# Patient Record
Sex: FEMALE | Race: WHITE | NOT HISPANIC OR LATINO | Employment: OTHER | URBAN - NONMETROPOLITAN AREA
[De-identification: names, ages, dates, MRNs, and addresses within clinical notes are randomized per-mention and may not be internally consistent; named-entity substitution may affect disease eponyms.]

---

## 2017-03-27 ENCOUNTER — OFFICE VISIT (OUTPATIENT)
Dept: INTERNAL MEDICINE | Facility: CLINIC | Age: 62
End: 2017-03-27

## 2017-03-27 VITALS
DIASTOLIC BLOOD PRESSURE: 60 MMHG | WEIGHT: 134 LBS | BODY MASS INDEX: 21.03 KG/M2 | HEART RATE: 87 BPM | HEIGHT: 67 IN | OXYGEN SATURATION: 94 % | SYSTOLIC BLOOD PRESSURE: 100 MMHG

## 2017-03-27 DIAGNOSIS — F32.A DEPRESSION, UNSPECIFIED DEPRESSION TYPE: Primary | ICD-10-CM

## 2017-03-27 DIAGNOSIS — E78.00 ELEVATED LDL CHOLESTEROL LEVEL: ICD-10-CM

## 2017-03-27 DIAGNOSIS — E55.9 VITAMIN D DEFICIENCY: ICD-10-CM

## 2017-03-27 DIAGNOSIS — E53.8 VITAMIN B12 DEFICIENCY: ICD-10-CM

## 2017-03-27 DIAGNOSIS — Z23 NEED FOR VACCINATION: ICD-10-CM

## 2017-03-27 DIAGNOSIS — Z79.899 ENCOUNTER FOR LONG-TERM CURRENT USE OF MEDICATION: ICD-10-CM

## 2017-03-27 DIAGNOSIS — G25.2 FINE TREMOR: ICD-10-CM

## 2017-03-27 LAB
25(OH)D3+25(OH)D2 SERPL-MCNC: 27.7 NG/ML
ALBUMIN SERPL-MCNC: 4.7 G/DL (ref 3.5–5)
ALBUMIN/GLOB SERPL: 1.6 G/DL (ref 1–2)
ALP SERPL-CCNC: 79 U/L (ref 38–126)
ALT SERPL-CCNC: 24 U/L (ref 13–69)
AST SERPL-CCNC: 27 U/L (ref 15–46)
BILIRUB SERPL-MCNC: 0.8 MG/DL (ref 0.2–1.3)
BUN SERPL-MCNC: 12 MG/DL (ref 7–20)
BUN/CREAT SERPL: 17.1 (ref 7.1–23.5)
CALCIUM SERPL-MCNC: 10 MG/DL (ref 8.4–10.2)
CHLORIDE SERPL-SCNC: 106 MMOL/L (ref 98–107)
CHOLEST SERPL-MCNC: 198 MG/DL (ref 0–199)
CO2 SERPL-SCNC: 30 MMOL/L (ref 26–30)
CREAT SERPL-MCNC: 0.7 MG/DL (ref 0.6–1.3)
GLOBULIN SER CALC-MCNC: 3 GM/DL
GLUCOSE SERPL-MCNC: 86 MG/DL (ref 74–98)
HDLC SERPL-MCNC: 53 MG/DL (ref 40–60)
LDLC SERPL CALC-MCNC: 119 MG/DL (ref 0–99)
POTASSIUM SERPL-SCNC: 4.2 MMOL/L (ref 3.5–5.1)
PROT SERPL-MCNC: 7.7 G/DL (ref 6.3–8.2)
SODIUM SERPL-SCNC: 144 MMOL/L (ref 137–145)
TRIGL SERPL-MCNC: 130 MG/DL
VIT B12 SERPL-MCNC: 554 PG/ML (ref 239–931)
VLDLC SERPL CALC-MCNC: 26 MG/DL

## 2017-03-27 PROCEDURE — 99214 OFFICE O/P EST MOD 30 MIN: CPT | Performed by: FAMILY MEDICINE

## 2017-03-27 RX ORDER — ESCITALOPRAM OXALATE 10 MG/1
10 TABLET ORAL DAILY
Qty: 90 TABLET | Refills: 3 | Status: SHIPPED | OUTPATIENT
Start: 2017-03-27 | End: 2018-04-09 | Stop reason: SDUPTHER

## 2017-03-27 RX ORDER — PROPRANOLOL HYDROCHLORIDE 10 MG/1
10 TABLET ORAL 3 TIMES DAILY
Qty: 90 TABLET | Refills: 5 | Status: SHIPPED | OUTPATIENT
Start: 2017-03-27 | End: 2017-04-28 | Stop reason: SDUPTHER

## 2017-03-27 RX ORDER — ERGOCALCIFEROL 1.25 MG/1
CAPSULE ORAL
COMMUNITY
Start: 2017-01-31 | End: 2017-05-18 | Stop reason: SDUPTHER

## 2017-03-27 NOTE — PROGRESS NOTES
Subjective   Milagro Sanderson is a 62 y.o. female.     History of Present Illness   Milagro had labs in September that showed a bit of elevated trig's.  She's taking vitamin D supplements and fish oils, as well as dialy B12 supplements.  She's fasting today.  VSS. NAD.    She has been getting a lot of fever blisters, and is curious about medication for those to keep them from occurring.    She's curious about getting the shingles shot.  Her tremors are getting worse, and she's curious if anything would help.     The following portions of the patient's history were reviewed and updated as appropriate: allergies, current medications, past social history and problem list.    Review of Systems   Constitutional: Negative for appetite change, chills, fatigue, fever and unexpected weight change.   HENT: Negative for congestion, ear pain, hearing loss, nosebleeds, postnasal drip, rhinorrhea, sore throat, tinnitus and trouble swallowing.    Eyes: Negative for photophobia, discharge and visual disturbance.   Respiratory: Negative for cough, chest tightness, shortness of breath and wheezing.    Cardiovascular: Negative for chest pain, palpitations and leg swelling.   Gastrointestinal: Negative for abdominal distention, abdominal pain, blood in stool, constipation, diarrhea, nausea and vomiting.   Endocrine: Negative for cold intolerance, heat intolerance, polydipsia, polyphagia and polyuria.   Musculoskeletal: Negative for arthralgias, back pain, joint swelling, myalgias, neck pain and neck stiffness.   Skin: Negative for color change, pallor, rash and wound.        Fever blisters     Allergic/Immunologic: Negative for environmental allergies, food allergies and immunocompromised state.   Neurological: Negative for dizziness, tremors, seizures, weakness, numbness and headaches.   Hematological: Negative for adenopathy. Does not bruise/bleed easily.   Psychiatric/Behavioral: Negative for agitation, behavioral problems, confusion,  "hallucinations, self-injury and suicidal ideas. The patient is not nervous/anxious.        Objective   /60  Pulse 87  Ht 67\" (170.2 cm)  Wt 134 lb (60.8 kg)  SpO2 94%  BMI 20.99 kg/m2  Physical Exam   Constitutional: She is oriented to person, place, and time. She appears well-developed and well-nourished. No distress.   HENT:   Head: Normocephalic and atraumatic.   Right Ear: External ear normal.   Left Ear: External ear normal.   Nose: Nose normal.   Mouth/Throat: Oropharynx is clear and moist.   Eyes: Conjunctivae and EOM are normal. Pupils are equal, round, and reactive to light. Right eye exhibits no discharge. Left eye exhibits no discharge. No scleral icterus.   Neck: Normal range of motion. Neck supple. No JVD present. No tracheal deviation present. No thyromegaly present.   Cardiovascular: Normal rate, regular rhythm, normal heart sounds and intact distal pulses.  Exam reveals no gallop and no friction rub.    No murmur heard.  Pulmonary/Chest: Effort normal and breath sounds normal. No stridor. No respiratory distress. She has no wheezes. She has no rales. She exhibits no tenderness.   Abdominal: Soft. Bowel sounds are normal. She exhibits no distension and no mass. There is no tenderness. There is no rebound and no guarding. No hernia.   Musculoskeletal: Normal range of motion. She exhibits no edema, tenderness or deformity.   Lymphadenopathy:     She has no cervical adenopathy.   Neurological: She is alert and oriented to person, place, and time. She has normal reflexes. She displays normal reflexes. No cranial nerve deficit. She exhibits normal muscle tone. Coordination abnormal.   Fine tremor of hands if needed.   Skin: Skin is warm and dry. No rash noted. No erythema. No pallor.   Fever blister on midline upper lip.   Psychiatric: She has a normal mood and affect. Her behavior is normal. Judgment normal.       Assessment/Plan   Problem List Items Addressed This Visit        Digestive    " Vitamin B12 deficiency    Relevant Orders    Lipid Panel    Comprehensive Metabolic Panel    Vitamin D 25 Hydroxy    Vitamin B12    Vitamin D deficiency    Relevant Orders    Lipid Panel    Comprehensive Metabolic Panel    Vitamin D 25 Hydroxy    Vitamin B12       Other    Depression - Primary    Relevant Medications    escitalopram (LEXAPRO) 10 MG tablet    Other Relevant Orders    Lipid Panel    Comprehensive Metabolic Panel    Vitamin D 25 Hydroxy    Vitamin B12    Elevated LDL cholesterol level    Relevant Orders    Lipid Panel    Comprehensive Metabolic Panel    Vitamin D 25 Hydroxy    Vitamin B12    Encounter for long-term current use of medication    Relevant Orders    Lipid Panel    Comprehensive Metabolic Panel    Vitamin D 25 Hydroxy    Vitamin B12    Need for vaccination    Relevant Medications    zoster vaccine live PF (ZOSTAVAX) 56001 UNT/0.65ML injection    Fine tremor    Relevant Medications    propranolol (INDERAL) 10 MG tablet           get labs, start propranolol for tremors, and fu in a month. Sooner if needed.

## 2017-04-14 ENCOUNTER — TRANSCRIBE ORDERS (OUTPATIENT)
Dept: MAMMOGRAPHY | Facility: HOSPITAL | Age: 62
End: 2017-04-14

## 2017-04-14 DIAGNOSIS — Z13.9 SCREENING: Primary | ICD-10-CM

## 2017-04-28 ENCOUNTER — OFFICE VISIT (OUTPATIENT)
Dept: INTERNAL MEDICINE | Facility: CLINIC | Age: 62
End: 2017-04-28

## 2017-04-28 VITALS
WEIGHT: 134 LBS | BODY MASS INDEX: 21.03 KG/M2 | OXYGEN SATURATION: 96 % | DIASTOLIC BLOOD PRESSURE: 70 MMHG | SYSTOLIC BLOOD PRESSURE: 110 MMHG | HEIGHT: 67 IN | HEART RATE: 63 BPM

## 2017-04-28 DIAGNOSIS — E78.00 ELEVATED LDL CHOLESTEROL LEVEL: ICD-10-CM

## 2017-04-28 DIAGNOSIS — E55.9 VITAMIN D DEFICIENCY: Primary | ICD-10-CM

## 2017-04-28 DIAGNOSIS — G25.2 FINE TREMOR: ICD-10-CM

## 2017-04-28 PROCEDURE — 99214 OFFICE O/P EST MOD 30 MIN: CPT | Performed by: FAMILY MEDICINE

## 2017-04-28 RX ORDER — PROPRANOLOL HYDROCHLORIDE 10 MG/1
20 TABLET ORAL 3 TIMES DAILY
Qty: 180 TABLET | Refills: 5 | Status: SHIPPED | OUTPATIENT
Start: 2017-04-28 | End: 2017-05-12 | Stop reason: SDUPTHER

## 2017-04-28 NOTE — PROGRESS NOTES
"Subjective   Milagro Sanderson is a 62 y.o. female.     History of Present Illness   Milagro's labs were all fine. She needs to restart vitamin D.  She states that her tremors are somewhat better and she can complete more daily tasks on 10mg TID of propranolol, but that her tremors are not totally gone.  She's fine with trying to go up to 20mg TID. We discussed orthostatic hypotension to watch for.      The following portions of the patient's history were reviewed and updated as appropriate: allergies, current medications, past social history and problem list.    Review of Systems   Constitutional: Negative for appetite change, chills, fatigue, fever and unexpected weight change.   HENT: Negative for congestion, ear pain, hearing loss, nosebleeds, postnasal drip, rhinorrhea, sore throat, tinnitus and trouble swallowing.    Eyes: Negative for photophobia, discharge and visual disturbance.   Respiratory: Negative for cough, chest tightness, shortness of breath and wheezing.    Cardiovascular: Negative for chest pain, palpitations and leg swelling.   Gastrointestinal: Negative for abdominal distention, abdominal pain, blood in stool, constipation, diarrhea, nausea and vomiting.   Endocrine: Negative for cold intolerance, heat intolerance, polydipsia, polyphagia and polyuria.   Musculoskeletal: Negative for arthralgias, back pain, joint swelling, myalgias, neck pain and neck stiffness.   Skin: Negative for color change, pallor, rash and wound.   Allergic/Immunologic: Negative for environmental allergies, food allergies and immunocompromised state.   Neurological: Negative for dizziness, tremors, seizures, weakness, numbness and headaches.   Hematological: Negative for adenopathy. Does not bruise/bleed easily.   Psychiatric/Behavioral: Negative for agitation, behavioral problems, confusion, hallucinations, self-injury and suicidal ideas. The patient is not nervous/anxious.        Objective   /70  Pulse 63  Ht 67\" (170.2 " cm)  Wt 134 lb (60.8 kg)  SpO2 96%  BMI 20.99 kg/m2  Physical Exam   Constitutional: She is oriented to person, place, and time. She appears well-developed and well-nourished. No distress.   HENT:   Head: Normocephalic and atraumatic.   Right Ear: External ear normal.   Left Ear: External ear normal.   Nose: Nose normal.   Mouth/Throat: Oropharynx is clear and moist.   Eyes: Conjunctivae and EOM are normal. Pupils are equal, round, and reactive to light. Right eye exhibits no discharge. Left eye exhibits no discharge. No scleral icterus.   Neck: Normal range of motion. Neck supple. No JVD present. No tracheal deviation present. No thyromegaly present.   Cardiovascular: Normal rate, regular rhythm, normal heart sounds and intact distal pulses.  Exam reveals no gallop and no friction rub.    No murmur heard.  Pulmonary/Chest: Effort normal and breath sounds normal. No stridor. No respiratory distress. She has no wheezes. She has no rales. She exhibits no tenderness.   Abdominal: Soft. Bowel sounds are normal. She exhibits no distension and no mass. There is no tenderness. There is no rebound and no guarding. No hernia.   Musculoskeletal: Normal range of motion. She exhibits no edema, tenderness or deformity.   Lymphadenopathy:     She has no cervical adenopathy.   Neurological: She is alert and oriented to person, place, and time. She has normal reflexes. She displays normal reflexes. No cranial nerve deficit. She exhibits normal muscle tone.   Skin: Skin is warm and dry. No rash noted. No erythema. No pallor.   Psychiatric: She has a normal mood and affect. Her behavior is normal. Judgment normal.       Assessment/Plan   Problem List Items Addressed This Visit        Digestive    Vitamin D deficiency - Primary       Other    Elevated LDL cholesterol level    Fine tremor    Relevant Medications    propranolol (INDERAL) 10 MG tablet        Fu in 8 weeks to see how she's doing with 20mg TID.  Sooner if needed.

## 2017-05-01 ENCOUNTER — HOSPITAL ENCOUNTER (OUTPATIENT)
Dept: MAMMOGRAPHY | Facility: HOSPITAL | Age: 62
Discharge: HOME | End: 2017-05-01
Attending: SURGERY | Admitting: SURGERY

## 2017-05-01 DIAGNOSIS — Z13.9 SCREENING: ICD-10-CM

## 2017-05-01 PROCEDURE — G0202 SCR MAMMO BI INCL CAD: HCPCS

## 2017-05-01 PROCEDURE — 77063 BREAST TOMOSYNTHESIS BI: CPT

## 2017-05-12 ENCOUNTER — TELEPHONE (OUTPATIENT)
Dept: INTERNAL MEDICINE | Facility: CLINIC | Age: 62
End: 2017-05-12

## 2017-05-12 DIAGNOSIS — G25.2 FINE TREMOR: ICD-10-CM

## 2017-05-12 RX ORDER — PROPRANOLOL HYDROCHLORIDE 10 MG/1
20 TABLET ORAL 3 TIMES DAILY
Qty: 180 TABLET | Refills: 5 | Status: SHIPPED | OUTPATIENT
Start: 2017-05-12 | End: 2017-11-07 | Stop reason: SDUPTHER

## 2017-05-18 RX ORDER — ERGOCALCIFEROL 1.25 MG/1
CAPSULE ORAL
Qty: 6 CAPSULE | Refills: 1 | Status: SHIPPED | OUTPATIENT
Start: 2017-05-18 | End: 2017-11-07 | Stop reason: SDUPTHER

## 2017-05-24 ENCOUNTER — OFFICE VISIT (OUTPATIENT)
Dept: SURGERY | Facility: CLINIC | Age: 62
End: 2017-05-24

## 2017-05-24 VITALS
BODY MASS INDEX: 21.19 KG/M2 | SYSTOLIC BLOOD PRESSURE: 110 MMHG | WEIGHT: 135 LBS | TEMPERATURE: 97.8 F | DIASTOLIC BLOOD PRESSURE: 68 MMHG | HEIGHT: 67 IN

## 2017-05-24 DIAGNOSIS — Z00.00 HEALTH CARE MAINTENANCE: Primary | ICD-10-CM

## 2017-05-24 PROCEDURE — 99214 OFFICE O/P EST MOD 30 MIN: CPT | Performed by: SURGERY

## 2017-10-11 ENCOUNTER — OFFICE VISIT (OUTPATIENT)
Dept: INTERNAL MEDICINE | Facility: CLINIC | Age: 62
End: 2017-10-11

## 2017-10-11 VITALS
BODY MASS INDEX: 22.13 KG/M2 | OXYGEN SATURATION: 97 % | WEIGHT: 141 LBS | SYSTOLIC BLOOD PRESSURE: 120 MMHG | TEMPERATURE: 97.8 F | HEART RATE: 64 BPM | HEIGHT: 67 IN | DIASTOLIC BLOOD PRESSURE: 70 MMHG

## 2017-10-11 DIAGNOSIS — E78.00 ELEVATED LDL CHOLESTEROL LEVEL: ICD-10-CM

## 2017-10-11 DIAGNOSIS — Z79.899 ENCOUNTER FOR LONG-TERM CURRENT USE OF MEDICATION: ICD-10-CM

## 2017-10-11 DIAGNOSIS — Z23 NEED FOR VACCINATION: Primary | ICD-10-CM

## 2017-10-11 DIAGNOSIS — G25.2 FINE TREMOR: ICD-10-CM

## 2017-10-11 PROCEDURE — 90471 IMMUNIZATION ADMIN: CPT | Performed by: FAMILY MEDICINE

## 2017-10-11 PROCEDURE — 90686 IIV4 VACC NO PRSV 0.5 ML IM: CPT | Performed by: FAMILY MEDICINE

## 2017-10-11 PROCEDURE — 99213 OFFICE O/P EST LOW 20 MIN: CPT | Performed by: FAMILY MEDICINE

## 2017-10-11 NOTE — PROGRESS NOTES
"Subjective   Milagro Sanderson is a 62 y.o. female.     History of Present Illness   Milagro is due to have yearly labs checked.  She has elevated LDL in the past we watch.  She also has vitamin D def, which she takes supplements for.  She has been taking the omega 3 fa's for her lipids.  She's not fasting today.  VSS. NAD.  We will try a trail of replacing the propranolol with bystolic, and seeing if the bystolic helps the tremors any without affecting her salivary glands.  They have been bothering her since she started the propranolol.  It could be parasympathritic hyperactivity due to that medication, causing the \"bumps\" in the roof of her mouth.      The following portions of the patient's history were reviewed and updated as appropriate: allergies, current medications, past social history and problem list.    Review of Systems   Constitutional: Negative for appetite change, chills, fatigue, fever and unexpected weight change.   HENT: Positive for mouth sores. Negative for congestion, ear pain, hearing loss, nosebleeds, postnasal drip, rhinorrhea, sore throat, tinnitus and trouble swallowing.    Eyes: Negative for photophobia, discharge and visual disturbance.   Respiratory: Negative for cough, chest tightness, shortness of breath and wheezing.    Cardiovascular: Negative for chest pain, palpitations and leg swelling.   Gastrointestinal: Negative for abdominal distention, abdominal pain, blood in stool, constipation, diarrhea, nausea and vomiting.   Endocrine: Negative for cold intolerance, heat intolerance, polydipsia, polyphagia and polyuria.   Musculoskeletal: Negative for arthralgias, back pain, joint swelling, myalgias, neck pain and neck stiffness.   Skin: Negative for color change, pallor, rash and wound.   Allergic/Immunologic: Negative for environmental allergies, food allergies and immunocompromised state.   Neurological: Negative for dizziness, tremors, seizures, weakness, numbness and headaches. " "  Hematological: Negative for adenopathy. Does not bruise/bleed easily.   Psychiatric/Behavioral: Negative for agitation, behavioral problems, confusion, hallucinations, self-injury and suicidal ideas. The patient is not nervous/anxious.        Objective   /70  Pulse 64  Temp 97.8 °F (36.6 °C)  Ht 67\" (170.2 cm)  Wt 141 lb (64 kg)  SpO2 97%  BMI 22.08 kg/m2  Physical Exam   Constitutional: She is oriented to person, place, and time. She appears well-developed and well-nourished. No distress.   HENT:   Head: Normocephalic and atraumatic.   Right Ear: External ear normal.   Left Ear: External ear normal.   Nose: Nose normal.   Mouth/Throat: Oropharynx is clear and moist.   Hyperactive salivary glands on the soft palate noted.   Eyes: Conjunctivae and EOM are normal. Pupils are equal, round, and reactive to light. Right eye exhibits no discharge. Left eye exhibits no discharge. No scleral icterus.   Neck: Normal range of motion. Neck supple. No JVD present. No tracheal deviation present. No thyromegaly present.   Cardiovascular: Normal rate, regular rhythm, normal heart sounds and intact distal pulses.  Exam reveals no gallop and no friction rub.    No murmur heard.  Pulmonary/Chest: Effort normal and breath sounds normal. No stridor. No respiratory distress. She has no wheezes. She has no rales. She exhibits no tenderness.   Abdominal: Soft. Bowel sounds are normal. She exhibits no distension and no mass. There is no tenderness. There is no rebound and no guarding. No hernia.   Musculoskeletal: Normal range of motion. She exhibits no edema, tenderness or deformity.   Lymphadenopathy:     She has no cervical adenopathy.   Neurological: She is alert and oriented to person, place, and time. She has normal reflexes. She displays normal reflexes. No cranial nerve deficit. She exhibits normal muscle tone.   Skin: Skin is warm and dry. No rash noted. No erythema. No pallor.   Psychiatric: She has a normal mood " and affect. Her behavior is normal. Judgment normal.       Assessment/Plan   Problem List Items Addressed This Visit        Other    Elevated LDL cholesterol level    Relevant Orders    Lipid Panel    Comprehensive Metabolic Panel    CBC & Differential    Urinalysis With / Microscopic If Indicated - Urine, Clean Catch    T4, Free    TSH    Vitamin D 25 Hydroxy    Encounter for long-term current use of medication    Relevant Orders    Lipid Panel    Comprehensive Metabolic Panel    CBC & Differential    Urinalysis With / Microscopic If Indicated - Urine, Clean Catch    T4, Free    TSH    Vitamin D 25 Hydroxy    Need for vaccination - Primary    Relevant Orders    Flu Vaccine Quad PF 3YR+ (Completed)    Lipid Panel    Comprehensive Metabolic Panel    CBC & Differential    Urinalysis With / Microscopic If Indicated - Urine, Clean Catch    T4, Free    TSH    Vitamin D 25 Hydroxy    Fine tremor    Relevant Orders    Lipid Panel    Comprehensive Metabolic Panel    CBC & Differential    Urinalysis With / Microscopic If Indicated - Urine, Clean Catch    T4, Free    TSH    Vitamin D 25 Hydroxy           fu in a month. Sooner if needed.

## 2017-10-12 LAB
25(OH)D3+25(OH)D2 SERPL-MCNC: 31.7 NG/ML
ALBUMIN SERPL-MCNC: 4.4 G/DL (ref 3.5–5)
ALBUMIN/GLOB SERPL: 1.5 G/DL (ref 1–2)
ALP SERPL-CCNC: 75 U/L (ref 38–126)
ALT SERPL-CCNC: 25 U/L (ref 13–69)
AST SERPL-CCNC: 22 U/L (ref 15–46)
BASOPHILS # BLD AUTO: 0.03 10*3/MM3 (ref 0–0.2)
BASOPHILS NFR BLD AUTO: 0.8 % (ref 0–2.5)
BILIRUB SERPL-MCNC: 0.8 MG/DL (ref 0.2–1.3)
BUN SERPL-MCNC: 11 MG/DL (ref 7–20)
BUN/CREAT SERPL: 13.8 (ref 7.1–23.5)
CALCIUM SERPL-MCNC: 10 MG/DL (ref 8.4–10.2)
CHLORIDE SERPL-SCNC: 106 MMOL/L (ref 98–107)
CHOLEST SERPL-MCNC: 164 MG/DL (ref 0–199)
CO2 SERPL-SCNC: 27 MMOL/L (ref 26–30)
CREAT SERPL-MCNC: 0.8 MG/DL (ref 0.6–1.3)
EOSINOPHIL # BLD AUTO: 0.1 10*3/MM3 (ref 0–0.7)
EOSINOPHIL NFR BLD AUTO: 2.7 % (ref 0–7)
ERYTHROCYTE [DISTWIDTH] IN BLOOD BY AUTOMATED COUNT: 11.4 % (ref 11.5–14.5)
GLOBULIN SER CALC-MCNC: 2.9 GM/DL
GLUCOSE SERPL-MCNC: 89 MG/DL (ref 74–98)
HCT VFR BLD AUTO: 41.4 % (ref 37–47)
HDLC SERPL-MCNC: 44 MG/DL (ref 40–60)
HGB BLD-MCNC: 14.3 G/DL (ref 12–16)
IMM GRANULOCYTES # BLD: 0.01 10*3/MM3 (ref 0–0.06)
IMM GRANULOCYTES NFR BLD: 0.3 % (ref 0–0.6)
LDLC SERPL CALC-MCNC: 91 MG/DL (ref 0–99)
LYMPHOCYTES # BLD AUTO: 0.69 10*3/MM3 (ref 0.6–3.4)
LYMPHOCYTES NFR BLD AUTO: 18.7 % (ref 10–50)
MCH RBC QN AUTO: 32.1 PG (ref 27–31)
MCHC RBC AUTO-ENTMCNC: 34.5 G/DL (ref 30–37)
MCV RBC AUTO: 92.8 FL (ref 81–99)
MONOCYTES # BLD AUTO: 0.41 10*3/MM3 (ref 0–0.9)
MONOCYTES NFR BLD AUTO: 11.1 % (ref 0–12)
NEUTROPHILS # BLD AUTO: 2.45 10*3/MM3 (ref 2–6.9)
NEUTROPHILS NFR BLD AUTO: 66.4 % (ref 37–80)
NRBC BLD AUTO-RTO: 0 /100 WBC (ref 0–0)
PLATELET # BLD AUTO: 240 10*3/MM3 (ref 130–400)
POTASSIUM SERPL-SCNC: 4.9 MMOL/L (ref 3.5–5.1)
PROT SERPL-MCNC: 7.3 G/DL (ref 6.3–8.2)
RBC # BLD AUTO: 4.46 10*6/MM3 (ref 4.2–5.4)
SODIUM SERPL-SCNC: 145 MMOL/L (ref 137–145)
T4 FREE SERPL-MCNC: 1.13 NG/DL (ref 0.78–2.19)
TRIGL SERPL-MCNC: 146 MG/DL
TSH SERPL DL<=0.005 MIU/L-ACNC: 2.5 MIU/ML (ref 0.47–4.68)
VLDLC SERPL CALC-MCNC: 29.2 MG/DL
WBC # BLD AUTO: 3.69 10*3/MM3 (ref 4.8–10.8)

## 2017-10-13 DIAGNOSIS — R82.90 URINE ABNORMALITY: Primary | ICD-10-CM

## 2017-10-16 LAB
APPEARANCE UR: CLEAR
BACTERIA #/AREA URNS HPF: NORMAL /HPF
BACTERIA UR CULT: ABNORMAL
BILIRUB UR QL STRIP: NEGATIVE
COLOR UR: YELLOW
EPI CELLS #/AREA URNS HPF: NORMAL /HPF
GLUCOSE UR QL: NEGATIVE
HGB UR QL STRIP: NEGATIVE
KETONES UR QL STRIP: NEGATIVE
LEUKOCYTE ESTERASE UR QL STRIP: ABNORMAL
MICRO URNS: ABNORMAL
MUCOUS THREADS URNS QL MICRO: PRESENT /HPF
NITRITE UR QL STRIP: NEGATIVE
OTHER ANTIBIOTIC SUSC ISLT: ABNORMAL
PH UR STRIP: 6.5 [PH] (ref 5–7.5)
PROT UR QL STRIP: NEGATIVE
RBC #/AREA URNS HPF: NORMAL /HPF
SP GR UR: 1.01 (ref 1–1.03)
URINALYSIS REFLEX: ABNORMAL
UROBILINOGEN UR STRIP-MCNC: 1 MG/DL (ref 0.2–1)
WBC #/AREA URNS HPF: NORMAL /HPF

## 2017-11-07 ENCOUNTER — OFFICE VISIT (OUTPATIENT)
Dept: INTERNAL MEDICINE | Facility: CLINIC | Age: 62
End: 2017-11-07

## 2017-11-07 VITALS
DIASTOLIC BLOOD PRESSURE: 70 MMHG | SYSTOLIC BLOOD PRESSURE: 120 MMHG | HEART RATE: 62 BPM | BODY MASS INDEX: 22.13 KG/M2 | OXYGEN SATURATION: 97 % | WEIGHT: 141 LBS | HEIGHT: 67 IN

## 2017-11-07 DIAGNOSIS — G25.2 FINE TREMOR: ICD-10-CM

## 2017-11-07 DIAGNOSIS — E55.9 VITAMIN D DEFICIENCY: Primary | ICD-10-CM

## 2017-11-07 PROCEDURE — 99213 OFFICE O/P EST LOW 20 MIN: CPT | Performed by: FAMILY MEDICINE

## 2017-11-07 RX ORDER — PROPRANOLOL HYDROCHLORIDE 20 MG/1
40 TABLET ORAL 3 TIMES DAILY
Qty: 270 TABLET | Refills: 3 | Status: SHIPPED | OUTPATIENT
Start: 2017-11-07 | End: 2018-04-09 | Stop reason: SDUPTHER

## 2017-11-07 RX ORDER — ERGOCALCIFEROL 1.25 MG/1
50000 CAPSULE ORAL
Qty: 6 CAPSULE | Refills: 3 | Status: SHIPPED | OUTPATIENT
Start: 2017-11-07 | End: 2018-04-10

## 2017-11-07 NOTE — PROGRESS NOTES
"Arlette Sanderson is a 62 y.o. female.     History of Present Illness   sHE HAS HAD THE ROOF OF HER MOUTH THAT GOT BLISTERS OVER THE SALIVARY GLANDS.  She was changed from propranolol to bystolic. She states the blisters got better, but now she has bad tremors again.  She's wanting to start back the propranolol at 10mg TID.        The following portions of the patient's history were reviewed and updated as appropriate: allergies, current medications, past social history and problem list.    Review of Systems   Constitutional: Negative for appetite change, chills, fatigue, fever and unexpected weight change.   HENT: Negative for congestion, ear pain, hearing loss, nosebleeds, postnasal drip, rhinorrhea, sore throat, tinnitus and trouble swallowing.    Eyes: Negative for photophobia, discharge and visual disturbance.   Respiratory: Negative for cough, chest tightness, shortness of breath and wheezing.    Cardiovascular: Negative for chest pain, palpitations and leg swelling.   Gastrointestinal: Negative for abdominal distention, abdominal pain, blood in stool, constipation, diarrhea, nausea and vomiting.   Endocrine: Negative for cold intolerance, heat intolerance, polydipsia, polyphagia and polyuria.   Musculoskeletal: Negative for arthralgias, back pain, joint swelling, myalgias, neck pain and neck stiffness.   Skin: Negative for color change, pallor, rash and wound.   Allergic/Immunologic: Negative for environmental allergies, food allergies and immunocompromised state.   Neurological: Positive for tremors. Negative for dizziness, seizures, weakness, numbness and headaches.   Hematological: Negative for adenopathy. Does not bruise/bleed easily.   Psychiatric/Behavioral: Negative for agitation, behavioral problems, confusion, hallucinations, self-injury and suicidal ideas. The patient is not nervous/anxious.        Objective   /70  Pulse 62  Ht 67\" (170.2 cm)  Wt 141 lb (64 kg)  SpO2 97%  BMI " 22.08 kg/m2  Physical Exam   Constitutional: She is oriented to person, place, and time. She appears well-developed and well-nourished. No distress.   HENT:   Head: Normocephalic and atraumatic.   Right Ear: External ear normal.   Left Ear: External ear normal.   Nose: Nose normal.   Mouth/Throat: Oropharynx is clear and moist.   Eyes: Conjunctivae and EOM are normal. Pupils are equal, round, and reactive to light. Right eye exhibits no discharge. Left eye exhibits no discharge. No scleral icterus.   Neck: Normal range of motion. Neck supple. No JVD present. No tracheal deviation present. No thyromegaly present.   Cardiovascular: Normal rate, regular rhythm, normal heart sounds and intact distal pulses.  Exam reveals no gallop and no friction rub.    No murmur heard.  Pulmonary/Chest: Effort normal and breath sounds normal. No stridor. No respiratory distress. She has no wheezes. She has no rales. She exhibits no tenderness.   Abdominal: Soft. Bowel sounds are normal. She exhibits no distension and no mass. There is no tenderness. There is no rebound and no guarding. No hernia.   Musculoskeletal: Normal range of motion. She exhibits no edema, tenderness or deformity.   Lymphadenopathy:     She has no cervical adenopathy.   Neurological: She is alert and oriented to person, place, and time. She has normal reflexes. She displays normal reflexes. No cranial nerve deficit. She exhibits normal muscle tone.   Skin: Skin is warm and dry. No rash noted. No erythema. No pallor.   Psychiatric: She has a normal mood and affect. Her behavior is normal. Judgment normal.       Assessment/Plan   Problem List Items Addressed This Visit        Digestive    Vitamin D deficiency - Primary    Relevant Medications    vitamin D (ERGOCALCIFEROL) 76389 units capsule capsule       Other    Fine tremor    Relevant Medications    propranolol (INDERAL) 20 MG tablet        FU in 6 months, sooner if needed.

## 2018-04-09 ENCOUNTER — OFFICE VISIT (OUTPATIENT)
Dept: INTERNAL MEDICINE | Facility: CLINIC | Age: 63
End: 2018-04-09

## 2018-04-09 VITALS
HEART RATE: 66 BPM | BODY MASS INDEX: 22.6 KG/M2 | OXYGEN SATURATION: 99 % | WEIGHT: 144 LBS | HEIGHT: 67 IN | DIASTOLIC BLOOD PRESSURE: 64 MMHG | SYSTOLIC BLOOD PRESSURE: 110 MMHG | TEMPERATURE: 97.9 F | RESPIRATION RATE: 12 BRPM

## 2018-04-09 DIAGNOSIS — Z12.39 BREAST CANCER SCREENING: ICD-10-CM

## 2018-04-09 DIAGNOSIS — B00.1 RECURRENT COLD SORES: Primary | ICD-10-CM

## 2018-04-09 DIAGNOSIS — F32.A DEPRESSION, UNSPECIFIED DEPRESSION TYPE: ICD-10-CM

## 2018-04-09 DIAGNOSIS — E55.9 VITAMIN D DEFICIENCY: ICD-10-CM

## 2018-04-09 DIAGNOSIS — E53.8 VITAMIN B12 DEFICIENCY: ICD-10-CM

## 2018-04-09 DIAGNOSIS — E78.00 ELEVATED LDL CHOLESTEROL LEVEL: ICD-10-CM

## 2018-04-09 DIAGNOSIS — Z23 NEED FOR ZOSTER VACCINE: ICD-10-CM

## 2018-04-09 DIAGNOSIS — Z23 NEED FOR PNEUMOCOCCAL VACCINE: ICD-10-CM

## 2018-04-09 DIAGNOSIS — IMO0001 NORMAL BODY MASS INDEX (BMI): ICD-10-CM

## 2018-04-09 DIAGNOSIS — G25.2 FINE TREMOR: ICD-10-CM

## 2018-04-09 DIAGNOSIS — Z11.59 NEED FOR HEPATITIS C SCREENING TEST: ICD-10-CM

## 2018-04-09 PROBLEM — B35.1 ONYCHOMYCOSIS: Status: ACTIVE | Noted: 2018-04-09

## 2018-04-09 PROBLEM — G25.0 HEREDITARY ESSENTIAL TREMOR: Status: ACTIVE | Noted: 2018-04-09

## 2018-04-09 PROCEDURE — 90670 PCV13 VACCINE IM: CPT | Performed by: FAMILY MEDICINE

## 2018-04-09 PROCEDURE — G0009 ADMIN PNEUMOCOCCAL VACCINE: HCPCS | Performed by: FAMILY MEDICINE

## 2018-04-09 PROCEDURE — 99214 OFFICE O/P EST MOD 30 MIN: CPT | Performed by: FAMILY MEDICINE

## 2018-04-09 RX ORDER — ESCITALOPRAM OXALATE 10 MG/1
10 TABLET ORAL DAILY
Qty: 90 TABLET | Refills: 3 | Status: SHIPPED | OUTPATIENT
Start: 2018-04-09 | End: 2019-04-29 | Stop reason: SDUPTHER

## 2018-04-09 RX ORDER — PROPRANOLOL HYDROCHLORIDE 20 MG/1
20 TABLET ORAL 3 TIMES DAILY
Qty: 270 TABLET | Refills: 3 | Status: SHIPPED | OUTPATIENT
Start: 2018-04-09 | End: 2019-04-29

## 2018-04-09 RX ORDER — ACYCLOVIR 400 MG/1
400 TABLET ORAL 3 TIMES DAILY
Qty: 15 TABLET | Refills: 5 | Status: SHIPPED | OUTPATIENT
Start: 2018-04-09 | End: 2019-08-20 | Stop reason: SDUPTHER

## 2018-04-09 NOTE — PROGRESS NOTES
"Subjective    Milagro Sanderson is a 63 y.o. female here for:  Chief Complaint   Patient presents with   • Establish Care     Former patient of Dr. Willis   • Depression     History of Present Illness     She notes always going to Dr. Amaral for her breast exams. She notes having fibrous breasts and is coming up due for screening. Asks if I can do this for her.     Has been getting a lot of fever blisters, lesions seems to be coming again before one heals. Over the counter medicine not helping as much as it was. She's taken a pill before for this, has not had for some time. Has had stress in life. Unsure if Lexapro is still helping with mood. She has been taking Lexapro a long time she reports. Unsure if she wants to change medicines, though.    Fine motor issues affected by tremor. Taking propranolol 20 mg tid. Was taking 40 mg tid but she did not like taking two pills at a time, so she dropped dose. It did not cause fatigue. Not keen on adding another medicine.     Dr. Willis had discussed Zostavax with her previously but she did not take it. Asks about Shingrix. Also needs pneumonia vaccination.     Fasting for labs. Taking high dose vitamin D supplement as well as over the counter multivitamin.     The following portions of the patient's history were reviewed and updated as appropriate: allergies, current medications, past family history, past medical history, past social history, past surgical history and problem list.    Review of Systems   Respiratory: Negative for shortness of breath.    Cardiovascular: Negative for chest pain.   Neurological: Positive for tremors.   Psychiatric/Behavioral: Positive for behavioral problems (irritable at times).       Vitals:    04/09/18 0901   BP: 110/64   Pulse: 66   Resp: 12   Temp: 97.9 °F (36.6 °C)   SpO2: 99%   Weight: 65.3 kg (144 lb)   Height: 170.2 cm (67.01\")         Objective   Physical Exam   Constitutional: She is oriented to person, place, and time. Vital signs are " normal. She appears well-developed and well-nourished. She is active.  Non-toxic appearance. She does not appear ill. No distress.   HENT:   Head: Normocephalic and atraumatic. Hair is normal.   Right Ear: Hearing and external ear normal.   Left Ear: Hearing and external ear normal.   Nose: Nose normal.   Mouth/Throat: Oropharynx is clear and moist. Mucous membranes are not dry. She does not have dentures. No oral lesions. Normal dentition. No uvula swelling.   No lesions to lips at this time, wearing lipstick   Eyes: EOM and lids are normal. Pupils are equal, round, and reactive to light. No scleral icterus.   Neck: Neck supple.   Cardiovascular: Normal rate, regular rhythm and normal heart sounds.    No murmur heard.  Pulmonary/Chest: Effort normal and breath sounds normal.   Musculoskeletal: She exhibits no edema or deformity.   Neurological: She is alert and oriented to person, place, and time. She displays no tremor (no tremor at this time). No cranial nerve deficit. Gait normal.   Skin: Skin is warm and dry. No rash noted. She is not diaphoretic. No cyanosis. Nails show no clubbing.   Psychiatric: She has a normal mood and affect. Her speech is normal and behavior is normal. Judgment and thought content normal. Cognition and memory are normal.   Nursing note and vitals reviewed.        Assessment/Plan     Milagro was seen today for establish care and depression.    Diagnoses and all orders for this visit:    Recurrent cold sores  Comments:  Start Acyclovir at first sign of outbreak.  Orders:  -     acyclovir (ZOVIRAX) 400 MG tablet; Take 1 tablet by mouth 3 (Three) Times a Day for 5 days. START AT FIRST SIGN OF COLD SORE  -     CBC & Differential    Fine tremor  Comments:  Continue propranolol for now. BP is good range. If worsens need to consider primidone. Past thyroid labs normal.  Orders:  -     propranolol (INDERAL) 20 MG tablet; Take 1 tablet by mouth 3 (Three) Times a Day.    Depression, unspecified  depression type  Comments:  Stable. Continue Lexapro. If mood worsens need to consider new medicine, may have burn out of SSRI.  Orders:  -     escitalopram (LEXAPRO) 10 MG tablet; Take 1 tablet by mouth Daily.    Vitamin B12 deficiency  -     CBC & Differential  -     Vitamin B12 & Folate    Vitamin D deficiency  -     Vitamin D 25 Hydroxy    Elevated LDL cholesterol level  -     Comprehensive Metabolic Panel  -     Lipid Panel    Normal body mass index (BMI)    Need for pneumococcal vaccine  -     Pneumococcal Conjugate Vaccine 13-Valent All (PCV13)    Need for zoster vaccine  -     Zoster Vac Recomb Adjuvanted 50 MCG reconstituted suspension; Inject 1 each into the shoulder, thigh, or buttocks 1 (One) Time for 1 dose.    Need for hepatitis C screening test  -     Hepatitis C Antibody    Breast cancer screening  -     Mammo Screening Digital Tomosynthesis Bilateral With CAD        · Prescription sent for Shingrix. Vaccination discussed in detail including efficacy compared to Zostavax and need for two injections two months apart. Discussed likely myalgias after vaccination as greater than 40% of patients complained of this in clinical trials. If not covered by insurance, recommend waiting until it is covered (if not affordable). Even if Zostavax was previously received, Shingrix is recommended.  · Pneumonia vaccination discussed and given, needs Pneumovax 23 a year from now.  ·     Return in about 6 months (around 10/9/2018) for Medicare Wellness.    Mae Valle MD    Please note that portions of this note may have been completed with a voice recognition program. Efforts were made to edit dictation, but occasionally words are mistranscribed.

## 2018-04-10 LAB
25(OH)D3+25(OH)D2 SERPL-MCNC: 33.7 NG/ML
ALBUMIN SERPL-MCNC: 4.5 G/DL (ref 3.5–5)
ALBUMIN/GLOB SERPL: 1.5 G/DL (ref 1–2)
ALP SERPL-CCNC: 87 U/L (ref 38–126)
ALT SERPL-CCNC: 27 U/L (ref 13–69)
AST SERPL-CCNC: 24 U/L (ref 15–46)
BASOPHILS # BLD AUTO: 0.04 10*3/MM3 (ref 0–0.2)
BASOPHILS NFR BLD AUTO: 0.9 % (ref 0–2.5)
BILIRUB SERPL-MCNC: 0.8 MG/DL (ref 0.2–1.3)
BUN SERPL-MCNC: 20 MG/DL (ref 7–20)
BUN/CREAT SERPL: 33.3 (ref 7.1–23.5)
CALCIUM SERPL-MCNC: 10.3 MG/DL (ref 8.4–10.2)
CHLORIDE SERPL-SCNC: 104 MMOL/L (ref 98–107)
CHOLEST SERPL-MCNC: 187 MG/DL (ref 0–199)
CO2 SERPL-SCNC: 30 MMOL/L (ref 26–30)
CREAT SERPL-MCNC: 0.6 MG/DL (ref 0.6–1.3)
EOSINOPHIL # BLD AUTO: 0.11 10*3/MM3 (ref 0–0.7)
EOSINOPHIL NFR BLD AUTO: 2.4 % (ref 0–7)
ERYTHROCYTE [DISTWIDTH] IN BLOOD BY AUTOMATED COUNT: 11.7 % (ref 11.5–14.5)
FOLATE SERPL-MCNC: 16.7 NG/ML
GFR SERPLBLD CREATININE-BSD FMLA CKD-EPI: 100 ML/MIN/1.73
GFR SERPLBLD CREATININE-BSD FMLA CKD-EPI: 122 ML/MIN/1.73
GLOBULIN SER CALC-MCNC: 3 GM/DL
GLUCOSE SERPL-MCNC: 87 MG/DL (ref 74–98)
HCT VFR BLD AUTO: 40.4 % (ref 37–47)
HCV AB S/CO SERPL IA: <0.1 S/CO RATIO (ref 0–0.9)
HDLC SERPL-MCNC: 44 MG/DL (ref 40–60)
HGB BLD-MCNC: 13.9 G/DL (ref 12–16)
IMM GRANULOCYTES # BLD: 0.01 10*3/MM3 (ref 0–0.06)
IMM GRANULOCYTES NFR BLD: 0.2 % (ref 0–0.6)
LDLC SERPL CALC-MCNC: 107 MG/DL (ref 0–99)
LYMPHOCYTES # BLD AUTO: 1.17 10*3/MM3 (ref 0.6–3.4)
LYMPHOCYTES NFR BLD AUTO: 25.9 % (ref 10–50)
MCH RBC QN AUTO: 32.4 PG (ref 27–31)
MCHC RBC AUTO-ENTMCNC: 34.4 G/DL (ref 30–37)
MCV RBC AUTO: 94.2 FL (ref 81–99)
MONOCYTES # BLD AUTO: 0.46 10*3/MM3 (ref 0–0.9)
MONOCYTES NFR BLD AUTO: 10.2 % (ref 0–12)
NEUTROPHILS # BLD AUTO: 2.73 10*3/MM3 (ref 2–6.9)
NEUTROPHILS NFR BLD AUTO: 60.4 % (ref 37–80)
NRBC BLD AUTO-RTO: 0 /100 WBC (ref 0–0)
PLATELET # BLD AUTO: 247 10*3/MM3 (ref 130–400)
POTASSIUM SERPL-SCNC: 4.8 MMOL/L (ref 3.5–5.1)
PROT SERPL-MCNC: 7.5 G/DL (ref 6.3–8.2)
RBC # BLD AUTO: 4.29 10*6/MM3 (ref 4.2–5.4)
SODIUM SERPL-SCNC: 144 MMOL/L (ref 137–145)
TRIGL SERPL-MCNC: 181 MG/DL
VIT B12 SERPL-MCNC: 824 PG/ML (ref 239–931)
VLDLC SERPL CALC-MCNC: 36.2 MG/DL
WBC # BLD AUTO: 4.52 10*3/MM3 (ref 4.8–10.8)

## 2018-04-10 RX ORDER — ACETAMINOPHEN 160 MG
2000 TABLET,DISINTEGRATING ORAL DAILY
Qty: 90 CAPSULE | Refills: 3 | Status: SHIPPED | OUTPATIENT
Start: 2018-04-10 | End: 2019-01-15 | Stop reason: ALTCHOICE

## 2018-05-15 ENCOUNTER — APPOINTMENT (OUTPATIENT)
Dept: MAMMOGRAPHY | Facility: HOSPITAL | Age: 63
End: 2018-05-15

## 2018-06-15 ENCOUNTER — HOSPITAL ENCOUNTER (OUTPATIENT)
Dept: MAMMOGRAPHY | Facility: HOSPITAL | Age: 63
Discharge: HOME | End: 2018-06-15
Admitting: FAMILY MEDICINE

## 2018-06-15 PROCEDURE — 77063 BREAST TOMOSYNTHESIS BI: CPT

## 2018-06-15 PROCEDURE — 77067 SCR MAMMO BI INCL CAD: CPT

## 2018-06-26 ENCOUNTER — OFFICE VISIT (OUTPATIENT)
Dept: INTERNAL MEDICINE | Facility: CLINIC | Age: 63
End: 2018-06-26

## 2018-06-26 VITALS
WEIGHT: 144 LBS | RESPIRATION RATE: 12 BRPM | TEMPERATURE: 98.1 F | HEART RATE: 63 BPM | BODY MASS INDEX: 22.6 KG/M2 | OXYGEN SATURATION: 97 % | HEIGHT: 67 IN | DIASTOLIC BLOOD PRESSURE: 62 MMHG | SYSTOLIC BLOOD PRESSURE: 110 MMHG

## 2018-06-26 DIAGNOSIS — R21 ERYTHEMATOUS RASH: Primary | ICD-10-CM

## 2018-06-26 PROCEDURE — 99213 OFFICE O/P EST LOW 20 MIN: CPT | Performed by: FAMILY MEDICINE

## 2018-06-26 RX ORDER — CLOTRIMAZOLE 1 %
CREAM (GRAM) TOPICAL 2 TIMES DAILY
Qty: 45 G | Refills: 0 | Status: SHIPPED | OUTPATIENT
Start: 2018-06-26 | End: 2018-11-27

## 2018-06-27 NOTE — PROGRESS NOTES
"Subjective    Milagro Sanderson is a 63 y.o. female here for:  Chief Complaint   Patient presents with   • spot on arm     Noticed spot on arm in birth shun last week seems to have grown      Rash on upper outer riught arm for a week or so. Started smaller and has grown. Round and red. A bit itchy. Has been around a new dog. Lesion is overlying a birthmark so she was worried about possible skin cancer.           The following portions of the patient's history were reviewed and updated as appropriate: allergies, current medications, past family history, past medical history, past social history, past surgical history and problem list.    Review of Systems   Constitutional: Negative for fever.       Vitals:    06/26/18 1604   BP: 110/62   Pulse: 63   Resp: 12   Temp: 98.1 °F (36.7 °C)   SpO2: 97%   Weight: 65.3 kg (144 lb)   Height: 170.2 cm (67\")         Objective   Physical Exam   Constitutional: She is oriented to person, place, and time. Vital signs are normal. She appears well-developed and well-nourished. She is active. She does not appear ill. No distress.   HENT:   Head: Normocephalic and atraumatic.   Right Ear: Hearing normal.   Left Ear: Hearing normal.   Mouth/Throat: Mucous membranes are not dry.   Eyes: EOM are normal. No scleral icterus.   Neck: Neck supple.   Pulmonary/Chest: Effort normal.   Neurological: She is alert and oriented to person, place, and time. No cranial nerve deficit.   Skin: Skin is warm. She is not diaphoretic.        Psychiatric: She has a normal mood and affect. Her behavior is normal.   Nursing note and vitals reviewed.          Assessment/Plan     Problem List Items Addressed This Visit     None      Visit Diagnoses     Erythematous rash    -  Primary    Relevant Medications    clotrimazole (LOTRIMIN) 1 % cream          · If not improved in two weeks needs to return to clinic     Return for As scheduled previously.    Mae Valle MD    Please note that portions of this note " may have been completed with a voice recognition program. Efforts were made to edit dictation, but occasionally words are mistranscribed.

## 2018-11-27 ENCOUNTER — OFFICE VISIT (OUTPATIENT)
Dept: INTERNAL MEDICINE | Facility: CLINIC | Age: 63
End: 2018-11-27

## 2018-11-27 VITALS
TEMPERATURE: 97.6 F | RESPIRATION RATE: 16 BRPM | OXYGEN SATURATION: 98 % | HEART RATE: 62 BPM | HEIGHT: 67 IN | BODY MASS INDEX: 22.35 KG/M2 | WEIGHT: 142.4 LBS

## 2018-11-27 DIAGNOSIS — G25.0 HEREDITARY ESSENTIAL TREMOR: ICD-10-CM

## 2018-11-27 DIAGNOSIS — F33.40 RECURRENT MAJOR DEPRESSIVE DISORDER, IN REMISSION (HCC): ICD-10-CM

## 2018-11-27 DIAGNOSIS — B00.1 RECURRENT COLD SORES: ICD-10-CM

## 2018-11-27 DIAGNOSIS — Z00.00 MEDICARE ANNUAL WELLNESS VISIT, INITIAL: Primary | ICD-10-CM

## 2018-11-27 PROCEDURE — G0402 INITIAL PREVENTIVE EXAM: HCPCS | Performed by: FAMILY MEDICINE

## 2018-11-27 RX ORDER — MAGNESIUM CHLORIDE 64 MG
250 TABLET, DELAYED RELEASE (ENTERIC COATED) ORAL
COMMUNITY
End: 2021-11-09

## 2018-11-27 RX ORDER — ACYCLOVIR 200 MG/1
CAPSULE ORAL
COMMUNITY
End: 2018-12-08

## 2018-11-27 NOTE — PATIENT INSTRUCTIONS
Health Maintenance for Postmenopausal Women  Menopause is a normal process in which your reproductive ability comes to an end. This process happens gradually over a span of months to years, usually between the ages of 48 and 55. Menopause is complete when you have missed 12 consecutive menstrual periods.  It is important to talk with your health care provider about some of the most common conditions that affect postmenopausal women, such as heart disease, cancer, and bone loss (osteoporosis). Adopting a healthy lifestyle and getting preventive care can help to promote your health and wellness. Those actions can also lower your chances of developing some of these common conditions.  What should I know about menopause?  During menopause, you may experience a number of symptoms, such as:  · Moderate-to-severe hot flashes.  · Night sweats.  · Decrease in sex drive.  · Mood swings.  · Headaches.  · Tiredness.  · Irritability.  · Memory problems.  · Insomnia.     Choosing to treat or not to treat menopausal changes is an individual decision that you make with your health care provider.  What should I know about hormone replacement therapy and supplements?  Hormone therapy products are effective for treating symptoms that are associated with menopause, such as hot flashes and night sweats. Hormone replacement carries certain risks, especially as you become older. If you are thinking about using estrogen or estrogen with progestin treatments, discuss the benefits and risks with your health care provider.  What should I know about heart disease and stroke?  Heart disease, heart attack, and stroke become more likely as you age. This may be due, in part, to the hormonal changes that your body experiences during menopause. These can affect how your body processes dietary fats, triglycerides, and cholesterol. Heart attack and stroke are both medical emergencies.    There are many things that you can do to help prevent heart  disease and stroke:  · Have your blood pressure checked at least every 1-2 years. High blood pressure causes heart disease and increases the risk of stroke.  · If you are 55-79 years old, ask your health care provider if you should take aspirin to prevent a heart attack or a stroke.  · Do not use any tobacco products, including cigarettes, chewing tobacco, or electronic cigarettes. If you need help quitting, ask your health care provider.  · It is important to eat a healthy diet and maintain a healthy weight.  ? Be sure to include plenty of vegetables, fruits, low-fat dairy products, and lean protein.  ? Avoid eating foods that are high in solid fats, added sugars, or salt (sodium).  · Get regular exercise. This is one of the most important things that you can do for your health.  ? Try to exercise for at least 150 minutes each week. The type of exercise that you do should increase your heart rate and make you sweat. This is known as moderate-intensity exercise.  ? Try to do strengthening exercises at least twice each week. Do these in addition to the moderate-intensity exercise.  · Know your numbers. Ask your health care provider to check your cholesterol and your blood glucose. Continue to have your blood tested as directed by your health care provider.     What should I know about cancer screening?  There are several types of cancer. Take the following steps to reduce your risk and to catch any cancer development as early as possible.  Breast Cancer  · Practice breast self-awareness.  ? This means understanding how your breasts normally appear and feel.  ? It also means doing regular breast self-exams. Let your health care provider know about any changes, no matter how small.  · If you are 40 or older, have a clinician do a breast exam (clinical breast exam or CBE) every year. Depending on your age, family history, and medical history, it may be recommended that you also have a yearly breast X-ray  (mammogram).  · If you have a family history of breast cancer, talk with your health care provider about genetic screening.  · If you are at high risk for breast cancer, talk with your health care provider about having an MRI and a mammogram every year.  · Breast cancer (BRCA) gene test is recommended for women who have family members with BRCA-related cancers. Results of the assessment will determine the need for genetic counseling and BRCA1 and for BRCA2 testing. BRCA-related cancers include these types:  ? Breast. This occurs in males or females.  ? Ovarian.  ? Tubal. This may also be called fallopian tube cancer.  ? Cancer of the abdominal or pelvic lining (peritoneal cancer).  ? Prostate.  ? Pancreatic.     Cervical, Uterine, and Ovarian Cancer  Your health care provider may recommend that you be screened regularly for cancer of the pelvic organs. These include your ovaries, uterus, and vagina. This screening involves a pelvic exam, which includes checking for microscopic changes to the surface of your cervix (Pap test).  · For women ages 21-65, health care providers may recommend a pelvic exam and a Pap test every three years. For women ages 30-65, they may recommend the Pap test and pelvic exam, combined with testing for human papilloma virus (HPV), every five years. Some types of HPV increase your risk of cervical cancer. Testing for HPV may also be done on women of any age who have unclear Pap test results.  · Other health care providers may not recommend any screening for nonpregnant women who are considered low risk for pelvic cancer and have no symptoms. Ask your health care provider if a screening pelvic exam is right for you.  · If you have had past treatment for cervical cancer or a condition that could lead to cancer, you need Pap tests and screening for cancer for at least 20 years after your treatment. If Pap tests have been discontinued for you, your risk factors (such as having a new sexual  partner) need to be reassessed to determine if you should start having screenings again. Some women have medical problems that increase the chance of getting cervical cancer. In these cases, your health care provider may recommend that you have screening and Pap tests more often.  · If you have a family history of uterine cancer or ovarian cancer, talk with your health care provider about genetic screening.  · If you have vaginal bleeding after reaching menopause, tell your health care provider.  · There are currently no reliable tests available to screen for ovarian cancer.     Lung Cancer  Lung cancer screening is recommended for adults 55-80 years old who are at high risk for lung cancer because of a history of smoking. A yearly low-dose CT scan of the lungs is recommended if you:  · Currently smoke.  · Have a history of at least 30 pack-years of smoking and you currently smoke or have quit within the past 15 years. A pack-year is smoking an average of one pack of cigarettes per day for one year.     Yearly screening should:  · Continue until it has been 15 years since you quit.  · Stop if you develop a health problem that would prevent you from having lung cancer treatment.     Colorectal Cancer  · This type of cancer can be detected and can often be prevented.  · Routine colorectal cancer screening usually begins at age 50 and continues through age 75.  · If you have risk factors for colon cancer, your health care provider may recommend that you be screened at an earlier age.  · If you have a family history of colorectal cancer, talk with your health care provider about genetic screening.  · Your health care provider may also recommend using home test kits to check for hidden blood in your stool.  · A small camera at the end of a tube can be used to examine your colon directly (sigmoidoscopy or colonoscopy). This is done to check for the earliest forms of colorectal cancer.  · Direct examination of the colon  should be repeated every 5-10 years until age 75. However, if early forms of precancerous polyps or small growths are found or if you have a family history or genetic risk for colorectal cancer, you may need to be screened more often.     Skin Cancer  · Check your skin from head to toe regularly.  · Monitor any moles. Be sure to tell your health care provider:  ? About any new moles or changes in moles, especially if there is a change in a mole's shape or color.  ? If you have a mole that is larger than the size of a pencil eraser.  · If any of your family members has a history of skin cancer, especially at a young age, talk with your health care provider about genetic screening.  · Always use sunscreen. Apply sunscreen liberally and repeatedly throughout the day.  · Whenever you are outside, protect yourself by wearing long sleeves, pants, a wide-brimmed hat, and sunglasses.     What should I know about osteoporosis?  Osteoporosis is a condition in which bone destruction happens more quickly than new bone creation. After menopause, you may be at an increased risk for osteoporosis. To help prevent osteoporosis or the bone fractures that can happen because of osteoporosis, the following is recommended:  · If you are 19-50 years old, get at least 1,000 mg of calcium and at least 600 mg of vitamin D per day.  · If you are older than age 50 but younger than age 70, get at least 1,200 mg of calcium and at least 600 mg of vitamin D per day.  · If you are older than age 70, get at least 1,200 mg of calcium and at least 800 mg of vitamin D per day.     Smoking and excessive alcohol intake increase the risk of osteoporosis. Eat foods that are rich in calcium and vitamin D, and do weight-bearing exercises several times each week as directed by your health care provider.  What should I know about how menopause affects my mental health?  Depression may occur at any age, but it is more common as you become older. Common symptoms  of depression include:  · Low or sad mood.  · Changes in sleep patterns.  · Changes in appetite or eating patterns.  · Feeling an overall lack of motivation or enjoyment of activities that you previously enjoyed.  · Frequent crying spells.     Talk with your health care provider if you think that you are experiencing depression.  What should I know about immunizations?  It is important that you get and maintain your immunizations. These include:  · Tetanus, diphtheria, and pertussis (Tdap) booster vaccine.  · Influenza every year before the flu season begins.  · Pneumonia vaccine.  · Shingles vaccine.     Your health care provider may also recommend other immunizations.  This information is not intended to replace advice given to you by your health care provider. Make sure you discuss any questions you have with your health care provider.  Document Released: 2007 Document Revised: 2017 Document Reviewed: 2016  [employer] Interactive Patient Education © 2018 [employer]         Medicare Wellness  Personal Prevention Plan of Service     Date of Office Visit:  2018  Encounter Provider:  Mae Valle MD  Place of Service:  [facility]  Patient Name: Milagro Sanderson  :  1955    As part of the Medicare Wellness portion of your visit today, we are providing you with this personalized preventive plan of services (PPPS). This plan is based upon recommendations of the [place] Preventive Services Task Force (USPSTF) and the Advisory Committee on Immunization Practices (ACIP).    This lists the preventive care services that should be considered, and provides dates of when you are due. Items listed as completed are up-to-date and do not require any further intervention.    Health Maintenance   Topic Date Due   • ZOSTER VACCINE (2 of 2) 2018   • TDAP/TD VACCINES (1 - Tdap) 2020 (Originally 1972)   • PNEUMOCOCCAL VACCINES (65+ LOW/MEDIUM  RISK) (2 of 2 - PPSV23) 04/09/2019   • MEDICARE ANNUAL WELLNESS  11/27/2019   • MAMMOGRAM  06/15/2020   • COLONOSCOPY  03/27/2025   • HEPATITIS C SCREENING  Completed   • INFLUENZA VACCINE  Completed   • PAP SMEAR  Discontinued       No orders of the defined types were placed in this encounter.      Return in about 5 months (around 4/15/2019) for Follow up on current issues, labs prior.

## 2018-11-27 NOTE — PROGRESS NOTES
QUICK REFERENCE INFORMATION:  The ABCs of the Annual Wellness Visit    Initial Medicare Wellness Visit    HEALTH RISK ASSESSMENT    1955    Recent Hospitalizations:  No hospitalization(s) within the last year..        Current Medical Providers:  Patient Care Team:  Mae Valle MD as PCP - General (Family Medicine)        Smoking Status:  Social History     Tobacco Use   Smoking Status Never Smoker   Smokeless Tobacco Never Used       Alcohol Consumption:  Social History     Substance and Sexual Activity   Alcohol Use No       Depression Screen:   PHQ-2/PHQ-9 Depression Screening 11/27/2018   Little interest or pleasure in doing things 0   Feeling down, depressed, or hopeless 1   Total Score 1       Health Habits and Functional and Cognitive Screening:  Functional & Cognitive Status 11/27/2018   Do you have difficulty preparing food and eating? No   Do you have difficulty bathing yourself, getting dressed or grooming yourself? No   Do you have difficulty using the toilet? No   Do you have difficulty moving around from place to place? No   Do you have trouble with steps or getting out of a bed or a chair? No   In the past year have you fallen or experienced a near fall? No   Current Diet Well Balanced Diet   Dental Exam Up to date   Eye Exam Up to date   Exercise (times per week) 0 times per week   Current Exercise Activities Include No Regular Exercise   Do you need help using the phone?  No   Are you deaf or do you have serious difficulty hearing?  No   Do you need help with transportation? No   Do you need help shopping? No   Do you need help preparing meals?  No   Do you need help with housework?  No   Do you need help with laundry? No   Do you need help taking your medications? No   Do you need help managing money? No   Do you ever drive or ride in a car without wearing a seat belt? No   Have you felt unusual stress, anger or loneliness in the last month? Yes   Who do you live with? Alone   If you  need help, do you have trouble finding someone available to you? No   Have you been bothered in the last four weeks by sexual problems? No   Do you have difficulty concentrating, remembering or making decisions? Yes           Does the patient have evidence of cognitive impairment? No    Asiprin use counseling: Does not need ASA (and currently is not on it)      Recent Lab Results:    Visual Acuity:  No exam data present    Age-appropriate Screening Schedule:  Refer to the list below for future screening recommendations based on patient's age, sex and/or medical conditions. Orders for these recommended tests are listed in the plan section. The patient has been provided with a written plan.    Health Maintenance   Topic Date Due   • TDAP/TD VACCINES (1 - Tdap) 01/01/2020 (Originally 1/24/1974)   • PNEUMOCOCCAL VACCINES (65+ LOW/MEDIUM RISK) (2 of 2 - PPSV23) 04/09/2019   • MAMMOGRAM  06/15/2020   • COLONOSCOPY  03/27/2025   • INFLUENZA VACCINE  Completed   • ZOSTER VACCINE  Completed   • PAP SMEAR  Discontinued        Subjective   History of Present Illness    Milagro Sanderson is a 63 y.o. female who presents for an Annual Wellness Visit.she is overall healthy. She takes acyclovir as needed for cold sores. She has a hereditary tremor, mild, improved with beta blocker. Depression controlled with lexapro. She is taking supplements as well. Last pap smear x 2 normal.     The following portions of the patient's history were reviewed and updated as appropriate: allergies, current medications, past family history, past medical history, past social history, past surgical history and problem list.    Outpatient Medications Prior to Visit   Medication Sig Dispense Refill   • acyclovir (ZOVIRAX) 200 MG capsule Take  by mouth Every 4 (Four) Hours While Awake.     • Ascorbic Acid (VITAMIN C) 500 MG capsule Take  by mouth daily.     • calcium (OS-SANTHOSH) 600 MG tablet Take  by mouth daily.     • Cholecalciferol (VITAMIN D3) 2000 units  capsule Take 1 capsule by mouth Daily. 90 capsule 3   • cyanocobalamin (VITAMIN B-12) 50 MCG tablet tablet Take  by mouth daily.     • escitalopram (LEXAPRO) 10 MG tablet Take 1 tablet by mouth Daily. 90 tablet 3   • magnesium chloride ER 64 MG DR tablet Take  by mouth Daily.     • Multiple Vitamins-Minerals (CENTRUM SILVER ULTRA WOMENS PO) Take  by mouth.     • Omega-3 Fatty Acids (FISH OIL) 1000 MG capsule capsule Take  by mouth.     • propranolol (INDERAL) 20 MG tablet Take 1 tablet by mouth 3 (Three) Times a Day. 270 tablet 3   • vitamin E 200 UNIT capsule Take 200 Units by mouth Daily.     • clotrimazole (LOTRIMIN) 1 % cream Apply  topically 2 (Two) Times a Day. 45 g 0     No facility-administered medications prior to visit.        Patient Active Problem List   Diagnosis   • Recurrent major depressive disorder, in remission (CMS/HCC)   • Eczema   • Elevated LDL cholesterol level   • Vitamin B12 deficiency   • Vitamin D deficiency   • Fine tremor   • Hereditary essential tremor   • Onychomycosis   • Normal body mass index (BMI)   • Recurrent cold sores       Advance Care Planning:  has NO advance directive - information provided to the patient today    Identification of Risk Factors:  Risk factors include: depression.    Review of Systems   Eyes: Positive for discharge (improved/resolved).   Respiratory: Negative for shortness of breath.    Cardiovascular: Negative for chest pain.   Gastrointestinal: Negative for abdominal pain.   Skin:        itching       Compared to one year ago, the patient feels her physical health is better.  Compared to one year ago, the patient feels her mental health is better.    Objective     Physical Exam   Constitutional: She is oriented to person, place, and time. Vital signs are normal. She appears well-developed and well-nourished. She is active.  Non-toxic appearance. She does not have a sickly appearance. She does not appear ill. No distress.   HENT:   Head: Normocephalic and  "atraumatic. Hair is normal.   Right Ear: Hearing, tympanic membrane, external ear and ear canal normal.   Left Ear: Hearing, tympanic membrane, external ear and ear canal normal.   Nose: Nose normal.   Mouth/Throat: Uvula is midline, oropharynx is clear and moist and mucous membranes are normal.   Eyes: Conjunctivae, EOM and lids are normal. Pupils are equal, round, and reactive to light. Right eye exhibits no discharge. Left eye exhibits no discharge. No scleral icterus.   Neck: Phonation normal. Neck supple. No thyromegaly present.   Cardiovascular: Normal rate, regular rhythm and normal heart sounds.   No murmur heard.  Pulses:       Radial pulses are 2+ on the right side, and 2+ on the left side.   Pulmonary/Chest: Effort normal and breath sounds normal.   Abdominal: Soft. Bowel sounds are normal. She exhibits no distension and no mass. There is no tenderness. There is no rebound and no guarding.   Musculoskeletal: She exhibits no edema, tenderness or deformity.   Lymphadenopathy:        Head (right side): No submandibular adenopathy present.        Head (left side): No submandibular adenopathy present.     She has no cervical adenopathy.   Neurological: She is alert and oriented to person, place, and time. She has normal strength. She displays no tremor. No cranial nerve deficit or sensory deficit. Gait normal.   Skin: Skin is warm and dry. No rash noted. She is not diaphoretic. No cyanosis. Nails show no clubbing.   Psychiatric: She has a normal mood and affect. Her speech is normal and behavior is normal. Judgment and thought content normal. Cognition and memory are normal.   Nursing note and vitals reviewed.      Vitals:    11/27/18 1047   Pulse: 62   Resp: 16   Temp: 97.6 °F (36.4 °C)   TempSrc: Oral   SpO2: 98%   Weight: 64.6 kg (142 lb 6.4 oz)   Height: 170.2 cm (67.01\")   PainSc: 0-No pain       Patient's Body mass index is 22.3 kg/m². BMI is within normal parameters. No follow-up " required.      Assessment/Plan   Patient Self-Management and Personalized Health Advice  The patient has been provided with information about: diet, exercise and mental health concerns and preventive services including:   · Advance directive, [employer].    Visit Diagnoses:    ICD-10-CM ICD-9-CM   1. Medicare annual wellness visit, initial Z00.00 V70.0   2. Hereditary essential tremor G25.0 333.1   3. Recurrent cold sores B00.1 054.9   4. Recurrent major depressive disorder, in remission (CMS/[facility]) F33.40 296.35     Problem List Items Addressed This Visit        Nervous and Auditory    Hereditary essential tremor    Overview     Current therapy: propranolol         Current Assessment & Plan     Continue beta blocker            Other    Recurrent major depressive disorder, in remission (CMS/[facility])    Overview     · Current therapy: lexapro         Current Assessment & Plan     Psychological condition is improving with treatment.  Continue current treatment regimen.  Psychological condition  will be reassessed at the next regular appointment.         Recurrent cold sores    Overview     Start Acyclovir at first sign of outbreak.         Relevant Medications    acyclovir (ZOVIRAX) 200 MG capsule      Other Visit Diagnoses     Medicare annual wellness visit, initial    -  Primary          No orders of the defined types were placed in this encounter.      Outpatient Encounter Medications as of 11/27/2018   Medication Sig Dispense Refill   • acyclovir (ZOVIRAX) 200 MG capsule Take  by mouth Every 4 (Four) Hours While Awake.     • Ascorbic Acid (VITAMIN C) 500 MG capsule Take  by mouth daily.     • calcium (OS-SANTHOSH) 600 MG tablet Take  by mouth daily.     • Cholecalciferol (VITAMIN D3) 2000 units capsule Take 1 capsule by mouth Daily. 90 capsule 3   • cyanocobalamin (VITAMIN B-12) 50 MCG tablet tablet Take  by mouth daily.     • escitalopram (LEXAPRO) 10 MG tablet Take 1 tablet by mouth Daily. 90 tablet 3   • magnesium chloride  ER 64 MG DR tablet Take  by mouth Daily.     • Multiple Vitamins-Minerals (CENTRUM SILVER ULTRA WOMENS PO) Take  by mouth.     • Omega-3 Fatty Acids (FISH OIL) 1000 MG capsule capsule Take  by mouth.     • propranolol (INDERAL) 20 MG tablet Take 1 tablet by mouth 3 (Three) Times a Day. 270 tablet 3   • vitamin E 200 UNIT capsule Take 200 Units by mouth Daily.     • [DISCONTINUED] clotrimazole (LOTRIMIN) 1 % cream Apply  topically 2 (Two) Times a Day. 45 g 0     No facility-administered encounter medications on file as of 11/27/2018.        Reviewed use of high risk medication in the elderly: yes  Reviewed for potential of harmful drug interactions in the elderly: yes    Follow Up:  Return in about 5 months (around 4/15/2019) for Follow up on current issues, labs prior.     An After Visit Summary and PPPS with all of these plans were given to the patient.

## 2019-01-02 ENCOUNTER — TELEPHONE (OUTPATIENT)
Dept: INTERNAL MEDICINE | Facility: CLINIC | Age: 64
End: 2019-01-02

## 2019-01-02 DIAGNOSIS — R13.19 ESOPHAGEAL DYSPHAGIA: ICD-10-CM

## 2019-01-02 RX ORDER — OMEPRAZOLE 20 MG/1
20 CAPSULE, DELAYED RELEASE ORAL DAILY
Qty: 90 CAPSULE | Refills: 0 | Status: SHIPPED | OUTPATIENT
Start: 2019-01-02 | End: 2019-01-15

## 2019-01-02 NOTE — TELEPHONE ENCOUNTER
Patient came in office and wanted to know if she should see Dr. Rogers or cancel? She went to Urgent Care on 12/8/18 because she felt like her pills were getting stuck in the back of her throat and it was hurting. They gave her Omeprazole 20 mg to take. She said it has helped her and she is needing refills.     Confirmed Pharmacy

## 2019-01-15 ENCOUNTER — OFFICE VISIT (OUTPATIENT)
Dept: GASTROENTEROLOGY | Facility: CLINIC | Age: 64
End: 2019-01-15

## 2019-01-15 ENCOUNTER — PREP FOR SURGERY (OUTPATIENT)
Dept: OTHER | Facility: HOSPITAL | Age: 64
End: 2019-01-15

## 2019-01-15 VITALS
HEART RATE: 65 BPM | HEIGHT: 68 IN | RESPIRATION RATE: 16 BRPM | DIASTOLIC BLOOD PRESSURE: 67 MMHG | TEMPERATURE: 97.2 F | WEIGHT: 145 LBS | BODY MASS INDEX: 21.98 KG/M2 | SYSTOLIC BLOOD PRESSURE: 137 MMHG

## 2019-01-15 DIAGNOSIS — R07.89 OTHER CHEST PAIN: ICD-10-CM

## 2019-01-15 DIAGNOSIS — R13.10 DYSPHAGIA, UNSPECIFIED TYPE: Primary | ICD-10-CM

## 2019-01-15 DIAGNOSIS — K59.00 CONSTIPATION, UNSPECIFIED CONSTIPATION TYPE: ICD-10-CM

## 2019-01-15 DIAGNOSIS — R13.10 DYSPHAGIA: Primary | ICD-10-CM

## 2019-01-15 PROCEDURE — 99204 OFFICE O/P NEW MOD 45 MIN: CPT | Performed by: INTERNAL MEDICINE

## 2019-01-15 RX ORDER — SODIUM CHLORIDE 9 MG/ML
70 INJECTION, SOLUTION INTRAVENOUS CONTINUOUS PRN
Status: CANCELLED | OUTPATIENT
Start: 2019-01-15

## 2019-01-15 NOTE — PATIENT INSTRUCTIONS
1. The patient should eat relatively soft diet.    2. The patient should eat in upright position and chew well.  The patient should drink water after to 3 bites and take medications with liberal amounts of water.    3. Generally,  medications that have a potential to cause pill esophagitis may be avoided or used in an alternative form. For example Motrin or Aleve can be taken in a gel cap form.  4. After eating and taking medications the patient should remain in upright position for 10-15 minutes.  5. Upper endoscopy (EGD) counseling:  Description of the procedure, risks, benefits, alternatives and options including nonoperative options were discussed with the patient in detail.  The patient understands and wishes to proceed.  6. It may be prudent to check thyroid function test if not already undertaken recently. About 2 years ago her TSH was okay.  7. A possible colonoscopy in the future.

## 2019-01-15 NOTE — PROGRESS NOTES
Chief Complaint   Patient presents with   • Difficulty Swallowing     History of Present Illness     The patient has difficulty swallowing for the last 1 month or so. The patient felt about a month or so ago some pills got stuck in her throat on the right side. Since then the patient has difficulty swallowing. Later it felt in her lower throat and neck area and for the last couple of weeks in the mid substernal area. Frequency being several times a week. There is associated chest discomfort. This is described as burning sensation in the retrosternal area. The discomfort is nonexertional. The patient otherwise can swallow liquids and solids. There is some improvement of the symptoms over the last 3 weeks. There is no associated weight loss.    The patient has history of mild to moderate constipation off and on for the last 3-4 years. Frequency of bowel movements being every other day to once in 2 days. The stools are relatively hard. There is no associated anorectal pain. She denies bright red blood per rectum.    The patient denies abdominal pain. There is no nausea or vomiting. She denies hematemesis or melena. There is no history of liver or pancreatic disease. There is no family history of colon cancer, inflammatory bowel disease or chronic liver disease. Her last colonoscopy was  5 years ago with polyps. There is no history of anemia. She denies epigastric abdominal fullness.     Review of Systems   Constitutional: Negative for appetite change, chills, fatigue, fever and unexpected weight change.   HENT: Positive for trouble swallowing. Negative for mouth sores and nosebleeds.    Eyes: Negative for discharge and redness.   Respiratory: Positive for cough. Negative for apnea and shortness of breath.    Cardiovascular: Positive for chest pain. Negative for palpitations and leg swelling.   Gastrointestinal: Positive for constipation. Negative for abdominal pain, anal bleeding, blood in stool, diarrhea, nausea and  vomiting.   Endocrine: Negative for cold intolerance, heat intolerance and polydipsia.   Genitourinary: Negative for dysuria, hematuria and urgency.   Musculoskeletal: Negative for arthralgias, joint swelling and myalgias.   Skin: Negative for rash.   Allergic/Immunologic: Positive for food allergies (hazelnuts). Negative for immunocompromised state.   Neurological: Negative for dizziness, seizures, syncope and headaches.   Hematological: Negative for adenopathy. Does not bruise/bleed easily.   Psychiatric/Behavioral: Negative for dysphoric mood. The patient is not nervous/anxious and is not hyperactive.      Patient Active Problem List   Diagnosis   • Recurrent major depressive disorder, in remission (CMS/HCC)   • Eczema   • Elevated LDL cholesterol level   • Vitamin B12 deficiency   • Vitamin D deficiency   • Fine tremor   • Hereditary essential tremor   • Onychomycosis   • Normal body mass index (BMI)   • Recurrent cold sores     Past Medical History:   Diagnosis Date   • Benign familial tremor    • Blood in urine    • Depression    • Eczema    • Elevated LDL cholesterol level    • Fever blister    • History of blood transfusion     possibly   • History of tremor    • Onychomycosis    • Sebaceous cyst    • Vitamin B12 deficiency    • Vitamin D deficiency      Past Surgical History:   Procedure Laterality Date   • APPENDECTOMY  2012   • BREAST BIOPSY     • BREAST CYST ASPIRATION     • BREAST SURGERY Bilateral     history of mammatone left and right breast   • CYSTECTOMY      from the scalp x 3     Family History   Problem Relation Age of Onset   • Heart attack Mother    • Hypertension Mother    • Heart attack Other    • Kidney disease Other    • Colon cancer Neg Hx    • Cirrhosis Neg Hx    • Liver disease Neg Hx    • Liver cancer Neg Hx    • Crohn's disease Neg Hx    • Ulcerative colitis Neg Hx    • Esophageal cancer Neg Hx    • Stomach cancer Neg Hx      Social History     Tobacco Use   • Smoking status: Never  "Smoker   • Smokeless tobacco: Never Used   Substance Use Topics   • Alcohol use: No     Comment: \"former social use, but not in 40+ years\"       Current Outpatient Medications:   •  Ascorbic Acid (VITAMIN C) 500 MG capsule, Take  by mouth daily., Disp: , Rfl:   •  calcium (OS-SANTHOSH) 600 MG tablet, Take  by mouth daily., Disp: , Rfl:   •  Cholecalciferol (VITAMIN D-3 PO), Take 50 mcg by mouth 1 (One) Time Per Week., Disp: , Rfl:   •  Cyanocobalamin (B-12) 1000 MCG capsule, Take 1,000 mcg by mouth Every 14 (Fourteen) Days., Disp: , Rfl:   •  escitalopram (LEXAPRO) 10 MG tablet, Take 1 tablet by mouth Daily., Disp: 90 tablet, Rfl: 3  •  magnesium chloride ER 64 MG DR tablet, Take 250 mg by mouth Every 14 (Fourteen) Days., Disp: , Rfl:   •  Multiple Vitamins-Minerals (CENTRUM SILVER ULTRA WOMENS PO), Take 1 tablet by mouth Every 14 (Fourteen) Days., Disp: , Rfl:   •  Omega-3 Fatty Acids (FISH OIL) 1000 MG capsule capsule, Take 1,200 mg by mouth 1 (One) Time Per Week., Disp: , Rfl:   •  propranolol (INDERAL) 20 MG tablet, Take 1 tablet by mouth 3 (Three) Times a Day., Disp: 270 tablet, Rfl: 3  •  vitamin E 200 UNIT capsule, Take 200 Units by mouth Daily., Disp: , Rfl:     Allergies   Allergen Reactions   • Macrobid [Nitrofurantoin Monohyd Macro]        Blood pressure 137/67, pulse 65, temperature 97.2 °F (36.2 °C), resp. rate 16, height 172.7 cm (68\"), weight 65.8 kg (145 lb).    Physical Exam   Constitutional: She is oriented to person, place, and time. She appears well-developed and well-nourished. No distress.   HENT:   Head: Normocephalic and atraumatic.   Right Ear: Hearing and external ear normal.   Left Ear: Hearing and external ear normal.   Nose: Nose normal.   Mouth/Throat: Oropharynx is clear and moist and mucous membranes are normal. Mucous membranes are not pale, not dry and not cyanotic. No oral lesions. No oropharyngeal exudate.   Eyes: Conjunctivae and EOM are normal. Right eye exhibits no discharge. Left " eye exhibits no discharge. No scleral icterus.   Neck: Trachea normal. Neck supple. No JVD present. No edema present. No thyroid mass and no thyromegaly present.   Cardiovascular: Normal rate, regular rhythm, S2 normal and normal heart sounds. Exam reveals no gallop, no S3 and no friction rub.   No murmur heard.  Pulmonary/Chest: Effort normal and breath sounds normal. No respiratory distress. She has no wheezes. She has no rales. She exhibits no tenderness.   Abdominal: Soft. Normal appearance and bowel sounds are normal. She exhibits no distension, no ascites and no mass. There is no splenomegaly or hepatomegaly. There is no tenderness. There is no rigidity, no rebound and no guarding. No hernia.   Musculoskeletal: She exhibits no tenderness or deformity.     Vascular Status -  Her right foot exhibits no edema. Her left foot exhibits no edema.  Lymphadenopathy:     She has no cervical adenopathy.        Left: No supraclavicular adenopathy present.   Neurological: She is alert and oriented to person, place, and time. She has normal strength. No cranial nerve deficit or sensory deficit. She exhibits normal muscle tone. Coordination normal.   Skin: No rash noted. She is not diaphoretic. No cyanosis. No pallor. Nails show no clubbing.   Psychiatric: She has a normal mood and affect. Her behavior is normal. Judgment and thought content normal.   Nursing note and vitals reviewed.  Stigmata of chronic liver disease:  None.  Asterixis:  None.    Laboratory Testing:  Upon review of medical records:    Dated October 12, 2017 sodium 145 potassium 4.9 chloride 106 CO2 27 BUN 11 serum creatinine 0.80 and glucose 89.  Calcium 10.0.  Total protein 7.3.  Albumin 4.40.  T bili 0.8 AST 22 ALT 25 alkaline phosphatase 75.  Total cholesterol 164.  Triglycerides 146.  TSH 2.500.  Free T4 level 1.13.  WBC 3.69 hemoglobin 14.3 hematocrit 41.4 MCV 92.8 and platelet count 240.    Dated April 9, 2018 sodium 144 potassium 4.8 chloride 104  CO2 30 BUN 20 serum creatinine 0.60 glucose 87.  Calcium 10.3.  Total protein 7.5.  Albumin 4.50.  T bili 0.8 AST 24 ALT 27 alkaline phosphatase 87.  Total cholesterol 187.  Triglycerides 181.  Vitamin B12 level 824.  Folate level 16.70.  Hep C virus antibody negative at <0.1.  WBC 4.5 to hemoglobin 13.9 hematocrit 40.4 MCV 94.2 and platelet count 247.    Procedures:  Upon review of medical records:    Dated October 27, 2014 the patient underwent a colonoscopy by Dr. Amaral from surgical service which revealed: Sigmoid polyps.  Colon, polyp, 35 cm and 25 cm.    Assessment:      ICD-10-CM ICD-9-CM   1. Dysphagia, unspecified type R13.10 787.20   2. Constipation, unspecified constipation type K59.00 564.00   3. Other chest pain R07.89 786.59         Discussion:  1.     Plan/  Patient Instructions   1. The patient should eat relatively soft diet.    2. The patient should eat in upright position and chew well.  The patient should drink water after to 3 bites and take medications with liberal amounts of water.    3. Generally,  medications that have a potential to cause pill esophagitis may be avoided or used in an alternative form. For example Motrin or Aleve can be taken in a gel cap form.  4. After eating and taking medications the patient should remain in upright position for 10-15 minutes.  5. Upper endoscopy (EGD) counseling:  Description of the procedure, risks, benefits, alternatives and options including nonoperative options were discussed with the patient in detail.  The patient understands and wishes to proceed.  6. It may be prudent to check thyroid function test if not already undertaken recently. About 2 years ago her TSH was okay.  7. A possible colonoscopy in the future.       Simba Rogers MD

## 2019-01-29 PROBLEM — R13.10 DYSPHAGIA: Status: ACTIVE | Noted: 2019-01-29

## 2019-02-07 ENCOUNTER — HOSPITAL ENCOUNTER (OUTPATIENT)
Facility: HOSPITAL | Age: 64
Setting detail: HOSPITAL OUTPATIENT SURGERY
Discharge: HOME | End: 2019-02-07
Attending: INTERNAL MEDICINE | Admitting: INTERNAL MEDICINE

## 2019-02-07 ENCOUNTER — ANESTHESIA (OUTPATIENT)
Dept: GASTROENTEROLOGY | Facility: HOSPITAL | Age: 64
End: 2019-02-07

## 2019-02-07 ENCOUNTER — ANESTHESIA EVENT (OUTPATIENT)
Dept: GASTROENTEROLOGY | Facility: HOSPITAL | Age: 64
End: 2019-02-07

## 2019-02-07 VITALS
OXYGEN SATURATION: 100 % | WEIGHT: 148 LBS | TEMPERATURE: 98.5 F | DIASTOLIC BLOOD PRESSURE: 67 MMHG | HEIGHT: 68 IN | HEART RATE: 67 BPM | SYSTOLIC BLOOD PRESSURE: 124 MMHG | BODY MASS INDEX: 22.43 KG/M2 | RESPIRATION RATE: 18 BRPM

## 2019-02-07 DIAGNOSIS — R13.10 DYSPHAGIA: ICD-10-CM

## 2019-02-07 PROCEDURE — 25010000002 ONDANSETRON PER 1 MG: Performed by: NURSE ANESTHETIST, CERTIFIED REGISTERED

## 2019-02-07 PROCEDURE — 43239 EGD BIOPSY SINGLE/MULTIPLE: CPT | Performed by: INTERNAL MEDICINE

## 2019-02-07 PROCEDURE — 88305 TISSUE EXAM BY PATHOLOGIST: CPT | Performed by: INTERNAL MEDICINE

## 2019-02-07 PROCEDURE — 25010000002 PROPOFOL 200 MG/20ML EMULSION: Performed by: NURSE ANESTHETIST, CERTIFIED REGISTERED

## 2019-02-07 RX ORDER — PANTOPRAZOLE SODIUM 40 MG/1
TABLET, DELAYED RELEASE ORAL
Qty: 30 TABLET | Refills: 1 | Status: SHIPPED | OUTPATIENT
Start: 2019-02-07 | End: 2019-04-29 | Stop reason: SDUPTHER

## 2019-02-07 RX ORDER — PROPOFOL 10 MG/ML
INJECTION, EMULSION INTRAVENOUS AS NEEDED
Status: DISCONTINUED | OUTPATIENT
Start: 2019-02-07 | End: 2019-02-07 | Stop reason: SURG

## 2019-02-07 RX ORDER — SODIUM CHLORIDE 0.9 % (FLUSH) 0.9 %
3 SYRINGE (ML) INJECTION AS NEEDED
Status: DISCONTINUED | OUTPATIENT
Start: 2019-02-07 | End: 2019-02-07 | Stop reason: HOSPADM

## 2019-02-07 RX ORDER — ONDANSETRON 2 MG/ML
INJECTION INTRAMUSCULAR; INTRAVENOUS AS NEEDED
Status: DISCONTINUED | OUTPATIENT
Start: 2019-02-07 | End: 2019-02-07 | Stop reason: SURG

## 2019-02-07 RX ORDER — SODIUM CHLORIDE 9 MG/ML
70 INJECTION, SOLUTION INTRAVENOUS CONTINUOUS PRN
Status: DISCONTINUED | OUTPATIENT
Start: 2019-02-07 | End: 2019-02-07 | Stop reason: HOSPADM

## 2019-02-07 RX ADMIN — PROPOFOL 50 MG: 10 INJECTION, EMULSION INTRAVENOUS at 10:53

## 2019-02-07 RX ADMIN — PROPOFOL 100 MG: 10 INJECTION, EMULSION INTRAVENOUS at 10:43

## 2019-02-07 RX ADMIN — ONDANSETRON 4 MG: 2 INJECTION INTRAMUSCULAR; INTRAVENOUS at 10:43

## 2019-02-07 RX ADMIN — SODIUM CHLORIDE 70 ML/HR: 9 INJECTION, SOLUTION INTRAVENOUS at 09:36

## 2019-02-07 RX ADMIN — PROPOFOL 50 MG: 10 INJECTION, EMULSION INTRAVENOUS at 10:47

## 2019-02-07 RX ADMIN — PROPOFOL 50 MG: 10 INJECTION, EMULSION INTRAVENOUS at 10:50

## 2019-02-07 NOTE — ANESTHESIA PREPROCEDURE EVALUATION
Anesthesia Evaluation     Patient summary reviewed and Nursing notes reviewed   no history of anesthetic complications:  NPO Solid Status: > 8 hours  NPO Liquid Status: > 8 hours           Airway   Mallampati: I  TM distance: >3 FB  Neck ROM: full  no difficulty expected  Dental - normal exam     Pulmonary - negative pulmonary ROS and normal exam   Cardiovascular - negative cardio ROS and normal exam        Neuro/Psych- negative ROS  GI/Hepatic/Renal/Endo - negative ROS     Musculoskeletal (-) negative ROS    Abdominal    Substance History - negative use     OB/GYN negative ob/gyn ROS         Other - negative ROS                       Anesthesia Plan    ASA 1     MAC     intravenous induction   Anesthetic plan, all risks, benefits, and alternatives have been provided, discussed and informed consent has been obtained with: patient.

## 2019-02-07 NOTE — INTERVAL H&P NOTE
"  H&P reviewed. The patient was examined and there are no changes to the H&P.       No recent shortness of breath or chest pains.      Blood pressure 129/75, pulse 66, temperature 98.4 °F (36.9 °C), temperature source Tympanic, resp. rate 16, height 172.7 cm (68\"), weight 67.1 kg (148 lb), SpO2 97 %, not currently breastfeeding.    Chest: clear to auscultation.  Cardiac exam: No S3 no murmurs.   Abdomen: soft bowel sounds present nondistended nontender.        "

## 2019-02-07 NOTE — ANESTHESIA POSTPROCEDURE EVALUATION
Patient: Milagro Sanderson    Procedure Summary     Date:  02/07/19 Room / Location:  [facility] ENDOSCOPY 2 / [facility] ENDOSCOPY    Anesthesia Start:  1040 Anesthesia Stop:  1055    Procedure:  ESOPHAGOGASTRODUODENOSCOPY W/ BIOPSIES (N/A Esophagus) Diagnosis:       Dysphagia      (Dysphagia [R13.10])    Surgeon:  Simba Rogers MD Provider:  London Valdez CRNA    Anesthesia Type:  MAC ASA Status:  1          Anesthesia Type: MAC  Last vitals  BP   124/67 (02/07/19 1136)   Temp   98.5 °F (36.9 °C) (02/07/19 1103)   Pulse   67 (02/07/19 1136)   Resp   18 (02/07/19 1136)     SpO2   100 % (02/07/19 1136)     Post Anesthesia Care and Evaluation    Patient location during evaluation: bedside  Patient participation: complete - patient participated  Level of consciousness: awake  Pain score: 0  Pain management: adequate  Airway patency: patent  Anesthetic complications: No anesthetic complications  PONV Status: controlled  Cardiovascular status: acceptable and stable  Respiratory status: acceptable and room air  Hydration status: acceptable

## 2019-02-07 NOTE — DISCHARGE INSTRUCTIONS
Rest today  No pushing,pulling,tugging,heavy lifting, or strenuous activity   No major decision making,driving,or drinking alcoholic beverages for 24 hours due to the sedation you received  Always use good hand hygiene/washing technique  No driving on pain medication.      To assist you in voiding:  Drink plenty of fluids  Listen to running water while attempting to void.    If you are unable to urinate and you have an uncomfortable urge to void or it has been   6 hours since you were discharged, return to the Emergency Room.        ************************************************************************************      Postprocedure instructions.  1. Nothing by mouth for 30 min.  2. Clear liquids diet (No Sodas) for 1 hours.  3. May advance to soft low-fat diet  in 2 hours       Diet otherwise:    1. Low fat diet.    2. Avoid soda beverages, excessive use of coffee and tea.   3. Avoid smoking and alcohol.      Other Instructions:  Call [facility] at [phone] or come to the Emergency Department if you experience the following: Chest pain, abdominal pain, bleeding (vomiting of blood or coffee colored material, black stools or lisette blood in stools),   fever/chills, nausea and vomiting or dizziness.    Follow-up:    Dr. Rogers in 3-4 weeks.   Office phone #[phone].   If you already have an appointment just keep that appointment.    ************************************************************************************    Notes to the patient and the family from Dr. Rogers.    Dear patient/family member,    Findings on today's procedure are as follows:  1. Esophagitis. Inflammation of the esophagus.  2. Subtle concentric Esophageal rings.   3. Inflammation of the stomach. Gastritis.    4. Small sliding hiatal hernia.    5. No cancer. No active ulcers.    Recommendations:    1. Protonix (Pantoprazole) tablet 40 mg tablet. Take 1 tablet orally in the morning half an hour before eating every  day.  2. Other instructions as above.      Should you have more questions please do not hesitate to talk to the nurse who can call me and let me talk to you.      I hope you feel better.    Simba Rogers M.D., FACP, FACG.

## 2019-02-07 NOTE — OP NOTE
PROCEDURE:  Upper Endoscopy with biopsies.    DATE OF PROCEDURE: February 7, 2019.    REFERRING PROVIDER:  Mae Valle MD.     INSTRUMENT:  Olympus GIF H 190 video endoscope     INDICATIONS OF THE PROCEDURE:  This is a 64-year-old female with history of dysphagia.       BIOPSIES: Second portion of duodenum.  Gastric antrum, body and fundus.  Mid and distal esophagus.     MEDICATIONS:  MAC.     PHOTOGRAPHS:  Photographs were included in the medical records.     CONSENT/PREPROCEDURE EVALUATION:  Risks, benefits, alternatives and options of the procedure including risks of anesthesia/sedation were discussed and informed consent was obtained prior to the procedure. History and physical examination were performed and nothing precluded the test.     REPORT:  The patient was placed in left lateral decubitus position. Once under the influence of IV sedation, the instrument was inserted into the mouth and esophagus was intubated under direct vision without difficulty.    Limited view of the hypopharyngeal, laryngopharyngeal areas and vocal cord appeared to be within normal limits.    Esophagus:  Tortuosity and tertiary contractions of esophageal body were noted. Subtle concentric rings were seen in the mid and distal esophagus. These were biopsied.  Erosive distal esophagitis. LA class A. Z line was noted to be around 42 cm.   A small sliding hiatal hernia less than 3 cm was noted.  No Infante's esophagus was seen.     Stomach:  Antrum:  Erythematous gastritis.  Angulus, lesser and greater curves: Normal.  Retroflex examination: Sliding hiatal hernia.  Cardia and fundus:  Some fullness was noted on the posterior wall of the upper stomach. This was palpated with biopsy forceps and appeared to be rather firm.     Body of the stomach: Erythematous gastritis.  Good distensibility of the stomach was achieved no giant folds were noted.   Biopsies were obtained from the gastric antrum,  body and fundus.    Pylorus and  pyloric channel:  normal.     Duodenum:  Bulb: Normal.  Second portion: normal.  No scalloping was seen in the second portion of duodenum.  Biopsies were obtained from the second portion of duodenum.    Intervention:  None.       The upper GI tract was decompressed and the scope was pulled out of the patient. The patient tolerated the procedure well.     DIAGNOSES:    1. Erosive distal esophagitis. LA class A.  2. Small sliding hiatal hernia less than 3 cm.  3. Erythematous gastritis.  4. Fullness at the upper stomach-posterior wall.  5. Subtle concentric rings within the mid and distal esophagus.     RECOMMENDATIONS:  1.  Dietary instructions.  2.  Pantoprazole 40 mg 1 p.o. q.a.m. 1/2 hour before breakfast.  3.  Follow biopsies.  4.  Follow up in office.  5. A possible CT of abdomen with contrast for evaluation of fullness of the upper posterior wall there is no recent.     Thank you very much for letting me participate in the care of this patient. Please do not hesitate to call me if you have any questions.

## 2019-02-12 PROBLEM — K21.00 GERD WITH ESOPHAGITIS: Status: ACTIVE | Noted: 2019-02-12

## 2019-02-13 LAB
LAB AP CASE REPORT: NORMAL
PATH REPORT.FINAL DX SPEC: NORMAL

## 2019-03-14 ENCOUNTER — LAB (OUTPATIENT)
Dept: LAB | Facility: HOSPITAL | Age: 64
End: 2019-03-14

## 2019-03-14 ENCOUNTER — OFFICE VISIT (OUTPATIENT)
Dept: GASTROENTEROLOGY | Facility: CLINIC | Age: 64
End: 2019-03-14

## 2019-03-14 VITALS
DIASTOLIC BLOOD PRESSURE: 76 MMHG | HEIGHT: 68 IN | HEART RATE: 66 BPM | TEMPERATURE: 97.2 F | WEIGHT: 150 LBS | BODY MASS INDEX: 22.73 KG/M2 | RESPIRATION RATE: 12 BRPM | SYSTOLIC BLOOD PRESSURE: 135 MMHG

## 2019-03-14 DIAGNOSIS — R12 HEARTBURN: ICD-10-CM

## 2019-03-14 DIAGNOSIS — R68.84 PAIN IN UPPER JAW: ICD-10-CM

## 2019-03-14 DIAGNOSIS — R13.10 DYSPHAGIA, UNSPECIFIED TYPE: Primary | ICD-10-CM

## 2019-03-14 DIAGNOSIS — R19.06 EPIGASTRIC FULLNESS: ICD-10-CM

## 2019-03-14 DIAGNOSIS — K59.00 CONSTIPATION, UNSPECIFIED CONSTIPATION TYPE: ICD-10-CM

## 2019-03-14 LAB
BUN BLD-MCNC: 15 MG/DL (ref 7–20)
CREAT BLD-MCNC: 0.7 MG/DL (ref 0.6–1.3)
GFR SERPL CREATININE-BSD FRML MDRD: 84 ML/MIN/1.73

## 2019-03-14 PROCEDURE — 99214 OFFICE O/P EST MOD 30 MIN: CPT | Performed by: INTERNAL MEDICINE

## 2019-03-14 PROCEDURE — 84520 ASSAY OF UREA NITROGEN: CPT

## 2019-03-14 PROCEDURE — 82565 ASSAY OF CREATININE: CPT

## 2019-03-14 PROCEDURE — 36415 COLL VENOUS BLD VENIPUNCTURE: CPT

## 2019-03-14 NOTE — PATIENT INSTRUCTIONS
1. Antireflux measures.  2. The patient should eat relatively soft diet.    3. The patient should eat in upright position and chew well.  The patient should drink water after to 3 bites and take medications with liberal amounts of water.    4. Generally,  medications that have a potential to cause pill esophagitis may be avoided or used in an alternative form. For example Motrin or Aleve can be taken in a gel cap form.  5. After eating and taking medications the patient should remain in upright position for 10-15 minutes.  6. The patient may apply local analgesic such as BenGay to the jaw area.  7. Advil or Motrin 200 mg gelcap 1 orally 3 times a day with food for 3-7 days.  8. Check BUN and creatinine.  9. CT of abdomen and pelvis with oral and IV contrast after review of BUN and creatinine.  Diagnosis: Gastric fullness

## 2019-03-14 NOTE — PROGRESS NOTES
Chief Complaint   Patient presents with   • Difficulty Swallowing     History of Present Illness     The patient has history of dysphagia starting in December 2018.  This was described as moderately severe feeling with frequency being several times a week.  The patient would point towards mid substernal area.  The dysphagic symptoms were mostly with solids.  She also had some burning sensation in the retrosternal area.  There was no associated weight loss.  She feels much better.  Dysphagic symptoms have significantly improved.  She denies further retrosternal burning sensation.  There is no nausea or vomiting.  There is mild epigastric abdominal fullness at times.    About 2 weeks ago the patient was eating something hard and while chewing she started having pain in the right jaw.  There is history of some rash on the back of her neck.  There is no history of overt GI bleed (Hematemesis, melena or hematochezia).  The patient has history of some mild constipation with bowel movements perhaps every other day or so.  She denies abdominal pain.  There is no nausea or vomiting.  There is no history of weight loss.    Review of Systems   Constitutional: Negative for appetite change, chills, fatigue, fever and unexpected weight change.   HENT: Positive for trouble swallowing. Negative for mouth sores and nosebleeds.    Eyes: Negative for discharge and redness.   Respiratory: Negative for apnea, cough and shortness of breath.    Cardiovascular: Negative for chest pain, palpitations and leg swelling.   Gastrointestinal: Positive for constipation. Negative for abdominal distention, abdominal pain, anal bleeding, blood in stool, diarrhea, nausea and vomiting.   Endocrine: Negative for cold intolerance, heat intolerance and polydipsia.   Genitourinary: Negative for dysuria, hematuria and urgency.   Musculoskeletal: Negative for arthralgias, joint swelling and myalgias.   Skin: Positive for rash.   Allergic/Immunologic:  Negative for food allergies and immunocompromised state.   Neurological: Negative for dizziness, seizures, syncope and headaches.   Hematological: Negative for adenopathy. Does not bruise/bleed easily.   Psychiatric/Behavioral: Negative for dysphoric mood. The patient is not nervous/anxious and is not hyperactive.      Patient Active Problem List   Diagnosis   • Recurrent major depressive disorder, in remission (CMS/HCC)   • Eczema   • Elevated LDL cholesterol level   • Vitamin B12 deficiency   • Vitamin D deficiency   • Fine tremor   • Hereditary essential tremor   • Onychomycosis   • Normal body mass index (BMI)   • Recurrent cold sores   • Dysphagia   • GERD with esophagitis     Past Medical History:   Diagnosis Date   • Benign familial tremor    • Blood in urine    • Body piercing     EARS   • Depression    • Eczema    • Elevated LDL cholesterol level    • Fever blister    • History of fracture     ARM AT AGE 4 - PATIENT REPORTS SHE DOES NOT REMEMBER LATERALITY   • History of migraine    • History of tremor    • Onychomycosis    • Problems with swallowing     REPORTS SHE FEELS LIKE PILLS ARE GETTING STUCK IN A POCKET IN HER THROAT WHEN SHE TRIES TO SWALLOW THEM   • Sebaceous cyst    • Vitamin D deficiency    • Wears glasses      Past Surgical History:   Procedure Laterality Date   • APPENDECTOMY  2012   • BREAST BIOPSY Bilateral    • BREAST CYST ASPIRATION     • BREAST SURGERY Bilateral     history of mammatone left and right breast   • COLONOSCOPY     • CYST REMOVAL      SEBACEOUS CYST X3 FROM HEAD   • ENDOSCOPY N/A 2/7/2019    Procedure: ESOPHAGOGASTRODUODENOSCOPY W/ BIOPSIES;  Surgeon: Simba Rogers MD;  Location: [facility] ENDOSCOPY;  Service: Gastroenterology   • WISDOM TOOTH EXTRACTION      EXTRACTION X2     Family History   Problem Relation Age of Onset   • Heart attack Mother    • Hypertension Mother    • Heart attack Other    • Kidney disease Other    • Colon cancer Neg Hx    • Cirrhosis Neg Hx    •  "Liver disease Neg Hx    • Liver cancer Neg Hx    • Crohn's disease Neg Hx    • Ulcerative colitis Neg Hx    • Esophageal cancer Neg Hx    • Stomach cancer Neg Hx      Social History     Tobacco Use   • Smoking status: Never Smoker   • Smokeless tobacco: Never Used   Substance Use Topics   • Alcohol use: No       Current Outpatient Medications:   •  Ascorbic Acid (VITAMIN C) 500 MG capsule, Take 500 mg by mouth Every Other Day., Disp: , Rfl:   •  calcium (OS-SANTHOSH) 600 MG tablet, Take 600 mg by mouth 1 (One) Time Per Week., Disp: , Rfl:   •  Cholecalciferol (VITAMIN D-3 PO), Take 2,000 Units by mouth Daily., Disp: , Rfl:   •  escitalopram (LEXAPRO) 10 MG tablet, Take 1 tablet by mouth Daily., Disp: 90 tablet, Rfl: 3  •  magnesium chloride ER 64 MG DR tablet, Take 250 mg by mouth Every 14 (Fourteen) Days., Disp: , Rfl:   •  Multiple Vitamins-Minerals (CENTRUM SILVER ULTRA WOMENS PO), Take 1 tablet by mouth Every Other Day., Disp: , Rfl:   •  Omega-3 Fatty Acids (FISH OIL) 1000 MG capsule capsule, Take 1,200 mg by mouth Daily With Breakfast., Disp: , Rfl:   •  pantoprazole (PROTONIX) 40 MG EC tablet, Take 1 tablet by mouth 30 minutes before breakfast daily., Disp: 30 tablet, Rfl: 1  •  propranolol (INDERAL) 20 MG tablet, Take 1 tablet by mouth 3 (Three) Times a Day., Disp: 270 tablet, Rfl: 3    Allergies   Allergen Reactions   • Hazelnut Anaphylaxis   • Macrobid [Nitrofurantoin Monohyd Macro] Other (See Comments)     REPORTS \"I FELL AND IT WAS LIKE I COULDN'T MOVE\"          Blood pressure 135/76, pulse 66, temperature 97.2 °F (36.2 °C), resp. rate 12, height 172.7 cm (68\"), weight 68 kg (150 lb), not currently breastfeeding.    Physical Exam   Constitutional: She is oriented to person, place, and time. She appears well-developed and well-nourished. No distress.   HENT:   Head: Normocephalic and atraumatic.   Right Ear: Hearing and external ear normal.   Left Ear: Hearing and external ear normal.   Nose: Nose normal. "   Mouth/Throat: Oropharynx is clear and moist and mucous membranes are normal. Mucous membranes are not pale, not dry and not cyanotic. No oral lesions. No oropharyngeal exudate.   Positive focal tenderness around right temporal mandibular joint and adjacent jaw.  However she can open and close the jaw without significant pain.   Eyes: Conjunctivae and EOM are normal. Right eye exhibits no discharge. Left eye exhibits no discharge. No scleral icterus.   Neck: Trachea normal. Neck supple. No JVD present. No edema present. No thyroid mass and no thyromegaly present.   Cardiovascular: Normal rate, regular rhythm, S2 normal and normal heart sounds. Exam reveals no gallop, no S3 and no friction rub.   No murmur heard.  Pulmonary/Chest: Effort normal and breath sounds normal. No respiratory distress. She has no wheezes. She has no rales. She exhibits no tenderness.   Abdominal: Soft. Normal appearance and bowel sounds are normal. She exhibits no distension, no ascites and no mass. There is no splenomegaly or hepatomegaly. There is no tenderness. There is no rigidity, no rebound and no guarding. No hernia.   Musculoskeletal: She exhibits no tenderness or deformity.     Vascular Status -  Her right foot exhibits no edema. Her left foot exhibits no edema.  Lymphadenopathy:     She has no cervical adenopathy.        Left: No supraclavicular adenopathy present.   Neurological: She is alert and oriented to person, place, and time. She has normal strength. No cranial nerve deficit or sensory deficit. She exhibits normal muscle tone. Coordination normal.   Skin: Rash noted. She is not diaphoretic. There is erythema. No cyanosis. No pallor. Nails show no clubbing.   Patient was noted to have mild areas of focal erythema, scratches at the back of her neck.   Psychiatric: She has a normal mood and affect. Her behavior is normal. Judgment and thought content normal.   Nursing note and vitals reviewed.  Stigmata of chronic liver  disease:  None.  Asterixis:  None.    Laboratory Testing:  Upon review of medical records:    Dated October 12, 2017 sodium 145 potassium 4.9 chloride 106 CO2 27 BUN 11 serum creatinine 0.80 and glucose 89.  Calcium 10.0.  Total protein 7.3.  Albumin 4.40.  T bili 0.8 AST 22 ALT 25 alkaline phosphatase 75.  Total cholesterol 164.  Triglycerides 146.  TSH 2.500.  Free T4 level 1.13.  WBC 3.69 hemoglobin 14.3 hematocrit 41.4 MCV 92.8 and platelet count 240.     Dated April 9, 2018 sodium 144 potassium 4.8 chloride 104 CO2 30 BUN 20 serum creatinine 0.60 glucose 87.  Calcium 10.3.  Total protein 7.5.  Albumin 4.50.  T bili 0.8 AST 24 ALT 27 alkaline phosphatase 87.  Total cholesterol 187.  Triglycerides 181.  Vitamin B12 level 824.  Folate level 16.70.  Hep C virus antibody negative at <0.1.  WBC 4.5 to hemoglobin 13.9 hematocrit 40.4 MCV 94.2 and platelet count 247.     Procedures:  Upon review of medical records:     Dated October 27, 2014 the patient underwent a colonoscopy by Dr. Amaral from surgical service which revealed: Sigmoid polyps.  Colon, polyp, 35 cm and 25 cm.     Dated February 7, 2019 the patient underwent an upper endoscopy which revealed: Erosive distal esophagitis.  LA class A.  Small sliding hiatal hernia less than 3 cm.  Erythematous gastritis.  Fullness at the upper stomach-posterior wall.  Subtle concentric rings within the mid and distal esophagus.  Antrum, body and fundus, biopsies revealed gastric antral and fundic type mucosa with mild chronic inactive gastritis.  Negative for intestinal metaplasia or dysplasia.  No Helicobacter pylori-like organisms seen.  Second portion of duodenum, biopsy revealed Duodenal type mucosa with no significant histopathologic abnormalities.  Mid and distal esophagus, biopsies revealed squamous mucosa with features suggestive of reflux esophagitis.  Negative for glandular type mucosa, intestinal metaplasia or dysplasia.  Negative for specific  microorganisms.      Assessment:      ICD-10-CM ICD-9-CM   1. Dysphagia, unspecified type R13.10 787.20   2. Heartburn R12 787.1   3. Constipation, unspecified constipation type K59.00 564.00   4. Pain in upper jaw R68.84 784.92   5. Epigastric fullness R19.06 789.36         Discussion:  1.     Plan/  Patient Instructions   1. Antireflux measures.  2. The patient should eat relatively soft diet.    3. The patient should eat in upright position and chew well.  The patient should drink water after to 3 bites and take medications with liberal amounts of water.    4. Generally,  medications that have a potential to cause pill esophagitis may be avoided or used in an alternative form. For example Motrin or Aleve can be taken in a gel cap form.  5. After eating and taking medications the patient should remain in upright position for 10-15 minutes.  6. The patient may apply local analgesic such as BenGay to the jaw area.  7. Advil or Motrin 200 mg gelcap 1 orally 3 times a day with food for 3-7 days.  8. Check BUN and creatinine.  9. CT of abdomen and pelvis with oral and IV contrast after review of BUN and creatinine.  Diagnosis: Gastric fullness       Simba Rogers MD

## 2019-03-15 ENCOUNTER — TELEPHONE (OUTPATIENT)
Dept: GASTROENTEROLOGY | Facility: CLINIC | Age: 64
End: 2019-03-15

## 2019-03-15 DIAGNOSIS — R19.8 ABDOMINAL FULLNESS: Primary | ICD-10-CM

## 2019-03-15 NOTE — TELEPHONE ENCOUNTER
Let the patient know her labs were ok and CT scan has been ordered. The hospital will contact her regarding scheduling.

## 2019-03-19 ENCOUNTER — HOSPITAL ENCOUNTER (OUTPATIENT)
Dept: CT IMAGING | Facility: HOSPITAL | Age: 64
Discharge: HOME | End: 2019-03-19
Admitting: NURSE PRACTITIONER

## 2019-03-19 DIAGNOSIS — R19.8 ABDOMINAL FULLNESS: ICD-10-CM

## 2019-03-19 PROCEDURE — 74177 CT ABD & PELVIS W/CONTRAST: CPT

## 2019-03-19 PROCEDURE — 25010000002 IOPAMIDOL 61 % SOLUTION: Performed by: NURSE PRACTITIONER

## 2019-03-19 RX ADMIN — BARIUM SULFATE 450 ML: 21 SUSPENSION ORAL at 13:28

## 2019-03-19 RX ADMIN — IOPAMIDOL 100 ML: 612 INJECTION, SOLUTION INTRAVENOUS at 13:28

## 2019-03-20 NOTE — TELEPHONE ENCOUNTER
Let her know the CT scan was ok, no acute process noted. Dr. Rogers will discuss further with her at follow up.

## 2019-03-21 ENCOUNTER — TELEPHONE (OUTPATIENT)
Dept: GASTROENTEROLOGY | Facility: CLINIC | Age: 64
End: 2019-03-21

## 2019-03-21 NOTE — TELEPHONE ENCOUNTER
Patient called for CT results.   verified.  CT results were normal.  She does have liver cysts which are unchanged from CT in 2012.  She also has renal cysts.  Patient is encouraged to follow PCP recommendations for renal cysts.  Her liver cysts would be discussed at follow-up with Dr. SAMSON in May.  She states understanding.

## 2019-03-25 ENCOUNTER — TELEPHONE (OUTPATIENT)
Dept: GASTROENTEROLOGY | Facility: CLINIC | Age: 64
End: 2019-03-25

## 2019-04-09 ENCOUNTER — CLINICAL SUPPORT (OUTPATIENT)
Dept: INTERNAL MEDICINE | Facility: CLINIC | Age: 64
End: 2019-04-09

## 2019-04-09 PROCEDURE — G0009 ADMIN PNEUMOCOCCAL VACCINE: HCPCS | Performed by: FAMILY MEDICINE

## 2019-04-09 PROCEDURE — 90732 PPSV23 VACC 2 YRS+ SUBQ/IM: CPT | Performed by: FAMILY MEDICINE

## 2019-04-16 PROBLEM — IMO0001 NORMAL BODY MASS INDEX (BMI): Status: RESOLVED | Noted: 2018-04-09 | Resolved: 2019-04-16

## 2019-04-16 LAB
25(OH)D3+25(OH)D2 SERPL-MCNC: 28.7 NG/ML
ALBUMIN SERPL-MCNC: 4.1 G/DL (ref 3.5–5)
ALBUMIN/GLOB SERPL: 1.5 G/DL (ref 1–2)
ALP SERPL-CCNC: 79 U/L (ref 38–126)
ALT SERPL-CCNC: 25 U/L (ref 13–69)
AST SERPL-CCNC: 24 U/L (ref 15–46)
BASOPHILS # BLD AUTO: 0.04 10*3/MM3 (ref 0–0.2)
BASOPHILS NFR BLD AUTO: 0.9 % (ref 0–1.5)
BILIRUB SERPL-MCNC: 0.6 MG/DL (ref 0.2–1.3)
BUN SERPL-MCNC: 15 MG/DL (ref 7–20)
BUN/CREAT SERPL: 21.4 (ref 7.1–23.5)
CALCIUM SERPL-MCNC: 9.8 MG/DL (ref 8.4–10.2)
CHLORIDE SERPL-SCNC: 103 MMOL/L (ref 98–107)
CHOLEST SERPL-MCNC: 147 MG/DL (ref 0–199)
CO2 SERPL-SCNC: 28 MMOL/L (ref 26–30)
CREAT SERPL-MCNC: 0.7 MG/DL (ref 0.6–1.3)
EOSINOPHIL # BLD AUTO: 0.15 10*3/MM3 (ref 0–0.4)
EOSINOPHIL NFR BLD AUTO: 3.5 % (ref 0.3–6.2)
ERYTHROCYTE [DISTWIDTH] IN BLOOD BY AUTOMATED COUNT: 11.8 % (ref 12.3–15.4)
FOLATE SERPL-MCNC: >20 NG/ML
GLOBULIN SER CALC-MCNC: 2.7 GM/DL
GLUCOSE SERPL-MCNC: 86 MG/DL (ref 74–98)
HCT VFR BLD AUTO: 40.1 % (ref 34–46.6)
HDLC SERPL-MCNC: 48 MG/DL (ref 40–60)
HGB BLD-MCNC: 13.5 G/DL (ref 12–15.9)
IMM GRANULOCYTES # BLD AUTO: 0.01 10*3/MM3 (ref 0–0.05)
IMM GRANULOCYTES NFR BLD AUTO: 0.2 % (ref 0–0.5)
LDLC SERPL CALC-MCNC: 78 MG/DL (ref 0–99)
LYMPHOCYTES # BLD AUTO: 1.14 10*3/MM3 (ref 0.7–3.1)
LYMPHOCYTES NFR BLD AUTO: 26.5 % (ref 19.6–45.3)
MCH RBC QN AUTO: 31.8 PG (ref 26.6–33)
MCHC RBC AUTO-ENTMCNC: 33.7 G/DL (ref 31.5–35.7)
MCV RBC AUTO: 94.6 FL (ref 79–97)
MONOCYTES # BLD AUTO: 0.38 10*3/MM3 (ref 0.1–0.9)
MONOCYTES NFR BLD AUTO: 8.8 % (ref 5–12)
NEUTROPHILS # BLD AUTO: 2.58 10*3/MM3 (ref 1.4–7)
NEUTROPHILS NFR BLD AUTO: 60.1 % (ref 42.7–76)
NRBC BLD AUTO-RTO: 0 /100 WBC (ref 0–0)
PLATELET # BLD AUTO: 246 10*3/MM3 (ref 140–450)
POTASSIUM SERPL-SCNC: 4.3 MMOL/L (ref 3.5–5.1)
PROT SERPL-MCNC: 6.8 G/DL (ref 6.3–8.2)
RBC # BLD AUTO: 4.24 10*6/MM3 (ref 3.77–5.28)
SODIUM SERPL-SCNC: 140 MMOL/L (ref 137–145)
TRIGL SERPL-MCNC: 105 MG/DL
VIT B12 SERPL-MCNC: 652 PG/ML (ref 239–931)
VLDLC SERPL CALC-MCNC: 21 MG/DL
WBC # BLD AUTO: 4.3 10*3/MM3 (ref 3.4–10.8)

## 2019-04-29 ENCOUNTER — OFFICE VISIT (OUTPATIENT)
Dept: INTERNAL MEDICINE | Facility: CLINIC | Age: 64
End: 2019-04-29

## 2019-04-29 VITALS
TEMPERATURE: 98 F | HEIGHT: 68 IN | WEIGHT: 148.5 LBS | BODY MASS INDEX: 22.51 KG/M2 | SYSTOLIC BLOOD PRESSURE: 126 MMHG | DIASTOLIC BLOOD PRESSURE: 62 MMHG | OXYGEN SATURATION: 97 % | HEART RATE: 64 BPM | RESPIRATION RATE: 16 BRPM

## 2019-04-29 DIAGNOSIS — E53.8 VITAMIN B12 DEFICIENCY: Primary | ICD-10-CM

## 2019-04-29 DIAGNOSIS — E78.00 ELEVATED LDL CHOLESTEROL LEVEL: ICD-10-CM

## 2019-04-29 DIAGNOSIS — G25.0 HEREDITARY ESSENTIAL TREMOR: ICD-10-CM

## 2019-04-29 DIAGNOSIS — E55.9 VITAMIN D DEFICIENCY: ICD-10-CM

## 2019-04-29 DIAGNOSIS — F33.40 RECURRENT MAJOR DEPRESSIVE DISORDER, IN REMISSION (HCC): ICD-10-CM

## 2019-04-29 DIAGNOSIS — K21.00 GERD WITH ESOPHAGITIS: ICD-10-CM

## 2019-04-29 PROCEDURE — 99214 OFFICE O/P EST MOD 30 MIN: CPT | Performed by: FAMILY MEDICINE

## 2019-04-29 RX ORDER — PANTOPRAZOLE SODIUM 40 MG/1
TABLET, DELAYED RELEASE ORAL
Qty: 90 TABLET | Refills: 3 | Status: SHIPPED | OUTPATIENT
Start: 2019-04-29 | End: 2019-05-08

## 2019-04-29 RX ORDER — PRIMIDONE 50 MG/1
50 TABLET ORAL NIGHTLY
Qty: 30 TABLET | Refills: 1 | Status: SHIPPED | OUTPATIENT
Start: 2019-04-29 | End: 2019-05-08

## 2019-04-29 RX ORDER — ESCITALOPRAM OXALATE 10 MG/1
10 TABLET ORAL DAILY
Qty: 90 TABLET | Refills: 3 | Status: SHIPPED | OUTPATIENT
Start: 2019-04-29 | End: 2019-09-27 | Stop reason: SDUPTHER

## 2019-04-29 NOTE — PROGRESS NOTES
Subjective    Milagro Sanderson is a 64 y.o. female here for:    Chief Complaint   Patient presents with   • Depression     5 mo f/u pt had a CT scan with Dr. Rico and it showed cyts on her kidneys and her liver    • Tremors     would like to discuss medication        History of Present Illness     Patient underwent EGD was found to have esophagitis and gastritis.  She is worried about propranolol possibly irritating her stomach and esophagus as it sometimes wants to stick.  Friend reported a similar situation with propranolol when she uses it, mouth irritation.  Patient would like to try coming off of propranolol which she uses for her essential tremor.  She asked if there is other therapy she may try.  She has come down from 3 time a day dosing to twice a day dosing.  There is a long-standing history of her tremor and it runs in her family.  She has not tried any other medicines besides propranolol.    She was prescribed a heartburn medicine by GI but has not continued as she did not have any further refills.  She remembers taking a medicine 30 minutes before breakfast.  Upon chart review this was pantoprazole 40 mg daily.    Mood is good on Lexapro, she wishes to continue use.    The following portions of the patient's history were reviewed and updated as appropriate: allergies, current medications, past family history, past medical history, past social history, past surgical history and problem list.    Health Maintenance   Topic Date Due   • TDAP/TD VACCINES (1 - Tdap) 01/01/2020 (Originally 1/24/1974)   • INFLUENZA VACCINE  08/01/2019   • MEDICARE ANNUAL WELLNESS  11/27/2019   • MAMMOGRAM  06/15/2020   • COLONOSCOPY  03/27/2025   • HEPATITIS C SCREENING  Completed   • PNEUMOCOCCAL VACCINES (65+ LOW/MEDIUM RISK)  Completed   • ZOSTER VACCINE  Completed       Review of Systems   Constitutional: Negative for activity change.   HENT: Positive for trouble swallowing.    Gastrointestinal: Positive for indigestion.  "  Neurological: Positive for tremors.       Vitals:    04/29/19 1054   BP: 126/62   Pulse: 64   Resp: 16   Temp: 98 °F (36.7 °C)   TempSrc: Temporal   SpO2: 97%   Weight: 67.4 kg (148 lb 8 oz)   Height: 172.7 cm (67.99\")         Objective   Physical Exam   Constitutional: She is oriented to person, place, and time. Vital signs are normal. She appears well-developed and well-nourished. She is active.  Non-toxic appearance. She does not appear ill. No distress.   HENT:   Head: Normocephalic and atraumatic.   Right Ear: Hearing normal.   Left Ear: Hearing normal.   Mouth/Throat: Mucous membranes are not dry.   Eyes: EOM are normal. No scleral icterus.   Neck: Phonation normal. Neck supple.   Pulmonary/Chest: Effort normal.   Neurological: She is alert and oriented to person, place, and time. She displays no tremor. No cranial nerve deficit.   Skin: Skin is warm. No rash noted. She is not diaphoretic. No pallor.   Psychiatric: She has a normal mood and affect. Her speech is normal and behavior is normal. Judgment and thought content normal. Cognition and memory are normal.   Nursing note and vitals reviewed.    I reviewed the EGD report from February 7, 2019 performed by Dr. Rogers.    Assessment/Plan     Problem List Items Addressed This Visit        Digestive    Vitamin B12 deficiency - Primary    Relevant Orders    CBC & Differential (Completed)    Vitamin B12 & Folate (Completed)    Vitamin D deficiency    Relevant Orders    Vitamin D 25 Hydroxy (Completed)    GERD with esophagitis    Overview     EGD 2/7/19, Dr. Rogers:  DIAGNOSES:    1. Erosive distal esophagitis. LA class A.  2. Small sliding hiatal hernia less than 3 cm.  3. Erythematous gastritis.  4. Fullness at the upper stomach-posterior wall.  5. Subtle concentric rings within the mid and distal esophagus.              Relevant Medications    pantoprazole (PROTONIX) 40 MG EC tablet       Nervous and Auditory    Hereditary essential tremor    Overview     " Current therapy: propranolol         Relevant Medications    primidone (MYSOLINE) 50 MG tablet       Other    Recurrent major depressive disorder, in remission (CMS/[facility])    Overview     · Current therapy: lexapro         Current Assessment & Plan     Psychological condition is improving with treatment.  Continue current treatment regimen.  Psychological condition  will be reassessed at the next regular appointment.         Relevant Medications    escitalopram (LEXAPRO) 10 MG tablet    Elevated LDL cholesterol level    Relevant Orders    Comprehensive Metabolic Panel (Completed)    Lipid Panel (Completed)          · Discussed weaning propranolol, may try CBD oil if she wishes.  Primidone has been sent if she wants to try this.    Return in about 7 months (around 12/2/2019) for Medicare Wellness. or sooner if needed.    Mae Valle MD

## 2019-05-08 ENCOUNTER — OFFICE VISIT (OUTPATIENT)
Dept: GASTROENTEROLOGY | Facility: CLINIC | Age: 64
End: 2019-05-08

## 2019-05-08 VITALS
BODY MASS INDEX: 23.04 KG/M2 | TEMPERATURE: 99.6 F | RESPIRATION RATE: 16 BRPM | DIASTOLIC BLOOD PRESSURE: 57 MMHG | HEART RATE: 70 BPM | HEIGHT: 68 IN | SYSTOLIC BLOOD PRESSURE: 142 MMHG | WEIGHT: 152 LBS

## 2019-05-08 DIAGNOSIS — R68.84 PAIN IN UPPER JAW: ICD-10-CM

## 2019-05-08 DIAGNOSIS — R12 HEARTBURN: Primary | ICD-10-CM

## 2019-05-08 PROCEDURE — 99214 OFFICE O/P EST MOD 30 MIN: CPT | Performed by: INTERNAL MEDICINE

## 2019-05-08 NOTE — PROGRESS NOTES
Chief Complaint   Patient presents with   • Heartburn     History of Present Illness     The patient has a history of reflux off and on for the last several years.  The reflux is moderately severe.  Symptoms are described as retrosternal burning sensation, and indigestion.  There is history of occasional regurgitative symptoms.  Frequency being several times per week.  The symptoms are worse at night.  The patient takes acid suppressive therapy with reasonable control of his symptoms.  The patient denies further dysphagia symptoms.  She denies epigastric abdominal pain or fullness.  There is no nausea or vomiting.  The patient has improvement of right jaw pain.  She denies further itching of neck area.  She denies diarrhea or constipation.  There is no history of hematemesis, melena or hematochezia.  There is no history of weight loss.       Review of Systems  Patient Active Problem List   Diagnosis   • Recurrent major depressive disorder, in remission (CMS/HCC)   • Eczema   • Elevated LDL cholesterol level   • Vitamin B12 deficiency   • Vitamin D deficiency   • Fine tremor   • Hereditary essential tremor   • Onychomycosis   • Recurrent cold sores   • Dysphagia   • GERD with esophagitis     Past Medical History:   Diagnosis Date   • Benign familial tremor    • Blood in urine    • Body piercing     EARS   • Depression    • Eczema    • Elevated LDL cholesterol level    • Fever blister    • History of fracture     ARM AT AGE 4 - PATIENT REPORTS SHE DOES NOT REMEMBER LATERALITY   • History of migraine    • History of tremor    • Onychomycosis    • Problems with swallowing     REPORTS SHE FEELS LIKE PILLS ARE GETTING STUCK IN A POCKET IN HER THROAT WHEN SHE TRIES TO SWALLOW THEM   • Sebaceous cyst    • Vitamin D deficiency    • Wears glasses      Past Surgical History:   Procedure Laterality Date   • APPENDECTOMY  2012   • BREAST BIOPSY Bilateral    • BREAST CYST ASPIRATION     • BREAST SURGERY Bilateral     history of  "mammatone left and right breast   • COLONOSCOPY     • CYST REMOVAL      SEBACEOUS CYST X3 FROM HEAD   • ENDOSCOPY N/A 2/7/2019    Procedure: ESOPHAGOGASTRODUODENOSCOPY W/ BIOPSIES;  Surgeon: Simba Rogers MD;  Location: [facility] ENDOSCOPY;  Service: Gastroenterology   • WISDOM TOOTH EXTRACTION      EXTRACTION X2     Family History   Problem Relation Age of Onset   • Heart attack Mother    • Hypertension Mother    • Heart attack Other    • Kidney disease Other    • Diabetes Paternal Aunt    • Diabetes Paternal Uncle    • Colon cancer Neg Hx    • Cirrhosis Neg Hx    • Liver disease Neg Hx    • Liver cancer Neg Hx    • Crohn's disease Neg Hx    • Ulcerative colitis Neg Hx    • Esophageal cancer Neg Hx    • Stomach cancer Neg Hx      Social History     Tobacco Use   • Smoking status: Never Smoker   • Smokeless tobacco: Never Used   Substance Use Topics   • Alcohol use: No       Current Outpatient Medications:   •  Ascorbic Acid (VITAMIN C) 500 MG capsule, Take 500 mg by mouth Every Other Day., Disp: , Rfl:   •  calcium (OS-SANTHOSH) 600 MG tablet, Take 600 mg by mouth 1 (One) Time Per Week., Disp: , Rfl:   •  CBD (cannabidiol) oral oil, Take  by mouth., Disp: , Rfl:   •  Cholecalciferol (VITAMIN D-3 PO), Take 2,000 Units by mouth Daily., Disp: , Rfl:   •  escitalopram (LEXAPRO) 10 MG tablet, Take 1 tablet by mouth Daily., Disp: 90 tablet, Rfl: 3  •  magnesium chloride ER 64 MG DR tablet, Take 250 mg by mouth Every 14 (Fourteen) Days., Disp: , Rfl:   •  Multiple Vitamins-Minerals (CENTRUM SILVER ULTRA WOMENS PO), Take 1 tablet by mouth Every Other Day., Disp: , Rfl:   •  Omega-3 Fatty Acids (FISH OIL) 1000 MG capsule capsule, Take 1,200 mg by mouth Daily With Breakfast., Disp: , Rfl:     Allergies   Allergen Reactions   • Hazelnut Anaphylaxis   • Macrobid [Nitrofurantoin Monohyd Macro] Other (See Comments)     REPORTS \"I FELL AND IT WAS LIKE I COULDN'T MOVE\"          Blood pressure 142/57, pulse 70, temperature 99.6 °F (37.6 " "°C), resp. rate 16, height 172.7 cm (68\"), weight 68.9 kg (152 lb), not currently breastfeeding.    Physical Exam   Constitutional: She is oriented to person, place, and time. She appears well-developed and well-nourished. No distress.   HENT:   Head: Normocephalic and atraumatic.   Right Ear: Hearing and external ear normal.   Left Ear: Hearing and external ear normal.   Nose: Nose normal.   Mouth/Throat: Oropharynx is clear and moist and mucous membranes are normal. Mucous membranes are not pale, not dry and not cyanotic. No oral lesions. No oropharyngeal exudate.   Mild tenderness around the right jaw area   Eyes: Conjunctivae and EOM are normal. Right eye exhibits no discharge. Left eye exhibits no discharge. No scleral icterus.   Neck: Trachea normal. Neck supple. No JVD present. No edema present. No thyroid mass and no thyromegaly present.   Cardiovascular: Normal rate, regular rhythm, S2 normal and normal heart sounds. Exam reveals no gallop, no S3 and no friction rub.   No murmur heard.  Pulmonary/Chest: Effort normal and breath sounds normal. No respiratory distress. She has no wheezes. She has no rales. She exhibits no tenderness.   Abdominal: Soft. Normal appearance and bowel sounds are normal. She exhibits no distension, no ascites and no mass. There is no splenomegaly or hepatomegaly. There is no tenderness. There is no rigidity, no rebound and no guarding. No hernia.   Musculoskeletal: She exhibits no tenderness or deformity.     Vascular Status -  Her right foot exhibits no edema. Her left foot exhibits no edema.  Lymphadenopathy:     She has no cervical adenopathy.        Left: No supraclavicular adenopathy present.   Neurological: She is alert and oriented to person, place, and time. She has normal strength. No cranial nerve deficit or sensory deficit. She exhibits normal muscle tone. Coordination normal.   Skin: No rash noted. She is not diaphoretic. No cyanosis. No pallor. Nails show no clubbing. "   Psychiatric: She has a normal mood and affect. Her behavior is normal. Judgment and thought content normal.   Nursing note and vitals reviewed.  Stigmata of chronic liver disease:  None.  Asterixis:  None.    Laboratory Testing:  Upon review of medical records:    Dated October 12, 2017 sodium 145 potassium 4.9 chloride 106 CO2 27 BUN 11 serum creatinine 0.80 and glucose 89.  Calcium 10.0.  Total protein 7.3.  Albumin 4.40.  T bili 0.8 AST 22 ALT 25 alkaline phosphatase 75.  Total cholesterol 164.  Triglycerides 146.  TSH 2.500.  Free T4 level 1.13.  WBC 3.69 hemoglobin 14.3 hematocrit 41.4 MCV 92.8 and platelet count 240.     Dated April 9, 2018 sodium 144 potassium 4.8 chloride 104 CO2 30 BUN 20 serum creatinine 0.60 glucose 87.  Calcium 10.3.  Total protein 7.5.  Albumin 4.50.  T bili 0.8 AST 24 ALT 27 alkaline phosphatase 87.  Total cholesterol 187.  Triglycerides 181.  Vitamin B12 level 824.  Folate level 16.70.  Hep C virus antibody negative at <0.1.  WBC 4.5 to hemoglobin 13.9 hematocrit 40.4 MCV 94.2 and platelet count 247.    Dated April 16, 2019 sodium 140 potassium 4.3 chloride 103 CO2 28 BUN 15 serum creatinine 0.70 glucose 86.  Calcium 9.8.  Total protein 6.8.  Albumin 4.10.  T bili 0.6 AST 24 ALT 25 alkaline phosphatase 79.  Total cholesterol 147.  Triglycerides 105.  Vitamin B12 level 652.  Folate >20.00.  WBC 4.30 hemoglobin 13.5 hematocrit 40.1 MCV 94.6 and platelet count 246.    Abdominal Imaging:  Upon review of medical records:     Dated March 19, 2019 the patient underwent a CT the abdomen and pelvis with contrast which revealed: Clear lung bases.  Heart is normal in size.  Hypodense lesions in the medial right lobe of the liver which are unchanged consistent with cysts.  Spleen is unremarkable.  No adrenal mass is present.  Pancreas is normal.  Kidneys enhance appropriately.  Bilateral renal cysts.  Aorta is normal in caliber.  No free fluid or adenopathy.  Abdominal portions of the GI tract  are unremarkable with no evidence of obstruction.  Appendix is not identified.  Urinary bladder is unremarkable.  No significant free fluid or adenopathy.    Procedures:  Upon review of medical records:     Dated October 27, 2014 the patient underwent a colonoscopy by Dr. Amaral from surgical service which revealed: Sigmoid polyps.  Colon, polyp, 35 cm and 25 cm.     Dated February 7, 2019 the patient underwent an upper endoscopy which revealed: Erosive distal esophagitis.  LA class A.  Small sliding hiatal hernia less than 3 cm.  Erythematous gastritis.  Fullness at the upper stomach-posterior wall.  Subtle concentric rings within the mid and distal esophagus.  Antrum, body and fundus, biopsies revealed gastric antral and fundic type mucosa with mild chronic inactive gastritis.  Negative for intestinal metaplasia or dysplasia.  No Helicobacter pylori-like organisms seen.  Second portion of duodenum, biopsy revealed Duodenal type mucosa with no significant histopathologic abnormalities.  Mid and distal esophagus, biopsies revealed squamous mucosa with features suggestive of reflux esophagitis.  Negative for glandular type mucosa, intestinal metaplasia or dysplasia.  Negative for specific microorganisms.     Assessment:      ICD-10-CM ICD-9-CM   1. Heartburn R12 787.1   2. Pain in upper jaw R68.84 784.92         Discussion:  1.     Plan/  Patient Instructions   1. Antireflux measures.  2. The patient should eat relatively soft diet.    3. The patient should eat in upright position and chew well.  The patient should drink water after to 3 bites and take medications with liberal amounts of water.     4. Protonix (Pantoprazole) tablet 40 mg tablet. Take 1 tablet orally in the morning half an hour before eating every day.  5. Generally,  medications that have a potential to cause pill esophagitis may be avoided or used in an alternative form. For example Motrin or Aleve can be taken in a gel cap form.  6. After eating and  taking medications the patient should remain in upright position for 10-15 minutes.  7. A possible colonoscopy may be considered in the future.  Last colonoscopy was in October 2014 by surgical service-Dr. Amaral.  History of colon polyps.  The patient will be due for colonoscopy in October 2019.         Simba Rogers MD

## 2019-05-08 NOTE — PATIENT INSTRUCTIONS
1. Antireflux measures.  2. The patient should eat relatively soft diet.    3. The patient should eat in upright position and chew well.  The patient should drink water after to 3 bites and take medications with liberal amounts of water.     4. Protonix (Pantoprazole) tablet 40 mg tablet. Take 1 tablet orally in the morning half an hour before eating every day.  5. Generally,  medications that have a potential to cause pill esophagitis may be avoided or used in an alternative form. For example Motrin or Aleve can be taken in a gel cap form.  6. After eating and taking medications the patient should remain in upright position for 10-15 minutes.  7. A possible colonoscopy may be considered in the future.  Last colonoscopy was in October 2014 by surgical service-Dr. Amaral.  History of colon polyps.  The patient will be due for colonoscopy in October 2019.

## 2019-06-03 ENCOUNTER — TRANSCRIBE ORDERS (OUTPATIENT)
Dept: ADMINISTRATIVE | Facility: HOSPITAL | Age: 64
End: 2019-06-03

## 2019-06-03 DIAGNOSIS — Z12.39 ENCOUNTER FOR SCREENING FOR MALIGNANT NEOPLASM OF BREAST: Primary | ICD-10-CM

## 2019-07-25 ENCOUNTER — HOSPITAL ENCOUNTER (OUTPATIENT)
Dept: MAMMOGRAPHY | Facility: HOSPITAL | Age: 64
Discharge: HOME | End: 2019-07-25
Admitting: FAMILY MEDICINE

## 2019-07-25 DIAGNOSIS — Z12.39 ENCOUNTER FOR SCREENING FOR MALIGNANT NEOPLASM OF BREAST: ICD-10-CM

## 2019-07-25 PROCEDURE — 77067 SCR MAMMO BI INCL CAD: CPT

## 2019-07-25 PROCEDURE — 77063 BREAST TOMOSYNTHESIS BI: CPT

## 2019-07-26 DIAGNOSIS — G25.0 HEREDITARY ESSENTIAL TREMOR: ICD-10-CM

## 2019-07-26 RX ORDER — PRIMIDONE 50 MG/1
50 TABLET ORAL NIGHTLY
Qty: 90 TABLET | Refills: 3 | Status: SHIPPED | OUTPATIENT
Start: 2019-07-26 | End: 2019-12-02 | Stop reason: SINTOL

## 2019-08-12 ENCOUNTER — TELEPHONE (OUTPATIENT)
Dept: SURGERY | Facility: CLINIC | Age: 64
End: 2019-08-12

## 2019-08-20 DIAGNOSIS — B00.1 RECURRENT COLD SORES: ICD-10-CM

## 2019-08-20 RX ORDER — ACYCLOVIR 400 MG/1
TABLET ORAL
Qty: 15 TABLET | Refills: 4 | Status: SHIPPED | OUTPATIENT
Start: 2019-08-20 | End: 2021-02-03

## 2019-09-18 ENCOUNTER — TELEPHONE (OUTPATIENT)
Dept: SURGERY | Facility: CLINIC | Age: 64
End: 2019-09-18

## 2019-09-27 ENCOUNTER — OFFICE VISIT (OUTPATIENT)
Dept: INTERNAL MEDICINE | Facility: CLINIC | Age: 64
End: 2019-09-27

## 2019-09-27 VITALS
BODY MASS INDEX: 21.52 KG/M2 | WEIGHT: 142 LBS | HEART RATE: 76 BPM | TEMPERATURE: 96.6 F | DIASTOLIC BLOOD PRESSURE: 62 MMHG | OXYGEN SATURATION: 96 % | RESPIRATION RATE: 16 BRPM | SYSTOLIC BLOOD PRESSURE: 104 MMHG | HEIGHT: 68 IN

## 2019-09-27 DIAGNOSIS — R22.1 NECK SWELLING: ICD-10-CM

## 2019-09-27 DIAGNOSIS — F33.40 RECURRENT MAJOR DEPRESSIVE DISORDER, IN REMISSION (HCC): ICD-10-CM

## 2019-09-27 DIAGNOSIS — N89.8 VAGINAL DISCHARGE: ICD-10-CM

## 2019-09-27 DIAGNOSIS — R30.0 DYSURIA: Primary | ICD-10-CM

## 2019-09-27 DIAGNOSIS — N39.0 COMPLICATED UTI (URINARY TRACT INFECTION): ICD-10-CM

## 2019-09-27 LAB
BILIRUB BLD-MCNC: NEGATIVE MG/DL
CLARITY, POC: ABNORMAL
COLOR UR: YELLOW
GLUCOSE UR STRIP-MCNC: NEGATIVE MG/DL
KETONES UR QL: NEGATIVE
LEUKOCYTE EST, POC: NEGATIVE
NITRITE UR-MCNC: NEGATIVE MG/ML
PH UR: 6 [PH] (ref 5–8)
PROT UR STRIP-MCNC: NEGATIVE MG/DL
RBC # UR STRIP: ABNORMAL /UL
SP GR UR: 1.01 (ref 1–1.03)
UROBILINOGEN UR QL: NORMAL

## 2019-09-27 PROCEDURE — 99214 OFFICE O/P EST MOD 30 MIN: CPT | Performed by: FAMILY MEDICINE

## 2019-09-27 PROCEDURE — 81003 URINALYSIS AUTO W/O SCOPE: CPT | Performed by: FAMILY MEDICINE

## 2019-09-27 RX ORDER — ESCITALOPRAM OXALATE 10 MG/1
10 TABLET ORAL DAILY
Qty: 90 TABLET | Refills: 3 | Status: SHIPPED | OUTPATIENT
Start: 2019-09-27 | End: 2019-10-30

## 2019-09-27 RX ORDER — FLUCONAZOLE 150 MG/1
TABLET ORAL
Qty: 2 TABLET | Refills: 0 | Status: SHIPPED | OUTPATIENT
Start: 2019-09-27 | End: 2020-01-30

## 2019-09-27 RX ORDER — CEFDINIR 300 MG/1
300 CAPSULE ORAL 2 TIMES DAILY
Qty: 20 CAPSULE | Refills: 0 | Status: SHIPPED | OUTPATIENT
Start: 2019-09-27 | End: 2019-10-07

## 2019-09-27 RX ORDER — PANTOPRAZOLE SODIUM 40 MG/1
TABLET, DELAYED RELEASE ORAL
COMMUNITY
Start: 2019-08-20 | End: 2019-12-02

## 2019-09-27 NOTE — PROGRESS NOTES
"Subjective    Milagro Sanderson is a 64 y.o. female here for:    Chief Complaint   Patient presents with   • Urinary Tract Infection     was seen at  2-3 weeks ago and was given an antibx for a UTI but she states that she is still burning and having frequent urination        History of Present Illness   tx for urinary tract infection by [facility] with bactrim. Continues to have difficulty urinating, frequency, some burning. No fevers. She completed antibiotic. She did not hear back from [facility] regarding culture. Admits she's had a vaginal discharge requiring use of a pad which is not normal for her.    Feels the lower part of neck is puffy. Family history of goiter in mother and aunt. No trouble swallowing. No known thyroid disease. Neck has looked this way for several years but she's never had it checked out.    Stopped Lexapro for a period and felt CBD was working well for mood. Moving and mood worsened, she resumed Lexapro and mood has improved. No SI. She needs medicine refilled.    The following portions of the patient's history were reviewed and updated as appropriate: allergies, current medications, past family history, past medical history, past social history, past surgical history and problem list.    Review of Systems   Constitutional: Negative for fever.   HENT: Negative for trouble swallowing.    Genitourinary: Positive for dysuria and vaginal discharge.   Psychiatric/Behavioral: Positive for dysphoric mood.       Vitals:    09/27/19 0858   BP: 104/62   Pulse: 76   Resp: 16   Temp: 96.6 °F (35.9 °C)   TempSrc: Temporal   SpO2: 96%   Weight: 64.4 kg (142 lb)   Height: 172.7 cm (67.99\")     Patient's Body mass index is 21.6 kg/m². BMI is within normal parameters. No follow-up required..        Objective   Physical Exam   Constitutional: She is oriented to person, place, and time. Vital signs are normal. She appears well-developed and well-nourished. She is active.  Non-toxic appearance. She does not have a sickly " appearance. She does not appear ill. No distress. She is not overweight.  HENT:   Head: Normocephalic and atraumatic.   Right Ear: Hearing normal.   Left Ear: Hearing normal.   Mouth/Throat: Mucous membranes are not dry.   Eyes: EOM are normal. No scleral icterus.   Neck: Phonation normal. Neck supple.       Pulmonary/Chest: Effort normal.   Abdominal: There is no CVA tenderness.   Neurological: She is alert and oriented to person, place, and time. She displays no tremor. No cranial nerve deficit.   Skin: Skin is warm. No rash noted. She is not diaphoretic. No pallor.   Psychiatric: She has a normal mood and affect. Her speech is normal and behavior is normal. Judgment and thought content normal. Cognition and memory are normal.   Nursing note and vitals reviewed.    Office Visit on 09/27/2019   Component Date Value Ref Range Status   • Color 09/27/2019 Yellow  Yellow, Straw, Dark Yellow, Isela Final   • Clarity, UA 09/27/2019 Slightly Cloudy* Clear Final   • Specific Gravity  09/27/2019 1.015  1.005 - 1.030 Final   • pH, Urine 09/27/2019 6.0  5.0 - 8.0 Final   • Leukocytes 09/27/2019 Negative  Negative Final   • Nitrite, UA 09/27/2019 Negative  Negative Final   • Protein, POC 09/27/2019 Negative  Negative mg/dL Final   • Glucose, UA 09/27/2019 Negative  Negative, 1000 mg/dL (3+) mg/dL Final   • Ketones, UA 09/27/2019 Negative  Negative Final   • Urobilinogen, UA 09/27/2019 Normal  Normal Final   • Bilirubin 09/27/2019 Negative  Negative Final   • Blood, UA 09/27/2019 Trace* Negative Final           Assessment/Plan     Problem List Items Addressed This Visit        Other    Recurrent major depressive disorder, in remission (CMS/[facility])    Overview     · Current therapy: lexapro         Relevant Medications    escitalopram (LEXAPRO) 10 MG tablet      Other Visit Diagnoses     Dysuria    -  Primary    Relevant Medications    cefdinir (OMNICEF) 300 MG capsule    Other Relevant Orders    POCT urinalysis dipstick, automated  (Completed)    Urine Culture - , Urine, Clean Catch    Complicated UTI (urinary tract infection)        Relevant Medications    cefdinir (OMNICEF) 300 MG capsule    fluconazole (DIFLUCAN) 150 MG tablet    Other Relevant Orders    Urine Culture - , Urine, Clean Catch    Vaginal discharge        Relevant Medications    fluconazole (DIFLUCAN) 150 MG tablet    Neck swelling        Relevant Orders    US Thyroid            Mae Valle MD

## 2019-09-29 LAB
BACTERIA UR CULT: NO GROWTH
BACTERIA UR CULT: NORMAL

## 2019-10-01 ENCOUNTER — OFFICE VISIT (OUTPATIENT)
Dept: INTERNAL MEDICINE | Facility: CLINIC | Age: 64
End: 2019-10-01

## 2019-10-01 VITALS
OXYGEN SATURATION: 97 % | DIASTOLIC BLOOD PRESSURE: 70 MMHG | BODY MASS INDEX: 21.52 KG/M2 | SYSTOLIC BLOOD PRESSURE: 103 MMHG | HEIGHT: 68 IN | TEMPERATURE: 98 F | HEART RATE: 71 BPM | WEIGHT: 142 LBS

## 2019-10-01 DIAGNOSIS — B37.0 ORAL THRUSH: Primary | ICD-10-CM

## 2019-10-01 PROCEDURE — 99213 OFFICE O/P EST LOW 20 MIN: CPT | Performed by: FAMILY MEDICINE

## 2019-10-01 NOTE — PROGRESS NOTES
"Subjective    Milagro Sanderson is a 64 y.o. female here for:    Chief Complaint   Patient presents with   • Oral Concerns     coating on tongue        History of Present Illness   Antibiotic and diflucan helped with vaginal discharge and urinary symptoms. Now in today with a coating on tongue for last day. Feels like \"to\". She does not wear dentures. No history of thrush previously but her antibiotic increased the risk of this new issue.    The following portions of the patient's history were reviewed and updated as appropriate: allergies, current medications, past family history, past medical history, past social history, past surgical history and problem list.    Review of Systems   Constitutional: Negative for fever.   HENT: Negative for trouble swallowing.        Vitals:    10/01/19 0931   BP: 103/70   Pulse: 71   Temp: 98 °F (36.7 °C)   TempSrc: Temporal   SpO2: 97%   Weight: 64.4 kg (142 lb)   Height: 172.7 cm (67.99\")     Patient's Body mass index is 21.6 kg/m². BMI is within normal parameters. No follow-up required..        Objective   Physical Exam   Constitutional: She is oriented to person, place, and time. Vital signs are normal. She appears well-developed and well-nourished. She is active.  Non-toxic appearance. She does not appear ill. No distress.   HENT:   Head: Normocephalic and atraumatic.   Right Ear: Hearing normal.   Left Ear: Hearing normal.   Mouth/Throat: Mucous membranes are not dry. No oropharyngeal exudate or posterior oropharyngeal erythema.   Coating on tongue, mild yellow to white. No other oral lesions noted. Has torus to hard palate   Eyes: EOM are normal. No scleral icterus.   Neck: Phonation normal. Neck supple.   Pulmonary/Chest: Effort normal.   Neurological: She is alert and oriented to person, place, and time. She displays no tremor. No cranial nerve deficit.   Skin: Skin is warm. No rash noted. She is not diaphoretic. No pallor.   Psychiatric: She has a normal mood and affect. " Her speech is normal and behavior is normal. Judgment and thought content normal. Cognition and memory are normal.   Nursing note and vitals reviewed.        Assessment/Plan     Problem List Items Addressed This Visit     None      Visit Diagnoses     Oral thrush    -  Primary    Relevant Medications    nystatin (MYCOSTATIN) 911179 UNIT/ML suspension          ·       Mae Valle MD

## 2019-10-23 ENCOUNTER — HOSPITAL ENCOUNTER (OUTPATIENT)
Dept: ULTRASOUND IMAGING | Facility: HOSPITAL | Age: 64
Discharge: HOME | End: 2019-10-23
Admitting: FAMILY MEDICINE

## 2019-10-23 PROCEDURE — 76536 US EXAM OF HEAD AND NECK: CPT

## 2019-10-25 ENCOUNTER — OFFICE VISIT (OUTPATIENT)
Dept: INTERNAL MEDICINE | Facility: CLINIC | Age: 64
End: 2019-10-25

## 2019-10-25 VITALS
TEMPERATURE: 98.8 F | RESPIRATION RATE: 16 BRPM | BODY MASS INDEX: 20.61 KG/M2 | OXYGEN SATURATION: 98 % | HEART RATE: 83 BPM | DIASTOLIC BLOOD PRESSURE: 66 MMHG | WEIGHT: 136 LBS | HEIGHT: 68 IN | SYSTOLIC BLOOD PRESSURE: 121 MMHG

## 2019-10-25 DIAGNOSIS — R82.4 KETONURIA: ICD-10-CM

## 2019-10-25 DIAGNOSIS — R30.0 DYSURIA: Primary | ICD-10-CM

## 2019-10-25 DIAGNOSIS — R31.9 HEMATURIA, UNSPECIFIED TYPE: ICD-10-CM

## 2019-10-25 DIAGNOSIS — N89.8 VAGINAL ITCHING: ICD-10-CM

## 2019-10-25 LAB
BILIRUB BLD-MCNC: NEGATIVE MG/DL
CLARITY, POC: ABNORMAL
COLOR UR: YELLOW
GLUCOSE UR STRIP-MCNC: NEGATIVE MG/DL
KETONES UR QL: ABNORMAL
LEUKOCYTE EST, POC: NEGATIVE
NITRITE UR-MCNC: NEGATIVE MG/ML
PH UR: 5.5 [PH] (ref 5–8)
PROT UR STRIP-MCNC: NEGATIVE MG/DL
RBC # UR STRIP: ABNORMAL /UL
SP GR UR: 1.03 (ref 1–1.03)
UROBILINOGEN UR QL: NORMAL

## 2019-10-25 PROCEDURE — 99213 OFFICE O/P EST LOW 20 MIN: CPT | Performed by: NURSE PRACTITIONER

## 2019-10-25 PROCEDURE — 81003 URINALYSIS AUTO W/O SCOPE: CPT | Performed by: NURSE PRACTITIONER

## 2019-10-30 ENCOUNTER — OFFICE VISIT (OUTPATIENT)
Dept: INTERNAL MEDICINE | Facility: CLINIC | Age: 64
End: 2019-10-30

## 2019-10-30 VITALS
RESPIRATION RATE: 16 BRPM | HEART RATE: 82 BPM | HEIGHT: 68 IN | DIASTOLIC BLOOD PRESSURE: 62 MMHG | BODY MASS INDEX: 20.61 KG/M2 | SYSTOLIC BLOOD PRESSURE: 114 MMHG | WEIGHT: 136 LBS | OXYGEN SATURATION: 98 % | TEMPERATURE: 97.1 F

## 2019-10-30 DIAGNOSIS — F33.2 SEVERE EPISODE OF RECURRENT MAJOR DEPRESSIVE DISORDER, WITHOUT PSYCHOTIC FEATURES (HCC): Primary | ICD-10-CM

## 2019-10-30 DIAGNOSIS — L29.2 VULVAR ITCHING: ICD-10-CM

## 2019-10-30 PROCEDURE — 99214 OFFICE O/P EST MOD 30 MIN: CPT | Performed by: FAMILY MEDICINE

## 2019-10-30 RX ORDER — CHOLECALCIFEROL (VITAMIN D3) 125 MCG
5 CAPSULE ORAL
COMMUNITY
End: 2020-02-24

## 2019-10-30 RX ORDER — FLUOXETINE HYDROCHLORIDE 20 MG/1
20 CAPSULE ORAL DAILY
Qty: 90 CAPSULE | Refills: 3 | Status: SHIPPED | OUTPATIENT
Start: 2019-10-30 | End: 2019-11-11

## 2019-10-30 NOTE — PROGRESS NOTES
Subjective    Milagro Sanderson is a 64 y.o. female here for:    Chief Complaint   Patient presents with   • Depression     has been going on for a while but has recently gotten worse        History of Present Illness   Depression is worse. She has had thoughts of hurting herself but no plans to harm herself. Does not want to do anything. Does not want to eat. Does not want to take care of herself. Says she has no reason to be sad and that others have more reasons to be sad than her. She's been on depression medicine >15 years.  Per chart review she's taken Lexapro since at least 2/2017.    PHQ-9 Depression Screening  Little interest or pleasure in doing things? 3   Feeling down, depressed, or hopeless? 3   Trouble falling or staying asleep, or sleeping too much? 3   Feeling tired or having little energy? 3   Poor appetite or overeating? 3   Feeling bad about yourself - or that you are a failure or have let yourself or your family down? 3   Trouble concentrating on things, such as reading the newspaper or watching television? 3   Moving or speaking so slowly that other people could have noticed? Or the opposite - being so fidgety or restless that you have been moving around a lot more than usual? 0   Thoughts that you would be better off dead, or of hurting yourself in some way? 3   PHQ-9 Total Score 24   If you checked off any problems, how difficult have these problems made it for you to do your work, take care of things at home, or get along with other people? Very difficult     Has not yet started clotrimazole which was prescribed for vaginal area itching, she was not sure if she was to apply to outside or if needed to insert. She continues to have itching but no discharge appreciated. No known vulvar disease. She was using bubble bath but she's stopped and that has helped some.    The following portions of the patient's history were reviewed and updated as appropriate: allergies, current medications, past family  "history, past medical history, past social history, past surgical history and problem list.    Review of Systems   Constitutional: Positive for activity change, appetite change and fatigue.   Genitourinary: Negative for dysuria and frequency.   Skin: Positive for dry skin and rash.   Psychiatric/Behavioral: Positive for behavioral problems, depressed mood and stress. Negative for self-injury.       Vitals:    10/30/19 1251   BP: 114/62   Pulse: 82   Resp: 16   Temp: 97.1 °F (36.2 °C)   TempSrc: Temporal   SpO2: 98%   Weight: 61.7 kg (136 lb)   Height: 172.7 cm (67.99\")     Body mass index is 20.68 kg/m².   Patient's Body mass index is 20.68 kg/m². BMI is within normal parameters. No follow-up required..        Objective   Physical Exam   Constitutional: She is oriented to person, place, and time. Vital signs are normal. She appears well-developed and well-nourished. She is active.  Non-toxic appearance. She does not appear ill. No distress.   HENT:   Head: Normocephalic and atraumatic.   Right Ear: Hearing normal.   Left Ear: Hearing normal.   Mouth/Throat: Mucous membranes are not dry.   Eyes: EOM are normal. No scleral icterus.   Neck: Phonation normal. Neck supple.   Pulmonary/Chest: Effort normal.   Genitourinary: There is rash (erythema and dry) on the right labia. There is no injury or Bartholin's cyst on the right labia. There is rash (erythema, dry) on the left labia. There is no injury or Bartholin's cyst on the left labia.   Genitourinary Comments: Winston Kirkland, AGUSTINA, chaperone.     Neurological: She is alert and oriented to person, place, and time. She displays no tremor. No cranial nerve deficit.   Skin: Skin is warm. No rash noted. She is not diaphoretic. No pallor.   Psychiatric: Her speech is normal and behavior is normal. Judgment and thought content normal. Cognition and memory are normal. She exhibits a depressed mood. She expresses no suicidal ideation.   Nursing note and vitals " reviewed.        Assessment/Plan     Problem List Items Addressed This Visit        Other    Severe episode of recurrent major depressive disorder, without psychotic features (CMS/HCC) - Primary    Overview     · Current therapy: lexapro         Relevant Medications    FLUoxetine (PROZAC) 20 MG capsule      Other Visit Diagnoses     Vulvar itching            Two uncontrolled conditions with change in medical therapy:  · Depression is severe and not controlled. Burn out of Lexapro. Patient hesitant to take Prozac due to stigma but I educated her on SSRI medicines.   · Exam of vulvar area indicates need to apply antifungal not insert vaginally, discussed use of medicine and encouraged her to . Continue avoiding irritants such as bubble baths    Return in about 2 weeks (around 11/13/2019). or sooner if needed.    Mae Valle MD

## 2019-11-11 ENCOUNTER — OFFICE VISIT (OUTPATIENT)
Dept: INTERNAL MEDICINE | Facility: CLINIC | Age: 64
End: 2019-11-11

## 2019-11-11 VITALS
RESPIRATION RATE: 16 BRPM | HEART RATE: 81 BPM | SYSTOLIC BLOOD PRESSURE: 102 MMHG | WEIGHT: 133.25 LBS | HEIGHT: 68 IN | BODY MASS INDEX: 20.19 KG/M2 | OXYGEN SATURATION: 97 % | DIASTOLIC BLOOD PRESSURE: 66 MMHG | TEMPERATURE: 97.8 F

## 2019-11-11 DIAGNOSIS — R63.4 WEIGHT LOSS: ICD-10-CM

## 2019-11-11 DIAGNOSIS — F33.2 SEVERE EPISODE OF RECURRENT MAJOR DEPRESSIVE DISORDER, WITHOUT PSYCHOTIC FEATURES (HCC): Primary | ICD-10-CM

## 2019-11-11 DIAGNOSIS — R25.1 TREMOR: ICD-10-CM

## 2019-11-11 DIAGNOSIS — R63.1 POLYDIPSIA: ICD-10-CM

## 2019-11-11 DIAGNOSIS — R79.9 ABNORMAL FINDING OF BLOOD CHEMISTRY, UNSPECIFIED: ICD-10-CM

## 2019-11-11 PROCEDURE — 99214 OFFICE O/P EST MOD 30 MIN: CPT | Performed by: FAMILY MEDICINE

## 2019-11-12 NOTE — PROGRESS NOTES
"Subjective    Milagro Sanderson is a 64 y.o. female here for:    Chief Complaint   Patient presents with   • Depression     2 wk f/u pt states that she does not feel any difference with new medication        History of Present Illness   Was put on Prozac last visit for depression as it seemed she'd burned out on Lexapro. She notes no improvement with new medicine. Continues to lose weight, no appetite. Notes lack of appetite prior to Prozac starting, unsure if worsened. Tired. All symptoms seemed to have started with her move to new home. She has been there for a month now. She worries about carbon monoxide as her fireplace has gas on at all times though not lit. No abdominal pain. No blood in bowel movements. Up to date on mammogram. No night sweats. Tremor of hands seems to be worsening.    The following portions of the patient's history were reviewed and updated as appropriate: allergies, current medications, past family history, past medical history, past social history, past surgical history and problem list.    Review of Systems   Constitutional: Positive for activity change, appetite change and unexpected weight loss. Negative for chills and fever.   Respiratory: Negative for shortness of breath.    Cardiovascular: Negative for chest pain.   Endocrine: Positive for polydipsia.   Neurological: Positive for tremors.       Visit Vitals  /66   Pulse 81   Temp 97.8 °F (36.6 °C) (Temporal)   Resp 16   Ht 172.7 cm (67.99\")   Wt 60.4 kg (133 lb 4 oz)   LMP  (LMP Unknown)   SpO2 97%   BMI 20.27 kg/m²         Objective   Physical Exam   Constitutional: She is oriented to person, place, and time. Vital signs are normal. She appears well-developed and well-nourished. She is active.  Non-toxic appearance. She does not have a sickly appearance. She does not appear ill. No distress.   HENT:   Head: Normocephalic and atraumatic. Hair is normal.   Right Ear: Hearing and external ear normal.   Left Ear: Hearing and external " ear normal.   Nose: Nose normal.   Mouth/Throat: Mucous membranes are not dry. No trismus in the jaw.   Eyes: Conjunctivae, EOM and lids are normal. Pupils are equal, round, and reactive to light. No scleral icterus.   Neck: Phonation normal. Neck supple. No tracheal deviation present.   Cardiovascular: Normal rate, regular rhythm and normal heart sounds.   No murmur heard.  Pulmonary/Chest: Effort normal and breath sounds normal.   Musculoskeletal: She exhibits no edema or deformity.   Lymphadenopathy:     She has no cervical adenopathy.   Neurological: She is alert and oriented to person, place, and time. She displays tremor. No cranial nerve deficit. She exhibits normal muscle tone. Gait normal.   Skin: Skin is warm. Turgor is normal. No rash noted. She is not diaphoretic. No cyanosis. No pallor. Nails show no clubbing.   Psychiatric: She has a normal mood and affect. Her speech is normal and behavior is normal. Judgment and thought content normal. Cognition and memory are normal.   Nursing note and vitals reviewed.        Assessment/Plan     Problem List Items Addressed This Visit        Other    Severe episode of recurrent major depressive disorder, without psychotic features (CMS/HCC) - Primary    Overview     · Past therapy: Lexapro, Prozac  · Zoloft replacing Prozac due to weight loss, decreased appetite         Relevant Medications    sertraline (ZOLOFT) 50 MG tablet    Other Relevant Orders    TSH+Free T4    Carbon Monoxide, Blood    Vitamin B12 & Folate      Other Visit Diagnoses     Tremor        Relevant Orders    TSH+Free T4    T3, free    T3    CBC & Differential    Comprehensive Metabolic Panel    Carbon Monoxide, Blood    Heavy Metals, Blood    Ceruloplasmin    Vitamin B12 & Folate    Weight loss        Relevant Orders    TSH+Free T4    T3, free    T3    CBC & Differential    Comprehensive Metabolic Panel    Carbon Monoxide, Blood    Heavy Metals, Blood    Ceruloplasmin    Hemoglobin A1c     Polydipsia        Relevant Orders    Hemoglobin A1c    Abnormal finding of blood chemistry, unspecified         Relevant Orders    Hemoglobin A1c          ·     No Follow-up on file. or sooner if needed.    Mae Valle MD

## 2019-11-13 LAB
ALBUMIN SERPL-MCNC: 4.8 G/DL (ref 3.5–5.2)
ALBUMIN/GLOB SERPL: 2 G/DL
ALP SERPL-CCNC: 74 U/L (ref 39–117)
ALT SERPL-CCNC: 17 U/L (ref 1–33)
ARSENIC BLD-MCNC: 7 UG/L (ref 2–23)
AST SERPL-CCNC: 17 U/L (ref 1–32)
BASOPHILS # BLD AUTO: 0.03 10*3/MM3 (ref 0–0.2)
BASOPHILS NFR BLD AUTO: 0.5 % (ref 0–1.5)
BILIRUB SERPL-MCNC: 0.4 MG/DL (ref 0.2–1.2)
BUN SERPL-MCNC: 7 MG/DL (ref 8–23)
BUN/CREAT SERPL: 10.3 (ref 7–25)
CALCIUM SERPL-MCNC: 9.8 MG/DL (ref 8.6–10.5)
CERULOPLASMIN SERPL-MCNC: 22.6 MG/DL (ref 19–39)
CHLORIDE SERPL-SCNC: 100 MMOL/L (ref 98–107)
CO2 SERPL-SCNC: 26.3 MMOL/L (ref 22–29)
COHGB MFR BLD: 3 % (ref 0–3.6)
CREAT SERPL-MCNC: 0.68 MG/DL (ref 0.57–1)
EOSINOPHIL # BLD AUTO: 0.03 10*3/MM3 (ref 0–0.4)
EOSINOPHIL NFR BLD AUTO: 0.5 % (ref 0.3–6.2)
ERYTHROCYTE [DISTWIDTH] IN BLOOD BY AUTOMATED COUNT: 12.2 % (ref 12.3–15.4)
FOLATE SERPL-MCNC: 17.4 NG/ML (ref 4.78–24.2)
GLOBULIN SER CALC-MCNC: 2.4 GM/DL
GLUCOSE SERPL-MCNC: 91 MG/DL (ref 65–99)
HBA1C MFR BLD: 5.51 % (ref 4.8–5.6)
HCT VFR BLD AUTO: 44 % (ref 34–46.6)
HGB BLD-MCNC: 15 G/DL (ref 12–15.9)
IMM GRANULOCYTES # BLD AUTO: 0.02 10*3/MM3 (ref 0–0.05)
IMM GRANULOCYTES NFR BLD AUTO: 0.3 % (ref 0–0.5)
LEAD BLDV-MCNC: NORMAL UG/DL (ref 0–4)
LYMPHOCYTES # BLD AUTO: 0.93 10*3/MM3 (ref 0.7–3.1)
LYMPHOCYTES NFR BLD AUTO: 15.5 % (ref 19.6–45.3)
MCH RBC QN AUTO: 32.3 PG (ref 26.6–33)
MCHC RBC AUTO-ENTMCNC: 34.1 G/DL (ref 31.5–35.7)
MCV RBC AUTO: 94.8 FL (ref 79–97)
MERCURY BLD-MCNC: NORMAL UG/L (ref 0–14.9)
MONOCYTES # BLD AUTO: 0.57 10*3/MM3 (ref 0.1–0.9)
MONOCYTES NFR BLD AUTO: 9.5 % (ref 5–12)
NEUTROPHILS # BLD AUTO: 4.43 10*3/MM3 (ref 1.7–7)
NEUTROPHILS NFR BLD AUTO: 73.7 % (ref 42.7–76)
NRBC BLD AUTO-RTO: 0 /100 WBC (ref 0–0.2)
PLATELET # BLD AUTO: 278 10*3/MM3 (ref 140–450)
POTASSIUM SERPL-SCNC: 4.5 MMOL/L (ref 3.5–5.2)
PROT SERPL-MCNC: 7.2 G/DL (ref 6–8.5)
RBC # BLD AUTO: 4.64 10*6/MM3 (ref 3.77–5.28)
SODIUM SERPL-SCNC: 142 MMOL/L (ref 136–145)
T3 SERPL-MCNC: 118 NG/DL (ref 71–180)
T3FREE SERPL-MCNC: 3.4 PG/ML (ref 2–4.4)
T4 FREE SERPL-MCNC: 1.41 NG/DL (ref 0.93–1.7)
TSH SERPL DL<=0.005 MIU/L-ACNC: 2.43 UIU/ML (ref 0.27–4.2)
VIT B12 SERPL-MCNC: 554 PG/ML (ref 211–946)
WBC # BLD AUTO: 6.01 10*3/MM3 (ref 3.4–10.8)

## 2019-11-15 ENCOUNTER — TELEPHONE (OUTPATIENT)
Dept: INTERNAL MEDICINE | Facility: CLINIC | Age: 64
End: 2019-11-15

## 2019-11-15 NOTE — TELEPHONE ENCOUNTER
Pt called the [facility] and left a voice message.  She is wanting to know if the Carbon Monoxide and Heavy Metals results are in yet.

## 2019-12-02 ENCOUNTER — OFFICE VISIT (OUTPATIENT)
Dept: INTERNAL MEDICINE | Facility: CLINIC | Age: 64
End: 2019-12-02

## 2019-12-02 VITALS
TEMPERATURE: 96.7 F | HEART RATE: 83 BPM | RESPIRATION RATE: 16 BRPM | BODY MASS INDEX: 20.06 KG/M2 | WEIGHT: 132.38 LBS | DIASTOLIC BLOOD PRESSURE: 80 MMHG | SYSTOLIC BLOOD PRESSURE: 124 MMHG | HEIGHT: 68 IN | OXYGEN SATURATION: 97 %

## 2019-12-02 DIAGNOSIS — Z00.00 MEDICARE ANNUAL WELLNESS VISIT, SUBSEQUENT: Primary | ICD-10-CM

## 2019-12-02 DIAGNOSIS — K21.00 GERD WITH ESOPHAGITIS: ICD-10-CM

## 2019-12-02 DIAGNOSIS — G25.0 HEREDITARY ESSENTIAL TREMOR: ICD-10-CM

## 2019-12-02 DIAGNOSIS — F33.2 SEVERE EPISODE OF RECURRENT MAJOR DEPRESSIVE DISORDER, WITHOUT PSYCHOTIC FEATURES (HCC): ICD-10-CM

## 2019-12-02 PROBLEM — R13.10 DYSPHAGIA: Status: RESOLVED | Noted: 2019-01-29 | Resolved: 2019-12-02

## 2019-12-02 PROCEDURE — 99397 PER PM REEVAL EST PAT 65+ YR: CPT | Performed by: FAMILY MEDICINE

## 2019-12-02 PROCEDURE — G0439 PPPS, SUBSEQ VISIT: HCPCS | Performed by: FAMILY MEDICINE

## 2019-12-02 NOTE — PATIENT INSTRUCTIONS
Medicare Wellness  Personal Prevention Plan of Service     Date of Office Visit:  2019  Encounter Provider:  Mae Valle MD  Place of Service:  [facility]  Patient Name: Milagro Sanderson  :  1955    As part of the Medicare Wellness portion of your visit today, we are providing you with this personalized preventive plan of services (PPPS). This plan is based upon recommendations of the [place] Preventive Services Task Force (USPSTF) and the Advisory Committee on Immunization Practices (ACIP).    This lists the preventive care services that should be considered, and provides dates of when you are due. Items listed as completed are up-to-date and do not require any further intervention.    Health Maintenance   Topic Date Due   • TDAP/TD VACCINES (1 - Tdap) 2020 (Originally 1974)   • MEDICARE ANNUAL WELLNESS  2020   • MAMMOGRAM  2021   • COLONOSCOPY  2025   • HEPATITIS C SCREENING  Completed   • INFLUENZA VACCINE  Completed   • PNEUMOCOCCAL VACCINES (65+ LOW/MEDIUM RISK)  Completed   • ZOSTER VACCINE  Completed       No orders of the defined types were placed in this encounter.      Return in about 2 weeks (around 2019).

## 2019-12-02 NOTE — PROGRESS NOTES
The ABCs of the Annual Wellness Visit  Subsequent Medicare Wellness Visit    Chief Complaint   Patient presents with   • Medicare Wellness-subsequent     patient states that her depression is still very debilitating        Subjective   History of Present Illness:  Milagro Sanderson is a 64 y.o. female who presents for a Subsequent Medicare Wellness Visit.    Depression is severe and not improved thus far with Zoloft. Failed Lexapro, Prozac. On primidone for tremor but notes tremor is worsening as mood worsens. Started med this summer.     History of GERD with esophagitis per chart review. She is taking but where she has not been eating she asks if she needs to take it. No known GI bleeds, no reflux at this time.     HEALTH RISK ASSESSMENT    Recent Hospitalizations:  No hospitalization(s) within the last year.    Current Medical Providers:  Patient Care Team:  Mae Valle MD as PCP - General (Family Medicine)    Smoking Status:  Social History     Tobacco Use   Smoking Status Never Smoker   Smokeless Tobacco Never Used       Alcohol Consumption:  Social History     Substance and Sexual Activity   Alcohol Use No       Depression Screen:   PHQ-2/PHQ-9 Depression Screening 12/2/2019   Little interest or pleasure in doing things 3   Feeling down, depressed, or hopeless 3   Trouble falling or staying asleep, or sleeping too much 3   Feeling tired or having little energy 3   Poor appetite or overeating 3   Feeling bad about yourself - or that you are a failure or have let yourself or your family down 3   Trouble concentrating on things, such as reading the newspaper or watching television 3   Moving or speaking so slowly that other people could have noticed. Or the opposite - being so fidgety or restless that you have been moving around a lot more than usual 3   Thoughts that you would be better off dead, or of hurting yourself in some way 3   Total Score 27   If you checked off any problems, how difficult have these  problems made it for you to do your work, take care of things at home, or get along with other people? Extremely dIfficult       Fall Risk Screen:  JOANNA Fall Risk Assessment was completed, and patient is at LOW risk for falls.Assessment completed on:12/2/2019    Health Habits and Functional and Cognitive Screening:  Functional & Cognitive Status 12/2/2019   Do you have difficulty preparing food and eating? Yes   Do you have difficulty bathing yourself, getting dressed or grooming yourself? No   Do you have difficulty using the toilet? No   Do you have difficulty moving around from place to place? Yes   Do you have trouble with steps or getting out of a bed or a chair? No   Current Diet Unhealthy Diet   Dental Exam Up to date   Eye Exam Up to date   Exercise (times per week) 0 times per week   Current Exercise Activities Include None   Do you need help using the phone?  No   Are you deaf or do you have serious difficulty hearing?  No   Do you need help with transportation? Yes   Do you need help shopping? No   Do you need help preparing meals?  No   Do you need help with housework?  No   Do you need help with laundry? No   Do you need help taking your medications? No   Do you need help managing money? No   Do you ever drive or ride in a car without wearing a seat belt? No   Have you felt unusual stress, anger or loneliness in the last month? -   Who do you live with? -   If you need help, do you have trouble finding someone available to you? -   Have you been bothered in the last four weeks by sexual problems? -   Do you have difficulty concentrating, remembering or making decisions? -         Does the patient have evidence of cognitive impairment? No    Asprin use counseling:Does not need ASA (and currently is not on it)    Age-appropriate Screening Schedule:  Refer to the list below for future screening recommendations based on patient's age, sex and/or medical conditions. Orders for these recommended tests are  listed in the plan section. The patient has been provided with a written plan.    Health Maintenance   Topic Date Due   • TDAP/TD VACCINES (1 - Tdap) 01/01/2020 (Originally 1/24/1974)   • MAMMOGRAM  07/25/2021   • COLONOSCOPY  03/27/2025   • INFLUENZA VACCINE  Completed   • PNEUMOCOCCAL VACCINES (65+ LOW/MEDIUM RISK)  Completed   • ZOSTER VACCINE  Completed          The following portions of the patient's history were reviewed and updated as appropriate: allergies, current medications, past family history, past medical history, past social history, past surgical history and problem list.    Outpatient Medications Prior to Visit   Medication Sig Dispense Refill   • acyclovir (ZOVIRAX) 400 MG tablet TAKE ONE TABLET BY MOUTH THREE TIMES A DAY FOR 5 DAYS STARTING AT FIRST SIGN OF COLD SORE 15 tablet 4   • Ascorbic Acid (VITAMIN C) 500 MG capsule Take 500 mg by mouth Every Other Day.     • calcium (OS-SANTHOSH) 600 MG tablet Take 600 mg by mouth 1 (One) Time Per Week.     • CBD (cannabidiol) oral oil Take  by mouth.     • Cholecalciferol (VITAMIN D-3 PO) Take 2,000 Units by mouth Daily.     • Clotrimazole 2 % vaginal cream Insert 1 application into the vagina Daily. 21 g 0   • fluconazole (DIFLUCAN) 150 MG tablet TAKE ONE TAB NOW AND ANOTHER IN 12 DAYS 2 tablet 0   • fluocinonide (LIDEX) 0.05 % external solution      • magnesium chloride ER 64 MG DR tablet Take 250 mg by mouth Every 14 (Fourteen) Days.     • melatonin 5 MG tablet tablet Take 5 mg by mouth.     • Multiple Vitamins-Minerals (CENTRUM SILVER ULTRA WOMENS PO) Take 1 tablet by mouth Every Other Day.     • Omega-3 Fatty Acids (FISH OIL) 1000 MG capsule capsule Take 1,200 mg by mouth Daily With Breakfast.     • sertraline (ZOLOFT) 50 MG tablet Take 1 tablet by mouth Daily. 90 tablet 3   • pantoprazole (PROTONIX) 40 MG EC tablet      • primidone (MYSOLINE) 50 MG tablet Take 1 tablet by mouth Every Night. 90 tablet 3     No facility-administered medications prior to  "visit.        Patient Active Problem List   Diagnosis   • Severe episode of recurrent major depressive disorder, without psychotic features (CMS/HCC)   • Eczema   • Vitamin B12 deficiency   • Vitamin D deficiency   • Fine tremor   • Hereditary essential tremor   • Onychomycosis   • Recurrent cold sores   • GERD with esophagitis       Advanced Care Planning:  Patient does not have an advance directive - not interested in additional information    Review of Systems   Constitutional: Positive for activity change, appetite change, fatigue and unexpected weight change.   Gastrointestinal: Negative for abdominal pain.   Psychiatric/Behavioral: Positive for behavioral problems, dysphoric mood and sleep disturbance. Negative for self-injury.   All other systems reviewed and are negative.      Compared to one year ago, the patient feels her physical health is worse.  Compared to one year ago, the patient feels her mental health is worse.    Reviewed chart for potential of high risk medication in the elderly: yes  Reviewed chart for potential of harmful drug interactions in the elderly:yes    Objective         Vitals:    12/02/19 1051   BP: 124/80   Pulse: 83   Resp: 16   Temp: 96.7 °F (35.9 °C)   TempSrc: Temporal   SpO2: 97%   Weight: 60 kg (132 lb 6 oz)   Height: 172.7 cm (67.99\")   PainSc: 0-No pain       Body mass index is 20.13 kg/m².  Discussed the patient's BMI with her. The BMI is in the acceptable range.    Physical Exam   Constitutional: She is oriented to person, place, and time. Vital signs are normal. She appears well-developed and well-nourished.  Non-toxic appearance. She does not have a sickly appearance. She does not appear ill. No distress.   HENT:   Head: Normocephalic and atraumatic. Hair is normal.   Right Ear: Hearing, tympanic membrane, external ear and ear canal normal.   Left Ear: Hearing, tympanic membrane, external ear and ear canal normal.   Nose: Nose normal.   Mouth/Throat: Uvula is midline, " oropharynx is clear and moist and mucous membranes are normal. No oropharyngeal exudate.   Eyes: Conjunctivae, EOM and lids are normal. Pupils are equal, round, and reactive to light. Right eye exhibits no discharge. Left eye exhibits no discharge. No scleral icterus.   Neck: Normal range of motion and phonation normal. Neck supple.   Cardiovascular: Normal rate, regular rhythm, normal heart sounds and intact distal pulses. Exam reveals no gallop and no friction rub.   No murmur heard.  Pulmonary/Chest: Effort normal and breath sounds normal. No stridor. No respiratory distress. She has no wheezes. She has no rales. She exhibits no tenderness.   Abdominal: Soft. Bowel sounds are normal. She exhibits no distension and no mass. There is no tenderness. There is no rebound and no guarding.   Musculoskeletal: She exhibits no edema, tenderness or deformity.   Neurological: She is alert and oriented to person, place, and time. She displays no atrophy and no tremor. No cranial nerve deficit. She exhibits normal muscle tone. She displays no seizure activity. Gait normal.   Skin: Skin is warm. Capillary refill takes less than 2 seconds. No rash noted. She is not diaphoretic. No cyanosis or erythema. Nails show no clubbing.   Psychiatric: Her speech is normal and behavior is normal. Judgment and thought content normal. Cognition and memory are normal. She exhibits a depressed mood.   Nursing note and vitals reviewed.      Lab Results   Component Value Date    GLU 91 11/11/2019    HGBA1C 5.51 11/11/2019      Lab Results   Component Value Date    TSH 2.430 11/11/2019     Lab Results   Component Value Date    FREET4 1.41 11/11/2019     Lab Results   Component Value Date    CHLPL 147 04/16/2019    TRIG 105 04/16/2019    HDL 48 04/16/2019    LDL 78 04/16/2019     Lab Results   Component Value Date    [id number] 554 11/11/2019         Assessment/Plan   Medicare Risks and Personalized Health Plan  CMS Preventative Services Quick  Reference  Advance Directive Discussion  Depression/Dysphoria    The above risks/problems have been discussed with the patient.  Pertinent information has been shared with the patient in the After Visit Summary.  Follow up plans and orders are seen below in the Assessment/Plan Section.    Diagnoses and all orders for this visit:    1. Medicare annual wellness visit, subsequent (Primary)    2. Severe episode of recurrent major depressive disorder, without psychotic features (CMS/LTAC, located within [facility])  Comments:  Continue Zoloft for now, take in AM as not sleeping. Symptoms may improve with d/c of Primidone.    3. GERD with esophagitis  Comments:  May stop Protonix as no hx of bleeding she reports. If reflux returns then resume PPI.    4. Hereditary essential tremor  Comments:  Stopping primidone as high suspect for her depression, emotional problems. Not helping, either. Consider neurology referral if she does not improve.      Follow Up:  Return in about 2 weeks (around 12/16/2019).     An After Visit Summary and PPPS were given to the patient.

## 2019-12-09 ENCOUNTER — TELEPHONE (OUTPATIENT)
Dept: INTERNAL MEDICINE | Facility: CLINIC | Age: 64
End: 2019-12-09

## 2019-12-09 DIAGNOSIS — R25.1 TREMOR: Primary | ICD-10-CM

## 2019-12-09 RX ORDER — PROPRANOLOL HYDROCHLORIDE 20 MG/1
20 TABLET ORAL 2 TIMES DAILY
Qty: 60 TABLET | Refills: 1 | Status: SHIPPED | OUTPATIENT
Start: 2019-12-09 | End: 2019-12-10 | Stop reason: SDUPTHER

## 2019-12-09 NOTE — TELEPHONE ENCOUNTER
Called patient and informed of propanolol being sent in to take bid, and to monitor blood pressure.  Also informed patient or neurology referral. Patient prefers to be seen in [place], informed patient the wait time would be further out. Patient states she will try to get a ride to a neurologist in [place] if they can get her in sooner.

## 2019-12-09 NOTE — TELEPHONE ENCOUNTER
1. Neurology consult order is in.  2. Try propranolol again, she took in past. Sent low dose of 20 mg to take twice a day, watch blood pressure.

## 2019-12-09 NOTE — TELEPHONE ENCOUNTER
Patient called and states she was seen last week and was taken off primidone for her tremors. She states she is shaking so bad now she can not even feed herself due to the shaking. She has issues pushing numbers on the phone to call our office.

## 2019-12-10 DIAGNOSIS — R25.1 TREMOR: ICD-10-CM

## 2019-12-10 RX ORDER — PROPRANOLOL HYDROCHLORIDE 20 MG/1
20 TABLET ORAL 2 TIMES DAILY
Qty: 60 TABLET | Refills: 1 | Status: SHIPPED | OUTPATIENT
Start: 2019-12-10 | End: 2019-12-30 | Stop reason: SDUPTHER

## 2019-12-10 NOTE — TELEPHONE ENCOUNTER
Pt is scheduled to see Dr. Jenkins today.   Sent propanolol to the pharmacy again for patient, pharmacy shows receipt of prescription.  Called patient and notified the medication had been re-sent.

## 2019-12-10 NOTE — TELEPHONE ENCOUNTER
Pt called back this am states pharmacy has never gotten her medicine propranolol  for tremors     Please advise and give call back

## 2019-12-30 ENCOUNTER — OFFICE VISIT (OUTPATIENT)
Dept: INTERNAL MEDICINE | Facility: CLINIC | Age: 64
End: 2019-12-30

## 2019-12-30 VITALS
HEART RATE: 74 BPM | RESPIRATION RATE: 18 BRPM | DIASTOLIC BLOOD PRESSURE: 60 MMHG | TEMPERATURE: 97.7 F | SYSTOLIC BLOOD PRESSURE: 110 MMHG | BODY MASS INDEX: 19.7 KG/M2 | WEIGHT: 130 LBS | HEIGHT: 68 IN | OXYGEN SATURATION: 95 %

## 2019-12-30 DIAGNOSIS — F33.2 SEVERE EPISODE OF RECURRENT MAJOR DEPRESSIVE DISORDER, WITHOUT PSYCHOTIC FEATURES (HCC): ICD-10-CM

## 2019-12-30 DIAGNOSIS — R25.1 TREMOR: Primary | ICD-10-CM

## 2019-12-30 DIAGNOSIS — R63.4 WEIGHT LOSS: ICD-10-CM

## 2019-12-30 PROCEDURE — 99214 OFFICE O/P EST MOD 30 MIN: CPT | Performed by: FAMILY MEDICINE

## 2019-12-30 RX ORDER — SERTRALINE HYDROCHLORIDE 100 MG/1
100 TABLET, FILM COATED ORAL DAILY
Qty: 90 TABLET | Refills: 3 | Status: SHIPPED | OUTPATIENT
Start: 2019-12-30 | End: 2020-05-08 | Stop reason: SDUPTHER

## 2019-12-30 RX ORDER — PROPRANOLOL HYDROCHLORIDE 20 MG/1
20 TABLET ORAL 2 TIMES DAILY
Qty: 180 TABLET | Refills: 3 | Status: SHIPPED | OUTPATIENT
Start: 2019-12-30 | End: 2020-03-17 | Stop reason: SDUPTHER

## 2019-12-30 NOTE — PROGRESS NOTES
"Subjective    Milagro Sanderson is a 64 y.o. female here for:    Chief Complaint   Patient presents with   • Depression     Not sure the Zoloft is helping. Possible she is feeling better. Getting out of the house more thanks to her neighbor.    • Tremors     Inderal seems to help sometimes. She knows when she is nervous her tremors are worse.        History of Present Illness   History per MA reviewed.    Neighbor feels patient is a bit better but not still not her. Patient thinks maybe she's better but still not happy. Still has trouble making decisions. Still depressed, down. Anxious.    Tremors may be \"a tiny bit better.\" Worsened by being nervous or upset. Feels propranolol works better than primidone (stopped primidone as we thought it may be affecting mood but mood did not improve with d/c). She has had the tremor >5 years and it's worsening. Father had tremor as well, no known memory issues nor parkinsons in him. She was offered sinemet years ago by Dr. Willis but she declined trial. Hesitant to try today. Scheduled with Olive Gupta NP, for neurology in [place] 2/17.    The following portions of the patient's history were reviewed and updated as appropriate: allergies, current medications, past family history, past medical history, past social history, past surgical history and problem list.    Review of Systems   Constitutional: Positive for unexpected weight loss.   Neurological: Positive for tremors.   Psychiatric/Behavioral: Positive for behavioral problems, depressed mood and stress. The patient is nervous/anxious.        Visit Vitals  /60   Pulse 74   Temp 97.7 °F (36.5 °C) (Temporal)   Resp 18   Ht 172.7 cm (67.99\")   Wt 59 kg (130 lb)   LMP  (LMP Unknown)   SpO2 95%   BMI 19.77 kg/m²     Wt Readings from Last 3 Encounters:   12/30/19 59 kg (130 lb)   12/02/19 60 kg (132 lb 6 oz)   11/11/19 60.4 kg (133 lb 4 oz)           Objective   Physical Exam   Constitutional: She is oriented to person, place, " and time. Vital signs are normal. She appears well-developed and well-nourished. She is active.  Non-toxic appearance. She does not appear ill. No distress.   HENT:   Head: Normocephalic and atraumatic.   Right Ear: Hearing normal.   Left Ear: Hearing normal.   Mouth/Throat: Mucous membranes are not dry.   Eyes: EOM are normal. No scleral icterus.   Neck: Phonation normal. Neck supple.   Pulmonary/Chest: Effort normal.   Neurological: She is alert and oriented to person, place, and time. She displays tremor (pill rolling left). No cranial nerve deficit.   Skin: Skin is warm. No rash noted. She is not diaphoretic. No pallor.   Psychiatric: Her speech is normal and behavior is normal. Thought content normal. Her mood appears anxious. She exhibits a depressed mood.   Nursing note and vitals reviewed.    Lab Results   Component Value Date    TSH 2.430 11/11/2019     Lab Results   Component Value Date    FREET4 1.41 11/11/2019         Assessment/Plan     Problem List Items Addressed This Visit        Other    Severe episode of recurrent major depressive disorder, without psychotic features (CMS/HCC)    Overview     · Past therapy: Lexapro, Prozac  · Zoloft replacing Prozac due to weight loss, decreased appetite         Relevant Medications    sertraline (ZOLOFT) 100 MG tablet    Other Relevant Orders    Ambulatory Referral to Neurology      Other Visit Diagnoses     Tremor    -  Primary    Relevant Medications    propranolol (INDERAL) 20 MG tablet    carbidopa-levodopa (SINEMET)  MG per tablet    Other Relevant Orders    Ambulatory Referral to Neurology    Weight loss              · Discussed sinemet, if helpful indicates Parkinson's is high likelihood, if does not, reassures somewhat. Continue propranolol. I'd like her seen by neurology sooner than 2/17. Referring to [place] [facility].   · increase Zoloft to 100 mg. Can try this for a week before starting sinemet, she's hesitant still to try the latter.  · Continues  to lose weight. Okay to add in supplements such as ensure if desired     Mae Valle MD

## 2020-01-13 ENCOUNTER — TELEPHONE (OUTPATIENT)
Dept: INTERNAL MEDICINE | Facility: CLINIC | Age: 65
End: 2020-01-13

## 2020-01-13 NOTE — TELEPHONE ENCOUNTER
PT HAS APPOINTMENT ON 1/27/2020 TO SEE [facility] BOB THEN SHE GOES 1/29/2020 TO SEE NEUROLOGIST   PT WANTED TO KNOW IF SHE SHOULD CANCEL HER APPOINTMENT ON 1/27/2020 WITH [facility] AND RESCHEDULE TILL AFTER SEEING THE NEUROLOGIST  SO THEY CAN TALK ABOUT WHAT WAS TALKED ABOUT AT THAT APPOINTMENT     PLEASE ADVISE AND GIVE CALL

## 2020-01-29 ENCOUNTER — OFFICE VISIT (OUTPATIENT)
Dept: NEUROLOGY | Facility: CLINIC | Age: 65
End: 2020-01-29

## 2020-01-29 VITALS
SYSTOLIC BLOOD PRESSURE: 120 MMHG | DIASTOLIC BLOOD PRESSURE: 80 MMHG | WEIGHT: 128 LBS | HEART RATE: 68 BPM | BODY MASS INDEX: 20.09 KG/M2 | OXYGEN SATURATION: 95 % | HEIGHT: 67 IN

## 2020-01-29 DIAGNOSIS — R25.1 TREMOR: Primary | ICD-10-CM

## 2020-01-29 PROCEDURE — 99214 OFFICE O/P EST MOD 30 MIN: CPT | Performed by: NURSE PRACTITIONER

## 2020-01-29 NOTE — PROGRESS NOTES
Subjective:     Patient ID: Milagro Sanderson is a 65 y.o. female.    CC:   Chief Complaint   Patient presents with   • Tremors       HPI:   History of Present Illness   Ms. Sanderson is a very pleasant 65-year-old female here today for evaluation of tremor.  She tells me that she first started noticing tremor in bilateral upper extremities about 8 to 9 years ago.  Both parents had diagnosis of essential tremor, her father had tremor in hands and her mother had a jaw tremor.  Neither had a diagnosis of Parkinson's disease.  She has no known family history of Parkinson's.  She tells me that she had been managed on primidone by previous PCP, symptoms seem to be controlled very well on this medication.  She had gone off and on the medication for a few years.  She states that back in October 2019 she moved, this caused increased stress and anxiety as well as depression.  She tells me today that it has affected her quite a bit.  Around the time that she moved and when she was under more stress her tremor became more pronounced.  She had read online that primidone could affect mood so she asked her new primary care to stop the medication.  At that time they did start her on propranolol 20 mg 3 times daily.  She states that the propranolol may be helped her tremor just a bit however has been much more pronounced in the last several months.  Tremor is essentially nonexistent at rest, profound with movement, holding papers and eating etc.  Most recently back in December primary care switched her from Prozac to sertraline and also prescribed Sinemet 10/100 to be taken 3 times daily.  She has not noticed any improvement in her tremor with the Sinemet, she actually tells me she thinks it may be worse.  She adamantly denies postural symptoms, changes in smell, incontinence, dysphasia, gait instability, shuffled gait or problems with balance.  She denies dizziness, headaches, vision changes, weakness in extremities, paresthesias, syncope  etc.          The following portions of the patient's history were reviewed and updated as appropriate: allergies, current medications, past family history, past medical history, past social history, past surgical history and problem list.    Past Medical History:   Diagnosis Date   • Benign familial tremor    • Blood in urine    • Body piercing     EARS   • Depression    • Eczema    • Elevated LDL cholesterol level    • Fever blister    • History of fracture     ARM AT AGE 4 - PATIENT REPORTS SHE DOES NOT REMEMBER LATERALITY   • History of migraine    • History of tremor    • Onychomycosis    • Problems with swallowing     REPORTS SHE FEELS LIKE PILLS ARE GETTING STUCK IN A POCKET IN HER THROAT WHEN SHE TRIES TO SWALLOW THEM   • Sebaceous cyst    • Vitamin D deficiency    • Wears glasses        Past Surgical History:   Procedure Laterality Date   • APPENDECTOMY  2012   • APPENDECTOMY     • BREAST BIOPSY Bilateral    • BREAST CYST ASPIRATION     • BREAST SURGERY Bilateral     history of mammatone left and right breast   • COLONOSCOPY     • CYST REMOVAL      SEBACEOUS CYST X3 FROM HEAD   • ENDOSCOPY N/A 2/7/2019    Procedure: ESOPHAGOGASTRODUODENOSCOPY W/ BIOPSIES;  Surgeon: Simba Rogers MD;  Location: [facility] ENDOSCOPY;  Service: Gastroenterology   • WISDOM TOOTH EXTRACTION      EXTRACTION X2       Social History     Socioeconomic History   • Marital status: Single     Spouse name: Not on file   • Number of children: Not on file   • Years of education: Not on file   • Highest education level: Not on file   Tobacco Use   • Smoking status: Never Smoker   • Smokeless tobacco: Never Used   Substance and Sexual Activity   • Alcohol use: No   • Drug use: No   • Sexual activity: Defer       Family History   Problem Relation Age of Onset   • Heart attack Mother    • Hypertension Mother    • Heart attack Other    • Kidney disease Other    • Diabetes Paternal Aunt    • Diabetes Paternal Uncle    • Colon cancer Neg Hx    •  "Cirrhosis Neg Hx    • Liver disease Neg Hx    • Liver cancer Neg Hx    • Crohn's disease Neg Hx    • Ulcerative colitis Neg Hx    • Esophageal cancer Neg Hx    • Stomach cancer Neg Hx         Review of Systems   Constitutional: Negative.    HENT: Negative.    Eyes: Negative.    Respiratory: Negative.    Cardiovascular: Negative.    Gastrointestinal: Negative.    Endocrine: Negative.    Genitourinary: Negative.    Musculoskeletal: Negative.    Skin: Negative.    Allergic/Immunologic: Negative.    Neurological: Positive for tremors. Negative for dizziness, seizures, syncope, facial asymmetry, speech difficulty, weakness, light-headedness, numbness and headaches.   Hematological: Negative.    Psychiatric/Behavioral: Positive for dysphoric mood. The patient is nervous/anxious.         Objective:  /80   Pulse 68   Ht 170.2 cm (67\")   Wt 58.1 kg (128 lb)   LMP  (LMP Unknown)   SpO2 95%   BMI 20.05 kg/m²     Neurologic Exam     Mental Status   Oriented to person, place, and time.   Attention: normal. Concentration: normal.   Speech: speech is normal   Level of consciousness: alert  Knowledge: consistent with education.   Able to read. Able to write. Normal comprehension.     Cranial Nerves   Cranial nerves II through XII intact.     CN II   Visual fields full to confrontation.   Right visual field deficit: none  Left visual field deficit: none     CN III, IV, VI   Pupils are equal, round, and reactive to light.  Extraocular motions are normal.   Right pupil: Shape: regular. Reactivity: brisk. Consensual response: intact. Accommodation: intact.   Left pupil: Shape: regular. Reactivity: brisk. Consensual response: intact. Accommodation: intact.   CN III: no CN III palsy  CN VI: no CN VI palsy  Nystagmus: none   Upgaze: normal  Downgaze: normal    CN V   Facial sensation intact.     CN VII   Facial expression full, symmetric.     CN VIII   CN VIII normal.     CN IX, X   CN IX normal.   CN X normal.     CN XI   CN " XI normal.     CN XII   CN XII normal.     Motor Exam   Muscle bulk: normal  Overall muscle tone: normal  Right arm tone: normal  Left arm tone: normal  Right arm pronator drift: absent  Left arm pronator drift: absent  Right leg tone: normal  Left leg tone: normal    Strength   Strength 5/5 throughout.     Sensory Exam   Light touch normal.     Gait, Coordination, and Reflexes     Gait  Gait: normal    Coordination   Romberg: negative  Finger to nose coordination: normal  Heel to shin coordination: normal  Tandem walking coordination: normal    Reflexes   Reflexes 2+ except as noted.   Right plantar: normal  Left plantar: normal  Right Bautista: absent  Left Bautista: absentVery steady fast/fluid gate with full arm swing bilaterally  No cogwheel rigidity  She is able to stand from seated position, turn and return to seated position in 4 seconds  No shuffling or stooped posture  She has a very fine tremor in upper extremities, worse with action  No bradykinesia with finger or foot tap  JASON intact         Physical Exam   Constitutional: She is oriented to person, place, and time. She appears well-developed and well-nourished.   HENT:   Head: Normocephalic and atraumatic.   Eyes: Pupils are equal, round, and reactive to light. Conjunctivae and EOM are normal. No scleral icterus.   Neck: Normal range of motion. Neck supple.   Pulmonary/Chest: Effort normal. No respiratory distress.   Neurological: She is alert and oriented to person, place, and time. She has normal strength. She has a normal Finger-Nose-Finger Test, a normal Heel to Shin Test, a normal Romberg Test and a normal Tandem Gait Test. Gait normal.   Skin: Skin is warm.   Psychiatric: She has a normal mood and affect. Her speech is normal and behavior is normal. Judgment and thought content normal.   Vitals reviewed.      Assessment/Plan:       Milagro was seen today for tremors.    Diagnoses and all orders for this visit:    Tremor  Comments:  appears to be  essential tremor, no parkinsonian features  sinemet was tried but she feels actually may have have worsened her s/s      I did have a long discussion with Ms. Sin, her tremor appears to be benign essential tremor, it is worse with stress and anxiety.  She feels like her depression is improving.  She has not had response from sinemet, she also thinks it maybe made her worse; she can stop this medication.  She initially thought maybe primidone was causing problems with her mood but feels like this was not a factor at all, she actually thinks the primidone was helping more than she initially thought.  She has responded just a bit to propranolol, I do not think that we could push her dose much higher due to her heart rate in the 60s at this time.  I did discuss the case with Dr. Edmonds, we can try to restart her primidone with propranolol, I have advised her and her friend, who is with her today, to monitor her mood.    We will start with 50 mg half a pill twice a day, after 1 week she may increase to half a pill in the morning with whole pill at night, after another week whole pill twice a day.  If she does notice alteration in mood she will stop medication and let me know ASAP. She previously was taking primidone 50 mg at night only.  She never had problems with dizziness, ataxia etc with primidone in the past.  We did discuss other options such as Topamax or gabapentin but patient is hesitant to try another new medication, she states that she has leftover primidone at home and wishes to try this first.  If she is unable to tolerate primidone due to any mood alterations I would recommend that we try gabapentin.  I would like to see her back in 3 weeks or sooner if needed, she has any questions concerns or problems prior to follow-up appointment I recommend that she contact my office ASAP         Reviewed medications, potential side effects and signs and symptoms to report. Discussed risk versus benefits of  treatment plan with patient and/or family-including medications, labs and radiology that may be ordered. Addressed questions and concerns during visit. Patient and/or family verbalized understanding and agree with plan.    During this visit the following were done:  Labs Reviewed [x]    Labs Ordered []    Radiology Reports Reviewed []    Radiology Ordered []    PCP Records Reviewed [x]    Referring Provider Records Reviewed [x]    ER Records Reviewed []    Hospital Records Reviewed []    History Obtained From Family []    Radiology Images Reviewed []    Other Reviewed []    Records Requested []      EMR Dragon/Transcription Disclaimer:  Much of this encounter note is an electronic transcription of spoken language to printed text. Electronic transcription of spoken language may permit erroneous words or phrases to be inadvertently transcribed. Although I have reviewed the note for such errors, some may still exist in this documentation.    Ethel Elizalde, APRN  1/29/2020

## 2020-01-29 NOTE — PATIENT INSTRUCTIONS
Primidone 50 mg pills that you have at home  Take 1/2 pill twice a day for one week then increase to 1/2 pill am and whole pill at night for one week then 1 whole pill twice a day   Then return to clinic

## 2020-01-30 ENCOUNTER — OFFICE VISIT (OUTPATIENT)
Dept: INTERNAL MEDICINE | Facility: CLINIC | Age: 65
End: 2020-01-30

## 2020-01-30 VITALS
RESPIRATION RATE: 18 BRPM | SYSTOLIC BLOOD PRESSURE: 95 MMHG | WEIGHT: 130.38 LBS | HEART RATE: 82 BPM | HEIGHT: 67 IN | DIASTOLIC BLOOD PRESSURE: 60 MMHG | TEMPERATURE: 97.5 F | OXYGEN SATURATION: 97 % | BODY MASS INDEX: 20.46 KG/M2

## 2020-01-30 DIAGNOSIS — G25.0 HEREDITARY ESSENTIAL TREMOR: Primary | ICD-10-CM

## 2020-01-30 DIAGNOSIS — F33.2 SEVERE EPISODE OF RECURRENT MAJOR DEPRESSIVE DISORDER, WITHOUT PSYCHOTIC FEATURES (HCC): ICD-10-CM

## 2020-01-30 PROCEDURE — 99213 OFFICE O/P EST LOW 20 MIN: CPT | Performed by: FAMILY MEDICINE

## 2020-01-30 RX ORDER — PRIMIDONE 50 MG/1
25 TABLET ORAL 4 TIMES DAILY
COMMUNITY
End: 2020-02-14 | Stop reason: SDUPTHER

## 2020-02-06 ENCOUNTER — OFFICE VISIT (OUTPATIENT)
Dept: PSYCHIATRY | Facility: CLINIC | Age: 65
End: 2020-02-06

## 2020-02-06 DIAGNOSIS — F41.1 GENERALIZED ANXIETY DISORDER: ICD-10-CM

## 2020-02-06 DIAGNOSIS — F33.2 SEVERE EPISODE OF RECURRENT MAJOR DEPRESSIVE DISORDER, WITHOUT PSYCHOTIC FEATURES (HCC): Primary | ICD-10-CM

## 2020-02-06 PROCEDURE — 90791 PSYCH DIAGNOSTIC EVALUATION: CPT | Performed by: SOCIAL WORKER

## 2020-02-06 NOTE — PROGRESS NOTES
Subjective   Patient ID: Milagro Sanderson is a   retired no children 65 y.o. female presenting to [facility] for an initial evaluation.    Time: 11:45 am-12:45 pm    Chief Complaint: depression and anxiety    Presenting Concern(s)   The patient shares that she was referred by her primary care physician Dr. Valle after ongoing depression and anxiety since October 2019.  The patient shares that her Aunt passed away on Wednesday and she doesn't think she will be able to attend the service, because she does not drive outside of [place], she does not know where she is going, and what if she gets lost.  The patient shares that this all started after her  Mom passed away in 10/2018 after years of bad health.  Patient had been living back in the childhood home for 47 years when Mom passed away and the house was in ill repair.  The patient  [place] [facility] on the 1 year anniversary of her Mothers death.  Since then she had hated where she moved to ever since.  She shares that she cannot make a choice where to go or what to eat.  She shares that she feels like she regressed back to a child and wants someone to tell her what to do instead of her making the choice.  The patient shares that her Mom always told her what to do and how to do it.  She shares that she felt that everything was always her fault and felt blamed for whatever things were happening even if it wasn't true.  The patient shares that she worries all of the time and can no longer stop these kind of concerns.  She shares that she sleeps 5 to 6 hours and finds some relief but knows she will wake up tomorrow and have to start all over again. The patient shares that she does not have any desire to do anything, bath, cook, put on clean clothes.  She shares that she no longer enjoys reading and has lost about 10 pounds without trying.  She shares that she is afraid she wont have any  "money to pay bills.  She shares that she is behind the times electronically since she moved.  She shares that she feels paralyzed in being able to do the things her friends tell her to do and she knows they are right.  The patient shares that she is unable to make a decision because it might be the wrong decision and that makes her worry even more.      Treatment History (all levels of care):  Patient reports that she has always been a nervious person and states her Mom was also nervious.  She has been treated by her PCP was on Prozac switched and zoloft.  Was prescribed propanolol which she thinks is somewhat helpful.    Interpersonal/Family Relationships: ok      Family History  Mental Illness and/or Substance Abuse:   Mom had anxiety    Patient reports relationship with family is good. Patient was informed of the option to involve family in treatment at [facility] and was also encouraged to do so.     Personal/Social History: Patient was born and raised in [place] and was an only child.  2 years for many years, no children. Patient reports she was always with her Mom and Mom hovered over her.  Has family on her dads side but Mom side has all passed away.   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  She has not worked in several years but has many friends who are supportive of her.       Social History     Socioeconomic History   • Marital status: Single     Spouse name: Not on file   • Number of children: Not on file   • Years of education: Not on file   • Highest education level: Not on file   Tobacco Use   • Smoking status: Never Smoker   • Smokeless tobacco: Never Used   Substance and Sexual Activity   • Alcohol use: No   • Drug use: No   • Sexual activity: Defer          Experience: No    Abuse History: No      Legal History: No   Court-ordered: No      History of Substance Use:     Patient answered \"no\" " to experiencing the one or more of the following problems related to substance use: trouble quitting, financial problems, increased thought about using, work and/or school problems, inability to stay off drugs and/or alcohol, relapse, family problems, cravings, feelings of guilt or remorse about use, times when used and/or drank alone, using more than intended, and black outs and/or memory issues when using.       History of DTs: no  History of Seizures: no    Past Medical History:   Diagnosis Date   • Benign familial tremor    • Blood in urine    • Body piercing     EARS   • Depression    • Eczema    • Elevated LDL cholesterol level    • Fever blister    • History of fracture     ARM AT AGE 4 - PATIENT REPORTS SHE DOES NOT REMEMBER LATERALITY   • History of migraine    • History of tremor    • Onychomycosis    • Problems with swallowing     REPORTS SHE FEELS LIKE PILLS ARE GETTING STUCK IN A POCKET IN HER THROAT WHEN SHE TRIES TO SWALLOW THEM   • Sebaceous cyst    • Vitamin D deficiency    • Wears glasses        Objective     anxiety  depression  excessive stress  feeling anxious  Mental Status: Hygiene:   good  Cooperation:  Cooperative  Eye Contact:  Fair  Psychomotor Behavior:  Slow  Affect:  Restricted  Hopelessness: 8  Speech:  Normal  Thought Progress:  Linear  Thought Content:  Normal  Suicidal:  None  Homicidal:  None  Hallucinations:  None  Delusion:  None  Memory:  Intact  Orientation:  Person, Place, Time and Situation  Reliability:  fair  Insight:  Fair  Judgement:  Fair  Impulse Control:  Fair  Physical/Medical Issues:  Yes essential tremors        Patient identified the following problems:     Anxiety, depression and unresolved grief or loss      Short term goals: Develop rapport and encourage compliance with treatment plan and followup. The patient to contact this office, call 911, or present to the nearest emergency room should suicidal or homicidal ideations occur.    Long term goals: Patient will  learn and utilized positive coping skills to avoid or manage high risk situations. Patient will develop a network of support in the community. Patient will be able to function at optimal levels without the need for continued treatment at this level of care.    Strengths: Literate, Articulate and Has insight    Weaknesses:Lack of motivation and Poor coping skills    Resources/Assets: Financial Stability, Educated, Intelligent, Articulate and Ability to read/write    VISIT DIAGNOSIS:     ICD-10-CM ICD-9-CM   1. Severe episode of recurrent major depressive disorder, without psychotic features (CMS/[facility]) F33.2 296.33   2. Generalized anxiety disorder F41.1 300.02        Plan: Patient is voluntarily requesting admission to [facility].      Future Appointments       Provider Department Center    2/14/2020 1:30 PM Des Elizondo MD [facility]IOR[facility]     2/24/2020 10:30 AM Ethel Elizalde APRN [facility] NEUROLOGY     3/30/2020 11:30 AM (Arrive by 11:15 AM) Mae Valle MD [facility] PRIMARY CARE     4/23/2020 11:00 AM Nida Pringle LCSW [facility] BEHAVIORAL HEALTH     5/21/2020 12:00 PM Nida Pringle LCSW [facility] BEHAVIORAL HEALTH             Patient will continue in individual psychotherapy sessions as scheduled at [facility]. Patient to be assessed and monitored by Dr. RY Elizondo. Patient will adhere to medication regimen as prescribed and report any adverse side effects. Patient will contact this office, call 911, or present to the nearest emergency room should suicidal or homicidal ideations occur. Therapist will use Motivation Interviewing, Cognitive Behavioral Therapy, and Solution Focused Therapy to assist patient with improving functioning, maintaining stability, and avoiding decompensation and the  need for higher level of care.         Nida Peters, [place], [facility]

## 2020-02-14 ENCOUNTER — OFFICE VISIT (OUTPATIENT)
Dept: PSYCHIATRY | Facility: CLINIC | Age: 65
End: 2020-02-14

## 2020-02-14 VITALS — HEIGHT: 67 IN | BODY MASS INDEX: 20.4 KG/M2 | WEIGHT: 130 LBS

## 2020-02-14 DIAGNOSIS — R25.1 TREMOR: ICD-10-CM

## 2020-02-14 DIAGNOSIS — F41.1 GENERALIZED ANXIETY DISORDER: ICD-10-CM

## 2020-02-14 DIAGNOSIS — F33.2 SEVERE EPISODE OF RECURRENT MAJOR DEPRESSIVE DISORDER, WITHOUT PSYCHOTIC FEATURES (HCC): Primary | ICD-10-CM

## 2020-02-14 DIAGNOSIS — Z60.0 PHASE OF LIFE PROBLEM IN ADULT: ICD-10-CM

## 2020-02-14 PROCEDURE — 90792 PSYCH DIAG EVAL W/MED SRVCS: CPT | Performed by: PSYCHIATRY & NEUROLOGY

## 2020-02-14 RX ORDER — BUPROPION HYDROCHLORIDE 100 MG/1
100 TABLET, EXTENDED RELEASE ORAL DAILY
Qty: 30 TABLET | Refills: 1 | Status: SHIPPED | OUTPATIENT
Start: 2020-02-14 | End: 2020-03-13 | Stop reason: SDUPTHER

## 2020-02-14 RX ORDER — PRIMIDONE 50 MG/1
50 TABLET ORAL 2 TIMES DAILY
Qty: 42 TABLET | Refills: 0 | Status: SHIPPED | OUTPATIENT
Start: 2020-02-14 | End: 2020-03-06

## 2020-02-14 SDOH — SOCIAL STABILITY - SOCIAL INSECURITY: PROBLEMS OF ADJUSTMENT TO LIFE-CYCLE TRANSITIONS: Z60.0

## 2020-02-20 ENCOUNTER — OFFICE VISIT (OUTPATIENT)
Dept: PSYCHIATRY | Facility: CLINIC | Age: 65
End: 2020-02-20

## 2020-02-20 DIAGNOSIS — F33.2 SEVERE EPISODE OF RECURRENT MAJOR DEPRESSIVE DISORDER, WITHOUT PSYCHOTIC FEATURES (HCC): Primary | ICD-10-CM

## 2020-02-20 DIAGNOSIS — F41.1 GENERALIZED ANXIETY DISORDER: ICD-10-CM

## 2020-02-20 PROCEDURE — 90837 PSYTX W PT 60 MINUTES: CPT | Performed by: SOCIAL WORKER

## 2020-02-20 NOTE — TREATMENT PLAN
Multi-Disciplinary Problems (from Behavioral Health Treatment Plan)    Active Problems     Problem: Anxiety  Start Date: 02/20/20    Problem Details:  The patient self-scales this problem as a 7 with 10 being the worst.       Goal Priority Start Date Expected End Date End Date    Patient will develop and implement behavioral and cognitive strategies to reduce anxiety and irrational fears. -- 02/20/20 02/19/21 --    Goal Details:  Progress toward goal:  Not appropriate to rate progress toward goal since this is the initial treatment plan.       Goal Intervention Frequency Start Date End Date    Help patient explore past emotional issues in relation to present anxiety. PRN 02/20/20 02/19/21    Intervention Details:  Duration of treatment until until remission of symptoms.       Goal Intervention Frequency Start Date End Date    Help patient develop an awareness of their cognitive and physical responses to anxiety. PRN 02/20/20 02/19/21    Intervention Details:  Duration of treatment until until remission of symptoms.             Problem: Depression  Start Date: 02/20/20    Problem Details:  The patient self-scales this problem as a 9 with 10 being the worst.       Goal Priority Start Date Expected End Date End Date    Patient will demonstrate the ability to initiate new constructive life skills outside of sessions on a consistent basis. -- 02/20/20 02/19/21 --    Goal Details:  Progress toward goal:  Not appropriate to rate progress toward goal since this is the initial treatment plan.       Goal Intervention Frequency Start Date End Date    Assist patient in setting attainable activities of daily living goals. PRN 02/20/20 02/19/21    Goal Intervention Frequency Start Date End Date    Provide education about depression PRN 02/20/20 02/19/21    Intervention Details:  Duration of treatment until until remission of symptoms.       Goal Intervention Frequency Start Date End Date    Assist patient in developing healthy  coping strategies. PRN 02/20/20 02/19/21    Intervention Details:  Duration of treatment until until remission of symptoms.             Problem: Grief and Loss  Start Date: 02/20/20    Problem Details:  The patient self-scales this problem as a 8 with 10 being the worst.       Goal Priority Start Date Expected End Date End Date    Patient will process feelings and identify and implement specific ways to facilitate the grieving process. -- 02/20/20 02/19/21 --    Goal Details:  Progress toward goal:  Not appropriate to rate progress toward goal since this is the initial treatment plan.       Goal Intervention Frequency Start Date End Date    Assist patient in identifying and processing feelings about losses. PRN 02/20/20 02/19/21    Intervention Details:  Duration of treatment until until remission of symptoms.       Goal Intervention Frequency Start Date End Date    Identify ways to grieve effectively and utilize healthy support systems PRN 02/20/20 02/19/21    Intervention Details:  Duration of treatment until until remission of symptoms.                          I have discussed and reviewed this treatment plan with the patient assisting and approving the plan.

## 2020-02-20 NOTE — PROGRESS NOTES
"Subjective   Milagro Sanderson is a 65 y.o. female who presents today for initial evaluation     Chief Complaint: Depression    History of Present Illness: Patient is a 65-year-old  female who presents for her initial evaluation by this provider for depression.  She states that she has been depressed since October.  Last October was the 1 year anniversary of her mother's death and at that time she moved out of her childhood home which she lived in for 47 years to a condominium.  She originally felt that it would be helpful as she liked her friend's condominium who lived in the same complex, however when she got there she found it difficult.  She feels that maintenance does not come and help in a timely manner and things are updated which causes her some confusion and difficulty using them.  She also does not have her own large space or outside space which has been difficult.  She has had to figure out a new routine as far as shopping and other necessities due to being in a new area.  She states that she worries all the time.  Her energy is low and she is not eating well.  She has less sleep than she previously got.  She is losing interest in hobbies.  Patient does endorse that she continues to socialize and has been going to the [employer].  She states that she feels that she has regressed as she was doing well previously.  She has tried Lexapro and Prozac in the past.  I asked patient, though she has both anxiety and depressive symptoms, whether anxiety or depression was the primary factor prompting her to seek help with this provider.  She stated that her mood being down was the biggest thing and she just does not feel motivated or, \"good in general.\"  She denies HI/AVH but has suicidal ideations on a daily basis without a plan or intent.    Patient is  with no kids.  She lives in a [facility] by herself.  She is a retired  who taught elementary school.  She reports that she liked third grade the " most.  She denies any substance use.  She is currently managed on Zoloft as p.o. daily, propranolol and primidone for essential tremors.  She utilizes melatonin at night for sleep.    The following portions of the patient's history were reviewed and updated as appropriate: allergies, current medications, past family history, past medical history, past social history, past surgical history and problem list.      Past Medical History:  Past Medical History:   Diagnosis Date   • Benign familial tremor    • Blood in urine    • Body piercing     EARS   • Depression    • Eczema    • Elevated LDL cholesterol level    • Fever blister    • History of fracture     ARM AT AGE 4 - PATIENT REPORTS SHE DOES NOT REMEMBER LATERALITY   • History of migraine    • History of tremor    • Onychomycosis    • Problems with swallowing     REPORTS SHE FEELS LIKE PILLS ARE GETTING STUCK IN A POCKET IN HER THROAT WHEN SHE TRIES TO SWALLOW THEM   • Sebaceous cyst    • Vitamin D deficiency    • Wears glasses        Social History:  Social History     Socioeconomic History   • Marital status: Single     Spouse name: Not on file   • Number of children: Not on file   • Years of education: Not on file   • Highest education level: Not on file   Tobacco Use   • Smoking status: Never Smoker   • Smokeless tobacco: Never Used   Substance and Sexual Activity   • Alcohol use: No   • Drug use: No   • Sexual activity: Defer       Family History:  Family History   Problem Relation Age of Onset   • Heart attack Mother    • Hypertension Mother    • Heart attack Other    • Kidney disease Other    • Diabetes Paternal Aunt    • Diabetes Paternal Uncle    • Colon cancer Neg Hx    • Cirrhosis Neg Hx    • Liver disease Neg Hx    • Liver cancer Neg Hx    • Crohn's disease Neg Hx    • Ulcerative colitis Neg Hx    • Esophageal cancer Neg Hx    • Stomach cancer Neg Hx        Past Surgical History:  Past Surgical History:   Procedure Laterality Date   • APPENDECTOMY   "2012   • APPENDECTOMY     • BREAST BIOPSY Bilateral    • BREAST CYST ASPIRATION     • BREAST SURGERY Bilateral     history of mammatone left and right breast   • COLONOSCOPY     • CYST REMOVAL      SEBACEOUS CYST X3 FROM HEAD   • ENDOSCOPY N/A 2/7/2019    Procedure: ESOPHAGOGASTRODUODENOSCOPY W/ BIOPSIES;  Surgeon: Simba Rogers MD;  Location: [facility] ENDOSCOPY;  Service: Gastroenterology   • WISDOM TOOTH EXTRACTION      EXTRACTION X2       Problem List:  Patient Active Problem List   Diagnosis   • Severe episode of recurrent major depressive disorder, without psychotic features (CMS/HCC)   • Eczema   • Vitamin B12 deficiency   • Vitamin D deficiency   • Hereditary essential tremor   • Onychomycosis   • Recurrent cold sores   • GERD with esophagitis       Allergy:   Allergies   Allergen Reactions   • Hazelnut Anaphylaxis   • Macrobid [Nitrofurantoin Monohyd Macro] Other (See Comments)     REPORTS \"I FELL AND IT WAS LIKE I COULDN'T MOVE\"           Current Medications:   Current Outpatient Medications   Medication Sig Dispense Refill   • acyclovir (ZOVIRAX) 400 MG tablet TAKE ONE TABLET BY MOUTH THREE TIMES A DAY FOR 5 DAYS STARTING AT FIRST SIGN OF COLD SORE 15 tablet 4   • Ascorbic Acid (VITAMIN C) 500 MG capsule Take 500 mg by mouth Every Other Day.     • buPROPion SR (WELLBUTRIN SR) 100 MG 12 hr tablet Take 1 tablet by mouth Daily. 30 tablet 1   • calcium (OS-SANTHOSH) 600 MG tablet Take 600 mg by mouth 1 (One) Time Per Week.     • CBD (cannabidiol) oral oil Take  by mouth.     • Cholecalciferol (VITAMIN D-3 PO) Take 2,000 Units by mouth Daily.     • fluocinonide (LIDEX) 0.05 % external solution      • magnesium chloride ER 64 MG DR tablet Take 250 mg by mouth Every 14 (Fourteen) Days.     • melatonin 5 MG tablet tablet Take 5 mg by mouth.     • Multiple Vitamins-Minerals (CENTRUM SILVER ULTRA WOMENS PO) Take 1 tablet by mouth Every Other Day.     • Omega-3 Fatty Acids (FISH OIL) 1000 MG capsule capsule Take 1,200 mg " "by mouth Daily With Breakfast.     • primidone (MYSOLINE) 50 MG tablet Take 1 tablet by mouth 2 (Two) Times a Day. Slowly titrate to 50mg bid 42 tablet 0   • propranolol (INDERAL) 20 MG tablet Take 1 tablet by mouth 2 (Two) Times a Day. For tremor. Monitor blood pressure 180 tablet 3   • sertraline (ZOLOFT) 100 MG tablet Take 1 tablet by mouth Daily. 90 tablet 3     No current facility-administered medications for this visit.        Review of Symptoms:    Review of Systems   Constitutional: Positive for activity change, appetite change and fatigue. Negative for chills, diaphoresis, fever and unexpected weight loss.   HENT: Negative.    Eyes: Negative.    Respiratory: Negative.    Cardiovascular: Negative.    Gastrointestinal: Negative.    Endocrine: Negative.    Genitourinary: Negative.    Musculoskeletal: Negative.    Skin: Negative.    Allergic/Immunologic: Negative.    Neurological: Negative.    Hematological: Negative.    Psychiatric/Behavioral: Positive for agitation, decreased concentration, sleep disturbance, suicidal ideas, depressed mood and stress. The patient is nervous/anxious.          Physical Exam:   Height 170.2 cm (67\"), weight 59 kg (130 lb), not currently breastfeeding.    Appearance:  female of stated age in no acute distress  Gait, Station, Strength: Within normal limits    Mental Status Exam:   Hygiene:   good  Cooperation:  Cooperative  Eye Contact:  Good  Psychomotor Behavior:  Appropriate  Affect:  Full range  Mood: normal  Hopelessness: 8  Speech:  Normal  Thought Process:  Goal directed and Linear  Thought Content:  Normal  Suicidal:  Suicidal Ideation  Homicidal:  None  Hallucinations:  None  Delusion:  None  Memory:  Intact  Orientation:  Person, Place, Time and Situation  Reliability:  good  Insight:  Fair  Judgement:  Good  Impulse Control:  Good  Physical/Medical Issues:  No        Lab Results:   No visits with results within 1 Month(s) from this visit.   Latest known visit " with results is:   Office Visit on 11/11/2019   Component Date Value Ref Range Status   • TSH 11/11/2019 2.430  0.270 - 4.200 uIU/mL Final   • Free T4 11/11/2019 1.41  0.93 - 1.70 ng/dL Final   • T3, Free 11/11/2019 3.4  2.0 - 4.4 pg/mL Final   • T3, Total 11/11/2019 118  71 - 180 ng/dL Final   • WBC 11/11/2019 6.01  3.40 - 10.80 10*3/mm3 Final   • RBC 11/11/2019 4.64  3.77 - 5.28 10*6/mm3 Final   • Hemoglobin 11/11/2019 15.0  12.0 - 15.9 g/dL Final   • Hematocrit 11/11/2019 44.0  34.0 - 46.6 % Final   • MCV 11/11/2019 94.8  79.0 - 97.0 fL Final   • MCH 11/11/2019 32.3  26.6 - 33.0 pg Final   • MCHC 11/11/2019 34.1  31.5 - 35.7 g/dL Final   • RDW 11/11/2019 12.2* 12.3 - 15.4 % Final   • Platelets 11/11/2019 278  140 - 450 10*3/mm3 Final   • Neutrophil Rel % 11/11/2019 73.7  42.7 - 76.0 % Final   • Lymphocyte Rel % 11/11/2019 15.5* 19.6 - 45.3 % Final   • Monocyte Rel % 11/11/2019 9.5  5.0 - 12.0 % Final   • Eosinophil Rel % 11/11/2019 0.5  0.3 - 6.2 % Final   • Basophil Rel % 11/11/2019 0.5  0.0 - 1.5 % Final   • Neutrophils Absolute 11/11/2019 4.43  1.70 - 7.00 10*3/mm3 Final   • Lymphocytes Absolute 11/11/2019 0.93  0.70 - 3.10 10*3/mm3 Final   • Monocytes Absolute 11/11/2019 0.57  0.10 - 0.90 10*3/mm3 Final   • Eosinophils Absolute 11/11/2019 0.03  0.00 - 0.40 10*3/mm3 Final   • Basophils Absolute 11/11/2019 0.03  0.00 - 0.20 10*3/mm3 Final   • Immature Granulocyte Rel % 11/11/2019 0.3  0.0 - 0.5 % Final   • Immature Grans Absolute 11/11/2019 0.02  0.00 - 0.05 10*3/mm3 Final   • nRBC 11/11/2019 0.0  0.0 - 0.2 /100 WBC Final   • Glucose 11/11/2019 91  65 - 99 mg/dL Final   • BUN 11/11/2019 7* 8 - 23 mg/dL Final   • Creatinine 11/11/2019 0.68  0.57 - 1.00 mg/dL Final   • eGFR Non  Am 11/11/2019 87  >60 mL/min/1.73 Final   • eGFR African Am 11/11/2019 105  >60 mL/min/1.73 Final   • BUN/Creatinine Ratio 11/11/2019 10.3  7.0 - 25.0 Final   • Sodium 11/11/2019 142  136 - 145 mmol/L Final   • Potassium 11/11/2019  4.5  3.5 - 5.2 mmol/L Final   • Chloride 11/11/2019 100  98 - 107 mmol/L Final   • Total CO2 11/11/2019 26.3  22.0 - 29.0 mmol/L Final   • Calcium 11/11/2019 9.8  8.6 - 10.5 mg/dL Final   • Total Protein 11/11/2019 7.2  6.0 - 8.5 g/dL Final   • Albumin 11/11/2019 4.80  3.50 - 5.20 g/dL Final   • Globulin 11/11/2019 2.4  gm/dL Final   • A/G Ratio 11/11/2019 2.0  g/dL Final   • Total Bilirubin 11/11/2019 0.4  0.2 - 1.2 mg/dL Final   • Alkaline Phosphatase 11/11/2019 74  39 - 117 U/L Final   • AST (SGOT) 11/11/2019 17  1 - 32 U/L Final   • ALT (SGPT) 11/11/2019 17  1 - 33 U/L Final   • Carbon Monoxide, Blood 11/11/2019 3.0  0.0 - 3.6 % Final    Comment:                             Environmental Exposure:                               Nonsmokers           <3.7                               Smokers              <9.9                              Occupational Exposure:                               [employer]                   3.5                                  Detection Limit =  0.2     • Lead 11/11/2019 None Detected  0 - 4 ug/dL Final    Comment: Testing performed by Inductively coupled plasma/Mass Spectrometry.                            Environmental Exposure:                             WHO Recommendation    <20                            Occupational Exposure:                             OSHA Lead Std          40                             [employer]                    30                                  Detection Limit =  1  This test was developed and its performance  characteristics determined by [employer]. It has not been  cleared or approved by the Food and Drug Administration.     • Arsenic 11/11/2019 7  2 - 23 ug/L Final                                    Detection Limit = 1   • Mercury 11/11/2019 None Detected  0.0 - 14.9 ug/L Final    Comment:                         Environmental Exposure:  <15.0                          Occupational Exposure:                           [employer] - Inorganic Mercury: 15.0                                   Detection Limit =  1.0     • Ceruloplasmin 11/11/2019 22.6  19.0 - 39.0 mg/dL Final   • Hemoglobin A1C 11/11/2019 5.51  4.80 - 5.60 % Final    Comment: Hemoglobin A1C Ranges:  Increased Risk for Diabetes  5.7% to 6.4%  Diabetes                     >= 6.5%  Diabetic Goal                < 7.0%     • Vitamin B-12 11/11/2019 554  211 - 946 pg/mL Final   • Folate 11/11/2019 17.40  4.78 - 24.20 ng/mL Final       Assessment/Plan   Diagnoses and all orders for this visit:    Severe episode of recurrent major depressive disorder, without psychotic features (CMS/HCC)  -     buPROPion SR (WELLBUTRIN SR) 100 MG 12 hr tablet; Take 1 tablet by mouth Daily.    Generalized anxiety disorder    Phase of life problem in adult    Tremor  -     primidone (MYSOLINE) 50 MG tablet; Take 1 tablet by mouth 2 (Two) Times a Day. Slowly titrate to 50mg bid    -Patient is a 65-year-old  female presenting for anxiety and depression.  She has chronic daily suicidal ideation without plan or intent.  She also has severe anxiety symptoms with constant ruminations and inability to make a decision.  She endorses lack of motivation, low energy, loss of interest in hobbies, difficulty getting out of bed, self-isolation.  -Reviewed previous documentation  -Reviewed most recent available labs  -ODALIS reviewed and appropriate. Patient counseled on use of controlled substances.   -Continue Zoloft 100 mg p.o. daily for mood and anxiety.  Could consider increasing this to max dose  -Continue propranolol and primidone for essential tremor  -Start Wellbutrin  mg p.o. daily for mood augmentation.  Patient has a lot of vegetative symptoms of depression and this medication is a more stimulating antidepressant that may be beneficial  -Encouraged patient to continue with therapy    Visit Diagnoses:    ICD-10-CM ICD-9-CM   1. Tremor R25.1 781.0       TREATMENT PLAN/GOALS: Continue supportive psychotherapy efforts and medications as  indicated. Treatment and medication options discussed during today's visit. Patient acknowledged and verbally consented to continue with current treatment plan and was educated on the importance of compliance with treatment and follow-up appointments.    MEDICATION ISSUES:    Discussed medication options and treatment plan of prescribed medication as well as the risks, benefits, and side effects including potential falls, possible impaired driving and metabolic adversities among others. Patient is agreeable to call the office with any worsening of symptoms or onset of side effects. Patient is agreeable to call 911 or go to the nearest ER should he/she begin having SI/HI.     MEDS ORDERED DURING VISIT:  New Medications Ordered This Visit   Medications   • buPROPion SR (WELLBUTRIN SR) 100 MG 12 hr tablet     Sig: Take 1 tablet by mouth Daily.     Dispense:  30 tablet     Refill:  1   • primidone (MYSOLINE) 50 MG tablet     Sig: Take 1 tablet by mouth 2 (Two) Times a Day. Slowly titrate to 50mg bid     Dispense:  42 tablet     Refill:  0       Return in about 4 weeks (around 3/13/2020).             This document has been electronically signed by Des Elizondo MD  February 20, 2020 1:41 PM

## 2020-02-20 NOTE — PROGRESS NOTES
PROGRESS NOTE  Data:  Milagro Sanderson came in 2/20/2020 for her regularly scheduled therapy session, with Nida Peters, ISRAEL,GENEVIEVE from 1:45 pm to 2:40 pm.  Patient reports that she might be feeling a tiny bit better and remains hopeful.  Continues to have no desire to do anything at all.   Is having to force herself to get cleaned up each day, has stopped cooking and is eating grapes, bandana, can of soup, cereal bar and things that dosenot require any effort.  She eroirts that she has not been able to cry in a long time. Has been to the [facility] 3 times in the last week.      Clinical Maneuvering/Intervention:  Assisted patient in processing above session content; acknowledged and normalized patient’s thoughts, feelings, and concerns. Allowed patient to freely discuss issues without interruption or judgment. Provided safe, confidential environment to facilitate the development of positive therapeutic relationship and encourage open, honest communication.   Introduced thinking issues and how to challenge the negative thoughts that patient has.  She is quite harsh in her self talk and admits that she does not see anything good about herself.  She shares that her Mom was very critical and this may be what she is re-telling herself.  Patient is working with a tele-therapist for an 7.5 week program and will use the CBT based program to continue to address her anxiety and depresion. Encouraged pt the importance of keeping all appointments and taking medications as prescribed if prescribed  and calling with any questions or concerns.  Assisted patient in identifying risk factors which would indicate the need for higher level of care including thoughts to harm self or others and/or self-harming behavior and encouraged patient to contact this office, call 911, or present to the nearest emergency room should any of these events occur. Discussed crisis intervention services and means to access.  Patient adamantly and  convincingly denies current suicidal or homicidal ideation or perceptual disturbance.    Assessment     Diagnoses and all orders for this visit:    Severe episode of recurrent major depressive disorder, without psychotic features (CMS/HCC)    Generalized anxiety disorder        Patient presents for session on time, clean and casually dressed with depressed/anxious mood and congruent affect. No evidence of intoxication, withdrawal, or perceptual disturbance. Patient appears to maintain relative stability as compared to their baseline.  However, patient continues to struggle with depression & anxiety which continues to cause impairment in important areas of functioning.  A result, they can be reasonably expected to continue to benefit from treatment and would likely be at increased risk for decompensation otherwise. Association’s intact, abstraction intact. Thought process is linear and logical. Speech is clear and coherent. Patient is oriented to person, place, and time. Attention and concentration fair. Insight and judgment fair. Patient reports no current suicidal or homicidal ideation. Patient appears cooperative and agreeable to treatment and appears to begin to develop rapport. Patient does not appear to be malingering.        ROS: Patient is positive for dysphoric mood. She is anxious and nervious.   Mental Status Exam:   Hygiene:   good  Cooperation:  Cooperative  Eye Contact:  Poor  Psychomotor Behavior:  Slow  Affect:  Restricted  Mood: depressed  Speech:  Normal  Thought Process:  Linear  Thought Content:  Normal  Suicidal:  None  Homicidal:  None  Hallucinations:  None  Delusion:  None  Memory:  Intact  Orientation:  Person, Place, Time and Situation  Reliability:  fair  Insight:  Fair  Judgement:  Fair  Impulse Control:  Fair  Physical/Medical Issues:  Yes GERD, Vit D & B deficiency, essential tremors, eczema       Patient's Support Network Includes:  friends    Progress toward goal: Not at  goal    Functional Status: Severe impairment    Prognosis: Good with Ongoing Treatment            Future Appointments       Provider Department Center    2/24/2020 10:30 AM Ethel Elizalde APRN [facility] NEUROLOGY [place]    3/13/2020 10:30 AM Des Elizondo MD [facility] BEHAVIORAL HEALTH     3/30/2020 11:30 AM (Arrive by 11:15 AM) Mae Valle MD [facility] PRIMARY CARE     4/23/2020 11:00 AM Nida Pringle LCSW [facility] BEHAVIORAL HEALTH     5/21/2020 12:00 PM Nida Pringle LCSW [facility] BEHAVIORAL HEALTH           Patient will have at least monthly outpatient psychotherapy sessions or earlier if symptoms worsen or fail to improve and pharmacotherapy as scheduled with the focus on improved functioning and coping skills, maintaining stability and avoiding decompensation and the need for a higher level of care. Patient will continue to work with [employer] therapist and behavior  over the next 7 weeks.   Patient will adhere to medication regimen as prescribed and report any side effects. Patient will contact this office, call 911 or present to the nearest emergency room should suicidal or homicidal ideations occur. Provide Cognitive Behavioral Therapy and Solution Focused Therapy to improve functioning, maintain stability, and avoid decompensation and the need for higher level of care.     Nida Peters LCSW,[facility]– [place][note 2]

## 2020-02-24 ENCOUNTER — OFFICE VISIT (OUTPATIENT)
Dept: NEUROLOGY | Facility: CLINIC | Age: 65
End: 2020-02-24

## 2020-02-24 ENCOUNTER — APPOINTMENT (OUTPATIENT)
Dept: LAB | Facility: HOSPITAL | Age: 65
End: 2020-02-24

## 2020-02-24 VITALS
WEIGHT: 134 LBS | HEIGHT: 67 IN | BODY MASS INDEX: 21.03 KG/M2 | SYSTOLIC BLOOD PRESSURE: 110 MMHG | HEART RATE: 65 BPM | DIASTOLIC BLOOD PRESSURE: 60 MMHG | OXYGEN SATURATION: 95 %

## 2020-02-24 DIAGNOSIS — R25.1 TREMOR: Primary | ICD-10-CM

## 2020-02-24 LAB
ALBUMIN SERPL-MCNC: 4.6 G/DL (ref 3.5–5.2)
ALBUMIN/GLOB SERPL: 1.7 G/DL
ALP SERPL-CCNC: 80 U/L (ref 39–117)
ALT SERPL W P-5'-P-CCNC: 19 U/L (ref 1–33)
ANION GAP SERPL CALCULATED.3IONS-SCNC: 12.7 MMOL/L (ref 5–15)
AST SERPL-CCNC: 19 U/L (ref 1–32)
BILIRUB SERPL-MCNC: 0.4 MG/DL (ref 0.2–1.2)
BUN BLD-MCNC: 11 MG/DL (ref 8–23)
BUN/CREAT SERPL: 18 (ref 7–25)
CALCIUM SPEC-SCNC: 10.1 MG/DL (ref 8.6–10.5)
CHLORIDE SERPL-SCNC: 99 MMOL/L (ref 98–107)
CO2 SERPL-SCNC: 28.3 MMOL/L (ref 22–29)
CREAT BLD-MCNC: 0.61 MG/DL (ref 0.57–1)
GFR SERPL CREATININE-BSD FRML MDRD: 98 ML/MIN/1.73
GLOBULIN UR ELPH-MCNC: 2.7 GM/DL
GLUCOSE BLD-MCNC: 90 MG/DL (ref 65–99)
POTASSIUM BLD-SCNC: 4.5 MMOL/L (ref 3.5–5.2)
PROT SERPL-MCNC: 7.3 G/DL (ref 6–8.5)
SODIUM BLD-SCNC: 140 MMOL/L (ref 136–145)

## 2020-02-24 PROCEDURE — 80188 ASSAY OF PRIMIDONE: CPT | Performed by: NURSE PRACTITIONER

## 2020-02-24 PROCEDURE — 80053 COMPREHEN METABOLIC PANEL: CPT | Performed by: NURSE PRACTITIONER

## 2020-02-24 PROCEDURE — 82525 ASSAY OF COPPER: CPT | Performed by: NURSE PRACTITIONER

## 2020-02-24 PROCEDURE — 80184 ASSAY OF PHENOBARBITAL: CPT | Performed by: NURSE PRACTITIONER

## 2020-02-24 PROCEDURE — 36415 COLL VENOUS BLD VENIPUNCTURE: CPT | Performed by: NURSE PRACTITIONER

## 2020-02-24 PROCEDURE — 99213 OFFICE O/P EST LOW 20 MIN: CPT | Performed by: NURSE PRACTITIONER

## 2020-02-24 NOTE — PROGRESS NOTES
Subjective:     Patient ID: Milagro Sanderson is a 65 y.o. female.    CC:   Chief Complaint   Patient presents with   • Tremors       HPI:   History of Present Illness     Ms. Sanderson is here today for follow-up.  When I first saw her she was here for evaluation of tremor.  She told me that she first started noticing tremor in bilateral upper extremities about 8 to 9 years ago.  Both parents had diagnosis of essential tremor, her father had tremor in hands and her mother had a jaw tremor.  Neither had a diagnosis of Parkinson's disease.  She has no known family history of Parkinson's.  She tome me that she had been managed on primidone by previous PCP, symptoms seem to be controlled very well on this medication.  She had gone off and on the medication for a few years.  She stated that back in October 2019 she moved, this caused increased stress and anxiety as well as depression.  She said that it has affected her quite a bit.  Around the time that she moved and when she was under more stress her tremor became more pronounced.  She had read online that primidone could affect mood so she asked her new primary care to stop the medication.  At that time they did start her on propranolol 20 mg 3 times daily.  She said that the propranolol may be helped her tremor just a bit however it has been much more pronounced in the last several months.  Tremor is essentially nonexistent at rest, profound with movement, holding papers and eating etc.  Most recently back in December primary care switched her from Prozac to sertraline and also prescribed Sinemet 10/100 to be taken 3 times daily.  She had not noticed any improvement in her tremor with the Sinemet, she actually told me she thinks it may be worse.  She adamantly denies postural symptoms, changes in smell, incontinence, dysphasia, gait instability, shuffled gait or problems with balance.  She denies dizziness, headaches, vision changes, weakness in extremities, paresthesias,  syncope etc.    After last visit I did discuss with Dr. Edmonds, we restarted primidone with instructions to titrate up to 50 mg twice daily to take with her propranolol.  She tells me today that for the first 4 to 5 days after starting the primidone and she felt significant improvement however her tremor has returned.  She is currently up to primidone 50 mg twice daily which she is taking along with her propranolol 20 mg 3 times daily.  Her tremor continues to be exacerbated with action and movement, no tremor with rest.  She has had no increase in depression or anxiety with the addition of primidone    The following portions of the patient's history were reviewed and updated as appropriate: allergies, current medications, past family history, past medical history, past social history, past surgical history and problem list.    Past Medical History:   Diagnosis Date   • Benign familial tremor    • Blood in urine    • Body piercing     EARS   • Depression    • Eczema    • Elevated LDL cholesterol level    • Fever blister    • History of fracture     ARM AT AGE 4 - PATIENT REPORTS SHE DOES NOT REMEMBER LATERALITY   • History of migraine    • History of tremor    • Onychomycosis    • Problems with swallowing     REPORTS SHE FEELS LIKE PILLS ARE GETTING STUCK IN A POCKET IN HER THROAT WHEN SHE TRIES TO SWALLOW THEM   • Sebaceous cyst    • Vitamin D deficiency    • Wears glasses        Past Surgical History:   Procedure Laterality Date   • APPENDECTOMY  2012   • APPENDECTOMY     • BREAST BIOPSY Bilateral    • BREAST CYST ASPIRATION     • BREAST SURGERY Bilateral     history of mammatone left and right breast   • COLONOSCOPY     • CYST REMOVAL      SEBACEOUS CYST X3 FROM HEAD   • ENDOSCOPY N/A 2/7/2019    Procedure: ESOPHAGOGASTRODUODENOSCOPY W/ BIOPSIES;  Surgeon: Simba Rogers MD;  Location: [facility] ENDOSCOPY;  Service: Gastroenterology   • WISDOM TOOTH EXTRACTION      EXTRACTION X2       Social History  "    Socioeconomic History   • Marital status: Single     Spouse name: Not on file   • Number of children: Not on file   • Years of education: Not on file   • Highest education level: Not on file   Tobacco Use   • Smoking status: Never Smoker   • Smokeless tobacco: Never Used   Substance and Sexual Activity   • Alcohol use: No   • Drug use: No   • Sexual activity: Defer       Family History   Problem Relation Age of Onset   • Heart attack Mother    • Hypertension Mother    • Heart attack Other    • Kidney disease Other    • Diabetes Paternal Aunt    • Diabetes Paternal Uncle    • Colon cancer Neg Hx    • Cirrhosis Neg Hx    • Liver disease Neg Hx    • Liver cancer Neg Hx    • Crohn's disease Neg Hx    • Ulcerative colitis Neg Hx    • Esophageal cancer Neg Hx    • Stomach cancer Neg Hx         Review of Systems   Constitutional: Negative.    HENT: Negative.    Eyes: Negative.    Respiratory: Negative.    Cardiovascular: Negative.    Gastrointestinal: Negative.    Endocrine: Negative.    Genitourinary: Negative.    Musculoskeletal: Negative.    Skin: Negative.    Allergic/Immunologic: Negative.    Neurological: Positive for tremors. Negative for dizziness, seizures, syncope, facial asymmetry, speech difficulty, weakness, light-headedness, numbness and headaches.   Hematological: Negative.    Psychiatric/Behavioral: Negative.         Objective:  /60   Pulse 65   Ht 170.2 cm (67\")   Wt 60.8 kg (134 lb)   LMP  (LMP Unknown)   SpO2 95%   BMI 20.99 kg/m²     Neurologic Exam     Mental Status   Oriented to person, place, and time.   Attention: normal. Concentration: normal.   Speech: speech is normal   Level of consciousness: alert  Knowledge: consistent with education.   Able to read. Able to write. Normal comprehension.     Cranial Nerves   Cranial nerves II through XII intact.     CN II   Visual fields full to confrontation.   Right visual field deficit: none  Left visual field deficit: none     CN III, IV, VI "   Pupils are equal, round, and reactive to light.  Extraocular motions are normal.   Right pupil: Shape: regular. Reactivity: brisk. Consensual response: intact. Accommodation: intact.   Left pupil: Shape: regular. Reactivity: brisk. Consensual response: intact. Accommodation: intact.   CN III: no CN III palsy  CN VI: no CN VI palsy  Nystagmus: none   Upgaze: normal  Downgaze: normal    CN V   Facial sensation intact.     CN VII   Facial expression full, symmetric.     CN VIII   CN VIII normal.     CN IX, X   CN IX normal.   CN X normal.     CN XI   CN XI normal.     CN XII   CN XII normal.     Motor Exam   Muscle bulk: normal  Overall muscle tone: normal  Right arm tone: normal  Left arm tone: normal  Right arm pronator drift: absent  Left arm pronator drift: absent  Right leg tone: normal  Left leg tone: normal    Strength   Strength 5/5 throughout.     Sensory Exam   Light touch normal.     Gait, Coordination, and Reflexes     Gait  Gait: normal    Coordination   Romberg: negative  Finger to nose coordination: normal  Heel to shin coordination: normal  Tandem walking coordination: normal    Tremor   Resting tremor: absent  Intention tremor: absent  Action tremor: left arm and right arm    Reflexes   Reflexes 2+ except as noted.   Right plantar: normal  Left plantar: normal  Right Bautista: absent  Left Bautista: Andres has no cogwheel rigidity, no bradykinesia with finger tap.  She does have significant action tremor.  Her gait is fluid with bilateral arm swing noted.       Physical Exam   Constitutional: She is oriented to person, place, and time. She appears well-developed and well-nourished. No distress.   HENT:   Head: Normocephalic and atraumatic.   Eyes: Pupils are equal, round, and reactive to light. Conjunctivae and EOM are normal. No scleral icterus.   Neck: Normal range of motion. Neck supple.   Pulmonary/Chest: Effort normal. No respiratory distress.   Neurological: She is alert and oriented to  person, place, and time. She has normal strength. She has a normal Finger-Nose-Finger Test, a normal Heel to Shin Test, a normal Romberg Test and a normal Tandem Gait Test. Gait normal.   Skin: Skin is warm.   Psychiatric: She has a normal mood and affect. Her speech is normal and behavior is normal. Judgment and thought content normal.   Vitals reviewed.      Assessment/Plan:       Milagro was seen today for tremors.    Diagnoses and all orders for this visit:    Tremor  -     Primidone Level; Future  -     Copper, Serum; Future  -     Comprehensive Metabolic Panel; Future  -     Primidone Level  -     Copper, Serum  -     Comprehensive Metabolic Panel    Ms. Bradshaw tremor looks more essential, she has a family history of essential tremor in both parents.  Tremor is worse with anxiety.  There is no resting tremor, no rigidity, no shuffled gait or decreased arm swing on exam.  Today we will check her primidone level and the above labs, will call her with further instructions on upward titration of primidone once level has been resulted.  I am going to see her back here in a few weeks on a day that Dr. Edmonds is here so I can discuss case with him as well.           Reviewed medications, potential side effects and signs and symptoms to report. Discussed risk versus benefits of treatment plan with patient and/or family-including medications, labs and radiology that may be ordered. Addressed questions and concerns during visit. Patient and/or family verbalized understanding and agree with plan.    During this visit the following were done:  Labs Reviewed [x]    Labs Ordered [x]    Radiology Reports Reviewed []    Radiology Ordered []    PCP Records Reviewed []    Referring Provider Records Reviewed []    ER Records Reviewed []    Hospital Records Reviewed []    History Obtained From Family []    Radiology Images Reviewed []    Other Reviewed []    Records Requested []      EMR Dragon/Transcription Disclaimer:  Much of  this encounter note is an electronic transcription of spoken language to printed text. Electronic transcription of spoken language may permit erroneous words or phrases to be inadvertently transcribed. Although I have reviewed the note for such errors, some may still exist in this documentation.    Ethel Elizalde, APRN  2/24/2020

## 2020-02-26 LAB
COPPER SERPL-MCNC: 107 UG/DL (ref 72–166)
PHENOBARB UR QL: ABNORMAL UG/ML (ref 15–40)
PRIMIDONE SERPL-MCNC: 4.2 UG/ML (ref 5–12)

## 2020-02-28 ENCOUNTER — TELEPHONE (OUTPATIENT)
Dept: NEUROLOGY | Facility: CLINIC | Age: 65
End: 2020-02-28

## 2020-03-04 DIAGNOSIS — R25.1 TREMOR: ICD-10-CM

## 2020-03-04 RX ORDER — PRIMIDONE 50 MG/1
50 TABLET ORAL 2 TIMES DAILY
Qty: 42 TABLET | Refills: 0 | Status: CANCELLED | OUTPATIENT
Start: 2020-03-04

## 2020-03-04 NOTE — TELEPHONE ENCOUNTER
PATIENT CALLED, AND IS NEEDING A REFILL ON HER RX PRIMIDONE 50 MG. PATIENT IS WANTING TO SEE IF SHE COULD HAVE SOMEONE FROM THE OFFICE CONTACT HER IN REGARDS TO MAYBE INCREASING RX PER RAMYA SHETH.    PATIENT IS SCHEDULED TO COME IN ON 2020 FOR A FOLLOW UP.     PLEASE CONTACT PATIENT TO ADVISE    THANK YOU, ADRIANA    PATIENT: INDU LUCIO  : 1955  GOOD CONTACT #[phone]

## 2020-03-05 DIAGNOSIS — R25.1 TREMOR: ICD-10-CM

## 2020-03-06 DIAGNOSIS — R25.1 TREMOR: ICD-10-CM

## 2020-03-06 RX ORDER — PRIMIDONE 50 MG/1
50 TABLET ORAL 2 TIMES DAILY
Qty: 60 TABLET | Refills: 1 | Status: SHIPPED | OUTPATIENT
Start: 2020-03-06 | End: 2020-04-09 | Stop reason: SDUPTHER

## 2020-03-06 RX ORDER — PRIMIDONE 50 MG/1
50 TABLET ORAL 2 TIMES DAILY
Qty: 42 TABLET | Refills: 0 | OUTPATIENT
Start: 2020-03-06

## 2020-03-09 ENCOUNTER — TELEPHONE (OUTPATIENT)
Dept: NEUROLOGY | Facility: CLINIC | Age: 65
End: 2020-03-09

## 2020-03-13 ENCOUNTER — OFFICE VISIT (OUTPATIENT)
Dept: PSYCHIATRY | Facility: CLINIC | Age: 65
End: 2020-03-13

## 2020-03-13 VITALS
HEART RATE: 66 BPM | DIASTOLIC BLOOD PRESSURE: 62 MMHG | HEIGHT: 67 IN | BODY MASS INDEX: 21.19 KG/M2 | WEIGHT: 135 LBS | SYSTOLIC BLOOD PRESSURE: 114 MMHG

## 2020-03-13 DIAGNOSIS — F41.1 GENERALIZED ANXIETY DISORDER: Primary | ICD-10-CM

## 2020-03-13 DIAGNOSIS — F33.2 SEVERE EPISODE OF RECURRENT MAJOR DEPRESSIVE DISORDER, WITHOUT PSYCHOTIC FEATURES (HCC): ICD-10-CM

## 2020-03-13 DIAGNOSIS — Z60.0 PHASE OF LIFE PROBLEM IN ADULT: ICD-10-CM

## 2020-03-13 PROCEDURE — 99214 OFFICE O/P EST MOD 30 MIN: CPT | Performed by: PSYCHIATRY & NEUROLOGY

## 2020-03-13 RX ORDER — BUPROPION HYDROCHLORIDE 100 MG/1
100 TABLET, EXTENDED RELEASE ORAL DAILY
Qty: 30 TABLET | Refills: 1 | Status: SHIPPED | OUTPATIENT
Start: 2020-03-13 | End: 2020-05-08

## 2020-03-13 SDOH — SOCIAL STABILITY - SOCIAL INSECURITY: PROBLEMS OF ADJUSTMENT TO LIFE-CYCLE TRANSITIONS: Z60.0

## 2020-03-13 NOTE — PROGRESS NOTES
"Subjective   Milagro Sanderson is a 65 y.o. female who presents today for follow up     This provider is located at [facility], [address] [place] [address], using [employer].  The patient is seen remotely at [facility], [address], [address] in [facility] via [employer], an encrypted service from one [facility] to another, with staff present. The patient's condition being diagnosed/treated is appropriate for telemedecine.  The provider identified himself as well as his credentials.      The patient and/or patient's guardian consent to be seen remotely, and when consent is given they understand that the consent allows for patient identifiable information to be sent to a third party as needed.  They may refuse to be seen remotely at any time.  The electronic data is encrypted and password protected, and the patient has been advised of the potential risks to privacy notwithstanding such measures.      Chief Complaint: Depression    History of Present Illness: Patient is a 65-year-old  female who presents for follow-up for anxiety and depression.  Last visit was my initial encounter with the patient and at that time we initiated Wellbutrin  mg p.o. daily.  She reports that she has started to be, \"able to,\" program which involves having a therapist call her once a week as well as a  call her once a week.  She states that her therapist does DASS21 screenings on her regularly.  Her depressive score reduced from 38-34, anxiety reduced from 26-12, and other parameters improved similarly.  She feels that the medication is somewhat helpful and feels that her mood is better.  She is stressed about her tremor as she feels it is getting worse.  She does not notice a correlation from starting Wellbutrin though this can worsen tremors in some instances.  She reports that she has a neurology follow-up next week.  Patient denies " SI/HI/AVH.    The following portions of the patient's history were reviewed and updated as appropriate: allergies, current medications, past family history, past medical history, past social history, past surgical history and problem list.      Past Medical History:  Past Medical History:   Diagnosis Date   • Benign familial tremor    • Blood in urine    • Body piercing     EARS   • Depression    • Eczema    • Elevated LDL cholesterol level    • Fever blister    • History of fracture     ARM AT AGE 4 - PATIENT REPORTS SHE DOES NOT REMEMBER LATERALITY   • History of migraine    • History of tremor    • Onychomycosis    • Problems with swallowing     REPORTS SHE FEELS LIKE PILLS ARE GETTING STUCK IN A POCKET IN HER THROAT WHEN SHE TRIES TO SWALLOW THEM   • Sebaceous cyst    • Vitamin D deficiency    • Wears glasses        Social History:  Social History     Socioeconomic History   • Marital status: Single     Spouse name: Not on file   • Number of children: Not on file   • Years of education: Not on file   • Highest education level: Not on file   Tobacco Use   • Smoking status: Never Smoker   • Smokeless tobacco: Never Used   Substance and Sexual Activity   • Alcohol use: No   • Drug use: No   • Sexual activity: Defer       Family History:  Family History   Problem Relation Age of Onset   • Heart attack Mother    • Hypertension Mother    • Heart attack Other    • Kidney disease Other    • Diabetes Paternal Aunt    • Diabetes Paternal Uncle    • Colon cancer Neg Hx    • Cirrhosis Neg Hx    • Liver disease Neg Hx    • Liver cancer Neg Hx    • Crohn's disease Neg Hx    • Ulcerative colitis Neg Hx    • Esophageal cancer Neg Hx    • Stomach cancer Neg Hx        Past Surgical History:  Past Surgical History:   Procedure Laterality Date   • APPENDECTOMY  2012   • APPENDECTOMY     • BREAST BIOPSY Bilateral    • BREAST CYST ASPIRATION     • BREAST SURGERY Bilateral     history of mammatone left and right breast   • COLONOSCOPY  "    • CYST REMOVAL      SEBACEOUS CYST X3 FROM HEAD   • ENDOSCOPY N/A 2/7/2019    Procedure: ESOPHAGOGASTRODUODENOSCOPY W/ BIOPSIES;  Surgeon: Simba Rogers MD;  Location: [facility] ENDOSCOPY;  Service: Gastroenterology   • WISDOM TOOTH EXTRACTION      EXTRACTION X2       Problem List:  Patient Active Problem List   Diagnosis   • Severe episode of recurrent major depressive disorder, without psychotic features (CMS/HCC)   • Eczema   • Vitamin B12 deficiency   • Vitamin D deficiency   • Hereditary essential tremor   • Onychomycosis   • Recurrent cold sores   • GERD with esophagitis       Allergy:   Allergies   Allergen Reactions   • Hazelnut Anaphylaxis   • Macrobid [Nitrofurantoin Monohyd Macro] Other (See Comments)     REPORTS \"I FELL AND IT WAS LIKE I COULDN'T MOVE\"           Current Medications:   Current Outpatient Medications   Medication Sig Dispense Refill   • acyclovir (ZOVIRAX) 400 MG tablet TAKE ONE TABLET BY MOUTH THREE TIMES A DAY FOR 5 DAYS STARTING AT FIRST SIGN OF COLD SORE 15 tablet 4   • Ascorbic Acid (VITAMIN C) 500 MG capsule Take 500 mg by mouth Every Other Day.     • buPROPion SR (WELLBUTRIN SR) 100 MG 12 hr tablet Take 1 tablet by mouth Daily. 30 tablet 1   • calcium (OS-SANTHOSH) 600 MG tablet Take 600 mg by mouth 1 (One) Time Per Week.     • CBD (cannabidiol) oral oil Take  by mouth.     • Cholecalciferol (VITAMIN D-3 PO) Take 2,000 Units by mouth Daily.     • fluocinonide (LIDEX) 0.05 % external solution      • magnesium chloride ER 64 MG DR tablet Take 250 mg by mouth Every 14 (Fourteen) Days.     • Multiple Vitamins-Minerals (CENTRUM SILVER ULTRA WOMENS PO) Take 1 tablet by mouth Every Other Day.     • Omega-3 Fatty Acids (FISH OIL) 1000 MG capsule capsule Take 1,200 mg by mouth Daily With Breakfast.     • primidone (MYSOLINE) 50 MG tablet Take 1 tablet by mouth 2 (Two) Times a Day. 60 tablet 1   • propranolol (INDERAL) 20 MG tablet Take 1 tablet by mouth 2 (Two) Times a Day. For tremor. Monitor " "blood pressure 180 tablet 3   • sertraline (ZOLOFT) 100 MG tablet Take 1 tablet by mouth Daily. 90 tablet 3     No current facility-administered medications for this visit.        Review of Symptoms:    Review of Systems   Constitutional: Positive for activity change (improved). Negative for appetite change, chills, diaphoresis, fatigue, fever and unexpected weight loss.   HENT: Negative.    Eyes: Negative.    Respiratory: Negative.    Cardiovascular: Negative.    Gastrointestinal: Negative.    Endocrine: Negative.    Genitourinary: Negative.    Musculoskeletal: Negative.    Skin: Negative.    Allergic/Immunologic: Negative.    Neurological: Negative.    Hematological: Negative.    Psychiatric/Behavioral: Positive for dysphoric mood and stress. Negative for agitation, decreased concentration, sleep disturbance, suicidal ideas and depressed mood. The patient is nervous/anxious.          Physical Exam:   Blood pressure 114/62, pulse 66, height 170.2 cm (67\"), weight 61.2 kg (135 lb), not currently breastfeeding.    Appearance:  female of stated age in no acute distress  Gait, Station, Strength: Within normal limits    Mental Status Exam:   Hygiene:   good  Cooperation:  Cooperative  Eye Contact:  Good  Psychomotor Behavior:  Appropriate  Affect:  Full range  Mood: normal  Hopelessness: Optimistic  Speech:  Normal  Thought Process:  Goal directed and Linear  Thought Content:  Normal  Suicidal:  Death wish  Homicidal:  None  Hallucinations:  None  Delusion:  None  Memory:  Intact  Orientation:  Person, Place, Time and Situation  Reliability:  good  Insight:  Fair  Judgement:  Good  Impulse Control:  Good  Physical/Medical Issues:  No        Lab Results:   Office Visit on 02/24/2020   Component Date Value Ref Range Status   • Primidone Level 02/24/2020 4.2* 5.0 - 12.0 ug/mL Final                                    Detection Limit = 0.3                           <0.3 indicates None Detected   • Phenobarbital " 02/24/2020 None Detected  15 - 40 ug/mL Final    **Verified by repeat analysis**                                  Detection Limit = 3   • Copper 02/24/2020 107  72 - 166 ug/dL Final                                    Detection Limit = 5   • Glucose 02/24/2020 90  65 - 99 mg/dL Final   • BUN 02/24/2020 11  8 - 23 mg/dL Final   • Creatinine 02/24/2020 0.61  0.57 - 1.00 mg/dL Final   • Sodium 02/24/2020 140  136 - 145 mmol/L Final   • Potassium 02/24/2020 4.5  3.5 - 5.2 mmol/L Final   • Chloride 02/24/2020 99  98 - 107 mmol/L Final   • CO2 02/24/2020 28.3  22.0 - 29.0 mmol/L Final   • Calcium 02/24/2020 10.1  8.6 - 10.5 mg/dL Final   • Total Protein 02/24/2020 7.3  6.0 - 8.5 g/dL Final   • Albumin 02/24/2020 4.60  3.50 - 5.20 g/dL Final   • ALT (SGPT) 02/24/2020 19  1 - 33 U/L Final   • AST (SGOT) 02/24/2020 19  1 - 32 U/L Final   • Alkaline Phosphatase 02/24/2020 80  39 - 117 U/L Final   • Total Bilirubin 02/24/2020 0.4  0.2 - 1.2 mg/dL Final   • eGFR Non African Amer 02/24/2020 98  >60 mL/min/1.73 Final   • Globulin 02/24/2020 2.7  gm/dL Final   • A/G Ratio 02/24/2020 1.7  g/dL Final   • BUN/Creatinine Ratio 02/24/2020 18.0  7.0 - 25.0 Final   • Anion Gap 02/24/2020 12.7  5.0 - 15.0 mmol/L Final       Assessment/Plan   Diagnoses and all orders for this visit:    Generalized anxiety disorder    Severe episode of recurrent major depressive disorder, without psychotic features (CMS/HCC)  -     buPROPion SR (WELLBUTRIN SR) 100 MG 12 hr tablet; Take 1 tablet by mouth Daily.    Phase of life problem in adult    -Patient reports overall improvement and her therapist also notes as much.  -Reviewed previous documentation  -Reviewed most recent available labs  -Continue Zoloft 100 mg p.o. daily for mood and anxiety.  Could consider increasing this to max dose  -Continue Wellbutrin  mg p.o. daily  -Encouraged patient to continue with therapy  -Encourage patient to continue working on her isolation and to be more active in  her everyday life.  She is adjusting to new phase of life and I think that the program for which she receives a phone call from the therapist and a phone call from a  on a weekly basis is going to be very beneficial to her.  -Encouraged to follow-up with neurology for tremor    Visit Diagnoses:    ICD-10-CM ICD-9-CM   1. Generalized anxiety disorder F41.1 300.02   2. Severe episode of recurrent major depressive disorder, without psychotic features (CMS/HCC) F33.2 296.33   3. Phase of life problem in adult Z60.0 V62.89       TREATMENT PLAN/GOALS: Continue supportive psychotherapy efforts and medications as indicated. Treatment and medication options discussed during today's visit. Patient acknowledged and verbally consented to continue with current treatment plan and was educated on the importance of compliance with treatment and follow-up appointments.    MEDICATION ISSUES:    Discussed medication options and treatment plan of prescribed medication as well as the risks, benefits, and side effects including potential falls, possible impaired driving and metabolic adversities among others. Patient is agreeable to call the office with any worsening of symptoms or onset of side effects. Patient is agreeable to call 911 or go to the nearest ER should he/she begin having SI/HI.     MEDS ORDERED DURING VISIT:  New Medications Ordered This Visit   Medications   • buPROPion SR (WELLBUTRIN SR) 100 MG 12 hr tablet     Sig: Take 1 tablet by mouth Daily.     Dispense:  30 tablet     Refill:  1       Return in about 2 months (around 5/13/2020).             This document has been electronically signed by Des Elizondo MD  March 13, 2020 10:15

## 2020-03-17 ENCOUNTER — OFFICE VISIT (OUTPATIENT)
Dept: NEUROLOGY | Facility: CLINIC | Age: 65
End: 2020-03-17

## 2020-03-17 VITALS
BODY MASS INDEX: 20.88 KG/M2 | SYSTOLIC BLOOD PRESSURE: 100 MMHG | WEIGHT: 133 LBS | HEIGHT: 67 IN | HEART RATE: 63 BPM | DIASTOLIC BLOOD PRESSURE: 60 MMHG | OXYGEN SATURATION: 98 %

## 2020-03-17 DIAGNOSIS — R25.1 TREMOR: ICD-10-CM

## 2020-03-17 PROCEDURE — 99214 OFFICE O/P EST MOD 30 MIN: CPT | Performed by: NURSE PRACTITIONER

## 2020-03-17 RX ORDER — PROPRANOLOL HYDROCHLORIDE 20 MG/1
20 TABLET ORAL 3 TIMES DAILY
Qty: 270 TABLET | Refills: 1 | Status: SHIPPED | OUTPATIENT
Start: 2020-03-17 | End: 2020-07-17 | Stop reason: SDUPTHER

## 2020-03-17 NOTE — PROGRESS NOTES
Subjective:     Patient ID: Milagro Sanderson is a 65 y.o. female.    CC:   Chief Complaint   Patient presents with   • Tremors       HPI:   History of Present Illness     Ms. Sanderson is here today for follow-up.  When I first saw her she was here for evaluation of tremor.  She told me that she first started noticing tremor in bilateral upper extremities about 8 to 9 years ago.  Both parents had diagnosis of essential tremor, her father had tremor in hands and her mother had a jaw tremor.  Neither had a diagnosis of Parkinson's disease.  She has no known family history of Parkinson's.  She tome me that she had been managed on primidone by previous PCP, symptoms seem to be controlled very well on this medication.  She had gone off and on the medication for a few years.  She stated that back in October 2019 she moved, this caused increased stress and anxiety as well as depression.  She said that it has affected her quite a bit.  Around the time that she moved and when she was under more stress her tremor became more pronounced.  She had read online that primidone could affect mood so she asked her new primary care to stop the medication.  At that time they did start her on propranolol 20 mg 3 times daily.  She said that the propranolol may be helped her tremor just a bit however it has been much more pronounced in the last several months.  Tremor is essentially nonexistent at rest, profound with movement, holding papers and eating etc.  Most recently back in December primary care switched her from Prozac to sertraline and also prescribed Sinemet 10/100 to be taken 3 times daily.  She had not noticed any improvement in her tremor with the Sinemet, she actually told me she thinks it may be worse.  She adamantly denies postural symptoms, changes in smell, incontinence, dysphasia, gait instability, shuffled gait or problems with balance.  She denies dizziness, headaches, vision changes, weakness in extremities, paresthesias,  syncope etc.      I did discuss with Dr. Edmonds at her first visit, we restarted primidone with instructions to titrate up to 50 mg twice daily to take with her propranolol.  She told me today that for the first 4 to 5 days after starting the primidone and she felt significant improvement however her tremor has returned.  She is currently up to primidone 50 mg BID which she is taking along with her propranolol 20 mg 2 times daily.  Her tremor continues to be exacerbated with action and movement, no tremor with rest.  She has had no increase in depression or anxiety with the addition of primidone,s he is seeing a counselor  The following portions of the patient's history were reviewed and updated as appropriate: allergies, current medications, past family history, past medical history, past social history, past surgical history and problem list.    Past Medical History:   Diagnosis Date   • Benign familial tremor    • Blood in urine    • Body piercing     EARS   • Depression    • Eczema    • Elevated LDL cholesterol level    • Fever blister    • History of fracture     ARM AT AGE 4 - PATIENT REPORTS SHE DOES NOT REMEMBER LATERALITY   • History of migraine    • History of tremor    • Onychomycosis    • Problems with swallowing     REPORTS SHE FEELS LIKE PILLS ARE GETTING STUCK IN A POCKET IN HER THROAT WHEN SHE TRIES TO SWALLOW THEM   • Sebaceous cyst    • Vitamin D deficiency    • Wears glasses        Past Surgical History:   Procedure Laterality Date   • APPENDECTOMY  2012   • APPENDECTOMY     • BREAST BIOPSY Bilateral    • BREAST CYST ASPIRATION     • BREAST SURGERY Bilateral     history of mammatone left and right breast   • COLONOSCOPY     • CYST REMOVAL      SEBACEOUS CYST X3 FROM HEAD   • ENDOSCOPY N/A 2/7/2019    Procedure: ESOPHAGOGASTRODUODENOSCOPY W/ BIOPSIES;  Surgeon: Simba Rogers MD;  Location: [facility] ENDOSCOPY;  Service: Gastroenterology   • WISDOM TOOTH EXTRACTION      EXTRACTION X2       Social  "History     Socioeconomic History   • Marital status: Single     Spouse name: Not on file   • Number of children: Not on file   • Years of education: Not on file   • Highest education level: Not on file   Tobacco Use   • Smoking status: Never Smoker   • Smokeless tobacco: Never Used   Substance and Sexual Activity   • Alcohol use: No   • Drug use: No   • Sexual activity: Defer       Family History   Problem Relation Age of Onset   • Heart attack Mother    • Hypertension Mother    • Anxiety disorder Mother    • Depression Mother    • Heart attack Other    • Kidney disease Other    • Bipolar disorder Father    • Diabetes Paternal Aunt    • Diabetes Paternal Uncle    • Seizures Maternal Aunt    • Colon cancer Neg Hx    • Cirrhosis Neg Hx    • Liver disease Neg Hx    • Liver cancer Neg Hx    • Crohn's disease Neg Hx    • Ulcerative colitis Neg Hx    • Esophageal cancer Neg Hx    • Stomach cancer Neg Hx    • ADD / ADHD Neg Hx    • Alcohol abuse Neg Hx    • Dementia Neg Hx    • Drug abuse Neg Hx    • OCD Neg Hx    • Paranoid behavior Neg Hx    • Schizophrenia Neg Hx    • Self-Injurious Behavior  Neg Hx    • Suicide Attempts Neg Hx         Review of Systems   Constitutional: Negative.    HENT: Negative.    Eyes: Negative.    Respiratory: Negative.    Cardiovascular: Negative.    Gastrointestinal: Negative.    Endocrine: Negative.    Genitourinary: Negative.    Musculoskeletal: Negative.    Skin: Negative.    Allergic/Immunologic: Negative.    Neurological: Positive for tremors. Negative for dizziness, seizures, syncope, facial asymmetry, speech difficulty, weakness, light-headedness, numbness and headaches.   Hematological: Negative.    Psychiatric/Behavioral: Negative.         Objective:  /60   Pulse 63   Ht 170.2 cm (67\")   Wt 60.3 kg (133 lb)   LMP  (LMP Unknown)   SpO2 98%   BMI 20.83 kg/m²     Neurologic Exam     Mental Status   Oriented to person, place, and time.   Attention: normal. Concentration: " normal.   Speech: speech is normal   Level of consciousness: alert  Knowledge: consistent with education.   Able to read. Able to write. Normal comprehension.     Cranial Nerves   Cranial nerves II through XII intact.     CN II   Visual fields full to confrontation.   Right visual field deficit: none  Left visual field deficit: none     CN III, IV, VI   Pupils are equal, round, and reactive to light.  Extraocular motions are normal.   Right pupil: Shape: regular. Reactivity: brisk. Consensual response: intact. Accommodation: intact.   Left pupil: Shape: regular. Reactivity: brisk. Consensual response: intact. Accommodation: intact.   CN III: no CN III palsy  CN VI: no CN VI palsy  Nystagmus: none   Upgaze: normal  Downgaze: normal    CN V   Facial sensation intact.     CN VII   Facial expression full, symmetric.     CN VIII   CN VIII normal.     CN IX, X   CN IX normal.   CN X normal.     CN XI   CN XI normal.     CN XII   CN XII normal.     Motor Exam   Muscle bulk: normal  Overall muscle tone: normal  Right arm tone: normal  Left arm tone: normal  Right arm pronator drift: absent  Left arm pronator drift: absent  Right leg tone: normal  Left leg tone: normal    Strength   Strength 5/5 throughout.     Sensory Exam   Light touch normal.     Gait, Coordination, and Reflexes     Gait  Gait: normal    Coordination   Romberg: negative  Finger to nose coordination: normal  Heel to shin coordination: normal  Tandem walking coordination: normal    Tremor   Resting tremor: absent  Intention tremor: absent  Action tremor: absent    Reflexes   Reflexes 2+ except as noted.   Right Bautista: absent  Left Bautista: absentSteady gait with fluid arm swing bilaterally, no shuffled gait, she is able to raise from a sitting position turn and sit in 5 seconds.  No cogwheel rigidity, no resting tremor.  She does have a significant action tremor bilateral upper extremities, patient was examined with Dr. Edmonds       Physical Exam    Constitutional: She is oriented to person, place, and time. She appears well-developed and well-nourished.   HENT:   Head: Normocephalic and atraumatic.   Eyes: Pupils are equal, round, and reactive to light. Conjunctivae and EOM are normal. No scleral icterus.   Neck: Normal range of motion. Neck supple.   Pulmonary/Chest: Effort normal. No respiratory distress.   Neurological: She is alert and oriented to person, place, and time. She has normal strength. She has a normal Finger-Nose-Finger Test, a normal Heel to Shin Test, a normal Romberg Test and a normal Tandem Gait Test. Gait normal.   Skin: Skin is warm.   Psychiatric: She has a normal mood and affect. Her speech is normal and behavior is normal. Judgment and thought content normal.   Vitals reviewed.      Assessment/Plan:       Milagro was seen today for tremors.    Diagnoses and all orders for this visit:    Tremor  -     propranolol (INDERAL) 20 MG tablet; Take 1 tablet by mouth 3 (Three) Times a Day. For tremor. Monitor blood pressure    Tremor appears to be essential, observed with Dr. Edmonds who agrees.  We did discuss plan of care, he suggested increasing propranolol to 3 times daily dosing first, if indicated afterwards pushing primidone dose gradually to 250 mg once a day, consider adding gabapentin or Topamax if no improvement in tremor.  I will see her back in about 8 weeks, I have asked her to call and give me an update in about 3 weeks how she is doing.  In light of recent pandemic virus we can make medication changes over the phone which she is agreeable to.           Reviewed medications, potential side effects and signs and symptoms to report. Discussed risk versus benefits of treatment plan with patient and/or family-including medications, labs and radiology that may be ordered. Addressed questions and concerns during visit. Patient and/or family verbalized understanding and agree with plan.    During this visit the following were  done:  Labs Reviewed []    Labs Ordered []    Radiology Reports Reviewed []    Radiology Ordered []    PCP Records Reviewed []    Referring Provider Records Reviewed []    ER Records Reviewed []    Hospital Records Reviewed []    History Obtained From Family []    Radiology Images Reviewed []    Other Reviewed []    Records Requested []      Ethel Elizalde, APRN  3/17/2020

## 2020-03-24 ENCOUNTER — TELEPHONE (OUTPATIENT)
Dept: NEUROLOGY | Facility: CLINIC | Age: 65
End: 2020-03-24

## 2020-03-24 NOTE — TELEPHONE ENCOUNTER
Called and spoke to Milagro. There was a new rx sent on 3/17/20 for the new directions to take three times daily. I asked the Patient to call her pharmacy back to let them know that she should have a new rx there, and its probably on hold. She will contact her pharmacy and let me know if she has any problems

## 2020-03-24 NOTE — TELEPHONE ENCOUNTER
----- Message from Brandee Flores MA sent at 3/23/2020  9:05 AM EDT -----  Patient needs refills on her primodone and propranolol

## 2020-03-25 ENCOUNTER — TELEPHONE (OUTPATIENT)
Dept: NEUROLOGY | Facility: CLINIC | Age: 65
End: 2020-03-25

## 2020-03-25 NOTE — TELEPHONE ENCOUNTER
ANNA CALLED TO VERIFY RX FOR PROPRANOLOL 20 MG IT IS NOW THREE TIMES DAILY. THE PRIMIDONE , SHE JUST NEEDS TO CONTINUE TWICE DAILY FOR NOW PER JARRET ACCORDING TO ANNA SHE HAS REFILLS ON BOTH OF THESE. (JARRET SAID THE PATIENT TOLD HER THAT HER PSYCHIATRIST INCREASED THAT TO THREE TIMES DAILY, BUT IN THE COMPUTER SAYS TWICE DAILY.)

## 2020-03-26 ENCOUNTER — TELEPHONE (OUTPATIENT)
Dept: NEUROLOGY | Facility: CLINIC | Age: 65
End: 2020-03-26

## 2020-03-26 NOTE — TELEPHONE ENCOUNTER
Pt is having confusion on medication she is taking, when she is to take them and how often.     pls advise [phone]

## 2020-03-27 NOTE — TELEPHONE ENCOUNTER
Spoke to Milagro to clarify her primidone rx. She originally was taking this 50 mg bid. We did a primidone level in February and the message cat sent me was to have her increase this to three times daily. She is running low now due to the increase. I told her I would call the pharmacy and give them the new rx for 30 day supply and no refills for now until we see if it is improving her symptoms. She is supposed to call back with update on how she is doing since increasing the primidone and now the propranolol both for three times a day.

## 2020-04-09 ENCOUNTER — TELEPHONE (OUTPATIENT)
Dept: NEUROLOGY | Facility: CLINIC | Age: 65
End: 2020-04-09

## 2020-04-09 DIAGNOSIS — R25.1 TREMOR: ICD-10-CM

## 2020-04-09 RX ORDER — PRIMIDONE 50 MG/1
TABLET ORAL
Qty: 180 TABLET | Refills: 2 | Status: SHIPPED | OUTPATIENT
Start: 2020-04-09 | End: 2020-05-13 | Stop reason: SDUPTHER

## 2020-04-09 NOTE — TELEPHONE ENCOUNTER
I sent in the Primidone 50mg tablets with these directions-Take 1.5 tabs 2-3 x a day for tremors for at least 2 wks. If tremors not better, may increase to 2 tabs 2-3 x a day for tremors. I hope this helps her. She has an appointment in May with Daija for follow up-5/12/20 at 10am-probably needs to be updated to a video visit. Thanks, RAMYA Espinal

## 2020-04-09 NOTE — TELEPHONE ENCOUNTER
Pt states that she has good and bad days with the medication.   She would like to clarify if she should be taking the medication exactly hours apart?  Pt states that she is taking Sertraline and Wellbutrin in the morning.  Could this be causing her tremors to not be better all the time?

## 2020-04-09 NOTE — TELEPHONE ENCOUNTER
INDU LUCIO   WAS TOLD TO CALL IN A MONTH BY CECILY TO LET HER KNOW HOW SHE WAS DOING ON HER MEDICATION.    PLEASE CONTACT PATIENT @[phone]

## 2020-04-09 NOTE — TELEPHONE ENCOUNTER
So for the tremor she should be taking the propranolol 20mg 1 pill 3 times a day. She should also be taking the primidone 50mg 3 times a day as prescribed by psychiatry. The Wellbutrin and Sertraline being taken together should not worsen her tremors. If she is not noticing a significant improvement in her tremors, we would recommend discussing with her psychiatrist increasing her primidone to 75mg 3 times a day for 2 weeks and then increasing to 100mg 3 times a day if she can tolerate this. Needs to watch out for drowsiness and falls with higher doses. It appears psychiatry writes this. Let me know if I need to send script for higher dosing.

## 2020-04-23 ENCOUNTER — TELEMEDICINE (OUTPATIENT)
Dept: PSYCHIATRY | Facility: CLINIC | Age: 65
End: 2020-04-23

## 2020-04-23 DIAGNOSIS — F41.1 GENERALIZED ANXIETY DISORDER: ICD-10-CM

## 2020-04-23 DIAGNOSIS — F33.2 SEVERE EPISODE OF RECURRENT MAJOR DEPRESSIVE DISORDER, WITHOUT PSYCHOTIC FEATURES (HCC): Primary | ICD-10-CM

## 2020-04-23 PROCEDURE — 90837 PSYTX W PT 60 MINUTES: CPT | Performed by: SOCIAL WORKER

## 2020-04-23 NOTE — PROGRESS NOTES
"Date of Service: April 23, 2020  Time In: 11:00 am  Time Out: 11:53 am      PROGRESS NOTE  Data:Milagro Sanderson is a 65 y.o. female who met 1:1 with Nida Peters, GENEVIEVE WOOD for regularly scheduled Individual Therapy session. This was an audio only enabled teletherapy encounter because of difficulty with Internet connection, complying with governors direction for CVOID 19.    The Patient is  at [address] [place] [address], using [facility] Telephonic Visit (HIPAA compliant). Patient is being seen via telehealth and stated they are in a secure environment for this session. The patient's condition being diagnosed/treated is appropriate for telemedicine. The provider identified herself and credentials ISRAEL and GENEVIEVE .   The patient  consent to be seen remotely, and when consent is given they understand that the consent allows for patient identifiable information to be sent to a third party as needed.   They may refuse to be seen remotely at any time. The electronic data is encrypted and password protected, and the patient has been advised of the potential risks to privacy not withstanding such measured of the potential risks to privacy not withstanding such measures.    Milagro presents for session on time, clean and casually dressed with  without evidence of intoxication, withdrawal, or perceptual disturbance.   She was Open and Engaged.        Chief Compliant: Patient presents with depression and anxiety  Interactive Complexity: Interactive Complexity No      HPI: Patient shares ongoing issues with her belief that she \"should not have sold her family home\".  She shares that she has been reaching out to friends, using [employer] and reading daily.  She has not been exercising.  Depression Low mood for > 2 weeks, Loss of Interest, Feelings of guilt/worthlessness, Change in appetite and Psychomotor slowing  Generalized Anxiety  Excess Worry, Restless/Edgy, Easily fatigued and Decreased concentration. Onset of " "symptoms was gradual, associated with phase of life, death.  Symptoms are associated with financial burdens and lack of support.  Symptoms are aggravated by lonely, sadness and stress.   Symptoms improve with medication management, therapy and personal self-care (wellness) Current rates severity of symptoms, on a scale of 1-10 (10 is the most severe) 5 Context Family and social history was reviewed and is unchanged since last visit which was on 3/3/202 Quality improved.    CLINICAL MANEUVERING/INTERVENTION/SUPPORTIVE PSYCHOTHERAPY: Therapist continued to promote the therapeutic alliance, address the patient’s issues, and strengthen self awareness, insights, and coping skills.  Therapist applied CBT/REBT, Mindfulness Training, Review of Treatment Plan and Thought Stopping and encouraged she to use positive coping skills such as Exercising, Journaling, Drawing/Art, Listen to music, Taking a bath/shower, Cleaning the house, Energy redirection, Distraction, Use positive self-talk, Keep a positive attitude and Utilize resources/coping skills.  Therapist allowed Milagro  to freely discuss issues without interruption or judgment. Assisted the patient to apply adaptive thinking strategies about her should statements.  Patient is agreeable to use the \"stop sign technique\" to help stop her rumination of how she thinks things   Would be at her house.  Provided safe, confidential environment to facilitate the development of positive therapeutic relationship and encourage open, honest communication. Assisted patient in identifying increased risk factors which would indicate the need for higher level of care including thoughts to harm self or others, self-harming behavior, and/or binge drinking and encouraged patient to contact this office, call 911, or present to the nearest emergency room should any of these events occur. Discussed crisis intervention services and means to access.    Assessment        Psychological ROS: positive " for - anxiety and depression    Mental Status Exam:    Hygiene:   on the phone  Cooperation:  Cooperative  Eye Contact:  on the phone  Psychomotor Behavior:  Appropriate  Affect:  Restricted  Hopelessness: 5  Speech:  Normal  Thought Progress:  Linear  Thought Content:  Normal  Suicidal:  None  Homicidal:  None  Hallucinations:  None  Delusion:  None  Memory:  Intact  Orientation:  Person, Place, Time and Situation  Reliability:  fair  Insight:  Fair  Judgement:  Fair  Impulse Control:  Fair  Physical/Medical Issues:  no acute medical issue     Patient's Support Network Includes:  extended family    Progress toward goal: Not at goal    Functional Status: Moderate impairment     Overall: Depressed, Anxious     VISIT DIAGNOSIS:     ICD-10-CM ICD-9-CM   1. Severe episode of recurrent major depressive disorder, without psychotic features (CMS/HCC) F33.2 296.33   2. Generalized anxiety disorder F41.1 300.02      PROGNOSIS: annie Humphrey appears to be mentally/physically stable compared to his baseline functioning.  However, she continues to present with a severe chronic mental illness. As a result,  she  would be at significantly increased risk for decompensation and possibly higher level of care without continued treatment.  It is reasonable to assume she would considerably benefit from ongoing treatment.          PROGRESS TOWARD CURRENT PLAN OF CARE/TREATMENT PLAN  :  Making Progress    SHORT-TERM GOALS: Milagro  will bathe and dress in street clothes daily, will attend therapy as scheduled, will take all medications as prescribed, will express feelings to therapist each contact , will identify the severity of symptoms each contact, will be compliant with all treatment recommendations, will learn and practice at least 2 anxiety management techniques with goal of decreasing anxiety, will learn and practice at least 2 depression management techniques with goal of decreasing depression, will engage in one self-care activity  daily, per self-report and will engage in one enjoyable activity daily, per self-report     LONG-TERM GOALS: With the help of therapy, I would like to:   learn how to structure my spare-time more meaningfully (hobbies, etc) and learn how to relax and take it easy  learn how to handle being alone  understand more clearly who I am, what I' m capable of, and what I want out of life and discuss personal well being and mental health plans or ideas regarding my future   learn how to make decisions more independently, learn how to adjust overly high expectations I have in myself or others and learn how to deal with strong negative feelings (e.g., anger, rage)  learn how to cope with my negative thoughts, ruminations, or sense of guilt, find a way out of my negative mood, sadness, or sense of inner emptiness, learn how to master anxiety or panic attacks, learn how to cope with my physical illness, learn how be more organized in daily life and learn how to handle stressful situations better    STRENGTHS: Literate and Articulate    WEAKNESSES: Poor social support and Poor coping skills      Plan   Crisis Plan:  Symptoms and/or behaviors to indicate a crisis: Thinking about suicide    What calming techniques or other strategies will patient use to de-esclate and stay safe: slow down, breathe, visualize calming self, think it though, listen to music, change focus, take a walk    Who is one person patient can contact to assist with de-escalation? Friend     Crisis Management: Milagro will contact staff or crisis line if symptoms exacerbate or if harm to self or others becomes a concern. Crisis resources include: Crisis Line [phone], 911, Local Law Enforcement, [employer], [facility] 24/7 Emergency Room [phone].    PLAN:   Milagro will continue in BI-WEEKLY ongoing outpatient treatment via Face-to-Facewith primary therapist and pharmacotherapy as scheduled.   Milagro will report any adverse reactions to treatment/medication  interventions immediately.  Milagro will be compliant with treatment and appointments.   April 23, 2020 11:10    Recommended Referrals: Psychiatrist/APRN    Patient will adhere to medication regimen as prescribed and report any side effects. Patient will contact this office, call 911 or present to the nearest emergency room should suicidal or homicidal ideations occur. Provide Cognitive Behavioral Therapy and Solution Focused Therapy to improve functioning, maintain stability, and avoid decompensation and the need for higher level of care.          Future Appointments       Provider Department Center    5/5/2020 10:00 AM (Arrive by 9:45 AM) Mae Valle MD [facility] PRIMARY CARE     5/8/2020 10:00 AM Des Elizondo MD [facility] BEHAVIORAL HEALTH     5/12/2020 10:00 AM Ethel Elizalde APRN [facility] NEUROLOGY [place]    5/21/2020 12:00 PM Nida Pringle LCSW [facility] BEHAVIORAL HEALTH             Nida Peters LCSW, [facility]

## 2020-05-06 ENCOUNTER — OFFICE VISIT (OUTPATIENT)
Dept: INTERNAL MEDICINE | Facility: CLINIC | Age: 65
End: 2020-05-06

## 2020-05-06 VITALS
HEART RATE: 69 BPM | OXYGEN SATURATION: 98 % | WEIGHT: 135.25 LBS | DIASTOLIC BLOOD PRESSURE: 60 MMHG | HEIGHT: 67 IN | BODY MASS INDEX: 21.23 KG/M2 | TEMPERATURE: 96.4 F | SYSTOLIC BLOOD PRESSURE: 100 MMHG | RESPIRATION RATE: 18 BRPM

## 2020-05-06 DIAGNOSIS — L29.9 ITCHY SKIN: ICD-10-CM

## 2020-05-06 DIAGNOSIS — L67.8 DRY HAIR: ICD-10-CM

## 2020-05-06 DIAGNOSIS — R63.0 POOR APPETITE: ICD-10-CM

## 2020-05-06 DIAGNOSIS — L72.9 SCALP CYST: ICD-10-CM

## 2020-05-06 DIAGNOSIS — L60.0 INGROWN TOENAIL OF RIGHT FOOT: ICD-10-CM

## 2020-05-06 DIAGNOSIS — F33.2 SEVERE EPISODE OF RECURRENT MAJOR DEPRESSIVE DISORDER, WITHOUT PSYCHOTIC FEATURES (HCC): Primary | ICD-10-CM

## 2020-05-06 DIAGNOSIS — B35.1 ONYCHOMYCOSIS: ICD-10-CM

## 2020-05-06 DIAGNOSIS — G25.0 HEREDITARY ESSENTIAL TREMOR: ICD-10-CM

## 2020-05-06 PROCEDURE — 99214 OFFICE O/P EST MOD 30 MIN: CPT | Performed by: FAMILY MEDICINE

## 2020-05-08 ENCOUNTER — TELEMEDICINE (OUTPATIENT)
Dept: PSYCHIATRY | Facility: CLINIC | Age: 65
End: 2020-05-08

## 2020-05-08 DIAGNOSIS — F33.1 MODERATE EPISODE OF RECURRENT MAJOR DEPRESSIVE DISORDER (HCC): Primary | ICD-10-CM

## 2020-05-08 DIAGNOSIS — R25.1 TREMOR: ICD-10-CM

## 2020-05-08 DIAGNOSIS — F41.1 GENERALIZED ANXIETY DISORDER: ICD-10-CM

## 2020-05-08 DIAGNOSIS — Z60.0 PHASE OF LIFE PROBLEM IN ADULT: ICD-10-CM

## 2020-05-08 PROCEDURE — 99214 OFFICE O/P EST MOD 30 MIN: CPT | Performed by: PSYCHIATRY & NEUROLOGY

## 2020-05-08 RX ORDER — BUPROPION HYDROCHLORIDE 75 MG/1
75 TABLET ORAL DAILY
Qty: 90 TABLET | Refills: 1 | Status: SHIPPED | OUTPATIENT
Start: 2020-05-08 | End: 2020-07-24

## 2020-05-08 RX ORDER — SERTRALINE HYDROCHLORIDE 100 MG/1
200 TABLET, FILM COATED ORAL DAILY
Qty: 180 TABLET | Refills: 1 | Status: SHIPPED | OUTPATIENT
Start: 2020-05-08 | End: 2020-10-02 | Stop reason: SDUPTHER

## 2020-05-08 SDOH — SOCIAL STABILITY - SOCIAL INSECURITY: PROBLEMS OF ADJUSTMENT TO LIFE-CYCLE TRANSITIONS: Z60.0

## 2020-05-08 NOTE — PROGRESS NOTES
Subjective   Milagro Sanderson is a 65 y.o. female who presents today for follow up     This provider is located at [facility] at [address] [place] [address]. The provider identified himself as well as his credentials.   The Patient is at home using her phone because problems with video connection. The patient's condition being diagnosed/treated is appropriate for telemedicine. The patient gave consent to be seen remotely, and when consent is given they understand that the consent allows for patient identifiable information to be sent to a third party as needed.   They may refuse to be seen remotely at any time. The electronic data is encrypted and password protected, and the patient has been advised of the potential risks to privacy not withstanding such measures        Chief Complaint: Depression    History of Present Illness: Patient is a 65-year-old  female who presents for follow-up for anxiety and depression.  Patient reports that she has noticed some improvement with medication management and therapy.  She does feel that her symptoms are somewhat improved but she continues to have some dysphoria and vegetative symptoms of depression.  Patient also reports that her appetite has been reduced and she continues to have tremor.  Wellbutrin is likely contributing to her decreased appetite, however her tremor was of the same severity prior to starting Wellbutrin as it is now.  Regardless, we will see if we can gain any more benefit from her current medication regimen by increasing Zoloft to see if it helps with motivation and reducing Wellbutrin to instant release 75 mg to see if this improves appetite and potentially reduce his tremor symptoms.  Patient denies SI/HI/AVH.    The following portions of the patient's history were reviewed and updated as appropriate: allergies, current medications, past family history, past medical history, past social history, past surgical  history and problem list.      Past Medical History:  Past Medical History:   Diagnosis Date   • Benign familial tremor    • Blood in urine    • Body piercing     EARS   • Depression    • Eczema    • Elevated LDL cholesterol level    • Fever blister    • History of fracture     ARM AT AGE 4 - PATIENT REPORTS SHE DOES NOT REMEMBER LATERALITY   • History of migraine    • History of tremor    • Onychomycosis    • Problems with swallowing     REPORTS SHE FEELS LIKE PILLS ARE GETTING STUCK IN A POCKET IN HER THROAT WHEN SHE TRIES TO SWALLOW THEM   • Sebaceous cyst    • Vitamin D deficiency    • Wears glasses        Social History:  Social History     Socioeconomic History   • Marital status: Single     Spouse name: Not on file   • Number of children: Not on file   • Years of education: Not on file   • Highest education level: Not on file   Tobacco Use   • Smoking status: Never Smoker   • Smokeless tobacco: Never Used   Substance and Sexual Activity   • Alcohol use: No   • Drug use: No   • Sexual activity: Defer       Family History:  Family History   Problem Relation Age of Onset   • Heart attack Mother    • Hypertension Mother    • Anxiety disorder Mother    • Depression Mother    • Heart attack Other    • Kidney disease Other    • Bipolar disorder Father    • Diabetes Paternal Aunt    • Diabetes Paternal Uncle    • Seizures Maternal Aunt    • Colon cancer Neg Hx    • Cirrhosis Neg Hx    • Liver disease Neg Hx    • Liver cancer Neg Hx    • Crohn's disease Neg Hx    • Ulcerative colitis Neg Hx    • Esophageal cancer Neg Hx    • Stomach cancer Neg Hx    • ADD / ADHD Neg Hx    • Alcohol abuse Neg Hx    • Dementia Neg Hx    • Drug abuse Neg Hx    • OCD Neg Hx    • Paranoid behavior Neg Hx    • Schizophrenia Neg Hx    • Self-Injurious Behavior  Neg Hx    • Suicide Attempts Neg Hx        Past Surgical History:  Past Surgical History:   Procedure Laterality Date   • APPENDECTOMY  2012   • APPENDECTOMY     • BREAST BIOPSY  "Bilateral    • BREAST CYST ASPIRATION     • BREAST SURGERY Bilateral     history of mammatone left and right breast   • COLONOSCOPY     • CYST REMOVAL      SEBACEOUS CYST X3 FROM HEAD   • ENDOSCOPY N/A 2/7/2019    Procedure: ESOPHAGOGASTRODUODENOSCOPY W/ BIOPSIES;  Surgeon: Simba Rogers MD;  Location: [facility] END[facility]COPY;  Service: Gastroenterology   • WISDOM TOOTH EXTRACTION      EXTRACTION X2       Problem List:  Patient Active Problem List   Diagnosis   • Severe episode of recurrent major depressive disorder, without psychotic features (CMS/HCC)   • Eczema   • Vitamin B12 deficiency   • Vitamin D deficiency   • Hereditary essential tremor   • Onychomycosis   • Recurrent cold sores   • GERD with esophagitis       Allergy:   Allergies   Allergen Reactions   • Hazelnut Anaphylaxis   • Macrobid [Nitrofurantoin Monohyd Macro] Other (See Comments)     REPORTS \"I FELL AND IT WAS LIKE I COULDN'T MOVE\"           Current Medications:   Current Outpatient Medications   Medication Sig Dispense Refill   • acyclovir (ZOVIRAX) 400 MG tablet TAKE ONE TABLET BY MOUTH THREE TIMES A DAY FOR 5 DAYS STARTING AT FIRST SIGN OF COLD SORE 15 tablet 4   • Ascorbic Acid (VITAMIN C) 500 MG capsule Take 500 mg by mouth Every Other Day.     • buPROPion (WELLBUTRIN) 75 MG tablet Take 1 tablet by mouth Daily. 90 tablet 1   • calcium (OS-SANTHOSH) 600 MG tablet Take 600 mg by mouth 1 (One) Time Per Week.     • Cholecalciferol (VITAMIN D-3 PO) Take 2,000 Units by mouth Daily.     • magnesium chloride ER 64 MG DR tablet Take 250 mg by mouth Every 14 (Fourteen) Days.     • Multiple Vitamins-Minerals (CENTRUM SILVER ULTRA WOMENS PO) Take 1 tablet by mouth Every Other Day.     • Omega-3 Fatty Acids (FISH OIL) 1000 MG capsule capsule Take 1,200 mg by mouth Daily With Breakfast.     • primidone (MYSOLINE) 50 MG tablet Take 1.5 tabs 2-3 x a day for tremors for at least 2 wks. If tremors not better, may increase to 2 tabs 2-3 x a day for tremors 180 tablet 2 "   • propranolol (INDERAL) 20 MG tablet Take 1 tablet by mouth 3 (Three) Times a Day. For tremor. Monitor blood pressure 270 tablet 1   • sertraline (ZOLOFT) 100 MG tablet Take 2 tablets by mouth Daily. 180 tablet 1     No current facility-administered medications for this visit.        Review of Symptoms:    Review of Systems   Constitutional: Positive for activity change (improved) and appetite change. Negative for chills, diaphoresis, fatigue, fever and unexpected weight loss.   HENT: Negative.    Eyes: Negative.    Respiratory: Negative.    Cardiovascular: Negative.    Gastrointestinal: Negative.    Endocrine: Negative.    Genitourinary: Negative.    Musculoskeletal: Negative.    Skin: Negative.    Allergic/Immunologic: Negative.    Neurological: Positive for tremors.   Hematological: Negative.    Psychiatric/Behavioral: Positive for dysphoric mood and stress. Negative for agitation, decreased concentration, sleep disturbance, suicidal ideas and depressed mood. The patient is not nervous/anxious.          Physical Exam:   not currently breastfeeding. Unable to assess, telephone visit.     Appearance: Unable to assess, telephone visit.   Gait, Station, Strength: Unable to assess, telephone visit.     Mental Status Exam:   Hygiene:   Unable to assess, telephone visit.   Cooperation:  Cooperative  Eye Contact:  Unable to assess, telephone visit.   Psychomotor Behavior:  Unable to assess, telephone visit.   Affect:  Full range  Mood: depressed  Hopelessness: Denies  Speech:  Normal  Thought Process:  Goal directed and Linear  Thought Content:  Normal  Suicidal:  None  Homicidal:  None  Hallucinations:  None  Delusion:  None  Memory:  Intact  Orientation:  Person, Place, Time and Situation  Reliability:  good  Insight:  Fair  Judgement:  Good  Impulse Control:  Good  Physical/Medical Issues:  No        Lab Results:   No visits with results within 1 Month(s) from this visit.   Latest known visit with results is:    Office Visit on 02/24/2020   Component Date Value Ref Range Status   • Primidone Level 02/24/2020 4.2* 5.0 - 12.0 ug/mL Final                                    Detection Limit = 0.3                           <0.3 indicates None Detected   • Phenobarbital 02/24/2020 None Detected  15 - 40 ug/mL Final    **Verified by repeat analysis**                                  Detection Limit = 3   • Copper 02/24/2020 107  72 - 166 ug/dL Final                                    Detection Limit = 5   • Glucose 02/24/2020 90  65 - 99 mg/dL Final   • BUN 02/24/2020 11  8 - 23 mg/dL Final   • Creatinine 02/24/2020 0.61  0.57 - 1.00 mg/dL Final   • Sodium 02/24/2020 140  136 - 145 mmol/L Final   • Potassium 02/24/2020 4.5  3.5 - 5.2 mmol/L Final   • Chloride 02/24/2020 99  98 - 107 mmol/L Final   • CO2 02/24/2020 28.3  22.0 - 29.0 mmol/L Final   • Calcium 02/24/2020 10.1  8.6 - 10.5 mg/dL Final   • Total Protein 02/24/2020 7.3  6.0 - 8.5 g/dL Final   • Albumin 02/24/2020 4.60  3.50 - 5.20 g/dL Final   • ALT (SGPT) 02/24/2020 19  1 - 33 U/L Final   • AST (SGOT) 02/24/2020 19  1 - 32 U/L Final   • Alkaline Phosphatase 02/24/2020 80  39 - 117 U/L Final   • Total Bilirubin 02/24/2020 0.4  0.2 - 1.2 mg/dL Final   • eGFR Non African Amer 02/24/2020 98  >60 mL/min/1.73 Final   • Globulin 02/24/2020 2.7  gm/dL Final   • A/G Ratio 02/24/2020 1.7  g/dL Final   • BUN/Creatinine Ratio 02/24/2020 18.0  7.0 - 25.0 Final   • Anion Gap 02/24/2020 12.7  5.0 - 15.0 mmol/L Final       Assessment/Plan   Diagnoses and all orders for this visit:    Moderate episode of recurrent major depressive disorder (CMS/HCC)  -     sertraline (ZOLOFT) 100 MG tablet; Take 2 tablets by mouth Daily.  -     buPROPion (WELLBUTRIN) 75 MG tablet; Take 1 tablet by mouth Daily.    Generalized anxiety disorder    Tremor    Phase of life problem in adult    -Patient feels some generalized improvement though she continues to have dysphoria with vegetative symptoms of  depression.  -Reviewed previous documentation  -Reviewed most recent available labs  -Increase Zoloft to 200 mg p.o. daily for mood and anxiety.  Patient initially found this medication helpful so we will see if we can get any more benefit.  She is amotivated which could be due to depression.  -Change Wellbutrin  mg p.o. daily to Wellbutrin IR 75 mg p.o. daily.  Patient is having reduced appetite and significant tremor which has not changed since the addition of Wellbutrin but it could be contributing.  -Encouraged patient to continue with therapy  -Again encouraged patient to continue working on her isolation and to be more active in her everyday life.  She is adjusting to new phase of life and I think that the program for which she receives a phone call from the therapist and a phone call from a  on a weekly basis is going to be very beneficial to her.   -Encouraged to follow-up with neurology for tremor  -Approximate appointment time 9:50 AM to 10:15 AM via telephone due to patient having trouble with video visit    Visit Diagnoses:    ICD-10-CM ICD-9-CM   1. Moderate episode of recurrent major depressive disorder (CMS/HCC) F33.1 296.32   2. Generalized anxiety disorder F41.1 300.02   3. Tremor R25.1 781.0   4. Phase of life problem in adult Z60.0 V62.89       TREATMENT PLAN/GOALS: Continue supportive psychotherapy efforts and medications as indicated. Treatment and medication options discussed during today's visit. Patient acknowledged and verbally consented to continue with current treatment plan and was educated on the importance of compliance with treatment and follow-up appointments.    MEDICATION ISSUES:    Discussed medication options and treatment plan of prescribed medication as well as the risks, benefits, and side effects including potential falls, possible impaired driving and metabolic adversities among others. Patient is agreeable to call the office with any worsening of symptoms or  onset of side effects. Patient is agreeable to call 911 or go to the nearest ER should he/she begin having SI/HI.     MEDS ORDERED DURING VISIT:  New Medications Ordered This Visit   Medications   • sertraline (ZOLOFT) 100 MG tablet     Sig: Take 2 tablets by mouth Daily.     Dispense:  180 tablet     Refill:  1   • buPROPion (WELLBUTRIN) 75 MG tablet     Sig: Take 1 tablet by mouth Daily.     Dispense:  90 tablet     Refill:  1       Return in about 4 weeks (around 6/5/2020).             This document has been electronically signed by Des Elizondo MD  May 8, 2020 12:53

## 2020-05-13 ENCOUNTER — OFFICE VISIT (OUTPATIENT)
Dept: NEUROLOGY | Facility: CLINIC | Age: 65
End: 2020-05-13

## 2020-05-13 DIAGNOSIS — G25.0 HEREDITARY ESSENTIAL TREMOR: Primary | ICD-10-CM

## 2020-05-13 DIAGNOSIS — Z79.899 HIGH RISK MEDICATION USE: ICD-10-CM

## 2020-05-13 PROCEDURE — 99213 OFFICE O/P EST LOW 20 MIN: CPT | Performed by: NURSE PRACTITIONER

## 2020-05-13 RX ORDER — PRIMIDONE 50 MG/1
TABLET ORAL
Qty: 225 TABLET | Refills: 1 | Status: SHIPPED | OUTPATIENT
Start: 2020-05-13 | End: 2020-07-17 | Stop reason: SDUPTHER

## 2020-05-13 NOTE — PROGRESS NOTES
Subjective:     Patient ID: Milagro Sanderson is a 65 y.o. female.    CC:   Chief Complaint   Patient presents with   • Tremors       HPI:   History of Present Illness   Due to COVID-19 Pandemic, this visit was completed via telephone with verbal consent to treat obtained from patient.      You have chosen to receive care through a telephone visit. Do you consent to use a telephone visit for your medical care today? Yes    This is a pleasant 65-year-old female who presents for 2-month follow-up on her hereditary essential tremor present for about 9 years.  She is routinely followed by RAMYA Pavon.  Both of her parents had essential tremor.  Neither had Parkinson's disease.  She was initially evaluated by Dr. Edmonds and was not found to have Parkinson's.  She has had a trial of Sinemet which actually made her tremor worse and this was in December 2019.  She tells me that her tremor is in both hands and is worse with movement, holding papers, eating and holding objects.  She is currently on propranolol 20 mg 3 times a day which was increased on March 17, 2020.  She is now taking primidone 50 mg 2 tablets 3 times a day since about a month ago.  She tells me that her tremor may be slightly worse than it has been.  Within the past week her bupropion has been decreased and her sertraline has been increased as a possible cause for tremor.  She does have significant anxiety and depression and is followed by psychiatry as well as a counselor.  She feels like her tremor overall is slightly worse and definitely no better.  She is anxious about her tremor.  She wants to know what else can be done.  She is hesitant to travel or see a movement specialist.  She does not own a computer or a smart phone.  She does not have Internet access.  She does not drive and relies on friends to take her to appointments.      The following portions of the patient's history were reviewed and updated as appropriate: allergies, current  medications, past family history, past medical history, past social history, past surgical history and problem list.    Past Medical History:   Diagnosis Date   • Benign familial tremor    • Blood in urine    • Body piercing     EARS   • Depression    • Eczema    • Elevated LDL cholesterol level    • Fever blister    • History of fracture     ARM AT AGE 4 - PATIENT REPORTS SHE DOES NOT REMEMBER LATERALITY   • History of migraine    • History of tremor    • Onychomycosis    • Problems with swallowing     REPORTS SHE FEELS LIKE PILLS ARE GETTING STUCK IN A POCKET IN HER THROAT WHEN SHE TRIES TO SWALLOW THEM   • Sebaceous cyst    • Vitamin D deficiency    • Wears glasses        Past Surgical History:   Procedure Laterality Date   • APPENDECTOMY  2012   • APPENDECTOMY     • BREAST BIOPSY Bilateral    • BREAST CYST ASPIRATION     • BREAST SURGERY Bilateral     history of mammatone left and right breast   • COLONOSCOPY     • CYST REMOVAL      SEBACEOUS CYST X3 FROM HEAD   • ENDOSCOPY N/A 2/7/2019    Procedure: ESOPHAGOGASTRODUODENOSCOPY W/ BIOPSIES;  Surgeon: Simba Rogers MD;  Location: [facility] ENDOSCOPY;  Service: Gastroenterology   • WISDOM TOOTH EXTRACTION      EXTRACTION X2       Social History     Socioeconomic History   • Marital status: Single     Spouse name: Not on file   • Number of children: Not on file   • Years of education: Not on file   • Highest education level: Not on file   Tobacco Use   • Smoking status: Never Smoker   • Smokeless tobacco: Never Used   Substance and Sexual Activity   • Alcohol use: No   • Drug use: No   • Sexual activity: Defer       Family History   Problem Relation Age of Onset   • Heart attack Mother    • Hypertension Mother    • Anxiety disorder Mother    • Depression Mother    • Heart attack Other    • Kidney disease Other    • Bipolar disorder Father    • Diabetes Paternal Aunt    • Diabetes Paternal Uncle    • Seizures Maternal Aunt    • Colon cancer Neg Hx    • Cirrhosis Neg  Hx    • Liver disease Neg Hx    • Liver cancer Neg Hx    • Crohn's disease Neg Hx    • Ulcerative colitis Neg Hx    • Esophageal cancer Neg Hx    • Stomach cancer Neg Hx    • ADD / ADHD Neg Hx    • Alcohol abuse Neg Hx    • Dementia Neg Hx    • Drug abuse Neg Hx    • OCD Neg Hx    • Paranoid behavior Neg Hx    • Schizophrenia Neg Hx    • Self-Injurious Behavior  Neg Hx    • Suicide Attempts Neg Hx         Review of Systems   Constitutional: Negative.    HENT: Negative.    Eyes: Negative.    Respiratory: Negative.    Cardiovascular: Negative.    Gastrointestinal: Negative.    Endocrine: Negative.    Genitourinary: Negative.    Musculoskeletal: Negative.    Skin: Negative.    Allergic/Immunologic: Negative.    Neurological: Positive for tremors.   Psychiatric/Behavioral: Positive for behavioral problems. The patient is nervous/anxious.         Objective:  LMP  (LMP Unknown)   KAREL D/T VIDEO VISIT    Neurologic Exam  KAREL D/T VIDEO VISIT    Physical Exam  KAREL D/T VIDEO VISIT    Assessment/Plan:       Milagro was seen today for tremors.    Diagnoses and all orders for this visit:    Hereditary essential tremor  Comments:  continue propranolol 20mg TID. increase primidone. consider movement specialist consult. psych component?  Orders:  -     Primidone Level; Future  -     primidone (MYSOLINE) 50 MG tablet; 2.5 tablets 3 times a day    High risk medication use  -     Primidone Level; Future         Total time of telephone visit today was 22 minutes. F/U in 4 weeks with RAMYA Pavon in office or via telephone if exception made to continue this type of visit.  Discussed recommendations that if her tremors not improved in the next week that she would continue her propranolol 20 mg 3 times daily since her blood pressure and heart rate running on the low side of normal.  Would increase primidone to 2.5 tablets 3 times a day.  I would be hesitant to increase higher than this.  She does verbalize understanding. Consider   Movement Clinic Consult. I highly suspect familiar tremor with psychogenic component. Will increase primidone after 1 week if not better with changes in bupropion and sertraline just made by PCP this week. Limited in evaluation with telephone visit. Recommended getting primidone level checked on 6/10/2020 when patient sees her PCP in [place]. Has transportation and Internet access issues. Continue propranolol 20mg TID. Increase primidone to 150mg TID in 1 week if not better overall. Discussed increased risk of lethargy, drowsiness and falls. Continue with psychiatry and counselor as scheduled.  Reviewed medications, potential side effects and signs and symptoms to report. Discussed risk versus benefits of treatment plan with patient and/or family-including medications, labs and radiology that may be ordered. Addressed questions and concerns during visit. Patient and/or family verbalized understanding and agree with plan.    During this visit the following were done:  Labs Reviewed [x]    Labs Ordered []    Radiology Reports Reviewed []    Radiology Ordered []    PCP Records Reviewed [x]    Referring Provider Records Reviewed []    ER Records Reviewed []    Hospital Records Reviewed []    History Obtained From Family []    Radiology Images Reviewed []    Other Reviewed [x]  Psychiatry/counselor records reviewed with recent med changes  Records Requested []      Helen Traore, RAMYA  5/13/2020

## 2020-05-18 ENCOUNTER — TELEPHONE (OUTPATIENT)
Dept: NEUROLOGY | Facility: CLINIC | Age: 65
End: 2020-05-18

## 2020-05-18 NOTE — TELEPHONE ENCOUNTER
Pt is going to try to come in, but will call for a telephone visit if needed.    She states that her tremors have been really bad over the weekend.  She bebeto start 1/2 of the pill on Wednesday.

## 2020-05-18 NOTE — TELEPHONE ENCOUNTER
----- Message from RAMYA Arguelles sent at 5/13/2020 10:30 AM EDT -----  Requests 4 week telephone visit with ramya vines-she does not want to come in office and she has no computer or smart phone. I am not sure if we can honor the telephone or if she will need to come into office. She cannot do video visit. Talk with hope or [facility] to see about an exception. Told patient we would call her.

## 2020-05-21 ENCOUNTER — TELEMEDICINE (OUTPATIENT)
Dept: PSYCHIATRY | Facility: CLINIC | Age: 65
End: 2020-05-21

## 2020-05-21 DIAGNOSIS — F33.1 MODERATE EPISODE OF RECURRENT MAJOR DEPRESSIVE DISORDER (HCC): ICD-10-CM

## 2020-05-21 DIAGNOSIS — R25.1 TREMOR: ICD-10-CM

## 2020-05-21 DIAGNOSIS — Z60.0 PHASE OF LIFE PROBLEM IN ADULT: ICD-10-CM

## 2020-05-21 DIAGNOSIS — F41.1 GENERALIZED ANXIETY DISORDER: Primary | ICD-10-CM

## 2020-05-21 PROCEDURE — 90837 PSYTX W PT 60 MINUTES: CPT | Performed by: SOCIAL WORKER

## 2020-05-21 SDOH — SOCIAL STABILITY - SOCIAL INSECURITY: PROBLEMS OF ADJUSTMENT TO LIFE-CYCLE TRANSITIONS: Z60.0

## 2020-05-21 NOTE — PROGRESS NOTES
Date of Service: May 21, 2020  Time In: 12:00 pm  Time Out: 12:53 pm      PROGRESS NOTE  Data:Milagro Sanderson is a 65 y.o. female who met 1:1 with Nida Peters LCSW, LCADC for regularly scheduled Individual Therapy session.  The Patient is  at [address] [place] [address], using [employer] Video Visit (HIPAA compliant) patient unable to maintain an Internet connection.  Patient is being seen via telehealth and stated they are in a secure environment for this session. The patient's condition being diagnosed/treated is appropriate for telemedicine. The provider identified herself and credentials ISRAEL and GENEVIEVE .   The patient  consent to be seen remotely, and when consent is given they understand that the consent allows for patient identifiable information to be sent to a third party as needed.   They may refuse to be seen remotely at any time. The electronic data is encrypted and password protected, and the patient has been advised of the potential risks to privacy not withstanding such measured of the potential risks to privacy not withstanding such measures.    Milagro presents for session on time, clean and casually dressed with  evidence of intoxication, withdrawal, or perceptual disturbance.   She was Open and Engaged.      Chief Compliant: Patient presents with depression and anxiety    Interactive Complexity: Interactive Complexity No If yes, due to:       HPI: Patient shares that her tremors are worse in the last 3 weeks and had medication change yesterday, she misses mowing the grass, misses the old washing machine and knowing where everything is supposed to be. Patient expresses a lot of worry about her leg swelling hands & feet being cold all the time,   Anxiety difficulty concentrating, dizziness, feelings of losing control, palpitations, racing thoughts, shortness of breath  Depression Low mood for > 2 weeks, Decreased/Increased sleep, Loss of Interest, Feelings of guilt/worthlessness, Low energy,  Impaired concentration, Change in appetite and Psychomotor slowing. Onset of symptoms was vague.  Symptoms are associated with lack of support.  Symptoms are aggravated by lonely and stress.   Symptoms improve with medication management and therapy Current rates severity of symptoms, on a scale of 1-10 (10 is the most severe) 6 Context Family and social history was reviewed and is unchanged. Quality remained the same.    CLINICAL MANEUVERING/INTERVENTION/SUPPORTIVE PSYCHOTHERAPY: Therapist continued to promote the therapeutic alliance, address the patient’s issues, and strengthen self awareness, insights, and coping skills.  Therapist applied CBT/REBT, Interactive Feedback and Positive Coping Skills and encouraged she to use positive coping skills such as Listen to music, Taking a bath/shower, Cleaning the house, Energy redirection, Releasing pent up emotions, Distraction and Utilize resources/coping skills.  Therapist allowed Milagro  to freely discuss issues without interruption or judgment. Provided safe, confidential environment to facilitate the development of positive therapeutic relationship and encourage open, honest communication. Praised patient for willingness to start slowly exercising and becoming more active in day to day things.  She will also begin listing things she is greatful for each day to improve mood. Assisted patient in identifying increased risk factors which would indicate the need for higher level of care including thoughts to harm self or others, self-harming behavior, and/or binge drinking and encouraged patient to contact this office, call 911, or present to the nearest emergency room should any of these events occur. Discussed crisis intervention services and means to access.    Assessment        Psychological ROS: positive for - anxiety and depression    Mental Status Exam:    Hygiene:   on the phone  Cooperation:  Cooperative  Eye Contact:  on the phone  Psychomotor Behavior:   Appropriate  Affect:  Appropriate  Hopelessness: 5  Speech:  Normal  Thought Progress:  Linear  Thought Content:  Normal  Suicidal:  None  Homicidal:  None  Hallucinations:  None  Delusion:  None  Memory:  Intact  Orientation:  Person, Place, Time and Situation  Reliability:  fair  Insight:  Fair  Judgement:  Fair  Impulse Control:  Fair  Physical/Medical Issues:  No  acute medical issue    Patient's Support Network Includes:  friends    Progress toward goal: Not at goal    Functional Status: Moderate impairment     Overall: Depressed     VISIT DIAGNOSIS:     ICD-10-CM ICD-9-CM   1. Generalized anxiety disorder F41.1 300.02   2. Moderate episode of recurrent major depressive disorder (CMS/HCC) F33.1 296.32   3. Phase of life problem in adult Z60.0 V62.89   4. Tremor R25.1 781.0        PROGNOSIS: annie Humphrey appears to be mentally/physically stable compared to his baseline functioning.  However, she continues to present with a severe chronic mental illness. As a result,  she  would be at significantly increased risk for decompensation and possibly higher level of care without continued treatment.  It is reasonable to assume she would considerably benefit from ongoing treatment.          PROGRESS TOWARD CURRENT PLAN OF CARE/TREATMENT PLAN DATED 5/21/2020 :  Making Progress    SHORT-TERM GOALS: Milagro  will bathe and dress in street clothes daily, will attend therapy as scheduled, will take all medications as prescribed, will express feelings to therapist each contact , will identify the severity of symptoms each contact, will be compliant with all treatment recommendations, will learn and practice at least 2 anxiety management techniques with goal of decreasing anxiety and will learn and practice at least 2 depression management techniques with goal of decreasing depression    LONG-TERM GOALS: With the help of therapy, I would like to:   learn how to structure my spare-time more meaningfully (hobbies, etc)  learn how  to handle being alone and learn how to be more assertive with others and set appropriate boundaries  discuss personal, health, personal well being and mental health plans or ideas regarding my future   learn how to make decisions more independently and allow myself to experience feelings and express them more effectively  learn how to cope with my negative thoughts, ruminations, or sense of guilt, find a way out of my negative mood, sadness, or sense of inner emptiness and learn how to handle stressful situations better    STRENGTHS: Literate and Articulate    WEAKNESSES: Poor social support and Poor coping skills    Plan   Crisis Plan:  Symptoms and/or behaviors to indicate a crisis: Thinking about suicide    What calming techniques or other strategies will patient use to de-esclate and stay safe: slow down, breathe, visualize calming self, think it though, listen to music, change focus, take a walk  Who is one person patient can contact to assist with de-escalation? Best friend    Crisis Management: Milagro will contact staff or crisis line if symptoms exacerbate or if harm to self or others becomes a concern. Crisis resources include: Crisis Line [phone], 911, Local Law Enforcement, [facility], [facility] 24/7 Emergency Room [phone].    PLAN:   Milagro will continue in MONTHLY ongoing outpatient treatment via Face-to-Facewith primary therapist and pharmacotherapy as scheduled.   Milagro will report any adverse reactions to treatment/medication interventions immediately.  Milagro will be compliant with treatment and appointments.   May 21, 2020 12:21    Recommended Referrals: Psychiatrist/APRN  Patient will adhere to medication regimen as prescribed and report any side effects. Patient will contact this office, call 911 or present to the nearest emergency room should suicidal or homicidal ideations occur. Provide Cognitive Behavioral Therapy and Solution Focused Therapy to improve functioning, maintain stability, and  avoid decompensation and the need for higher level of care.          Future Appointments       Provider Department Center    6/5/2020 11:30 AM Des Elizondo MD [facility] BEHAVIORAL HEALTH     6/10/2020 2:30 PM (Arrive by 2:15 PM) Mae Valle MD [facility] PRIMARY CARE     6/22/2020 11:00 AM Ethel Elizalde APRN [facility] NEUROLOGY [place]    6/25/2020 12:00 PM Nida Pringle LCSW [facility] BEHAVIORAL HEALTH     7/23/2020 12:15 PM Nida Pringle LCSW [facility] BEHAVIORAL HEALTH                 Nida Peters LCSW, [facility]

## 2020-05-21 NOTE — TREATMENT PLAN
Multi-Disciplinary Problems (from Behavioral Health Treatment Plan)    Active Problems     Problem: Anxiety  Start Date: 02/20/20    Problem Details:  The patient self-scales this problem as a 7 with 10 being the worst.       Goal Priority Start Date Expected End Date End Date    Patient will develop and implement behavioral and cognitive strategies to reduce anxiety and irrational fears. -- 02/20/20 02/19/21 --    Goal Details:  Progress toward goal:  Remains about the same and medication adjustments have been made     Goal Intervention Frequency Start Date End Date    Help patient explore past emotional issues in relation to present anxiety. PRN 02/20/20 02/19/21    Intervention Details:  Duration of treatment until until remission of symptoms.       Goal Intervention Frequency Start Date End Date    Help patient develop an awareness of their cognitive and physical responses to anxiety. PRN 02/20/20 02/19/21    Intervention Details:  Duration of treatment until until remission of symptoms.             Problem: Depression  Start Date: 02/20/20    Problem Details:  The patient self-scales this problem as a 7 with 10 being the worst.       Goal Priority Start Date Expected End Date End Date    Patient will demonstrate the ability to initiate new constructive life skills outside of sessions on a consistent basis. -- 02/20/20 02/19/21 --    Goal Details:  Progress toward goal:  Self scales depression at 7 but still struggles with her good..       Goal Intervention Frequency Start Date End Date    Assist patient in setting attainable activities of daily living goals. PRN 02/20/20 02/19/21    Goal Intervention Frequency Start Date End Date    Provide education about depression PRN 02/20/20 02/19/21    Intervention Details:  Duration of treatment until until remission of symptoms.       Goal Intervention Frequency Start Date End Date    Assist patient in developing healthy coping strategies. PRN 02/20/20 02/19/21     Intervention Details:  Duration of treatment until until remission of symptoms.             Problem: Grief and Loss  Start Date: 02/20/20    Problem Details:  The patient self-scales this problem as a 6 with 10 being the worst.       Goal Priority Start Date Expected End Date End Date    Patient will process feelings and identify and implement specific ways to facilitate the grieving process. -- 02/20/20 02/19/21 --    Goal Details:  Progress toward goal:  Making slight progress but still finding it lonely.       Goal Intervention Frequency Start Date End Date    Assist patient in identifying and processing feelings about losses. PRN 02/20/20 02/19/21    Intervention Details:  Duration of treatment until until remission of symptoms.       Goal Intervention Frequency Start Date End Date    Identify ways to grieve effectively and utilize healthy support systems PRN 02/20/20 02/19/21    Intervention Details:  Duration of treatment until until remission of symptoms.                          I have discussed and reviewed this treatment plan with the patient assisting and approving the plan.

## 2020-06-22 ENCOUNTER — OFFICE VISIT (OUTPATIENT)
Dept: NEUROLOGY | Facility: CLINIC | Age: 65
End: 2020-06-22

## 2020-06-22 VITALS
OXYGEN SATURATION: 94 % | BODY MASS INDEX: 21.66 KG/M2 | TEMPERATURE: 97.7 F | HEIGHT: 67 IN | SYSTOLIC BLOOD PRESSURE: 100 MMHG | DIASTOLIC BLOOD PRESSURE: 60 MMHG | HEART RATE: 68 BPM | WEIGHT: 138 LBS

## 2020-06-22 DIAGNOSIS — R25.1 TREMOR: Primary | ICD-10-CM

## 2020-06-22 PROCEDURE — 99214 OFFICE O/P EST MOD 30 MIN: CPT | Performed by: NURSE PRACTITIONER

## 2020-06-22 NOTE — PROGRESS NOTES
Subjective:     Patient ID: Milagro Sanderson is a 65 y.o. female.    CC:   Chief Complaint   Patient presents with   • Tremors       HPI:   History of Present Illness     Ms. Sanderson is here today for follow-up.      When I first saw her she was here for evaluation of tremor.  She told me that she first started noticing tremor in bilateral upper extremities about 8 to 9 years ago.  Both parents had diagnosis of essential tremor, her father had tremor in hands and her mother had a jaw tremor.  Neither had a diagnosis of Parkinson's disease.  She has no known family history of Parkinson's.  She told me that she had been managed on primidone by previous PCP, symptoms seem to be controlled very well on this medication.  She had gone off and on the medication for a few years.  She stated that back in October 2019 she moved, this caused increased stress and anxiety as well as depression.  She said that it has affected her quite a bit.  Around the time that she moved and when she was under more stress her tremor became more pronounced.  She had read online that primidone could affect mood so she asked her new primary care to stop the medication.  At that time they did start her on propranolol 20 mg 3 times daily.  She said that the propranolol may be helped her tremor just a bit however it has been much more pronounced in the last several months.  Tremor is essentially nonexistent at rest, profound with movement, holding papers and eating etc.  Most recently back in December primary care switched her from Prozac to sertraline and also prescribed Sinemet 10/100 to be taken 3 times daily.  She had not noticed any improvement in her tremor with the Sinemet, she actually told me she thought it may be worse.  She adamantly denies postural symptoms, changes in smell, incontinence, dysphasia, gait instability, shuffled gait or problems with balance.  She denies dizziness, headaches, vision changes, weakness in extremities,  "paresthesias, syncope etc.      I did discuss with Dr. Edmonds, we restarted primidone with instructions to titrate up to 50 mg twice daily to take with her propranolol, he actually saw pt with me as well.  She told me that for the first 4 to 5 days after starting the primidone and she felt significant improvement however her tremor returned.  Over the course of the past several months her primidone has been titrated up to 125 mg TID and she continues on propranolol as well  Today she again tells me she is \"no better, worse really\".  She thinks the primidone is making her tremor worse.  She also now says that thinking back she thinks the sinemet did help.  She denies rigidity, stiffness, problems with smell or swallowing.  No gait problems or falls    Her wellbutrin has been lowered a bit by psychiatry and sertraline increased, no improvement in tremor with these changes      The following portions of the patient's history were reviewed and updated as appropriate: allergies, current medications, past family history, past medical history, past social history, past surgical history and problem list.    Past Medical History:   Diagnosis Date   • Benign familial tremor    • Blood in urine    • Body piercing     EARS   • Depression    • Eczema    • Elevated LDL cholesterol level    • Fever blister    • History of fracture     ARM AT AGE 4 - PATIENT REPORTS SHE DOES NOT REMEMBER LATERALITY   • History of migraine    • History of tremor    • Onychomycosis    • Problems with swallowing     REPORTS SHE FEELS LIKE PILLS ARE GETTING STUCK IN A POCKET IN HER THROAT WHEN SHE TRIES TO SWALLOW THEM   • Sebaceous cyst    • Vitamin D deficiency    • Wears glasses        Past Surgical History:   Procedure Laterality Date   • APPENDECTOMY  2012   • APPENDECTOMY     • BREAST BIOPSY Bilateral    • BREAST CYST ASPIRATION     • BREAST SURGERY Bilateral     history of mammatone left and right breast   • COLONOSCOPY     • CYST REMOVAL      " SEBACEOUS CYST X3 FROM HEAD   • ENDOSCOPY N/A 2/7/2019    Procedure: ESOPHAGOGASTRODUODENOSCOPY W/ BIOPSIES;  Surgeon: Simba Rogers MD;  Location: [facility] ENDOSCOPY;  Service: Gastroenterology   • WISDOM TOOTH EXTRACTION      EXTRACTION X2       Social History     Socioeconomic History   • Marital status: Single     Spouse name: Not on file   • Number of children: Not on file   • Years of education: Not on file   • Highest education level: Not on file   Tobacco Use   • Smoking status: Never Smoker   • Smokeless tobacco: Never Used   Substance and Sexual Activity   • Alcohol use: No   • Drug use: No   • Sexual activity: Defer       Family History   Problem Relation Age of Onset   • Heart attack Mother    • Hypertension Mother    • Anxiety disorder Mother    • Depression Mother    • Heart attack Other    • Kidney disease Other    • Bipolar disorder Father    • Diabetes Paternal Aunt    • Diabetes Paternal Uncle    • Seizures Maternal Aunt    • Colon cancer Neg Hx    • Cirrhosis Neg Hx    • Liver disease Neg Hx    • Liver cancer Neg Hx    • Crohn's disease Neg Hx    • Ulcerative colitis Neg Hx    • Esophageal cancer Neg Hx    • Stomach cancer Neg Hx    • ADD / ADHD Neg Hx    • Alcohol abuse Neg Hx    • Dementia Neg Hx    • Drug abuse Neg Hx    • OCD Neg Hx    • Paranoid behavior Neg Hx    • Schizophrenia Neg Hx    • Self-Injurious Behavior  Neg Hx    • Suicide Attempts Neg Hx         Review of Systems   Constitutional: Negative.    HENT: Negative.    Eyes: Negative.    Respiratory: Negative.    Cardiovascular: Negative.    Gastrointestinal: Negative.    Endocrine: Negative.    Genitourinary: Negative.    Musculoskeletal: Negative.    Skin: Negative.    Allergic/Immunologic: Negative.    Neurological: Positive for tremors. Negative for dizziness, seizures, syncope, facial asymmetry, speech difficulty, weakness, light-headedness, numbness and headaches.   Hematological: Negative.    Psychiatric/Behavioral: The patient  "is nervous/anxious.         Objective:  /60   Pulse 68   Temp 97.7 °F (36.5 °C)   Ht 170.2 cm (67\")   Wt 62.6 kg (138 lb)   LMP  (LMP Unknown)   SpO2 94%   BMI 21.61 kg/m²     Neurologic Exam     Mental Status   Oriented to person, place, and time.   Attention: normal. Concentration: normal.   Speech: speech is normal   Level of consciousness: alert  Knowledge: consistent with education.   Able to read. Able to write. Normal comprehension.     Cranial Nerves   Cranial nerves II through XII intact.     CN II   Visual fields full to confrontation.   Right visual field deficit: none  Left visual field deficit: none     CN III, IV, VI   Pupils are equal, round, and reactive to light.  Extraocular motions are normal.   Right pupil: Shape: regular. Reactivity: brisk. Consensual response: intact. Accommodation: intact.   Left pupil: Shape: regular. Reactivity: brisk. Consensual response: intact. Accommodation: intact.   CN III: no CN III palsy  CN VI: no CN VI palsy  Nystagmus: none   Upgaze: normal  Downgaze: normal    CN V   Facial sensation intact.     CN VII   Facial expression full, symmetric.     CN VIII   CN VIII normal.     CN IX, X   CN IX normal.   CN X normal.     CN XI   CN XI normal.     CN XII   CN XII normal.     Motor Exam   Muscle bulk: normal  Overall muscle tone: normal  Right arm tone: normal  Left arm tone: normal  Right arm pronator drift: absent  Left arm pronator drift: absent  Right leg tone: normal  Left leg tone: normal    Strength   Strength 5/5 throughout. No cogwheeling, no bradykinesis with finger tap, long strides with gait and fluid arm swing bilaterally         Sensory Exam   Light touch normal.     Gait, Coordination, and Reflexes     Gait  Gait: normal    Coordination   Romberg: negative  Finger to nose coordination: normal  Heel to shin coordination: normal  Tandem walking coordination: normal    Reflexes   Reflexes 2+ except as noted.   Right Bautista: absent  Left Bautista: " Andres does have a more pronounced tremor today than previous visits, present in both upper extremities L>R and in head as well  Tremor is worse with action but present in left hand at rest with mild pill rolling motion         Physical Exam   Constitutional: She is oriented to person, place, and time.   Eyes: Pupils are equal, round, and reactive to light. EOM are normal.   Neurological: She is oriented to person, place, and time. She has normal strength. She has a normal Finger-Nose-Finger Test, a normal Heel to Shin Test, a normal Romberg Test and a normal Tandem Gait Test. Gait normal.   Psychiatric: Her speech is normal.       Assessment/Plan:       Milagro was seen today for tremors.    Diagnoses and all orders for this visit:    Tremor  -     carbidopa-levodopa (Sinemet)  MG per tablet; Take 1 tablet by mouth 2 (Two) Times a Day.    Patient's tremor is more pronounced today than previous visits, she does have more pronounced tremor on the left upper extremity, mild resting tremor noted.  No other parkinsonian features however.  She tells me today that now that she thinks about it she thinks the Sinemet helped her more than anything, she did not take this very long as she felt initially it made her worse, see first note.  For now are going to try to add back Sinemet low-dose 10/100 2-3 times a day, she will cut back her primidone to 50 mg 3 times daily, she thinks this is making her worse at this point.  After 1 week she is going to stop medication.  Continue propranolol at current dose.  I have asked her to call and give me an update on her last dose of primidone how she is doing, we can push Sinemet dose up a bit, I do want to have a telephone visit with her or video visit in 3 weeks.  We also discussed referall to [facility] if these medication changes do not seem help         Reviewed medications, potential side effects and signs and symptoms to report. Discussed risk versus benefits of  treatment plan with patient and/or family-including medications, labs and radiology that may be ordered. Addressed questions and concerns during visit. Patient and/or family verbalized understanding and agree with plan.        Ethel Elizalde, APRN  6/22/2020

## 2020-06-25 ENCOUNTER — OFFICE VISIT (OUTPATIENT)
Dept: PSYCHIATRY | Facility: CLINIC | Age: 65
End: 2020-06-25

## 2020-06-25 DIAGNOSIS — F33.1 MODERATE EPISODE OF RECURRENT MAJOR DEPRESSIVE DISORDER (HCC): ICD-10-CM

## 2020-06-25 DIAGNOSIS — F41.1 GENERALIZED ANXIETY DISORDER: Primary | ICD-10-CM

## 2020-06-25 PROCEDURE — 90837 PSYTX W PT 60 MINUTES: CPT | Performed by: SOCIAL WORKER

## 2020-06-25 NOTE — PROGRESS NOTES
Date of Service: June 25, 2020  Time In: 11:55 pm  Time Out: 12:52 pm      PROGRESS NOTE  Data:Milagro Sanderson is a 65 y.o. female who [facility] 1:1 [facility] for regularly scheduled Individual Therapy session.Milagro presents for session on time, clean and casually dressed with  without evidence of intoxication, withdrawal, or perceptual disturbance.   She was open and engaged.      Chief Compliant: Patient presents with depression and anxiety    Interactive Complexity: Interactive Complexity No      HPI: Patient shares that she continues to feel depressed with no motivation.  Has not appetite and things do not taste good anymore.Patient shares that her tremors are worse.  She shares that her legs are swelling and they are different colors.  Medical assistant advised patient to contact her primary care physician and have them looked at.  Patient cannot identify anything that would cause this.  Patient continues to share many negative thoughts.  Depression Low mood for > 2 weeks, Decreased/Increased sleep, Loss of Interest, Feelings of guilt/worthlessness, Low energy, Impaired concentration, Change in appetite and Psychomotor slowing  Generalized Anxiety  Excess Worry, Restless/Edgy, Easily fatigued, Muscle tension, Decreased sleep and Decreased concentration. Onset of symptoms was vague.  Symptoms are associated with death of unchanged since last visit and lack of support.  Symptoms are aggravated by boredom, lonely and stress.   Symptoms improve with medication management, therapy and personal self-care (wellness) Current rates severity of symptoms, on a scale of 1-10 (10 is the most severe) 5 Context Family and social history was reviewed and is unchanged since last visit Quality remained the same.    CLINICAL MANEUVERING/INTERVENTION/SUPPORTIVE PSYCHOTHERAPY: Therapist continued to promote the therapeutic alliance, address the patient’s issues, and strengthen self awareness, insights, and  coping skills.  Therapist applied CBT/REBT, Cognitive Challenging, Positive Coping Skills and Thought Stopping and encouraged she to use positive coping skills such as Exercising, Cleaning the house, Spending time in nature, Reframe the way you are thinking about the problem, Use positive self-talk, Keep a positive attitude and Utilize resources/coping skills.  Specifically worked with patient on her thinking issue of over generalization and should statements both creating anxiety and significant depression.  Patient shares awareness that her mother was quite negative and she thinks this is a habit she has found herself in.  Continuing to process with patient ways to pay attention to her thoughts and challenge the should statements and over generalization therapist allowed Milagro  to freely discuss issues without interruption or judgment. Provided safe, confidential environment to facilitate the development of positive therapeutic relationship and encourage open, honest communication.  Patient was agreeable to begin exercising 5 minutes every day.  She will also start her day with 5 things she is grateful for.  She will use her face using what would Chong tell me to do when she notices negative thoughts.  Assisted patient in identifying increased risk factors which would indicate the need for higher level of care including thoughts to harm self or others, self-harming behavior, and/or binge drinking and encouraged patient to contact this office, call 911, or present to the nearest emergency room should any of these events occur. Discussed crisis intervention services and means to access.    Assessment        Psychological ROS: positive for - anxiety    Mental Status Exam:    Hygiene:   good  Cooperation:  Cooperative  Eye Contact:  Fair  Psychomotor Behavior:  Appropriate  Affect:  Restricted  Hopelessness: 5  Speech:  Normal  Thought Progress:  Linear  Thought Content:  Normal  Suicidal:  None  Homicidal:   None  Hallucinations:  None  Delusion:  None  Memory:  Intact  Orientation:  Person, Place, Time and Situation  Reliability:  fair  Insight:  Fair  Judgement:  Fair  Impulse Control:  Fair  Physical/Medical Issues:  No     Patient's Support Network Includes:  extended family and friends    Progress toward goal: Not at goal    Functional Status: Moderate impairment     Overall: Depressed, Anxious     VISIT DIAGNOSIS:     ICD-10-CM ICD-9-CM   1. Generalized anxiety disorder F41.1 300.02   2. Moderate episode of recurrent major depressive disorder (CMS/HCC) F33.1 296.32        PROGNOSIS: duncan Humphrey appears to be mentally/physically stable compared to his baseline functioning.  However, she continues to present with a severe chronic mental illness. As a result,  she  would be at significantly increased risk for decompensation and possibly higher level of care without continued treatment.  It is reasonable to assume she would considerably benefit from ongoing treatment.          PROGRESS TOWARD CURRENT PLAN OF CARE/TREATMENT PLAN :  Making Progress    SHORT-TERM GOALS: Milagro  will bathe and dress in street clothes daily, will attend therapy as scheduled, will take all medications as prescribed, will express feelings to therapist each contact , will identify the severity of symptoms each contact, will be compliant with all treatment recommendations, will learn and practice at least 2 anxiety management techniques with goal of decreasing anxiety, will learn and practice at least 2 depression management techniques with goal of decreasing depression, will work with therapist to help expose and extinguish irrational beliefs and conclusions that contribute to anxiety/depression, will engage in one self-care activity daily, per self-report and will engage in one enjoyable activity daily, per self-report     LONG-TERM GOALS: With the help of therapy, I would like to:   learn how to structure my spare-time more meaningfully  (hobbies, etc)  learn how to be more assertive with others and set appropriate boundaries, learn how to handle other people's reactions to my behavior (criticism, rejection, praise, etc.). and learn how to connect with other people (and how to maintain relationships)  discuss health, personal well being and mental health plans or ideas regarding my future   gain self-confidence or become more self-assured and allow myself to experience feelings and express them more effectively  learn how to cope with my negative thoughts, ruminations, or sense of guilt, find a way out of my negative mood, sadness, or sense of inner emptiness and learn how to handle stressful situations better    STRENGTHS: Literate and Articulate    WEAKNESSES: Poor insight, Lack of motivation and Poor coping skills    Plan   Crisis Plan:  Symptoms and/or behaviors to indicate a crisis: Thinking about suicide    What calming techniques or other strategies will patient use to de-esclate and stay safe: slow down, breathe, visualize calming self, think it though, listen to music, change focus, take a walk  Who is one person patient can contact to assist with de-escalation? friend    Crisis Management: Milagro will contact staff or crisis line if symptoms exacerbate or if harm to self or others becomes a concern. Crisis resources include: Crisis Line [phone], 911, Local Law Enforcement, [facility], [facility] 24/7 Emergency Room [phone].    PLAN:   Milagro will continue in BI-WEEKLY ongoing outpatient treatment via Face-to-Facewith primary therapist and pharmacotherapy as scheduled.   Milagro will report any adverse reactions to treatment/medication interventions immediately.  Milagro will be compliant with treatment and appointments.   June 25, 2020 12:19    Recommended Referrals: Psychiatrist/APRN and Medical Provider (PCP)  Patient will adhere to medication regimen as prescribed and report any side effects. Patient will contact this office, call [phone] or  present to the nearest emergency room should suicidal or homicidal ideations occur. Provide Cognitive Behavioral Therapy and Solution Focused Therapy to improve functioning, maintain stability, and avoid decompensation and the need for higher level of care.          Future Appointments       Provider Department Center    7/17/2020 1:00 PM Ethel Elizalde APRN [facility] NEUROLOGY [place]    7/23/2020 12:15 PM Nida Pringle LCSW [facility] BEHAVIORAL HEALTH     7/24/2020 1:30 PM Des Elizondo MD [facility] BEHAVIORAL HEALTH     8/3/2020 3:30 PM (Arrive by 3:15 PM) Mae Valle MD [facility] PRIMARY CARE                 Nida Peters LCSW, [facility]

## 2020-06-26 ENCOUNTER — TELEPHONE (OUTPATIENT)
Dept: NEUROLOGY | Facility: CLINIC | Age: 65
End: 2020-06-26

## 2020-06-26 DIAGNOSIS — R25.1 TREMOR: ICD-10-CM

## 2020-06-26 NOTE — TELEPHONE ENCOUNTER
Milagro called to report about how her medication is doing. She has taken 8 of the Carbidopa- levadopa now and 13 of the primidone, which she is now out of. She says that she feels like she is the same.

## 2020-06-29 ENCOUNTER — TRANSCRIBE ORDERS (OUTPATIENT)
Dept: ADMINISTRATIVE | Facility: HOSPITAL | Age: 65
End: 2020-06-29

## 2020-06-29 DIAGNOSIS — Z12.31 ENCOUNTER FOR SCREENING MAMMOGRAM FOR MALIGNANT NEOPLASM OF BREAST: Primary | ICD-10-CM

## 2020-07-01 ENCOUNTER — TELEPHONE (OUTPATIENT)
Dept: NEUROLOGY | Facility: CLINIC | Age: 65
End: 2020-07-01

## 2020-07-01 NOTE — TELEPHONE ENCOUNTER
Provider: RAMYA BRANDT  Caller: INDU LUCIO  Relationship to Patient: SELF      Reason for Call: PT STATING THAT SHE STARTED THE MEDICATION carbidopa-levodopa (Sinemet)  MG per tablet ON 6-27 AND HER HANDS ARE SHAKING WORSE NOW THEN THEY WAS. SHE WAS WONDERING IF SHE CAN GO BACK ON PROPRANOLOL AND PRIMIDONE. PLEASE GIVE HER A CALL BACK AS SOON AS POSSIBLE [phone]

## 2020-07-02 NOTE — TELEPHONE ENCOUNTER
Patient called back and stated that she don't have refills on the Primodone. I called pharmacy and they have the prescription she just can't  until 07/19/20 due to insurance. She can pay for it for 60.00.

## 2020-07-02 NOTE — TELEPHONE ENCOUNTER
Yes, she can go back to prior medication  I feel she needs to see [facility] for opinion as well, if she ok with referral

## 2020-07-02 NOTE — TELEPHONE ENCOUNTER
Spoke to Milagro and told her ok to start back on Primidone and Propranolol she has verbal understanding of this and has a refill on these for now and will call [facility] to get them. She is not sure about the [facility] referral but I told her we would go ahead and send it because it takes a long time to get an appt. She can think about it since it takes so long to get an appt

## 2020-07-06 DIAGNOSIS — R25.1 TREMOR: Primary | ICD-10-CM

## 2020-07-17 ENCOUNTER — OFFICE VISIT (OUTPATIENT)
Dept: NEUROLOGY | Facility: CLINIC | Age: 65
End: 2020-07-17

## 2020-07-17 DIAGNOSIS — G25.0 HEREDITARY ESSENTIAL TREMOR: ICD-10-CM

## 2020-07-17 DIAGNOSIS — R25.1 TREMOR: ICD-10-CM

## 2020-07-17 PROCEDURE — 99442 PR PHYS/QHP TELEPHONE EVALUATION 11-20 MIN: CPT | Performed by: NURSE PRACTITIONER

## 2020-07-17 RX ORDER — PROPRANOLOL HYDROCHLORIDE 20 MG/1
20 TABLET ORAL 3 TIMES DAILY
Qty: 270 TABLET | Refills: 1 | Status: SHIPPED | OUTPATIENT
Start: 2020-07-17 | End: 2022-03-11

## 2020-07-17 RX ORDER — PRIMIDONE 50 MG/1
TABLET ORAL
Qty: 225 TABLET | Refills: 1 | Status: SHIPPED | OUTPATIENT
Start: 2020-07-17 | End: 2020-10-08

## 2020-07-17 NOTE — PROGRESS NOTES
Subjective:     Patient ID: Milagro Sanderson is a 65 y.o. female.    CC:   Chief Complaint   Patient presents with   • Tremors       HPI:   History of Present Illness     Ms. Sanderson is seen today via telephone visit with consent for follow-up on tremor.  She has a long history of benign essential tremor previously well controlled on primidone, this was stopped over a year ago due to depression.  She does have quite a bit of increased stressors in the last year, tremor did return and become bothersome to her.  Primary care had started her on propranolol with little to no effect, they did trial her on Sinemet as well.  She told me first visit the Sinemet made her worse however last visit she thought maybe it made her better and she wanted to try it again as she was not responding to primidone and propranolol together.  Unfortunately as we tapered her off of primidone and started the Sinemet (patient also thought primidone made her worse in parentheses tremor worsened after about 1 week, she called the office and wanted to stop the Sinemet and restart the primidone.  At this time she has been on primidone 50 mg 2-1/2 pills 3 times a day since July 6, this was her previous dose.  She does feel that her tremor is back to her baseline prior to restarting the Sinemet but still present    The following portions of the patient's history were reviewed and updated as appropriate: allergies, current medications, past family history, past medical history, past social history, past surgical history and problem list.    Past Medical History:   Diagnosis Date   • Benign familial tremor    • Blood in urine    • Body piercing     EARS   • Depression    • Eczema    • Elevated LDL cholesterol level    • Fever blister    • History of fracture     ARM AT AGE 4 - PATIENT REPORTS SHE DOES NOT REMEMBER LATERALITY   • History of migraine    • History of tremor    • Onychomycosis    • Problems with swallowing     REPORTS SHE FEELS LIKE PILLS ARE  GETTING STUCK IN A POCKET IN HER THROAT WHEN SHE TRIES TO SWALLOW THEM   • Sebaceous cyst    • Vitamin D deficiency    • Wears glasses        Past Surgical History:   Procedure Laterality Date   • APPENDECTOMY  2012   • APPENDECTOMY     • BREAST BIOPSY Bilateral    • BREAST CYST ASPIRATION     • BREAST SURGERY Bilateral     history of mammatone left and right breast   • COLONOSCOPY     • CYST REMOVAL      SEBACEOUS CYST X3 FROM HEAD   • ENDOSCOPY N/A 2/7/2019    Procedure: ESOPHAGOGASTRODUODENOSCOPY W/ BIOPSIES;  Surgeon: Simba Rogers MD;  Location: [facility] ENDOSCOPY;  Service: Gastroenterology   • WISDOM TOOTH EXTRACTION      EXTRACTION X2       Social History     Socioeconomic History   • Marital status: Single     Spouse name: Not on file   • Number of children: Not on file   • Years of education: Not on file   • Highest education level: Not on file   Tobacco Use   • Smoking status: Never Smoker   • Smokeless tobacco: Never Used   Substance and Sexual Activity   • Alcohol use: No   • Drug use: No   • Sexual activity: Defer       Family History   Problem Relation Age of Onset   • Heart attack Mother    • Hypertension Mother    • Anxiety disorder Mother    • Depression Mother    • Heart attack Other    • Kidney disease Other    • Bipolar disorder Father    • Diabetes Paternal Aunt    • Diabetes Paternal Uncle    • Seizures Maternal Aunt    • Colon cancer Neg Hx    • Cirrhosis Neg Hx    • Liver disease Neg Hx    • Liver cancer Neg Hx    • Crohn's disease Neg Hx    • Ulcerative colitis Neg Hx    • Esophageal cancer Neg Hx    • Stomach cancer Neg Hx    • ADD / ADHD Neg Hx    • Alcohol abuse Neg Hx    • Dementia Neg Hx    • Drug abuse Neg Hx    • OCD Neg Hx    • Paranoid behavior Neg Hx    • Schizophrenia Neg Hx    • Self-Injurious Behavior  Neg Hx    • Suicide Attempts Neg Hx         Review of Systems   Constitutional: Negative.    HENT: Negative.    Eyes: Negative.    Respiratory: Negative.    Cardiovascular:  Negative.    Gastrointestinal: Negative.    Neurological: Positive for tremors. Negative for dizziness, seizures, syncope, facial asymmetry, speech difficulty, weakness, light-headedness, numbness and headaches.        Objective:  LMP  (LMP Unknown)     Neurologic Exam     Mental Status   Exam limited as this is a telephone visit, speech clear and articulate patient was alert and oriented       Physical Exam    Assessment/Plan:       Milagro was seen today for tremors.    Diagnoses and all orders for this visit:    Hereditary essential tremor  Comments:  continue propranolol 20mg TID. increase primidone. consider movement specialist consult. psych component?  Orders:  -     primidone (MYSOLINE) 50 MG tablet; 2.5 tablets 3 times a day    Tremor  -     propranolol (INDERAL) 20 MG tablet; Take 1 tablet by mouth 3 (Three) Times a Day. For tremor. Monitor blood pressure    Ms. Sanderson has been resistant to treatment, she thinks that medications make her worse however when we change these she reports that they were in fact improving her tremor.  At this time she is on primidone propranolol together, she has trialed Sinemet twice and is adamant this made her worse.  She has taken gabapentin in the past with no relief, she does not want to try this again  She has an appointment with [facility] in November, request if we can get this moved up with a different provider.  For now she is going to continue current medication doses as she had only restarted primidone less than 2 weeks ago  Will check primidone level in about 3 weeks, we can titrate dose upward if needed at that time  Total time of telephone visit 12 minutes         Reviewed medications, potential side effects and signs and symptoms to report. Discussed risk versus benefits of treatment plan with patient and/or family-including medications, labs and radiology that may be ordered. Addressed questions and concerns during visit. Patient and/or family verbalized  understanding and agree with plan.    During this visit the following were done:  Labs Reviewed []    Labs Ordered []    Radiology Reports Reviewed []    Radiology Ordered []    PCP Records Reviewed []    Referring Provider Records Reviewed []    ER Records Reviewed []    Hospital Records Reviewed []    History Obtained From Family []    Radiology Images Reviewed []    Other Reviewed []    Records Requested []      Ethel Elizalde, APRN  7/17/2020

## 2020-07-23 ENCOUNTER — OFFICE VISIT (OUTPATIENT)
Dept: PSYCHIATRY | Facility: CLINIC | Age: 65
End: 2020-07-23

## 2020-07-23 DIAGNOSIS — F33.1 MODERATE EPISODE OF RECURRENT MAJOR DEPRESSIVE DISORDER (HCC): Primary | ICD-10-CM

## 2020-07-23 DIAGNOSIS — F41.1 GENERALIZED ANXIETY DISORDER: ICD-10-CM

## 2020-07-23 PROCEDURE — 90834 PSYTX W PT 45 MINUTES: CPT | Performed by: SOCIAL WORKER

## 2020-07-23 NOTE — PROGRESS NOTES
Date of Service: July 23, 2020  Time In: 12:00 pm  Time Out: 12:53 pm      PROGRESS NOTE  Data:Milagro Sanderson is a 65 y.o. female who met 1:1 with Nida Peters LCSW,[facility] for regularly scheduled Individual Therapy session.    Milagro presents for session on time, clean and casually dressed with  without evidence of intoxication, withdrawal, or perceptual disturbance.   She was lethargic and negative/pessimistic disposition.      Chief Compliant: Patient presents with depression and anxiety    Interactive Complexity: Interactive Complexity No      HPI: Patient shares that she thinks her depression is worse with low mood, no motivation, no enjoyment in life, not bathing, putting on clean clothes, and having to push herself even more than normal to eat.  She reports that she goes to sleep at midnight waking at 6 or 7 and feels rested most of the time. She shares that since CVOID 19 pandemic has gotten worse she is not going out at all except to the grocery store between 2 to 3 weeks.  Patient   Depression Low mood for > 2 weeks, Decreased/Increased sleep, Loss of Interest, Feelings of guilt/worthlessness, Low energy, Impaired concentration, Change in appetite and Psychomotor slowing  Generalized Anxiety  Excess Worry, Restless/Edgy, Easily fatigued, Muscle tension and Decreased concentration. Onset of symptoms was vague.  Symptoms are associated with death of unchanged since last visit and lack of support.  Symptoms are aggravated by anxiety, boredom, lonely, sadness and stress.   Symptoms improve with medication management and therapy Current rates severity of symptoms, on a scale of 1-10 (10 is the most severe) 5 Context Family and social history was reviewed and is unchanged since last visit  Quality worsened.    CLINICAL MANEUVERING/INTERVENTION/SUPPORTIVE PSYCHOTHERAPY: Therapist continued to promote the therapeutic alliance, address the patient’s issues, and strengthen self awareness, insights, and coping  "skills.  Discussed what would be different if her Mom would live with her now. Patient identifies that she would force herself to get things done instead of putting things off.  \"I feel worthless and I dont  do anything\".  Therapist applied CBT/REBT, Cognitive Redirection and Positive Coping Skills and encouraged she to use positive coping skills such as Exercising, Listen to music, Taking a bath/shower, Cleaning the house, Energy redirection, Releasing pent up emotions, Reframe the way you are thinking about the problem, Self Care (Take care of your body in a way that makes you feel good - paint your nails, do your hair, put on a face mask) and Utilize resources/coping skills.  Therapist allowed Milagro  to freely discuss issues without interruption or judgment. Provided safe, confidential environment to facilitate the development of positive therapeutic relationship and encourage open, honest communication. Assisted patient in identifying increased risk factors which would indicate the need for higher level of care including thoughts to harm self or others, self-harming behavior, and/or binge drinking and encouraged patient to contact this office, call 911, or present to the nearest emergency room should any of these events occur. Discussed crisis intervention services and means to access.    Assessment            Psychiatric/Behavioral:Negative for agitation, behavioral problems, decreased concentration, dysphoric mood, hallucinations, self-injury, sleep disturbance, suicidal ideas, negative for hyperactivity, depressed mood and stress. The patient is nervous/anxious and depressed.      Mental Status Exam:    Hygiene:   good  Cooperation:  Cooperative  Eye Contact:  Good  Psychomotor Behavior:  Slow  Affect:  Restricted  Hopelessness: 7  Speech:  Normal  Thought Progress:  Linear  Thought Content:  Normal  Suicidal:  None  Homicidal:  None  Hallucinations:  None  Delusion:  None  Memory:  Intact  Orientation:  " Person, Place, Time and Situation  Reliability:  fair  Insight:  Fair  Judgement:  Fair  Impulse Control:  Fair  Physical/Medical Issues:  No     Patient's Support Network Includes:  extended family and friends    Progress toward goal: Not at goal    Functional Status: Severe impairment    Overall: Depressed     VISIT DIAGNOSIS:     ICD-10-CM ICD-9-CM   1. Moderate episode of recurrent major depressive disorder (CMS/HCC) F33.1 296.32   2. Generalized anxiety disorder F41.1 300.02        PROGNOSIS: annie Humphrey appears to be mentally/physically stable compared to his baseline functioning.  However, she continues to present with a severe chronic mental illness. As a result,  she  would be at significantly increased risk for decompensation and possibly higher level of care without continued treatment.  It is reasonable to assume she would considerably benefit from ongoing treatment.          PROGRESS TOWARD CURRENT PLAN OF CARE/TREATMENT PLAN :  No Progress     SHORT-TERM GOALS: Milagro  will bathe and dress in street clothes daily, will attend therapy as scheduled, will take all medications as prescribed, will express feelings to therapist each contact , will identify the severity of symptoms each contact, will be compliant with all treatment recommendations, will learn and practice at least 2 anxiety management techniques with goal of decreasing anxiety, will learn and practice at least 2 depression management techniques with goal of decreasing depression, will work with therapist to help expose and extinguish irrational beliefs and conclusions that contribute to anxiety/depression, will work with therapist to identify conflicts from the past and the present that form the basis, will engage in one self-care activity daily, per self-report and will engage in one enjoyable activity daily, per self-report     LONG-TERM GOALS: With the help of therapy, I would like to:   learn how to structure my spare-time more  meaningfully (hobbies, etc)  learn how to handle being alone  discuss personal, personal well being and mental health plans or ideas regarding my future   allow myself to experience feelings and express them more effectively and learn how to deal with strong negative feelings (e.g., anger, rage)  learn how to cope with my negative thoughts, ruminations, or sense of guilt, find a way out of my negative mood, sadness, or sense of inner emptiness, gain more drive and energy, learn how to cope with my physical illness and learn how to handle stressful situations better    STRENGTHS: Literate and Articulate    WEAKNESSES: Poor social support and Poor coping skills    Plan   Crisis Plan:  Symptoms and/or behaviors to indicate a crisis: Thinking about suicide    What calming techniques or other strategies will patient use to de-esclate and stay safe: slow down, breathe, visualize calming self, think it though, listen to music, change focus, take a walk  Who is one person patient can contact to assist with de-escalation? friend    Crisis Management: Milagro will contact staff or crisis line if symptoms exacerbate or if harm to self or others becomes a concern. Crisis resources include: Crisis Line [phone], 911, Local Law Enforcement, [facility], [facility] 24/7 Emergency Room [phone].    PLAN:   Milagro will continue in BI-WEEKLY or AS NEEDED ongoing outpatient treatment via Face-to-Facewith primary therapist and pharmacotherapy as scheduled.   Milagro will report any adverse reactions to treatment/medication interventions immediately.  Milagro will be compliant with treatment and appointments.   July 23, 2020 12:53    Recommended Referrals: Psychiatrist/APRN  Patient will adhere to medication regimen as prescribed and report any side effects. Patient will contact this office, call 911 or present to the nearest emergency room should suicidal or homicidal ideations occur. Provide Cognitive Behavioral Therapy and Solution  Focused Therapy to improve functioning, maintain stability, and avoid decompensation and the need for higher level of care.          Future Appointments       Provider Department Center    7/24/2020 9:30 AM Des Elizondo MD [facility] BEHAVIORAL [facility]     7/30/2020 2:30 PM LEONARD MAMM 1 [facility]    8/3/2020 3:30 PM (Arrive by 3:15 PM) Mae Valle MD [facility] PRIMARY CARE     9/10/2020 10:00 AM Nida Pringle LCSW [facility] BEHAVIORAL HEALTH     10/15/2020 1:00 PM Nida Pringle LCSW [facility] BE[facility]AVIORAL [facility]     11/12/2020 1:00 PM Nida Pringle LCSW [facility] BEHAVIORAL HEALTH                 Nida Peters LCSW, [facility]

## 2020-07-24 ENCOUNTER — TELEMEDICINE (OUTPATIENT)
Dept: PSYCHIATRY | Facility: CLINIC | Age: 65
End: 2020-07-24

## 2020-07-24 DIAGNOSIS — F41.1 GENERALIZED ANXIETY DISORDER: ICD-10-CM

## 2020-07-24 DIAGNOSIS — Z60.0 PHASE OF LIFE PROBLEM IN ADULT: ICD-10-CM

## 2020-07-24 DIAGNOSIS — F33.1 MODERATE EPISODE OF RECURRENT MAJOR DEPRESSIVE DISORDER (HCC): Primary | ICD-10-CM

## 2020-07-24 PROCEDURE — 99214 OFFICE O/P EST MOD 30 MIN: CPT | Performed by: PSYCHIATRY & NEUROLOGY

## 2020-07-24 RX ORDER — MIRTAZAPINE 7.5 MG/1
7.5 TABLET, FILM COATED ORAL NIGHTLY
Qty: 90 TABLET | Refills: 0 | Status: SHIPPED | OUTPATIENT
Start: 2020-07-24 | End: 2020-10-02 | Stop reason: SDUPTHER

## 2020-07-24 SDOH — SOCIAL STABILITY - SOCIAL INSECURITY: PROBLEMS OF ADJUSTMENT TO LIFE-CYCLE TRANSITIONS: Z60.0

## 2020-07-24 NOTE — PROGRESS NOTES
Subjective   Milagro Sanderson is a 65 y.o. female who presents today for follow up     This provider is located at [facility] at [address] [place] [address]. The provider identified himself as well as his credentials.   The Patient is at home using her phone because problems with video connection. The patient's condition being diagnosed/treated is appropriate for telemedicine. The patient gave consent to be seen remotely, and when consent is given they understand that the consent allows for patient identifiable information to be sent to a third party as needed.   They may refuse to be seen remotely at any time. The electronic data is encrypted and password protected, and the patient has been advised of the potential risks to privacy not withstanding such measures        Chief Complaint: Depression    History of Present Illness: Patient is a 65-year-old  female who presents for follow-up for anxiety and depression.  Patient reports that she has found worsening depressive symptoms recently.  She states that she has no motivation.  She often has no appetite and has to force herself to eat regular meals.  She reports no motivation to get out of bed, take care of ADLs, or do activities she previously enjoyed.  She does not know if medication has been helpful but did not notice any major difference when Wellbutrin was decreased.  We will discontinue Wellbutrin today due to no treatment benefit, side effects at higher dosing, and potentially reducing her appetite which is already decreased.  We will continue Zoloft and add mirtazapine to see if we can help patient with her sleeping difficulty which is intermittent and has frequent awakenings.  This medication will also help augment Zoloft for mood and anxiety and increase her appetite.  Patient denies SI/HI/AVH.    The following portions of the patient's history were reviewed and updated as appropriate: allergies, current  medications, past family history, past medical history, past social history, past surgical history and problem list.      Past Medical History:  Past Medical History:   Diagnosis Date   • Benign familial tremor    • Blood in urine    • Body piercing     EARS   • Depression    • Eczema    • Elevated LDL cholesterol level    • Fever blister    • History of fracture     ARM AT AGE 4 - PATIENT REPORTS SHE DOES NOT REMEMBER LATERALITY   • History of migraine    • History of tremor    • Onychomycosis    • Problems with swallowing     REPORTS SHE FEELS LIKE PILLS ARE GETTING STUCK IN A POCKET IN HER THROAT WHEN SHE TRIES TO SWALLOW THEM   • Sebaceous cyst    • Vitamin D deficiency    • Wears glasses        Social History:  Social History     Socioeconomic History   • Marital status: Single     Spouse name: Not on file   • Number of children: Not on file   • Years of education: Not on file   • Highest education level: Not on file   Tobacco Use   • Smoking status: Never Smoker   • Smokeless tobacco: Never Used   Substance and Sexual Activity   • Alcohol use: No   • Drug use: No   • Sexual activity: Defer       Family History:  Family History   Problem Relation Age of Onset   • Heart attack Mother    • Hypertension Mother    • Anxiety disorder Mother    • Depression Mother    • Heart attack Other    • Kidney disease Other    • Bipolar disorder Father    • Diabetes Paternal Aunt    • Diabetes Paternal Uncle    • Seizures Maternal Aunt    • Colon cancer Neg Hx    • Cirrhosis Neg Hx    • Liver disease Neg Hx    • Liver cancer Neg Hx    • Crohn's disease Neg Hx    • Ulcerative colitis Neg Hx    • Esophageal cancer Neg Hx    • Stomach cancer Neg Hx    • ADD / ADHD Neg Hx    • Alcohol abuse Neg Hx    • Dementia Neg Hx    • Drug abuse Neg Hx    • OCD Neg Hx    • Paranoid behavior Neg Hx    • Schizophrenia Neg Hx    • Self-Injurious Behavior  Neg Hx    • Suicide Attempts Neg Hx        Past Surgical History:  Past Surgical History:  "  Procedure Laterality Date   • APPENDECTOMY  2012   • APPENDECTOMY     • BREAST BIOPSY Bilateral    • BREAST CYST ASPIRATION     • BREAST SURGERY Bilateral     history of mammatone left and right breast   • COLONOSCOPY     • CYST REMOVAL      SEBACEOUS CYST X3 FROM HEAD   • ENDOSCOPY N/A 2/7/2019    Procedure: ESOPHAGOGASTRODUODENOSCOPY W/ BIOPSIES;  Surgeon: Simba Rogers MD;  Location: [facility] ENDOSCOPY;  Service: Gastroenterology   • WISDOM TOOTH EXTRACTION      EXTRACTION X2       Problem List:  Patient Active Problem List   Diagnosis   • Severe episode of recurrent major depressive disorder, without psychotic features (CMS/HCC)   • Eczema   • Vitamin B12 deficiency   • Vitamin D deficiency   • Hereditary essential tremor   • Onychomycosis   • Recurrent cold sores   • GERD with esophagitis   • High risk medication use       Allergy:   Allergies   Allergen Reactions   • Hazelnut Anaphylaxis   • Macrobid [Nitrofurantoin Monohyd Macro] Other (See Comments)     REPORTS \"I FELL AND IT WAS LIKE I COULDN'T MOVE\"           Current Medications:   Current Outpatient Medications   Medication Sig Dispense Refill   • acyclovir (ZOVIRAX) 400 MG tablet TAKE ONE TABLET BY MOUTH THREE TIMES A DAY FOR 5 DAYS STARTING AT FIRST SIGN OF COLD SORE 15 tablet 4   • Ascorbic Acid (VITAMIN C) 500 MG capsule Take 500 mg by mouth Every Other Day.     • calcium (OS-SANTHOSH) 600 MG tablet Take 600 mg by mouth 1 (One) Time Per Week.     • Cholecalciferol (VITAMIN D-3 PO) Take 2,000 Units by mouth Daily.     • magnesium chloride ER 64 MG DR tablet Take 250 mg by mouth Every 14 (Fourteen) Days.     • mirtazapine (REMERON) 7.5 MG tablet Take 1 tablet by mouth Every Night. 90 tablet 0   • Multiple Vitamins-Minerals (CENTRUM SILVER ULTRA WOMENS PO) Take 1 tablet by mouth Every Other Day.     • Omega-3 Fatty Acids (FISH OIL) 1000 MG capsule capsule Take 1,200 mg by mouth Daily With Breakfast.     • primidone (MYSOLINE) 50 MG tablet 2.5 tablets 3 " times a day 225 tablet 1   • propranolol (INDERAL) 20 MG tablet Take 1 tablet by mouth 3 (Three) Times a Day. For tremor. Monitor blood pressure 270 tablet 1   • sertraline (ZOLOFT) 100 MG tablet Take 2 tablets by mouth Daily. 180 tablet 1     No current facility-administered medications for this visit.        Review of Symptoms:    Review of Systems   Constitutional: Positive for activity change (improved) and appetite change. Negative for chills, diaphoresis, fatigue, fever and unexpected weight loss.   HENT: Negative.    Eyes: Negative.    Respiratory: Negative.    Cardiovascular: Negative.    Gastrointestinal: Negative.    Endocrine: Negative.    Genitourinary: Negative.    Musculoskeletal: Negative.    Skin: Negative.    Allergic/Immunologic: Negative.    Neurological: Positive for tremors.   Hematological: Negative.    Psychiatric/Behavioral: Positive for decreased concentration, dysphoric mood, sleep disturbance and stress. Negative for agitation, suicidal ideas and depressed mood. The patient is not nervous/anxious.          Physical Exam:   not currently breastfeeding. Unable to assess, telephone visit.     Appearance: Unable to assess, telephone visit.   Gait, Station, Strength: Unable to assess, telephone visit.     Mental Status Exam:   Hygiene:   Unable to assess, telephone visit.   Cooperation:  Cooperative  Eye Contact:  Unable to assess, telephone visit.   Psychomotor Behavior:  Unable to assess, telephone visit.   Affect:  Full range  Mood: depressed, amotivated   Hopelessness: Denies  Speech:  Normal  Thought Process:  Goal directed and Linear  Thought Content:  Normal  Suicidal:  None  Homicidal:  None  Hallucinations:  None  Delusion:  None  Memory:  Intact  Orientation:  Person, Place, Time and Situation  Reliability:  good  Insight:  Fair  Judgement:  Good  Impulse Control:  Good  Physical/Medical Issues:  No        Lab Results:   No visits with results within 1 Month(s) from this visit.    Latest known visit with results is:   Office Visit on 02/24/2020   Component Date Value Ref Range Status   • Primidone Level 02/24/2020 4.2* 5.0 - 12.0 ug/mL Final                                    Detection Limit = 0.3                           <0.3 indicates None Detected   • Phenobarbital 02/24/2020 None Detected  15 - 40 ug/mL Final    **Verified by repeat analysis**                                  Detection Limit = 3   • Copper 02/24/2020 107  72 - 166 ug/dL Final                                    Detection Limit = 5   • Glucose 02/24/2020 90  65 - 99 mg/dL Final   • BUN 02/24/2020 11  8 - 23 mg/dL Final   • Creatinine 02/24/2020 0.61  0.57 - 1.00 mg/dL Final   • Sodium 02/24/2020 140  136 - 145 mmol/L Final   • Potassium 02/24/2020 4.5  3.5 - 5.2 mmol/L Final   • Chloride 02/24/2020 99  98 - 107 mmol/L Final   • CO2 02/24/2020 28.3  22.0 - 29.0 mmol/L Final   • Calcium 02/24/2020 10.1  8.6 - 10.5 mg/dL Final   • Total Protein 02/24/2020 7.3  6.0 - 8.5 g/dL Final   • Albumin 02/24/2020 4.60  3.50 - 5.20 g/dL Final   • ALT (SGPT) 02/24/2020 19  1 - 33 U/L Final   • AST (SGOT) 02/24/2020 19  1 - 32 U/L Final   • Alkaline Phosphatase 02/24/2020 80  39 - 117 U/L Final   • Total Bilirubin 02/24/2020 0.4  0.2 - 1.2 mg/dL Final   • eGFR Non African Amer 02/24/2020 98  >60 mL/min/1.73 Final   • Globulin 02/24/2020 2.7  gm/dL Final   • A/G Ratio 02/24/2020 1.7  g/dL Final   • BUN/Creatinine Ratio 02/24/2020 18.0  7.0 - 25.0 Final   • Anion Gap 02/24/2020 12.7  5.0 - 15.0 mmol/L Final       Assessment/Plan   Diagnoses and all orders for this visit:    Moderate episode of recurrent major depressive disorder (CMS/HCC)  -     mirtazapine (REMERON) 7.5 MG tablet; Take 1 tablet by mouth Every Night.    Generalized anxiety disorder  -     mirtazapine (REMERON) 7.5 MG tablet; Take 1 tablet by mouth Every Night.    Phase of life problem in adult  -     mirtazapine (REMERON) 7.5 MG tablet; Take 1 tablet by mouth Every  Night.    -Patient having increased depressive symptoms with amotivation, decreased appetite, interrupted sleep.   -Reviewed previous documentation  -Reviewed most recent available labs  -Continue Zoloft 200mg PO daily for mood and anxiety  -Discontinue Wellbutrin due to treatment failure and possible contribution to decreased appetite   -Start Mirtazapine 7.5 mg PO QHS for mood, anxiety, and sleep and appetite disturbance  -Encouraged patient to continue with therapy  -Again encouraged patient to continue working on her isolation and to be more active in her everyday life.  She is adjusting to new phase of life and I think that the program for which she receives a phone call from the therapist and a phone call from a  on a weekly basis is going to be very beneficial to her.   -Encouraged to follow-up with neurology for tremor  -Approximate appointment time 9:30 AM to 9:45 AM via telephone due to patient having trouble with video visit    Visit Diagnoses:    ICD-10-CM ICD-9-CM   1. Moderate episode of recurrent major depressive disorder (CMS/HCC) F33.1 296.32   2. Generalized anxiety disorder F41.1 300.02   3. Phase of life problem in adult Z60.0 V62.89       TREATMENT PLAN/GOALS: Continue supportive psychotherapy efforts and medications as indicated. Treatment and medication options discussed during today's visit. Patient acknowledged and verbally consented to continue with current treatment plan and was educated on the importance of compliance with treatment and follow-up appointments.    MEDICATION ISSUES:    Discussed medication options and treatment plan of prescribed medication as well as the risks, benefits, and side effects including potential falls, possible impaired driving and metabolic adversities among others. Patient is agreeable to call the office with any worsening of symptoms or onset of side effects. Patient is agreeable to call 911 or go to the nearest ER should he/she begin having  SI/HI.     MEDS ORDERED DURING VISIT:  New Medications Ordered This Visit   Medications   • mirtazapine (REMERON) 7.5 MG tablet     Sig: Take 1 tablet by mouth Every Night.     Dispense:  90 tablet     Refill:  0       Return in about 4 weeks (around 8/21/2020).             This document has been electronically signed by Des Elizondo MD  July 24, 2020 09:40

## 2020-07-29 LAB
PHENOBARB SERPL-MCNC: 15 UG/ML (ref 15–40)
PRIMIDONE SERPL-MCNC: 11.4 UG/ML (ref 5–12)

## 2020-07-30 ENCOUNTER — HOSPITAL ENCOUNTER (OUTPATIENT)
Dept: MAMMOGRAPHY | Facility: HOSPITAL | Age: 65
Discharge: HOME | End: 2020-07-30
Admitting: FAMILY MEDICINE

## 2020-07-30 DIAGNOSIS — Z12.31 ENCOUNTER FOR SCREENING MAMMOGRAM FOR MALIGNANT NEOPLASM OF BREAST: ICD-10-CM

## 2020-07-30 PROCEDURE — 77063 BREAST TOMOSYNTHESIS BI: CPT

## 2020-07-30 PROCEDURE — 77067 SCR MAMMO BI INCL CAD: CPT

## 2020-08-03 ENCOUNTER — OFFICE VISIT (OUTPATIENT)
Dept: INTERNAL MEDICINE | Facility: CLINIC | Age: 65
End: 2020-08-03

## 2020-08-03 VITALS
TEMPERATURE: 97 F | OXYGEN SATURATION: 95 % | WEIGHT: 139.25 LBS | HEART RATE: 74 BPM | BODY MASS INDEX: 21.85 KG/M2 | HEIGHT: 67 IN | DIASTOLIC BLOOD PRESSURE: 60 MMHG | RESPIRATION RATE: 18 BRPM | SYSTOLIC BLOOD PRESSURE: 105 MMHG

## 2020-08-03 DIAGNOSIS — K21.00 GERD WITH ESOPHAGITIS: ICD-10-CM

## 2020-08-03 DIAGNOSIS — G25.0 HEREDITARY ESSENTIAL TREMOR: ICD-10-CM

## 2020-08-03 DIAGNOSIS — F33.2 SEVERE EPISODE OF RECURRENT MAJOR DEPRESSIVE DISORDER, WITHOUT PSYCHOTIC FEATURES (HCC): ICD-10-CM

## 2020-08-03 DIAGNOSIS — Z00.00 MEDICARE ANNUAL WELLNESS VISIT, SUBSEQUENT: Primary | ICD-10-CM

## 2020-08-03 PROCEDURE — G0439 PPPS, SUBSEQ VISIT: HCPCS | Performed by: FAMILY MEDICINE

## 2020-08-03 PROCEDURE — 99397 PER PM REEVAL EST PAT 65+ YR: CPT | Performed by: FAMILY MEDICINE

## 2020-08-03 NOTE — PATIENT INSTRUCTIONS
Medicare Wellness  Personal Prevention Plan of Service     Date of Office Visit:  2020  Encounter Provider:  Mae Valle MD  Place of Service:  [facility] PRIMARY CARE  Patient Name: Milagro Sanderson  :  1955    As part of the Medicare Wellness portion of your visit today, we are providing you with this personalized preventive plan of services (PPPS). This plan is based upon recommendations of the [place] Preventive Services Task Force (USPSTF) and the Advisory Committee on Immunization Practices (ACIP).    This lists the preventive care services that should be considered, and provides dates of when you are due. Items listed as completed are up-to-date and do not require any further intervention.    Health Maintenance   Topic Date Due   • INFLUENZA VACCINE  2020 (Originally 2020)   • TDAP/TD VACCINES (1 - Tdap) 2022 (Originally 1964)   • MEDICARE ANNUAL WELLNESS  2021   • MAMMOGRAM  2022   • COLONOSCOPY  2025   • HEPATITIS C SCREENING  Completed   • Pneumococcal Vaccine Once at 65 Years Old  Completed   • ZOSTER VACCINE  Completed       No orders of the defined types were placed in this encounter.      Return in about 6 months (around 2/3/2021) for follow up.

## 2020-08-03 NOTE — PROGRESS NOTES
The ABCs of the Annual Wellness Visit  Subsequent Medicare Wellness Visit    Chief Complaint   Patient presents with   • Medicare Wellness-subsequent       Subjective   History of Present Illness:  Milagro Sanderson is a 65 y.o. female who presents for a Subsequent Medicare Wellness Visit.    Tremor ongoing issue. Followed by neurology. Last note from 7/17/20 reviewed. [facility]. Trying primidone again. Has tried that in past with propranolol, which she is still taking. Has tried sinemet and felt it worsened tremor per note. Thyroid studies previously within normal limits (11/2019). Tremor is worse when she's nervous.     Followed by psych for depression. Was on Wellbutrin which can cause tremor, but off of this now. On Remeron nightly along with Zoloft. Remeron has helped with appetite. It has helped mood. Sleeps well. Gets up later than she used to, was around 6 and now up to 8 am.     HEALTH RISK ASSESSMENT    Recent Hospitalizations:  No hospitalization(s) within the last year.    Current Medical Providers:  Patient Care Team:  Mae Valle MD as PCP - General (Family Medicine)    Smoking Status:  Social History     Tobacco Use   Smoking Status Never Smoker   Smokeless Tobacco Never Used       Alcohol Consumption:  Social History     Substance and Sexual Activity   Alcohol Use No       Depression Screen:   PHQ-2/PHQ-9 Depression Screening 8/3/2020   Little interest or pleasure in doing things 0   Feeling down, depressed, or hopeless 1   Trouble falling or staying asleep, or sleeping too much -   Feeling tired or having little energy -   Poor appetite or overeating -   Feeling bad about yourself - or that you are a failure or have let yourself or your family down -   Trouble concentrating on things, such as reading the newspaper or watching television -   Moving or speaking so slowly that other people could have noticed. Or the opposite - being so fidgety or restless that you  have been moving around a lot more than usual -   Thoughts that you would be better off dead, or of hurting yourself in some way -   Total Score 1   If you checked off any problems, how difficult have these problems made it for you to do your work, take care of things at home, or get along with other people? -       Fall Risk Screen:  JOANNA Fall Risk Assessment was completed, and patient is at MODERATE risk for falls. Assessment completed on:8/3/2020    Health Habits and Functional and Cognitive Screening:  Functional & Cognitive Status 8/3/2020   Do you have difficulty preparing food and eating? No   Do you have difficulty bathing yourself, getting dressed or grooming yourself? No   Do you have difficulty using the toilet? No   Do you have difficulty moving around from place to place? No   Do you have trouble with steps or getting out of a bed or a chair? No   Current Diet Unhealthy Diet   Dental Exam Up to date   Eye Exam Up to date   Exercise (times per week) 2 times per week   Current Exercise Activities Include Tapes/CDs/TV Fitness Programs at Home   Do you need help using the phone?  No   Are you deaf or do you have serious difficulty hearing?  No   Do you need help with transportation? No   Do you need help shopping? No   Do you need help preparing meals?  No   Do you need help with housework?  No   Do you need help with laundry? No   Do you need help taking your medications? No   Do you need help managing money? No   Do you ever drive or ride in a car without wearing a seat belt? No   Have you felt unusual stress, anger or loneliness in the last month? Yes   Who do you live with? Alone   If you need help, do you have trouble finding someone available to you? No   Have you been bothered in the last four weeks by sexual problems? -   Do you have difficulty concentrating, remembering or making decisions? Yes         Does the patient have evidence of cognitive impairment? No    Asprin use counseling:Does not  need ASA (and currently is not on it)    Age-appropriate Screening Schedule:  Refer to the list below for future screening recommendations based on patient's age, sex and/or medical conditions. Orders for these recommended tests are listed in the plan section. The patient has been provided with a written plan.    Health Maintenance   Topic Date Due   • INFLUENZA VACCINE  09/30/2020 (Originally 8/1/2020)   • TDAP/TD VACCINES (1 - Tdap) 01/01/2022 (Originally 1/24/1966)   • MAMMOGRAM  07/30/2022   • COLONOSCOPY  03/27/2025   • ZOSTER VACCINE  Completed          The following portions of the patient's history were reviewed and updated as appropriate: allergies, current medications, past family history, past medical history, past social history, past surgical history and problem list.    Outpatient Medications Prior to Visit   Medication Sig Dispense Refill   • acyclovir (ZOVIRAX) 400 MG tablet TAKE ONE TABLET BY MOUTH THREE TIMES A DAY FOR 5 DAYS STARTING AT FIRST SIGN OF COLD SORE 15 tablet 4   • Ascorbic Acid (VITAMIN C) 500 MG capsule Take 500 mg by mouth Every Other Day.     • calcium (OS-SANTHOSH) 600 MG tablet Take 600 mg by mouth 1 (One) Time Per Week.     • Cholecalciferol (VITAMIN D-3 PO) Take 2,000 Units by mouth Daily.     • magnesium chloride ER 64 MG DR tablet Take 250 mg by mouth Every 14 (Fourteen) Days.     • mirtazapine (REMERON) 7.5 MG tablet Take 1 tablet by mouth Every Night. 90 tablet 0   • Multiple Vitamins-Minerals (CENTRUM SILVER ULTRA WOMENS PO) Take 1 tablet by mouth Every Other Day.     • Omega-3 Fatty Acids (FISH OIL) 1000 MG capsule capsule Take 1,200 mg by mouth Daily With Breakfast.     • primidone (MYSOLINE) 50 MG tablet 2.5 tablets 3 times a day 225 tablet 1   • propranolol (INDERAL) 20 MG tablet Take 1 tablet by mouth 3 (Three) Times a Day. For tremor. Monitor blood pressure 270 tablet 1   • sertraline (ZOLOFT) 100 MG tablet Take 2 tablets by mouth Daily. 180 tablet 1     No  "facility-administered medications prior to visit.        Patient Active Problem List   Diagnosis   • Severe episode of recurrent major depressive disorder, without psychotic features (CMS/HCC)   • Eczema   • Vitamin B12 deficiency   • Vitamin D deficiency   • Hereditary essential tremor   • Onychomycosis   • Recurrent cold sores   • GERD with esophagitis   • High risk medication use       Advanced Care Planning:  ACP discussion was held with the patient during this visit. Patient does not have an advance directive, declines further assistance.    Review of Systems   Constitutional: Negative for fever.   Respiratory: Negative for chest tightness and shortness of breath.    Cardiovascular: Positive for chest pain (occasionally, always after eating, like reflux) and leg swelling (lower legs, worse in winter).   Gastrointestinal: Negative for abdominal pain.   All other systems reviewed and are negative.      Compared to one year ago, the patient feels her physical health is worse.  Compared to one year ago, the patient feels her mental health is worse.    Reviewed chart for potential of high risk medication in the elderly: yes  Reviewed chart for potential of harmful drug interactions in the elderly:yes    Objective         Vitals:    08/03/20 1522   BP: 105/60   Pulse: 74   Resp: 18   Temp: 97 °F (36.1 °C)   TempSrc: Temporal   SpO2: 95%   Weight: 63.2 kg (139 lb 4 oz)   Height: 170.2 cm (67.01\")   PainSc: 0-No pain       Body mass index is 21.8 kg/m².  Discussed the patient's BMI with her. The BMI is in the acceptable range.    Physical Exam   Constitutional: She is oriented to person, place, and time. She appears well-developed and well-nourished. She is active.  Non-toxic appearance. She does not have a sickly appearance. She does not appear ill. No distress. Face mask in place.   HENT:   Head: Normocephalic and atraumatic.   Right Ear: Hearing, tympanic membrane, external ear and ear canal normal.   Left Ear: " Hearing, tympanic membrane, external ear and ear canal normal.   Eyes: Pupils are equal, round, and reactive to light. Conjunctivae, EOM and lids are normal. Right eye exhibits no discharge. Left eye exhibits no discharge. No scleral icterus.   Neck: Phonation normal. Neck supple.   Cardiovascular: Normal rate, regular rhythm and normal heart sounds.   Pulmonary/Chest: Effort normal and breath sounds normal. No stridor.   Abdominal: Soft. Bowel sounds are normal. She exhibits no distension. There is no tenderness.   Musculoskeletal: She exhibits no edema or deformity.   Neurological: She is alert and oriented to person, place, and time. She displays tremor (action tremor. no tremor today at rest). She displays no seizure activity. Gait normal.   Skin: Skin is warm. Capillary refill takes less than 2 seconds. She is not diaphoretic. No cyanosis. Nails show no clubbing.   Mild stasis dermatitis staining of lower legs bilaterally.   Psychiatric: She has a normal mood and affect. Her speech is normal and behavior is normal. Judgment and thought content normal. Cognition and memory are normal.   Nursing note and vitals reviewed.            Assessment/Plan   Medicare Risks and Personalized Health Plan  CMS Preventative Services Quick Reference  Advance Directive Discussion  Depression/Dysphoria  Immunizations Discussed/Encouraged (specific immunizations; Influenza )    The above risks/problems have been discussed with the patient.  Pertinent information has been shared with the patient in the After Visit Summary.  Follow up plans and orders are seen below in the Assessment/Plan Section.    Diagnoses and all orders for this visit:    1. Medicare annual wellness visit, subsequent (Primary)    2. Severe episode of recurrent major depressive disorder, without psychotic features (CMS/[facility])  Comments:  follow up with psychiatry    3. Hereditary essential tremor  Comments:  see [facility] as scheduled, continue follow up with neurology  in the meantime, continue current medicines    4. GERD with esophagitis  Comments:  was with dr walters, may need to see dr wayne if worsens. tums okay      Follow Up:  Return in about 6 months (around 2/3/2021) for follow up.     An After Visit Summary and PPPS were given to the patient.

## 2020-10-02 ENCOUNTER — TELEMEDICINE (OUTPATIENT)
Dept: PSYCHIATRY | Facility: CLINIC | Age: 65
End: 2020-10-02

## 2020-10-02 VITALS
RESPIRATION RATE: 18 BRPM | TEMPERATURE: 98 F | SYSTOLIC BLOOD PRESSURE: 106 MMHG | WEIGHT: 140 LBS | HEIGHT: 67 IN | DIASTOLIC BLOOD PRESSURE: 64 MMHG | HEART RATE: 63 BPM | BODY MASS INDEX: 21.97 KG/M2

## 2020-10-02 DIAGNOSIS — F33.0 MILD EPISODE OF RECURRENT MAJOR DEPRESSIVE DISORDER (HCC): Primary | ICD-10-CM

## 2020-10-02 DIAGNOSIS — Z60.0 PHASE OF LIFE PROBLEM IN ADULT: ICD-10-CM

## 2020-10-02 DIAGNOSIS — G47.09 OTHER INSOMNIA: ICD-10-CM

## 2020-10-02 DIAGNOSIS — F41.1 GENERALIZED ANXIETY DISORDER: ICD-10-CM

## 2020-10-02 PROCEDURE — 99214 OFFICE O/P EST MOD 30 MIN: CPT | Performed by: PSYCHIATRY & NEUROLOGY

## 2020-10-02 RX ORDER — MIRTAZAPINE 7.5 MG/1
7.5 TABLET, FILM COATED ORAL NIGHTLY
Qty: 90 TABLET | Refills: 0 | Status: SHIPPED | OUTPATIENT
Start: 2020-10-02 | End: 2020-12-18 | Stop reason: SDUPTHER

## 2020-10-02 RX ORDER — SERTRALINE HYDROCHLORIDE 100 MG/1
200 TABLET, FILM COATED ORAL DAILY
Qty: 180 TABLET | Refills: 0 | Status: SHIPPED | OUTPATIENT
Start: 2020-10-02 | End: 2020-12-08 | Stop reason: SDUPTHER

## 2020-10-02 SDOH — SOCIAL STABILITY - SOCIAL INSECURITY: PROBLEMS OF ADJUSTMENT TO LIFE-CYCLE TRANSITIONS: Z60.0

## 2020-10-06 DIAGNOSIS — G25.0 HEREDITARY ESSENTIAL TREMOR: ICD-10-CM

## 2020-10-07 NOTE — TELEPHONE ENCOUNTER
PT CALLED TO CHECK STATUS OF REFILL REQUEST AND HAS BEEN ADVISED IT IS CURRENTLY PENDING APPROVAL. PLEASE ADVISE AS PT IS ALMOST OUT OF MEDICATION

## 2020-10-08 RX ORDER — PRIMIDONE 50 MG/1
TABLET ORAL
Qty: 225 TABLET | Refills: 1 | Status: SHIPPED | OUTPATIENT
Start: 2020-10-08 | End: 2021-08-06 | Stop reason: DRUGHIGH

## 2020-10-12 NOTE — PROGRESS NOTES
"Subjective   Milagro Sanderson is a 65 y.o. female who presents today for follow up     This provider is located at [facility], [address] [place] [address], using [employer].  The patient is seen remotely at [facility], Behavioral health, [address], [address] [facility] in [facility] via [employer], an encrypted service from one [facility] [place] to another, with staff present. The patient's condition being diagnosed/treated is appropriate for telemedecine.  The provider identified himself as well as his credentials.      The patient and/or patient's guardian consent to be seen remotely, and when consent is given they understand that the consent allows for patient identifiable information to be sent to a third party as needed.  They may refuse to be seen remotely at any time.  The electronic data is encrypted and password protected, and the patient has been advised of the potential risks to privacy notwithstanding such measures.        Chief Complaint: Depression    History of Present Illness: Patient is a 65-year-old  female who presents for follow-up for anxiety and depression.  Patient reports today that things seem to be, \"a little better.\"  She states that she is sleeping better and her appetite has improved.  She has also been more active in her day-to-day life.  She seems to have a brighter outlook than she has at previous visits.  She continues to get therapy and finds this beneficial.  Her improvement in overall clinical picture and symptoms is also reflected in her therapy notes per review.  She is not having any medication side effects.  She denies any current issues with sleep or appetite.  Patient denies SI/HI/AVH.    The following portions of the patient's history were reviewed and updated as appropriate: allergies, current medications, past family history, past medical history, past social history, past surgical history and problem list.      Past Medical " History:  Past Medical History:   Diagnosis Date   • Benign familial tremor    • Blood in urine    • Body piercing     EARS   • Depression    • Eczema    • Elevated LDL cholesterol level    • Fever blister    • History of fracture     ARM AT AGE 4 - PATIENT REPORTS SHE DOES NOT REMEMBER LATERALITY   • History of migraine    • History of tremor    • Onychomycosis    • Problems with swallowing     REPORTS SHE FEELS LIKE PILLS ARE GETTING STUCK IN A POCKET IN HER THROAT WHEN SHE TRIES TO SWALLOW THEM   • Sebaceous cyst    • Vitamin D deficiency    • Wears glasses        Social History:  Social History     Socioeconomic History   • Marital status: Single     Spouse name: Not on file   • Number of children: Not on file   • Years of education: Not on file   • Highest education level: Not on file   Tobacco Use   • Smoking status: Never Smoker   • Smokeless tobacco: Never Used   Substance and Sexual Activity   • Alcohol use: No   • Drug use: No   • Sexual activity: Defer       Family History:  Family History   Problem Relation Age of Onset   • Heart attack Mother    • Hypertension Mother    • Anxiety disorder Mother    • Depression Mother    • Heart attack Other    • Kidney disease Other    • Bipolar disorder Father    • Diabetes Paternal Aunt    • Diabetes Paternal Uncle    • Seizures Maternal Aunt    • Colon cancer Neg Hx    • Cirrhosis Neg Hx    • Liver disease Neg Hx    • Liver cancer Neg Hx    • Crohn's disease Neg Hx    • Ulcerative colitis Neg Hx    • Esophageal cancer Neg Hx    • Stomach cancer Neg Hx    • ADD / ADHD Neg Hx    • Alcohol abuse Neg Hx    • Dementia Neg Hx    • Drug abuse Neg Hx    • OCD Neg Hx    • Paranoid behavior Neg Hx    • Schizophrenia Neg Hx    • Self-Injurious Behavior  Neg Hx    • Suicide Attempts Neg Hx        Past Surgical History:  Past Surgical History:   Procedure Laterality Date   • APPENDECTOMY  2012   • APPENDECTOMY     • BREAST BIOPSY Bilateral    • BREAST CYST ASPIRATION     •  "BREAST SURGERY Bilateral     history of mammatone left and right breast   • COLONOSCOPY     • CYST REMOVAL      SEBACEOUS CYST X3 FROM HEAD   • ENDOSCOPY N/A 2/7/2019    Procedure: ESOPHAGOGASTRODUODENOSCOPY W/ BIOPSIES;  Surgeon: Simba Rogers MD;  Location: [facility] ENDOSCOPY;  Service: Gastroenterology   • WISDOM TOOTH EXTRACTION      EXTRACTION X2       Problem List:  Patient Active Problem List   Diagnosis   • Severe episode of recurrent major depressive disorder, without psychotic features (CMS/HCC)   • Eczema   • Vitamin B12 deficiency   • Vitamin D deficiency   • Hereditary essential tremor   • Onychomycosis   • Recurrent cold sores   • GERD with esophagitis   • High risk medication use       Allergy:   Allergies   Allergen Reactions   • Hazelnut Anaphylaxis   • Macrobid [Nitrofurantoin Monohyd Macro] Other (See Comments)     REPORTS \"I FELL AND IT WAS LIKE I COULDN'T MOVE\"           Current Medications:   Current Outpatient Medications   Medication Sig Dispense Refill   • acyclovir (ZOVIRAX) 400 MG tablet TAKE ONE TABLET BY MOUTH THREE TIMES A DAY FOR 5 DAYS STARTING AT FIRST SIGN OF COLD SORE 15 tablet 4   • Ascorbic Acid (VITAMIN C) 500 MG capsule Take 500 mg by mouth Every Other Day.     • calcium (OS-SANTHOSH) 600 MG tablet Take 600 mg by mouth 1 (One) Time Per Week.     • Cholecalciferol (VITAMIN D-3 PO) Take 2,000 Units by mouth Daily.     • magnesium chloride ER 64 MG DR tablet Take 250 mg by mouth Every 14 (Fourteen) Days.     • mirtazapine (REMERON) 7.5 MG tablet Take 1 tablet by mouth Every Night. 90 tablet 0   • Multiple Vitamins-Minerals (CENTRUM SILVER ULTRA WOMENS PO) Take 1 tablet by mouth Every Other Day.     • Omega-3 Fatty Acids (FISH OIL) 1000 MG capsule capsule Take 1,200 mg by mouth Daily With Breakfast.     • propranolol (INDERAL) 20 MG tablet Take 1 tablet by mouth 3 (Three) Times a Day. For tremor. Monitor blood pressure 270 tablet 1   • sertraline (ZOLOFT) 100 MG tablet Take 2 tablets by " "mouth Daily. 180 tablet 0   • primidone (MYSOLINE) 50 MG tablet TAKE 2 AND 1/2 TABLETS BY MOUTH THREE TIMES A  tablet 1     No current facility-administered medications for this visit.        Review of Symptoms:    Review of Systems   Constitutional: Positive for activity change (improved) and appetite change (improved). Negative for chills, diaphoresis, fatigue, fever and unexpected weight loss.   HENT: Negative.    Eyes: Negative.    Respiratory: Negative.    Cardiovascular: Negative.    Gastrointestinal: Negative.    Endocrine: Negative.    Genitourinary: Negative.    Musculoskeletal: Negative.    Skin: Negative.    Allergic/Immunologic: Negative.    Neurological: Positive for tremors.   Hematological: Negative.    Psychiatric/Behavioral: Positive for sleep disturbance (improved) and stress. Negative for agitation, decreased concentration, dysphoric mood, suicidal ideas and depressed mood. The patient is not nervous/anxious.          Physical Exam:   Blood pressure 106/64, pulse 63, temperature 98 °F (36.7 °C), temperature source Infrared, resp. rate 18, height 170.2 cm (67.01\"), weight 63.5 kg (140 lb), not currently breastfeeding.     Appearance: CF of stated age, NAD   Gait, Station, Strength: WNL    Mental Status Exam:   Hygiene:   good  Cooperation:  Cooperative  Eye Contact:  Good  Psychomotor Behavior:  Appropriate  Affect:  Full range  Mood: normal  Hopelessness: Denies  Speech:  Normal  Thought Process:  Goal directed and Linear  Thought Content:  Normal  Suicidal:  None  Homicidal:  None  Hallucinations:  None  Delusion:  None  Memory:  Intact  Orientation:  Person, Place, Time and Situation  Reliability:  good  Insight:  Fair  Judgement:  Good  Impulse Control:  Good  Physical/Medical Issues:  No        Lab Results:   No visits with results within 1 Month(s) from this visit.   Latest known visit with results is:   Orders Only on 07/27/2020   Component Date Value Ref Range Status   • Primidone " Level 07/27/2020 11.4  5.0 - 12.0 ug/mL Final    Comment:                                 Detection Limit = 0.3                           <0.3 indicates None Detected     • Phenobarbital 07/27/2020 15  15 - 40 ug/mL Final                                    Detection Limit = 3       Assessment/Plan   Diagnoses and all orders for this visit:    Mild episode of recurrent major depressive disorder (CMS/HCC)  -     sertraline (ZOLOFT) 100 MG tablet; Take 2 tablets by mouth Daily.  -     mirtazapine (REMERON) 7.5 MG tablet; Take 1 tablet by mouth Every Night.    Generalized anxiety disorder  -     sertraline (ZOLOFT) 100 MG tablet; Take 2 tablets by mouth Daily.  -     mirtazapine (REMERON) 7.5 MG tablet; Take 1 tablet by mouth Every Night.    Phase of life problem in adult  -     sertraline (ZOLOFT) 100 MG tablet; Take 2 tablets by mouth Daily.  -     mirtazapine (REMERON) 7.5 MG tablet; Take 1 tablet by mouth Every Night.    Other insomnia  -     mirtazapine (REMERON) 7.5 MG tablet; Take 1 tablet by mouth Every Night.    -Patient having overall improvement in depressive symptoms.  She still has some mild dysphoria but her overall outlook and mood have improved and patient is more active and engaged in her day-to-day life.  She also reports sleep and appetite have improved.  -Reviewed previous documentation  -Reviewed most recent available labs  -Continue Zoloft 200mg PO daily for mood and anxiety  -Continue mirtazapine 7.5 mg p.o. nightly for mood, anxiety, insomnia, and appetite   -Encouraged patient to continue with therapy for phase of life issues and depression  -Encouraged to follow-up with neurology for tremor      Visit Diagnoses:    ICD-10-CM ICD-9-CM   1. Mild episode of recurrent major depressive disorder (CMS/HCC)  F33.0 296.31   2. Generalized anxiety disorder  F41.1 300.02   3. Phase of life problem in adult  Z60.0 V62.89   4. Other insomnia  G47.09 780.52       TREATMENT PLAN/GOALS: Continue supportive  psychotherapy efforts and medications as indicated. Treatment and medication options discussed during today's visit. Patient acknowledged and verbally consented to continue with current treatment plan and was educated on the importance of compliance with treatment and follow-up appointments.    MEDICATION ISSUES:    Discussed medication options and treatment plan of prescribed medication as well as the risks, benefits, and side effects including potential falls, possible impaired driving and metabolic adversities among others. Patient is agreeable to call the office with any worsening of symptoms or onset of side effects. Patient is agreeable to call 911 or go to the nearest ER should he/she begin having SI/HI.     MEDS ORDERED DURING VISIT:  New Medications Ordered This Visit   Medications   • sertraline (ZOLOFT) 100 MG tablet     Sig: Take 2 tablets by mouth Daily.     Dispense:  180 tablet     Refill:  0   • mirtazapine (REMERON) 7.5 MG tablet     Sig: Take 1 tablet by mouth Every Night.     Dispense:  90 tablet     Refill:  0       Return in about 3 months (around 1/2/2021).             This document has been electronically signed by Des Elizondo MD  October 12, 2020 09:15 EDT

## 2020-12-08 DIAGNOSIS — F41.1 GENERALIZED ANXIETY DISORDER: ICD-10-CM

## 2020-12-08 DIAGNOSIS — F33.0 MILD EPISODE OF RECURRENT MAJOR DEPRESSIVE DISORDER (HCC): ICD-10-CM

## 2020-12-08 DIAGNOSIS — Z60.0 PHASE OF LIFE PROBLEM IN ADULT: ICD-10-CM

## 2020-12-08 RX ORDER — SERTRALINE HYDROCHLORIDE 100 MG/1
200 TABLET, FILM COATED ORAL DAILY
Qty: 180 TABLET | Refills: 0 | Status: SHIPPED | OUTPATIENT
Start: 2020-12-08 | End: 2021-04-02 | Stop reason: SDUPTHER

## 2020-12-08 SDOH — SOCIAL STABILITY - SOCIAL INSECURITY: PROBLEMS OF ADJUSTMENT TO LIFE-CYCLE TRANSITIONS: Z60.0

## 2020-12-18 ENCOUNTER — TELEMEDICINE (OUTPATIENT)
Dept: PSYCHIATRY | Facility: CLINIC | Age: 65
End: 2020-12-18

## 2020-12-18 DIAGNOSIS — Z60.0 PHASE OF LIFE PROBLEM IN ADULT: ICD-10-CM

## 2020-12-18 DIAGNOSIS — F33.0 MILD EPISODE OF RECURRENT MAJOR DEPRESSIVE DISORDER (HCC): ICD-10-CM

## 2020-12-18 DIAGNOSIS — G47.09 OTHER INSOMNIA: ICD-10-CM

## 2020-12-18 DIAGNOSIS — F41.1 GENERALIZED ANXIETY DISORDER: ICD-10-CM

## 2020-12-18 PROCEDURE — 99214 OFFICE O/P EST MOD 30 MIN: CPT | Performed by: PSYCHIATRY & NEUROLOGY

## 2020-12-18 RX ORDER — MIRTAZAPINE 7.5 MG/1
7.5 TABLET, FILM COATED ORAL NIGHTLY
Qty: 90 TABLET | Refills: 0 | Status: SHIPPED | OUTPATIENT
Start: 2020-12-18 | End: 2021-04-02 | Stop reason: SDUPTHER

## 2020-12-18 SDOH — SOCIAL STABILITY - SOCIAL INSECURITY: PROBLEMS OF ADJUSTMENT TO LIFE-CYCLE TRANSITIONS: Z60.0

## 2021-02-03 ENCOUNTER — OFFICE VISIT (OUTPATIENT)
Dept: INTERNAL MEDICINE | Facility: CLINIC | Age: 66
End: 2021-02-03

## 2021-02-03 VITALS
HEIGHT: 67 IN | DIASTOLIC BLOOD PRESSURE: 65 MMHG | WEIGHT: 147.25 LBS | RESPIRATION RATE: 18 BRPM | SYSTOLIC BLOOD PRESSURE: 115 MMHG | OXYGEN SATURATION: 95 % | HEART RATE: 70 BPM | BODY MASS INDEX: 23.11 KG/M2 | TEMPERATURE: 96.4 F

## 2021-02-03 DIAGNOSIS — K21.00 GASTROESOPHAGEAL REFLUX DISEASE WITH ESOPHAGITIS, UNSPECIFIED WHETHER HEMORRHAGE: ICD-10-CM

## 2021-02-03 DIAGNOSIS — B00.1 RECURRENT COLD SORES: Primary | ICD-10-CM

## 2021-02-03 DIAGNOSIS — F33.9 EPISODE OF RECURRENT MAJOR DEPRESSIVE DISORDER, UNSPECIFIED DEPRESSION EPISODE SEVERITY (HCC): ICD-10-CM

## 2021-02-03 DIAGNOSIS — G25.0 HEREDITARY ESSENTIAL TREMOR: ICD-10-CM

## 2021-02-03 PROCEDURE — 99213 OFFICE O/P EST LOW 20 MIN: CPT | Performed by: FAMILY MEDICINE

## 2021-02-03 RX ORDER — TOPIRAMATE 25 MG/1
25 TABLET ORAL 2 TIMES DAILY
Qty: 180 TABLET | Refills: 3 | Status: CANCELLED | OUTPATIENT
Start: 2021-02-03

## 2021-02-03 RX ORDER — ACYCLOVIR 400 MG/1
TABLET ORAL
Qty: 15 TABLET | Refills: 4 | Status: CANCELLED | OUTPATIENT
Start: 2021-02-03

## 2021-02-03 RX ORDER — VALACYCLOVIR HYDROCHLORIDE 1 G/1
2000 TABLET, FILM COATED ORAL 2 TIMES DAILY
Qty: 12 TABLET | Refills: 5 | Status: SHIPPED | OUTPATIENT
Start: 2021-02-03 | End: 2021-02-04

## 2021-02-03 NOTE — PROGRESS NOTES
"Subjective    Milagro Sanderson is a 66 y.o. female here for:  Chief Complaint   Patient presents with   • Depression     She is no longer seeing her psychiatrist because she moved. Pt has not seen anyone since. She is still having a hard time making decisions.    • Heartburn     Pt states this is no longer an issue for her. She has not taken anything for her heartburn in a long time.        History per MA reviewed.    Therapist left  Doesn't think she has to get back in with one at this time  Still goes to psych provider for med management    Doesn't go back to  neurology until this summer  Still struggling with tremor         The following portions of the patient's history were reviewed and updated as appropriate: allergies, current medications, past family history, past medical history, past social history, past surgical history and problem list.    Review of Systems   Constitutional: Negative for fever.       Visit Vitals  /65   Pulse 70   Temp 96.4 °F (35.8 °C) (Temporal)   Resp 18   Ht 170.2 cm (67.01\")   Wt 66.8 kg (147 lb 4 oz)   LMP  (LMP Unknown)   SpO2 95%   BMI 23.06 kg/m²         Objective   Physical Exam  Vitals signs and nursing note reviewed.   Constitutional:       General: She is not in acute distress.     Appearance: Normal appearance. She is well-developed and well-groomed. She is not ill-appearing, toxic-appearing or diaphoretic.      Interventions: Face mask in place.   HENT:      Head: Normocephalic and atraumatic.      Right Ear: Hearing normal.      Left Ear: Hearing normal.   Eyes:      General: Lids are normal. No scleral icterus.     Extraocular Movements: Extraocular movements intact.   Neck:      Trachea: Phonation normal.   Pulmonary:      Effort: Pulmonary effort is normal.   Skin:     Coloration: Skin is not jaundiced.   Neurological:      General: No focal deficit present.      Mental Status: She is alert and oriented to person, place, and time.      Motor: Tremor present. "   Psychiatric:         Attention and Perception: Attention and perception normal.         Mood and Affect: Affect normal. Mood is anxious (somewhat).         Speech: Speech normal.         Behavior: Behavior normal. Behavior is cooperative.         Thought Content: Thought content normal.         Cognition and Memory: Cognition and memory normal.         Judgment: Judgment normal.         For medical decision making review of the following was required:  [facility] Dr. [place] note from 11/13/20 (see scanned note)    Assessment/Plan     Problem List Items Addressed This Visit        Gastrointestinal Abdominal     GERD with esophagitis    Overview     EGD 2/7/19, Dr. Rogers:  DIAGNOSES:    1. Erosive distal esophagitis. LA class A.  2. Small sliding hiatal hernia less than 3 cm.  3. Erythematous gastritis.  4. Fullness at the upper stomach-posterior wall.  5. Subtle concentric rings within the mid and distal esophagus.              Current Assessment & Plan     Not bothersome at this time. Monitor.            Mental Health    Episode of recurrent major depressive disorder (CMS/HCC)    Overview     · Past therapy: Lexapro, Prozac  · Zoloft replacing Prozac due to weight loss, decreased appetite         Current Assessment & Plan     Psychological condition is improving with treatment.  Continue current treatment regimen.  follow up with psychiatry  Psychological condition  will be reassessed at the next regular appointment.            Neuro    Hereditary essential tremor    Overview     Evaluated by neurology 1/2020 and not felt to be Parkinson's, worsened with Sinemet         Current Assessment & Plan     Discussed off label use of Topamax   Encouraged follow up with Dr. montana  No change to propranolol or primidone at this time  In past carbidopa/levadopa didn't help tremor.            Other    Recurrent cold sores - Primary    Current Assessment & Plan     Changed acyclovir to valacyclovir due to ease of dosing.          Relevant Medications    valACYclovir (Valtrex) 1000 MG tablet          · Patient screened positive for depression based on a PHQ-9 score of 4 on 10/2/2020. Follow-up recommendations include: Follow up with psych provider. If needs referral for therapist she'll let us know..     Return in about 26 weeks (around 8/4/2021) for Medicare Wellness (366 days from last AWV).     Mae Valle MD

## 2021-02-04 NOTE — ASSESSMENT & PLAN NOTE
Psychological condition is improving with treatment.  Continue current treatment regimen.  follow up with psychiatry  Psychological condition  will be reassessed at the next regular appointment.

## 2021-02-04 NOTE — ASSESSMENT & PLAN NOTE
Discussed off label use of Topamax   Encouraged follow up with Dr. montana  No change to propranolol or primidone at this time  In past carbidopa/levadopa didn't help tremor.

## 2021-04-02 ENCOUNTER — OFFICE VISIT (OUTPATIENT)
Dept: PSYCHIATRY | Facility: CLINIC | Age: 66
End: 2021-04-02

## 2021-04-02 DIAGNOSIS — F41.1 GENERALIZED ANXIETY DISORDER: ICD-10-CM

## 2021-04-02 DIAGNOSIS — Z60.0 PHASE OF LIFE PROBLEM IN ADULT: ICD-10-CM

## 2021-04-02 DIAGNOSIS — F33.0 MILD EPISODE OF RECURRENT MAJOR DEPRESSIVE DISORDER (HCC): ICD-10-CM

## 2021-04-02 DIAGNOSIS — G47.09 OTHER INSOMNIA: ICD-10-CM

## 2021-04-02 PROCEDURE — 99214 OFFICE O/P EST MOD 30 MIN: CPT | Performed by: PSYCHIATRY & NEUROLOGY

## 2021-04-02 RX ORDER — MIRTAZAPINE 7.5 MG/1
7.5 TABLET, FILM COATED ORAL NIGHTLY
Qty: 90 TABLET | Refills: 0 | Status: SHIPPED | OUTPATIENT
Start: 2021-04-02 | End: 2021-06-25 | Stop reason: SDUPTHER

## 2021-04-02 RX ORDER — SERTRALINE HYDROCHLORIDE 100 MG/1
200 TABLET, FILM COATED ORAL DAILY
Qty: 180 TABLET | Refills: 0 | Status: SHIPPED | OUTPATIENT
Start: 2021-04-02 | End: 2021-10-01 | Stop reason: SDUPTHER

## 2021-04-02 SDOH — SOCIAL STABILITY - SOCIAL INSECURITY: PROBLEMS OF ADJUSTMENT TO LIFE-CYCLE TRANSITIONS: Z60.0

## 2021-04-15 NOTE — PROGRESS NOTES
Subjective   Milagro Sanderson is a 66 y.o. female who presents today for follow up     This provider is located at [facility] at [address] [place] [address]. The provider identified himself as well as his credentials.   The Patient is at home using her phone because problems with video connection. The patient's condition being diagnosed/treated is appropriate for telemedicine. The patient gave consent to be seen remotely, and when consent is given they understand that the consent allows for patient identifiable information to be sent to a third party as needed.   They may refuse to be seen remotely at any time. The electronic data is encrypted and password protected, and the patient has been advised of the potential risks to privacy not withstanding such measures        Chief Complaint: Depression    History of Present Illness: Patient is a 66-year-old  female who presents for follow-up for anxiety and depression.  Patient reports that she is enjoying her mother.  She had both COVID vaccines.  She affectively sounds relatively bright and cheerful.  She states that her anxiety and mood symptoms coming down but overall she seems to be improving.  She does state that mirtazapine helps with her sleep and appetite.  She did try to reduce her Zoloft but her tremor did not improve so she went back to 2 tablets of Zoloft as she found it more beneficial.  Her tremor continues to be symptomatic and it limits her activities and hobbies.  She is working with neurology but so far has not found any relief.  She denies any current medication side effects.  She denies any current issues with sleep or appetite.  Patient denies SI/HI/AVH.    The following portions of the patient's history were reviewed and updated as appropriate: allergies, current medications, past family history, past medical history, past social history, past surgical history and problem list.      Past Medical  History:  Past Medical History:   Diagnosis Date   • Benign familial tremor    • Blood in urine    • Body piercing     EARS   • Depression    • Eczema    • Elevated LDL cholesterol level    • Fever blister    • History of fracture     ARM AT AGE 4 - PATIENT REPORTS SHE DOES NOT REMEMBER LATERALITY   • History of migraine    • History of tremor    • Onychomycosis    • Problems with swallowing     REPORTS SHE FEELS LIKE PILLS ARE GETTING STUCK IN A POCKET IN HER THROAT WHEN SHE TRIES TO SWALLOW THEM   • Sebaceous cyst    • Vitamin D deficiency    • Wears glasses        Social History:  Social History     Socioeconomic History   • Marital status: Single     Spouse name: Not on file   • Number of children: Not on file   • Years of education: Not on file   • Highest education level: Not on file   Tobacco Use   • Smoking status: Never Smoker   • Smokeless tobacco: Never Used   Vaping Use   • Vaping Use: Never used   Substance and Sexual Activity   • Alcohol use: No   • Drug use: No   • Sexual activity: Defer       Family History:  Family History   Problem Relation Age of Onset   • Heart attack Mother    • Hypertension Mother    • Anxiety disorder Mother    • Depression Mother    • Heart attack Other    • Kidney disease Other    • Bipolar disorder Father    • Diabetes Paternal Aunt    • Diabetes Paternal Uncle    • Seizures Maternal Aunt    • Colon cancer Neg Hx    • Cirrhosis Neg Hx    • Liver disease Neg Hx    • Liver cancer Neg Hx    • Crohn's disease Neg Hx    • Ulcerative colitis Neg Hx    • Esophageal cancer Neg Hx    • Stomach cancer Neg Hx    • ADD / ADHD Neg Hx    • Alcohol abuse Neg Hx    • Dementia Neg Hx    • Drug abuse Neg Hx    • OCD Neg Hx    • Paranoid behavior Neg Hx    • Schizophrenia Neg Hx    • Self-Injurious Behavior  Neg Hx    • Suicide Attempts Neg Hx        Past Surgical History:  Past Surgical History:   Procedure Laterality Date   • APPENDECTOMY  2012   • APPENDECTOMY     • BREAST BIOPSY Bilateral  "   • BREAST CYST ASPIRATION     • BREAST SURGERY Bilateral     history of mammatone left and right breast   • COLONOSCOPY     • CYST REMOVAL      SEBACEOUS CYST X3 FROM HEAD   • ENDOSCOPY N/A 2/7/2019    Procedure: ESOPHAGOGASTRODUODENOSCOPY W/ BIOPSIES;  Surgeon: Simba Rogers MD;  Location: [facility] ENDOSCOPY;  Service: Gastroenterology   • WISDOM TOOTH EXTRACTION      EXTRACTION X2       Problem List:  Patient Active Problem List   Diagnosis   • Episode of recurrent major depressive disorder (CMS/HCC)   • Eczema   • Vitamin B12 deficiency   • Vitamin D deficiency   • Hereditary essential tremor   • Onychomycosis   • Recurrent cold sores   • GERD with esophagitis   • High risk medication use       Allergy:   Allergies   Allergen Reactions   • Hazelnut Anaphylaxis   • Macrobid [Nitrofurantoin Monohyd Macro] Other (See Comments)     REPORTS \"I FELL AND IT WAS LIKE I COULDN'T MOVE\"           Current Medications:   Current Outpatient Medications   Medication Sig Dispense Refill   • Ascorbic Acid (VITAMIN C) 500 MG capsule Take 500 mg by mouth Every Other Day.     • calcium (OS-SANTHOSH) 600 MG tablet Take 600 mg by mouth 1 (One) Time Per Week.     • Cholecalciferol (VITAMIN D-3 PO) Take 2,000 Units by mouth Daily.     • magnesium chloride ER 64 MG DR tablet Take 250 mg by mouth Every 14 (Fourteen) Days.     • mirtazapine (REMERON) 7.5 MG tablet Take 1 tablet by mouth Every Night. 90 tablet 0   • Multiple Vitamins-Minerals (CENTRUM SILVER ULTRA WOMENS PO) Take 1 tablet by mouth Every Other Day.     • Omega-3 Fatty Acids (FISH OIL) 1000 MG capsule capsule Take 1,200 mg by mouth Daily With Breakfast.     • primidone (MYSOLINE) 50 MG tablet TAKE 2 AND 1/2 TABLETS BY MOUTH THREE TIMES A DAY (Patient taking differently: 250 mg. TAKE  1/2 TABLETS BY MOUTH THREE TIMES A DAY) 225 tablet 1   • propranolol (INDERAL) 20 MG tablet Take 1 tablet by mouth 3 (Three) Times a Day. For tremor. Monitor blood pressure 270 tablet 1   • " sertraline (ZOLOFT) 100 MG tablet Take 2 tablets by mouth Daily. 180 tablet 0     No current facility-administered medications for this visit.       Review of Symptoms:    Review of Systems   Constitutional: Positive for activity change (improved) and appetite change (improved). Negative for chills, diaphoresis, fatigue, fever and unexpected weight loss.   HENT: Negative.    Eyes: Negative.    Respiratory: Negative.    Cardiovascular: Negative.    Gastrointestinal: Negative.    Endocrine: Negative.    Genitourinary: Negative.    Musculoskeletal: Negative.    Skin: Negative.    Allergic/Immunologic: Negative.    Neurological: Positive for tremors.   Hematological: Negative.    Psychiatric/Behavioral: Positive for dysphoric mood and stress. Negative for agitation, decreased concentration, sleep disturbance (improved), suicidal ideas and depressed mood. The patient is nervous/anxious.          Physical Exam:   not currently breastfeeding.  Unable to assess, telephone visit    Appearance: Unable to assess, telephone visit  Gait, Station, Strength: Unable to assess, telephone visit    Mental Status Exam:   Hygiene:   Unable to assess, telephone visit  Cooperation:  Cooperative  Eye Contact:  Unable to assess, telephone visit  Psychomotor Behavior:  Unable to assess, telephone visit  Affect:  Full range, affect is bright   Mood: normal, improved  Hopelessness: Denies  Speech:  Normal  Thought Process:  Goal directed and Linear  Thought Content:  Normal  Suicidal:  None  Homicidal:  None  Hallucinations:  None  Delusion:  None  Memory:  Intact  Orientation:  Person, Place, Time and Situation  Reliability:  good  Insight:  Fair  Judgement:  Good  Impulse Control:  Good  Physical/Medical Issues:  No        Lab Results:   No visits with results within 1 Month(s) from this visit.   Latest known visit with results is:   Orders Only on 07/27/2020   Component Date Value Ref Range Status   • Primidone Level 07/27/2020 11.4  5.0 -  12.0 ug/mL Final    Comment:                                 Detection Limit = 0.3                           <0.3 indicates None Detected     • Phenobarbital 07/27/2020 15  15 - 40 ug/mL Final                                    Detection Limit = 3       Assessment/Plan   Diagnoses and all orders for this visit:    1. Mild episode of recurrent major depressive disorder (CMS/HCC)  -     mirtazapine (REMERON) 7.5 MG tablet; Take 1 tablet by mouth Every Night.  Dispense: 90 tablet; Refill: 0  -     sertraline (ZOLOFT) 100 MG tablet; Take 2 tablets by mouth Daily.  Dispense: 180 tablet; Refill: 0    2. Generalized anxiety disorder  -     mirtazapine (REMERON) 7.5 MG tablet; Take 1 tablet by mouth Every Night.  Dispense: 90 tablet; Refill: 0  -     sertraline (ZOLOFT) 100 MG tablet; Take 2 tablets by mouth Daily.  Dispense: 180 tablet; Refill: 0    3. Phase of life problem in adult  -     mirtazapine (REMERON) 7.5 MG tablet; Take 1 tablet by mouth Every Night.  Dispense: 90 tablet; Refill: 0  -     sertraline (ZOLOFT) 100 MG tablet; Take 2 tablets by mouth Daily.  Dispense: 180 tablet; Refill: 0    4. Other insomnia  -     mirtazapine (REMERON) 7.5 MG tablet; Take 1 tablet by mouth Every Night.  Dispense: 90 tablet; Refill: 0    -Patient overall showing continued and sustained improvements.  She tends to have some mild anxiety and mood symptoms that continue to fluctuate.  -Reviewed previous documentation  -Reviewed most recent available labs  -Continue Zoloft 200mg PO daily for mood and anxiety  -Continue mirtazapine 7.5 mg p.o. nightly for mood, anxiety, insomnia, and appetite   -Encouraged patient to continue with therapy for phase of life issues and depression  -Encouraged to follow-up with neurology for tremor  -Consider low-dose Ritalin for refractory depression and should patient continue to have vegetative symptoms though these have largely improved  -Approximate appointment time 9:20 AM to 9:40 AM via telephone  visit due to technical difficulty with video visit      Visit Diagnoses:    ICD-10-CM ICD-9-CM   1. Mild episode of recurrent major depressive disorder (CMS/HCC)  F33.0 296.31   2. Generalized anxiety disorder  F41.1 300.02   3. Phase of life problem in adult  Z60.0 V62.89   4. Other insomnia  G47.09 780.52       TREATMENT PLAN/GOALS: Continue supportive psychotherapy efforts and medications as indicated. Treatment and medication options discussed during today's visit. Patient acknowledged and verbally consented to continue with current treatment plan and was educated on the importance of compliance with treatment and follow-up appointments.    MEDICATION ISSUES:    Discussed medication options and treatment plan of prescribed medication as well as the risks, benefits, and side effects including potential falls, possible impaired driving and metabolic adversities among others. Patient is agreeable to call the office with any worsening of symptoms or onset of side effects. Patient is agreeable to call 911 or go to the nearest ER should he/she begin having SI/HI.     MEDS ORDERED DURING VISIT:  New Medications Ordered This Visit   Medications   • mirtazapine (REMERON) 7.5 MG tablet     Sig: Take 1 tablet by mouth Every Night.     Dispense:  90 tablet     Refill:  0     Don't fill until due or patient calls per her request.   • sertraline (ZOLOFT) 100 MG tablet     Sig: Take 2 tablets by mouth Daily.     Dispense:  180 tablet     Refill:  0       Return in about 3 months (around 7/2/2021).             This document has been electronically signed by Des Elizondo MD  April 15, 2021 12:15 EDT

## 2021-06-21 ENCOUNTER — TRANSCRIBE ORDERS (OUTPATIENT)
Dept: ADMINISTRATIVE | Facility: HOSPITAL | Age: 66
End: 2021-06-21

## 2021-06-21 DIAGNOSIS — Z12.31 VISIT FOR SCREENING MAMMOGRAM: Primary | ICD-10-CM

## 2021-06-25 ENCOUNTER — OFFICE VISIT (OUTPATIENT)
Dept: PSYCHIATRY | Facility: CLINIC | Age: 66
End: 2021-06-25

## 2021-06-25 DIAGNOSIS — G25.0 HEREDITARY ESSENTIAL TREMOR: ICD-10-CM

## 2021-06-25 DIAGNOSIS — F41.1 GENERALIZED ANXIETY DISORDER: ICD-10-CM

## 2021-06-25 DIAGNOSIS — F33.0 MILD EPISODE OF RECURRENT MAJOR DEPRESSIVE DISORDER (HCC): Primary | ICD-10-CM

## 2021-06-25 DIAGNOSIS — Z60.0 PHASE OF LIFE PROBLEM IN ADULT: ICD-10-CM

## 2021-06-25 DIAGNOSIS — G47.09 OTHER INSOMNIA: ICD-10-CM

## 2021-06-25 PROCEDURE — 99214 OFFICE O/P EST MOD 30 MIN: CPT | Performed by: PSYCHIATRY & NEUROLOGY

## 2021-06-25 SDOH — SOCIAL STABILITY - SOCIAL INSECURITY: PROBLEMS OF ADJUSTMENT TO LIFE-CYCLE TRANSITIONS: Z60.0

## 2021-06-28 RX ORDER — MIRTAZAPINE 7.5 MG/1
7.5 TABLET, FILM COATED ORAL NIGHTLY
Qty: 90 TABLET | Refills: 0 | Status: SHIPPED | OUTPATIENT
Start: 2021-06-28 | End: 2021-10-01 | Stop reason: SDUPTHER

## 2021-06-28 NOTE — PROGRESS NOTES
Subjective   Milagro Sanderson is a 66 y.o. female who presents today for follow up     This provider is located at [facility] at [address] [place] [address]. The provider identified himself as well as his credentials.   The Patient is at home using her phone because problems with video connection. The patient's condition being diagnosed/treated is appropriate for telemedicine. The patient gave consent to be seen remotely, and when consent is given they understand that the consent allows for patient identifiable information to be sent to a third party as needed.   They may refuse to be seen remotely at any time. The electronic data is encrypted and password protected, and the patient has been advised of the potential risks to privacy not withstanding such measures        Chief Complaint: Depression    History of Present Illness: Patient reports that she remains relatively stable at baseline.  She states that she has been getting out of the house a little bit more and going outside more often.  She is having a slightly increased number of good days relative to bad days but she did have a close friend passed away recently which has been difficult.  She recently started Topamax for her tremor and now states that food tastes bad which can be a side effect of Topamax.  She does not feel her tremor is marginally improved.  She denies any other medication side effects.  She denies SI/HI/AVH.    The following portions of the patient's history were reviewed and updated as appropriate: allergies, current medications, past family history, past medical history, past social history, past surgical history and problem list.      Past Medical History:  Past Medical History:   Diagnosis Date   • Benign familial tremor    • Blood in urine    • Body piercing     EARS   • Depression    • Eczema    • Elevated LDL cholesterol level    • Fever blister    • History of fracture     ARM AT AGE 4 - PATIENT  REPORTS SHE DOES NOT REMEMBER LATERALITY   • History of migraine    • History of tremor    • Onychomycosis    • Problems with swallowing     REPORTS SHE FEELS LIKE PILLS ARE GETTING STUCK IN A POCKET IN HER THROAT WHEN SHE TRIES TO SWALLOW THEM   • Sebaceous cyst    • Vitamin D deficiency    • Wears glasses        Social History:  Social History     Socioeconomic History   • Marital status: Single     Spouse name: Not on file   • Number of children: Not on file   • Years of education: Not on file   • Highest education level: Not on file   Tobacco Use   • Smoking status: Never Smoker   • Smokeless tobacco: Never Used   Vaping Use   • Vaping Use: Never used   Substance and Sexual Activity   • Alcohol use: No   • Drug use: No   • Sexual activity: Defer       Family History:  Family History   Problem Relation Age of Onset   • Heart attack Mother    • Hypertension Mother    • Anxiety disorder Mother    • Depression Mother    • Heart attack Other    • Kidney disease Other    • Bipolar disorder Father    • Diabetes Paternal Aunt    • Diabetes Paternal Uncle    • Seizures Maternal Aunt    • Colon cancer Neg Hx    • Cirrhosis Neg Hx    • Liver disease Neg Hx    • Liver cancer Neg Hx    • Crohn's disease Neg Hx    • Ulcerative colitis Neg Hx    • Esophageal cancer Neg Hx    • Stomach cancer Neg Hx    • ADD / ADHD Neg Hx    • Alcohol abuse Neg Hx    • Dementia Neg Hx    • Drug abuse Neg Hx    • OCD Neg Hx    • Paranoid behavior Neg Hx    • Schizophrenia Neg Hx    • Self-Injurious Behavior  Neg Hx    • Suicide Attempts Neg Hx        Past Surgical History:  Past Surgical History:   Procedure Laterality Date   • APPENDECTOMY  2012   • APPENDECTOMY     • BREAST BIOPSY Bilateral    • BREAST CYST ASPIRATION     • BREAST SURGERY Bilateral     history of mammatone left and right breast   • COLONOSCOPY     • CYST REMOVAL      SEBACEOUS CYST X3 FROM HEAD   • ENDOSCOPY N/A 2/7/2019    Procedure: ESOPHAGOGASTRODUODENOSCOPY W/ BIOPSIES;   "Surgeon: Simba Rogers MD;  Location: [facility] ENDOSCOPY;  Service: Gastroenterology   • WISDOM TOOTH EXTRACTION      EXTRACTION X2       Problem List:  Patient Active Problem List   Diagnosis   • Episode of recurrent major depressive disorder (CMS/HCC)   • Eczema   • Vitamin B12 deficiency   • Vitamin D deficiency   • Hereditary essential tremor   • Onychomycosis   • Recurrent cold sores   • GERD with esophagitis   • High risk medication use       Allergy:   Allergies   Allergen Reactions   • Hazelnut Anaphylaxis   • Macrobid [Nitrofurantoin Monohyd Macro] Other (See Comments)     REPORTS \"I FELL AND IT WAS LIKE I COULDN'T MOVE\"           Current Medications:   Current Outpatient Medications   Medication Sig Dispense Refill   • Ascorbic Acid (VITAMIN C) 500 MG capsule Take 500 mg by mouth Every Other Day.     • calcium (OS-SANTHOSH) 600 MG tablet Take 600 mg by mouth 1 (One) Time Per Week.     • Cholecalciferol (VITAMIN D-3 PO) Take 2,000 Units by mouth Daily.     • magnesium chloride ER 64 MG DR tablet Take 250 mg by mouth Every 14 (Fourteen) Days.     • mirtazapine (REMERON) 7.5 MG tablet Take 1 tablet by mouth Every Night. 90 tablet 0   • Multiple Vitamins-Minerals (CENTRUM SILVER ULTRA WOMENS PO) Take 1 tablet by mouth Every Other Day.     • Omega-3 Fatty Acids (FISH OIL) 1000 MG capsule capsule Take 1,200 mg by mouth Daily With Breakfast.     • primidone (MYSOLINE) 50 MG tablet TAKE 2 AND 1/2 TABLETS BY MOUTH THREE TIMES A DAY (Patient taking differently: 250 mg. TAKE  1/2 TABLETS BY MOUTH THREE TIMES A DAY) 225 tablet 1   • propranolol (INDERAL) 20 MG tablet Take 1 tablet by mouth 3 (Three) Times a Day. For tremor. Monitor blood pressure 270 tablet 1   • sertraline (ZOLOFT) 100 MG tablet Take 2 tablets by mouth Daily. 180 tablet 0     No current facility-administered medications for this visit.       Review of Symptoms:    Review of Systems   Constitutional: Positive for activity change (improved) and appetite " change (improved). Negative for chills, diaphoresis, fatigue, fever and unexpected weight loss.   HENT: Negative.    Eyes: Negative.    Respiratory: Negative.    Cardiovascular: Negative.    Gastrointestinal: Negative.    Endocrine: Negative.    Genitourinary: Negative.    Musculoskeletal: Negative.    Skin: Negative.    Allergic/Immunologic: Negative.    Neurological: Positive for tremors.   Hematological: Negative.    Psychiatric/Behavioral: Positive for stress. Negative for agitation, decreased concentration, dysphoric mood, sleep disturbance (improved), suicidal ideas and depressed mood. The patient is nervous/anxious.          Physical Exam:   not currently breastfeeding.  Unable to assess, telephone visit    Appearance: Unable to assess, telephone visit  Gait, Station, Strength: Unable to assess, telephone visit    Mental Status Exam:     Mental Status exam performed 06/25/2021  and patient shows no significant changes from previous exam.     Hygiene:   Unable to assess, telephone visit  Cooperation:  Cooperative  Eye Contact:  Unable to assess, telephone visit  Psychomotor Behavior:  Unable to assess, telephone visit  Affect:  Full range, affect is bright   Mood: normal, improved  Hopelessness: Denies  Speech:  Normal  Thought Process:  Goal directed and Linear  Thought Content:  Normal  Suicidal:  None  Homicidal:  None  Hallucinations:  None  Delusion:  None  Memory:  Intact  Orientation:  Person, Place, Time and Situation  Reliability:  good  Insight:  Fair  Judgement:  Good  Impulse Control:  Good  Physical/Medical Issues:  No        Lab Results:   No visits with results within 1 Month(s) from this visit.   Latest known visit with results is:   Orders Only on 07/27/2020   Component Date Value Ref Range Status   • Primidone Level 07/27/2020 11.4  5.0 - 12.0 ug/mL Final    Comment:                                 Detection Limit = 0.3                           <0.3 indicates None Detected     •  Phenobarbital 07/27/2020 15  15 - 40 ug/mL Final                                    Detection Limit = 3       Assessment/Plan   Diagnoses and all orders for this visit:    1. Mild episode of recurrent major depressive disorder (CMS/HCC) (Primary)  -     mirtazapine (REMERON) 7.5 MG tablet; Take 1 tablet by mouth Every Night.  Dispense: 90 tablet; Refill: 0    2. Hereditary essential tremor  Comments:  continue propranolol 20mg TID. increase primidone. consider movement specialist consult. psych component?    3. Generalized anxiety disorder  -     mirtazapine (REMERON) 7.5 MG tablet; Take 1 tablet by mouth Every Night.  Dispense: 90 tablet; Refill: 0    4. Phase of life problem in adult  -     mirtazapine (REMERON) 7.5 MG tablet; Take 1 tablet by mouth Every Night.  Dispense: 90 tablet; Refill: 0    5. Other insomnia  -     mirtazapine (REMERON) 7.5 MG tablet; Take 1 tablet by mouth Every Night.  Dispense: 90 tablet; Refill: 0    -Patient having some continued mild improvements.  She reports she is having more good days and bad days but continues to have anxiety and mood issues at times.  She is relatively stable.  -Reviewed previous documentation  -Reviewed most recent available labs  -Continue Zoloft 200mg PO daily for mood and anxiety  -Continue mirtazapine 7.5 mg p.o. nightly for mood, anxiety, insomnia, and appetite   -Encouraged patient to continue with therapy for phase of life issues and depression  -Encouraged to follow-up with neurology for tremor  -Consider low-dose Ritalin for refractory depression and should patient continue to have vegetative symptoms though these have largely improved  -Approximate appointment time 9:15 AM to 9:30 AM via telephone visit      Visit Diagnoses:    ICD-10-CM ICD-9-CM   1. Hereditary essential tremor  G25.0 333.1   2. Mild episode of recurrent major depressive disorder (CMS/HCC)  F33.0 296.31   3. Generalized anxiety disorder  F41.1 300.02   4. Phase of life problem in  adult  Z60.0 V62.89   5. Other insomnia  G47.09 780.52       TREATMENT PLAN/GOALS: Continue supportive psychotherapy efforts and medications as indicated. Treatment and medication options discussed during today's visit. Patient acknowledged and verbally consented to continue with current treatment plan and was educated on the importance of compliance with treatment and follow-up appointments.    MEDICATION ISSUES:    Discussed medication options and treatment plan of prescribed medication as well as the risks, benefits, and side effects including potential falls, possible impaired driving and metabolic adversities among others. Patient is agreeable to call the office with any worsening of symptoms or onset of side effects. Patient is agreeable to call 911 or go to the nearest ER should he/she begin having SI/HI.     MEDS ORDERED DURING VISIT:  New Medications Ordered This Visit   Medications   • mirtazapine (REMERON) 7.5 MG tablet     Sig: Take 1 tablet by mouth Every Night.     Dispense:  90 tablet     Refill:  0     Don't fill until due or patient calls per her request.       Return in about 3 months (around 9/25/2021).             This document has been electronically signed by Des Elizondo MD  June 28, 2021 09:10 EDT

## 2021-08-04 ENCOUNTER — APPOINTMENT (OUTPATIENT)
Dept: MAMMOGRAPHY | Facility: HOSPITAL | Age: 66
End: 2021-08-04

## 2021-08-06 ENCOUNTER — OFFICE VISIT (OUTPATIENT)
Dept: INTERNAL MEDICINE | Facility: CLINIC | Age: 66
End: 2021-08-06

## 2021-08-06 ENCOUNTER — TELEPHONE (OUTPATIENT)
Dept: INTERNAL MEDICINE | Facility: CLINIC | Age: 66
End: 2021-08-06

## 2021-08-06 VITALS
TEMPERATURE: 98.4 F | WEIGHT: 146.5 LBS | OXYGEN SATURATION: 96 % | HEIGHT: 67 IN | SYSTOLIC BLOOD PRESSURE: 100 MMHG | HEART RATE: 68 BPM | BODY MASS INDEX: 22.99 KG/M2 | DIASTOLIC BLOOD PRESSURE: 60 MMHG | RESPIRATION RATE: 18 BRPM

## 2021-08-06 DIAGNOSIS — R26.81 UNSTEADINESS: ICD-10-CM

## 2021-08-06 DIAGNOSIS — R79.9 ABNORMAL BLOOD CHEMISTRY: ICD-10-CM

## 2021-08-06 DIAGNOSIS — K21.00 GASTROESOPHAGEAL REFLUX DISEASE WITH ESOPHAGITIS, UNSPECIFIED WHETHER HEMORRHAGE: ICD-10-CM

## 2021-08-06 DIAGNOSIS — G25.0 HEREDITARY ESSENTIAL TREMOR: ICD-10-CM

## 2021-08-06 DIAGNOSIS — Z13.6 SCREENING FOR CARDIOVASCULAR CONDITION: ICD-10-CM

## 2021-08-06 DIAGNOSIS — M25.512 ACUTE PAIN OF LEFT SHOULDER: ICD-10-CM

## 2021-08-06 DIAGNOSIS — F33.9 EPISODE OF RECURRENT MAJOR DEPRESSIVE DISORDER, UNSPECIFIED DEPRESSION EPISODE SEVERITY (HCC): ICD-10-CM

## 2021-08-06 DIAGNOSIS — R33.9 URINARY RETENTION: ICD-10-CM

## 2021-08-06 DIAGNOSIS — Z00.00 MEDICARE ANNUAL WELLNESS VISIT, SUBSEQUENT: Primary | ICD-10-CM

## 2021-08-06 DIAGNOSIS — Z91.81 AT MODERATE RISK FOR FALL: ICD-10-CM

## 2021-08-06 DIAGNOSIS — E55.9 VITAMIN D DEFICIENCY: ICD-10-CM

## 2021-08-06 DIAGNOSIS — E53.8 VITAMIN B12 DEFICIENCY: ICD-10-CM

## 2021-08-06 PROCEDURE — G0439 PPPS, SUBSEQ VISIT: HCPCS | Performed by: FAMILY MEDICINE

## 2021-08-06 PROCEDURE — 1159F MED LIST DOCD IN RCRD: CPT | Performed by: FAMILY MEDICINE

## 2021-08-06 PROCEDURE — 99397 PER PM REEVAL EST PAT 65+ YR: CPT | Performed by: FAMILY MEDICINE

## 2021-08-06 PROCEDURE — 99213 OFFICE O/P EST LOW 20 MIN: CPT | Performed by: FAMILY MEDICINE

## 2021-08-06 PROCEDURE — 1170F FXNL STATUS ASSESSED: CPT | Performed by: FAMILY MEDICINE

## 2021-08-06 RX ORDER — TOPIRAMATE 50 MG/1
TABLET, FILM COATED ORAL
COMMUNITY
Start: 2021-05-11 | End: 2021-11-05

## 2021-08-06 RX ORDER — PRIMIDONE 250 MG/1
TABLET ORAL
COMMUNITY
Start: 2021-07-08

## 2021-08-06 NOTE — TELEPHONE ENCOUNTER
Patient has had visit with Dr. Praful Massey at  neurology. I've looked in care everywhere and in our system, I cannot find the note. Can you call and get that record?

## 2021-08-06 NOTE — PROGRESS NOTES
"The ABCs of the Annual Wellness Visit  Subsequent Medicare Wellness Visit    Chief Complaint   Patient presents with   • Medicare Wellness-subsequent     Also c/o very weak urine stream. Sometimes takes 10 minutes just to \"Dribble a little\". Admits she does not eat or drink near enough water.    • Fall     Pt has fallen several times since starting Topamax. She also states food now taste bad and she does not want to eat anything. She has not told the prescriber about this yet because she does not see him again until the end of this month.        Subjective   History of Present Illness:  Milagro Sanderson is a 66 y.o. female who presents for a Subsequent Medicare Wellness Visit.    Feels medicine may be doing something to her. Tongue feels coated, fuzzy. Trouble with urination. Everything tastes abnormal. Clumbsy, running into things, has fallen twice. Body feels like lead. everything seemed to start with Topamax, was written on 5/11/21 per bottle, written by Dr. Massey ( neurology).    Recently turned to catch herself when she was dizzy and has had pain in anterior left shoulder since that time about 3 weeks ago.    Remeron helps with her appetite. Sleeps wonderfully.       HEALTH RISK ASSESSMENT    Recent Hospitalizations:  No hospitalization(s) within the last year.    Current Medical Providers:  Patient Care Team:  Mae Valle MD as PCP - General (Family Medicine)  Praful Massey MD as Consulting Physician (Neurology)  Des Elizondo MD as Consulting Physician (Psychiatry)    Smoking Status:  Social History     Tobacco Use   Smoking Status Never Smoker   Smokeless Tobacco Never Used       Alcohol Consumption:  Social History     Substance and Sexual Activity   Alcohol Use No       Depression Screen:   PHQ-2/PHQ-9 Depression Screening 8/6/2021   Little interest or pleasure in doing things 0   Feeling down, depressed, or hopeless 1   Trouble falling or staying asleep, or sleeping too much - "   Feeling tired or having little energy -   Poor appetite or overeating -   Feeling bad about yourself - or that you are a failure or have let yourself or your family down -   Trouble concentrating on things, such as reading the newspaper or watching television -   Moving or speaking so slowly that other people could have noticed. Or the opposite - being so fidgety or restless that you have been moving around a lot more than usual -   Thoughts that you would be better off dead, or of hurting yourself in some way -   Total Score 1   If you checked off any problems, how difficult have these problems made it for you to do your work, take care of things at home, or get along with other people? -       Fall Risk Screen:  JOANNA Fall Risk Assessment was completed, and patient is at HIGH risk for falls. Assessment completed on:8/6/2021    Health Habits and Functional and Cognitive Screening:  Functional & Cognitive Status 8/6/2021   Do you have difficulty preparing food and eating? No   Do you have difficulty bathing yourself, getting dressed or grooming yourself? Yes   Do you have difficulty using the toilet? Yes   Do you have difficulty moving around from place to place? No   Do you have trouble with steps or getting out of a bed or a chair? No   Current Diet Unhealthy Diet   Dental Exam Up to date   Eye Exam Up to date   Exercise (times per week) 0 times per week   Current Exercises Include No Regular Exercise   Current Exercise Activities Include -   Do you need help using the phone?  No   Are you deaf or do you have serious difficulty hearing?  No   Do you need help with transportation? No   Do you need help shopping? No   Do you need help preparing meals?  No   Do you need help with housework?  No   Do you need help with laundry? No   Do you need help taking your medications? No   Do you need help managing money? No   Do you ever drive or ride in a car without wearing a seat belt? No   Have you felt unusual stress,  anger or loneliness in the last month? Yes   Who do you live with? Alone   If you need help, do you have trouble finding someone available to you? No   Have you been bothered in the last four weeks by sexual problems? No   Do you have difficulty concentrating, remembering or making decisions? Yes         Does the patient have evidence of cognitive impairment? unclear--requesting  neurology note, suspect at this time patient is having some adverse effects from Topamax    Asprin use counseling:Does not need ASA (and currently is not on it)    Age-appropriate Screening Schedule:  Refer to the list below for future screening recommendations based on patient's age, sex and/or medical conditions. Orders for these recommended tests are listed in the plan section. The patient has been provided with a written plan.    Health Maintenance   Topic Date Due   • DXA SCAN  09/01/2021 (Originally 1955)   • TDAP/TD VACCINES (1 - Tdap) 01/01/2022 (Originally 1/24/1974)   • INFLUENZA VACCINE  10/01/2021   • MAMMOGRAM  07/30/2022   • ZOSTER VACCINE  Completed   • PAP SMEAR  Discontinued          The following portions of the patient's history were reviewed and updated as appropriate: allergies, current medications, past family history, past medical history, past social history, past surgical history and problem list.    Outpatient Medications Prior to Visit   Medication Sig Dispense Refill   • Ascorbic Acid (VITAMIN C) 500 MG capsule Take 500 mg by mouth Every Other Day.     • calcium (OS-SANTHOSH) 600 MG tablet Take 600 mg by mouth 1 (One) Time Per Week.     • Cholecalciferol (VITAMIN D-3 PO) Take 2,000 Units by mouth Daily.     • magnesium chloride ER 64 MG DR tablet Take 250 mg by mouth Every 14 (Fourteen) Days.     • mirtazapine (REMERON) 7.5 MG tablet Take 1 tablet by mouth Every Night. 90 tablet 0   • Multiple Vitamins-Minerals (CENTRUM SILVER ULTRA WOMENS PO) Take 1 tablet by mouth Every Other Day.     • Omega-3 Fatty Acids  (FISH OIL) 1000 MG capsule capsule Take 1,200 mg by mouth Daily With Breakfast.     • primidone (MYSOLINE) 250 MG tablet      • propranolol (INDERAL) 20 MG tablet Take 1 tablet by mouth 3 (Three) Times a Day. For tremor. Monitor blood pressure 270 tablet 1   • sertraline (ZOLOFT) 100 MG tablet Take 2 tablets by mouth Daily. 180 tablet 0   • topiramate (TOPAMAX) 50 MG tablet      • primidone (MYSOLINE) 50 MG tablet TAKE 2 AND 1/2 TABLETS BY MOUTH THREE TIMES A DAY (Patient taking differently: 250 mg. TAKE  1/2 TABLETS BY MOUTH THREE TIMES A DAY) 225 tablet 1     No facility-administered medications prior to visit.       Patient Active Problem List   Diagnosis   • Episode of recurrent major depressive disorder (CMS/HCC)   • Eczema   • Vitamin B12 deficiency   • Vitamin D deficiency   • Hereditary essential tremor   • Onychomycosis   • Recurrent cold sores   • GERD with esophagitis   • High risk medication use       Advanced Care Planning:  ACP discussion was held with the patient during this visit. Patient does not have an advance directive, declines further assistance.    Review of Systems   Constitutional: Negative for fever.   Genitourinary: Positive for difficulty urinating. Negative for dysuria and enuresis.   Musculoskeletal: Positive for arthralgias and gait problem.   Neurological: Positive for dizziness, weakness and light-headedness.   Psychiatric/Behavioral: Positive for confusion.   All other systems reviewed and are negative.      Compared to one year ago, the patient feels her physical health is worse.  Compared to one year ago, the patient feels her mental health is the same.    Reviewed chart for potential of high risk medication in the elderly: yes  Reviewed chart for potential of harmful drug interactions in the elderly:yes    Objective         Vitals:    08/06/21 1330   BP: 100/60   Pulse: 68   Resp: 18   Temp: 98.4 °F (36.9 °C)   TempSrc: Temporal   SpO2: 96%   Weight: 66.5 kg (146 lb 8 oz)  "  Height: 170.2 cm (67.01\")   PainSc: 0-No pain       Body mass index is 22.94 kg/m².  Discussed the patient's BMI with her. The BMI is in the acceptable range.    Physical Exam  Vitals and nursing note reviewed.   Constitutional:       General: She is not in acute distress.     Appearance: Normal appearance. She is well-developed, well-groomed and normal weight. She is not ill-appearing, toxic-appearing or diaphoretic.      Interventions: Face mask in place.   HENT:      Head: Normocephalic and atraumatic. Hair is normal.      Right Ear: Hearing, tympanic membrane, ear canal and external ear normal.      Left Ear: Hearing, tympanic membrane, ear canal and external ear normal.   Eyes:      General: Lids are normal. Gaze aligned appropriately. No scleral icterus.        Right eye: No discharge.         Left eye: No discharge.      Extraocular Movements: Extraocular movements intact.      Conjunctiva/sclera: Conjunctivae normal.      Pupils: Pupils are equal, round, and reactive to light.   Neck:      Thyroid: No thyromegaly.      Trachea: Trachea and phonation normal. No tracheal deviation.   Cardiovascular:      Rate and Rhythm: Normal rate and regular rhythm.      Heart sounds: Normal heart sounds. No murmur heard.   No friction rub. No gallop.    Pulmonary:      Effort: Pulmonary effort is normal.      Breath sounds: Normal breath sounds and air entry.   Abdominal:      General: Bowel sounds are normal. There is no distension.      Palpations: Abdomen is soft. Abdomen is not rigid. There is no mass.      Tenderness: There is no abdominal tenderness. There is no guarding or rebound.   Musculoskeletal:         General: No deformity.      Left shoulder: Tenderness (ac joint) present. Normal range of motion.      Cervical back: Neck supple.      Right lower leg: No edema.      Left lower leg: No edema.   Skin:     General: Skin is warm.      Capillary Refill: Capillary refill takes less than 2 seconds.      " Coloration: Skin is not cyanotic, jaundiced or pale.      Findings: No erythema or rash.      Nails: There is no clubbing.   Neurological:      General: No focal deficit present.      Mental Status: She is alert and oriented to person, place, and time.      Cranial Nerves: No cranial nerve deficit.      Motor: Tremor present. No atrophy, abnormal muscle tone or seizure activity.      Gait: Gait is intact. Gait normal.   Psychiatric:         Attention and Perception: Attention and perception normal.         Mood and Affect: Affect normal. Mood is anxious.         Speech: Speech is delayed.         Behavior: Behavior normal. Behavior is cooperative.         Thought Content: Thought content normal.         Judgment: Judgment normal.               Assessment/Plan   Medicare Risks and Personalized Health Plan  CMS Preventative Services Quick Reference  Advance Directive Discussion  Dementia/Memory   Depression/Dysphoria  Fall Risk  Polypharmacy    The above risks/problems have been discussed with the patient.  Pertinent information has been shared with the patient in the After Visit Summary.  Follow up plans and orders are seen below in the Assessment/Plan Section.    Diagnoses and all orders for this visit:    1. Medicare annual wellness visit, subsequent (Primary)    2. Episode of recurrent major depressive disorder, unspecified depression episode severity (CMS/[facility])  Comments:  follow up with psychiatry  Orders:  -     TSH+Free T4    3. Hereditary essential tremor  Comments:  requesting records from , Dr. Massey  Orders:  -     TSH+Free T4    4. Urinary retention  Comments:  possible AE of topamax. cut dose down to half tablet bid, follow up with     5. Unsteadiness  Comments:  possible AE of topamax. cut back to half tablet bid and follow up with     6. Gastroesophageal reflux disease with esophagitis, unspecified whether hemorrhage  -     Vitamin B12  -     Comprehensive Metabolic Panel    7. Acute pain of left  shoulder  -     Diclofenac Sodium (VOLTAREN) 1 % gel gel; Apply 4 g topically to the appropriate area as directed 4 (Four) Times a Day As Needed (joint pain, max 32 grams/day).  Dispense: 350 g; Refill: 11    8. Vitamin D deficiency  -     Vitamin D 25 Hydroxy  -     Comprehensive Metabolic Panel    9. Vitamin B12 deficiency  -     Vitamin B12  -     Folate  -     Methylmalonic Acid, Serum  -     CBC (No Diff)    10. Abnormal blood chemistry  -     Hemoglobin A1c    11. Screening for cardiovascular condition  -     Lipid Panel  -     TSH+Free T4    12. At moderate risk for fall      Follow Up:  Return in about 3 months (around 11/6/2021) for follow up.     An After Visit Summary and PPPS were given to the patient.

## 2021-08-06 NOTE — PATIENT INSTRUCTIONS

## 2021-08-09 NOTE — TELEPHONE ENCOUNTER
There are notes in media from Feb 2021 with Dr. Massey. Was she seen another time that we do not have notes from?

## 2021-08-09 NOTE — TELEPHONE ENCOUNTER
Thank you for finding that. She was put on Topamax by that clinic (not Dr. Massey she says but another provider who works with him) in May. That's the medicine she was having issues with she feels. The February note says to follow up in in six months, which would be this month. If she's not set up with his clinic for follow up this month she needs to call them.

## 2021-08-10 LAB
25(OH)D3+25(OH)D2 SERPL-MCNC: 18.9 NG/ML (ref 30–100)
ALBUMIN SERPL-MCNC: 4.7 G/DL (ref 3.5–5.2)
ALBUMIN/GLOB SERPL: 2 G/DL
ALP SERPL-CCNC: 114 U/L (ref 39–117)
ALT SERPL-CCNC: 31 U/L (ref 1–33)
AST SERPL-CCNC: 24 U/L (ref 1–32)
BILIRUB SERPL-MCNC: 0.2 MG/DL (ref 0–1.2)
BUN SERPL-MCNC: 13 MG/DL (ref 8–23)
BUN/CREAT SERPL: 17.6 (ref 7–25)
CALCIUM SERPL-MCNC: 9.8 MG/DL (ref 8.6–10.5)
CHLORIDE SERPL-SCNC: 105 MMOL/L (ref 98–107)
CHOLEST SERPL-MCNC: 166 MG/DL (ref 0–200)
CO2 SERPL-SCNC: 26.1 MMOL/L (ref 22–29)
CREAT SERPL-MCNC: 0.74 MG/DL (ref 0.57–1)
ERYTHROCYTE [DISTWIDTH] IN BLOOD BY AUTOMATED COUNT: 12.7 % (ref 12.3–15.4)
FOLATE SERPL-MCNC: 4.75 NG/ML (ref 4.78–24.2)
GLOBULIN SER CALC-MCNC: 2.4 GM/DL
GLUCOSE SERPL-MCNC: 87 MG/DL (ref 65–99)
HBA1C MFR BLD: 5.1 % (ref 4.8–5.6)
HCT VFR BLD AUTO: 42 % (ref 34–46.6)
HDLC SERPL-MCNC: 44 MG/DL (ref 40–60)
HGB BLD-MCNC: 14.4 G/DL (ref 12–15.9)
LDLC SERPL CALC-MCNC: 93 MG/DL (ref 0–100)
Lab: NORMAL
MCH RBC QN AUTO: 32.1 PG (ref 26.6–33)
MCHC RBC AUTO-ENTMCNC: 34.3 G/DL (ref 31.5–35.7)
MCV RBC AUTO: 93.8 FL (ref 79–97)
METHYLMALONATE SERPL-SCNC: 287 NMOL/L (ref 0–378)
PLATELET # BLD AUTO: 233 10*3/MM3 (ref 140–450)
POTASSIUM SERPL-SCNC: 4.9 MMOL/L (ref 3.5–5.2)
PROT SERPL-MCNC: 7.1 G/DL (ref 6–8.5)
RBC # BLD AUTO: 4.48 10*6/MM3 (ref 3.77–5.28)
SODIUM SERPL-SCNC: 138 MMOL/L (ref 136–145)
T4 FREE SERPL-MCNC: 0.8 NG/DL (ref 0.93–1.7)
TRIGL SERPL-MCNC: 170 MG/DL (ref 0–150)
TSH SERPL DL<=0.005 MIU/L-ACNC: 3.22 UIU/ML (ref 0.27–4.2)
VIT B12 SERPL-MCNC: 540 PG/ML (ref 211–946)
VLDLC SERPL CALC-MCNC: 29 MG/DL (ref 5–40)
WBC # BLD AUTO: 4.29 10*3/MM3 (ref 3.4–10.8)

## 2021-08-10 NOTE — TELEPHONE ENCOUNTER
Spoke with pt and she have a follow up appt with Dr. Massey at the end of this month. She will keep that appt and talk about her medications at that time.

## 2021-08-11 DIAGNOSIS — E55.9 VITAMIN D DEFICIENCY: Primary | ICD-10-CM

## 2021-08-11 RX ORDER — CHOLECALCIFEROL (VITAMIN D3) 1250 MCG
50000 CAPSULE ORAL
Qty: 12 CAPSULE | Refills: 1 | Status: SHIPPED | OUTPATIENT
Start: 2021-08-11 | End: 2022-03-02

## 2021-08-11 RX ORDER — FOLIC ACID 1 MG/1
1 TABLET ORAL DAILY
Qty: 90 TABLET | Refills: 3 | Status: SHIPPED | OUTPATIENT
Start: 2021-08-11

## 2021-08-11 NOTE — PROGRESS NOTES
Please let her know one thyroid lab is off slightly, I don't recommend putting her on thyroid med right now as that could worsen tremor. Vitamin D low. Sent once a week supplement to help this, take with food. Hold daily supplement while taking. Folic acid low, this can lead to nerve pain, memory problems. If possible suggest L-methylfolate over the counter, but if cannot find can take folic acid I sent to pharmacy. I prefer L-methlylfolate as it is better absorbed but it can be more expensive as Rx compared to over the counter. We will need to recheck these labs next visit. She should mention low folic acid to [facility].

## 2021-10-01 ENCOUNTER — TELEMEDICINE (OUTPATIENT)
Dept: PSYCHIATRY | Facility: CLINIC | Age: 66
End: 2021-10-01

## 2021-10-01 DIAGNOSIS — G47.09 OTHER INSOMNIA: ICD-10-CM

## 2021-10-01 DIAGNOSIS — Z60.0 PHASE OF LIFE PROBLEM IN ADULT: ICD-10-CM

## 2021-10-01 DIAGNOSIS — F41.1 GENERALIZED ANXIETY DISORDER: ICD-10-CM

## 2021-10-01 DIAGNOSIS — F33.0 MILD EPISODE OF RECURRENT MAJOR DEPRESSIVE DISORDER (HCC): ICD-10-CM

## 2021-10-01 PROCEDURE — 99214 OFFICE O/P EST MOD 30 MIN: CPT | Performed by: PSYCHIATRY & NEUROLOGY

## 2021-10-01 RX ORDER — MIRTAZAPINE 7.5 MG/1
7.5 TABLET, FILM COATED ORAL NIGHTLY
Qty: 90 TABLET | Refills: 0 | Status: SHIPPED | OUTPATIENT
Start: 2021-10-01 | End: 2021-12-10 | Stop reason: SDUPTHER

## 2021-10-01 RX ORDER — SERTRALINE HYDROCHLORIDE 100 MG/1
200 TABLET, FILM COATED ORAL DAILY
Qty: 180 TABLET | Refills: 0 | Status: SHIPPED | OUTPATIENT
Start: 2021-10-01 | End: 2021-11-17 | Stop reason: SDUPTHER

## 2021-10-01 SDOH — SOCIAL STABILITY - SOCIAL INSECURITY: PROBLEMS OF ADJUSTMENT TO LIFE-CYCLE TRANSITIONS: Z60.0

## 2021-10-01 NOTE — PROGRESS NOTES
Subjective   Milagro Sanderson is a 66 y.o. female who presents today for follow up     This provider is located at [facility] at [address] [place] [address]. The provider identified himself as well as his credentials.   The Patient is at home using her phone because problems with video connection. The patient's condition being diagnosed/treated is appropriate for telemedicine. The patient gave consent to be seen remotely, and when consent is given they understand that the consent allows for patient identifiable information to be sent to a third party as needed.   They may refuse to be seen remotely at any time. The electronic data is encrypted and password protected, and the patient has been advised of the potential risks to privacy not withstanding such measures        Chief Complaint: Depression    History of Present Illness: Patient reports that recently she has been feeling depressed and anxious and has not noted any major change in mood or anxiety symptoms since we adjusted her medications.  It has been difficult for her this week because she has been sick with a stomach ache and abdominal cramping with vomiting.  She is starting to feel better but is still fatigued.  She is found to be low on folate and vitamin D by her primary care provider and was started on supplementation.  We discussed treatment options and consider starting a stimulant for refractory depression in the elderly but patient would like to hold off on any major changes at this time.  She reports that she was running low on Zoloft so she had reduced to 100 mg p.o. daily and is unsure how long she has been on the reduced dose.  She cannot remember how her mood was at the higher dose.  We will return to the higher dose of Zoloft and evaluate response and consider a stimulant should she continue to have vegetative depressive symptoms at that time.  She denies SI/HI/AVH.    The following portions of the patient's  history were reviewed and updated as appropriate: allergies, current medications, past family history, past medical history, past social history, past surgical history and problem list.      Past Medical History:  Past Medical History:   Diagnosis Date   • Benign familial tremor    • Blood in urine    • Body piercing     EARS   • Depression    • Eczema    • Elevated LDL cholesterol level    • Fever blister    • History of fracture     ARM AT AGE 4 - PATIENT REPORTS SHE DOES NOT REMEMBER LATERALITY   • History of migraine    • History of tremor    • Onychomycosis    • Problems with swallowing     REPORTS SHE FEELS LIKE PILLS ARE GETTING STUCK IN A POCKET IN HER THROAT WHEN SHE TRIES TO SWALLOW THEM   • Sebaceous cyst    • Vitamin D deficiency    • Wears glasses        Social History:  Social History     Socioeconomic History   • Marital status: Single     Spouse name: Not on file   • Number of children: Not on file   • Years of education: Not on file   • Highest education level: Not on file   Tobacco Use   • Smoking status: Never Smoker   • Smokeless tobacco: Never Used   Vaping Use   • Vaping Use: Never used   Substance and Sexual Activity   • Alcohol use: No   • Drug use: No   • Sexual activity: Defer       Family History:  Family History   Problem Relation Age of Onset   • Heart attack Mother    • Hypertension Mother    • Anxiety disorder Mother    • Depression Mother    • Heart attack Other    • Kidney disease Other    • Bipolar disorder Father    • Diabetes Paternal Aunt    • Diabetes Paternal Uncle    • Seizures Maternal Aunt    • Colon cancer Neg Hx    • Cirrhosis Neg Hx    • Liver disease Neg Hx    • Liver cancer Neg Hx    • Crohn's disease Neg Hx    • Ulcerative colitis Neg Hx    • Esophageal cancer Neg Hx    • Stomach cancer Neg Hx    • ADD / ADHD Neg Hx    • Alcohol abuse Neg Hx    • Dementia Neg Hx    • Drug abuse Neg Hx    • OCD Neg Hx    • Paranoid behavior Neg Hx    • Schizophrenia Neg Hx    •  "Self-Injurious Behavior  Neg Hx    • Suicide Attempts Neg Hx        Past Surgical History:  Past Surgical History:   Procedure Laterality Date   • APPENDECTOMY  2012   • APPENDECTOMY     • BREAST BIOPSY Bilateral    • BREAST CYST ASPIRATION     • BREAST SURGERY Bilateral     history of mammatone left and right breast   • COLONOSCOPY     • CYST REMOVAL      SEBACEOUS CYST X3 FROM HEAD   • ENDOSCOPY N/A 2/7/2019    Procedure: ESOPHAGOGASTRODUODENOSCOPY W/ BIOPSIES;  Surgeon: Simba Rogers MD;  Location: [facility];  Service: Gastroenterology   • WISDOM TOOTH EXTRACTION      EXTRACTION X2       Problem List:  Patient Active Problem List   Diagnosis   • Episode of recurrent major depressive disorder (HCC)   • Eczema   • Vitamin B12 deficiency   • Vitamin D deficiency   • Hereditary essential tremor   • Onychomycosis   • Recurrent cold sores   • GERD with esophagitis   • High risk medication use       Allergy:   Allergies   Allergen Reactions   • Hazelnut Anaphylaxis   • Macrobid [Nitrofurantoin Monohyd Macro] Other (See Comments)     REPORTS \"I FELL AND IT WAS LIKE I COULDN'T MOVE\"           Current Medications:   Current Outpatient Medications   Medication Sig Dispense Refill   • Ascorbic Acid (VITAMIN C) 500 MG capsule Take 500 mg by mouth Every Other Day.     • calcium (OS-SANTHOSH) 600 MG tablet Take 600 mg by mouth 1 (One) Time Per Week.     • Cholecalciferol (VITAMIN D-3 PO) Take 2,000 Units by mouth Daily.     • Cholecalciferol (Vitamin D3) 1.25 MG (89455 UT) capsule Take 1 capsule by mouth Every 7 (Seven) Days. Indications: Vitamin D Deficiency 12 capsule 1   • Diclofenac Sodium (VOLTAREN) 1 % gel gel Apply 4 g topically to the appropriate area as directed 4 (Four) Times a Day As Needed (joint pain, max 32 grams/day). 350 g 11   • folic acid (FOLVITE) 1 MG tablet Take 1 tablet by mouth Daily. Indications: folic acid deficiency 90 tablet 3   • magnesium chloride ER 64 MG DR tablet Take 250 mg by mouth Every 14 " (Fourteen) Days.     • mirtazapine (REMERON) 7.5 MG tablet Take 1 tablet by mouth Every Night. 90 tablet 0   • Multiple Vitamins-Minerals (CENTRUM SILVER ULTRA WOMENS PO) Take 1 tablet by mouth Every Other Day.     • Omega-3 Fatty Acids (FISH OIL) 1000 MG capsule capsule Take 1,200 mg by mouth Daily With Breakfast.     • primidone (MYSOLINE) 250 MG tablet      • propranolol (INDERAL) 20 MG tablet Take 1 tablet by mouth 3 (Three) Times a Day. For tremor. Monitor blood pressure 270 tablet 1   • sertraline (ZOLOFT) 100 MG tablet Take 2 tablets by mouth Daily. 180 tablet 0   • topiramate (TOPAMAX) 50 MG tablet        No current facility-administered medications for this visit.       Review of Symptoms:    Review of Systems   Constitutional: Positive for activity change (improved), appetite change (improved) and fatigue. Negative for chills, diaphoresis, fever and unexpected weight loss.   HENT: Negative.    Eyes: Negative.    Respiratory: Negative.    Cardiovascular: Negative.    Gastrointestinal: Positive for abdominal pain, nausea and vomiting.   Endocrine: Negative.    Genitourinary: Negative.    Musculoskeletal: Negative.    Skin: Negative.    Allergic/Immunologic: Negative.    Neurological: Positive for tremors.   Hematological: Negative.    Psychiatric/Behavioral: Positive for depressed mood and stress. Negative for agitation, decreased concentration, dysphoric mood, sleep disturbance (improved) and suicidal ideas. The patient is nervous/anxious.          Physical Exam:   not currently breastfeeding.  Unable to assess, telephone visit    Appearance: Unable to assess, telephone visit  Gait, Station, Strength: Unable to assess, telephone visit    Mental Status Exam:     Hygiene:   Unable to assess, telephone visit  Cooperation:  Cooperative  Eye Contact:  Unable to assess, telephone visit  Psychomotor Behavior:  Unable to assess, telephone visit  Affect:  Restricted  Mood: depressed and anxious  Hopelessness:  Denies  Speech:  Normal  Thought Process:  Goal directed and Linear  Thought Content:  Normal  Suicidal:  None  Homicidal:  None  Hallucinations:  None  Delusion:  None  Memory:  Intact  Orientation:  Person, Place, Time and Situation  Reliability:  good  Insight:  Fair  Judgement:  Good  Impulse Control:  Good  Physical/Medical Issues:  No        Lab Results:   No visits with results within 1 Month(s) from this visit.   Latest known visit with results is:   Office Visit on 08/06/2021   Component Date Value Ref Range Status   • Total Cholesterol 08/06/2021 166  0 - 200 mg/dL Final    Comment: Cholesterol Reference Ranges  (U.S. Department of Health and Human Services ATP III  Classifications)  Desirable          <200 mg/dL  Borderline High    200-239 mg/dL  High Risk          >240 mg/dL  Triglyceride Reference Ranges  (U.S. Department of Health and Human Services ATP III  Classifications)  Normal           <150 mg/dL  Borderline High  150-199 mg/dL  High             200-499 mg/dL  Very High        >500 mg/dL  HDL Reference Ranges  (U.S. Department of Health and Human Services ATP III  Classifcations)  Low     <40 mg/dl (major risk factor for CHD)  High    >60 mg/dl ('negative' risk factor for CHD)  LDL Reference Ranges  (U.S. Department of Health and Human Services ATP III  Classifcations)  Optimal          <100 mg/dL  Near Optimal     100-129 mg/dL  Borderline High  130-159 mg/dL  High             160-189 mg/dL  Very High        >189 mg/dL     • Triglycerides 08/06/2021 170* 0 - 150 mg/dL Final   • HDL Cholesterol 08/06/2021 44  40 - 60 mg/dL Final   • VLDL Cholesterol Lm 08/06/2021 29  5 - 40 mg/dL Final   • LDL Chol Calc (NIH) 08/06/2021 93  0 - 100 mg/dL Final   • TSH 08/06/2021 3.220  0.270 - 4.200 uIU/mL Final   • Free T4 08/06/2021 0.80* 0.93 - 1.70 ng/dL Final    Results may be falsely increased if patient taking Biotin.   • 25 Hydroxy, Vitamin D 08/06/2021 18.9* 30.0 - 100.0 ng/ml Final    Comment: Results  may be falsely increased if patient taking Biotin.  Reference Range for Total Vitamin D 25(OH)  Deficiency <20.0 ng/mL  Insufficiency 21-29 ng/mL  Sufficiency  ng/mL  Toxicity >100 ng/ml     • Vitamin B-12 08/06/2021 540  211 - 946 pg/mL Final    Results may be falsely increased if patient taking Biotin.   • Folate 08/06/2021 4.75* 4.78 - 24.20 ng/mL Final    Results may be falsely increased if patient taking Biotin.   • Methylmalonic Acid 08/06/2021 287  0 - 378 nmol/L Final   • Disclaimer: 08/06/2021 Comment   Final    Comment: This test was developed and its performance characteristics  determined by [facility]. It has not been cleared or approved  by the Food and Drug Administration.     • WBC 08/06/2021 4.29  3.40 - 10.80 10*3/mm3 Final   • RBC 08/06/2021 4.48  3.77 - 5.28 10*6/mm3 Final   • Hemoglobin 08/06/2021 14.4  12.0 - 15.9 g/dL Final   • Hematocrit 08/06/2021 42.0  34.0 - 46.6 % Final   • MCV 08/06/2021 93.8  79.0 - 97.0 fL Final   • MCH 08/06/2021 32.1  26.6 - 33.0 pg Final   • MCHC 08/06/2021 34.3  31.5 - 35.7 g/dL Final   • RDW 08/06/2021 12.7  12.3 - 15.4 % Final   • Platelets 08/06/2021 233  140 - 450 10*3/mm3 Final   • Glucose 08/06/2021 87  65 - 99 mg/dL Final   • BUN 08/06/2021 13  8 - 23 mg/dL Final   • Creatinine 08/06/2021 0.74  0.57 - 1.00 mg/dL Final   • eGFR Non  Am 08/06/2021 78  >60 mL/min/1.73 Final    Comment: GFR Normal >60  Chronic Kidney Disease <60  Kidney Failure <15     • eGFR  Am 08/06/2021 95  >60 mL/min/1.73 Final   • BUN/Creatinine Ratio 08/06/2021 17.6  7.0 - 25.0 Final   • Sodium 08/06/2021 138  136 - 145 mmol/L Final   • Potassium 08/06/2021 4.9  3.5 - 5.2 mmol/L Final   • Chloride 08/06/2021 105  98 - 107 mmol/L Final   • Total CO2 08/06/2021 26.1  22.0 - 29.0 mmol/L Final   • Calcium 08/06/2021 9.8  8.6 - 10.5 mg/dL Final   • Total Protein 08/06/2021 7.1  6.0 - 8.5 g/dL Final   • Albumin 08/06/2021 4.70  3.50 - 5.20 g/dL Final   • Globulin 08/06/2021 2.4   gm/dL Final   • A/G Ratio 08/06/2021 2.0  g/dL Final   • Total Bilirubin 08/06/2021 0.2  0.0 - 1.2 mg/dL Final   • Alkaline Phosphatase 08/06/2021 114  39 - 117 U/L Final   • AST (SGOT) 08/06/2021 24  1 - 32 U/L Final   • ALT (SGPT) 08/06/2021 31  1 - 33 U/L Final   • Hemoglobin A1C 08/06/2021 5.10  4.80 - 5.60 % Final    Comment: Hemoglobin A1C Ranges:  Increased Risk for Diabetes  5.7% to 6.4%  Diabetes                     >= 6.5%  Diabetic Goal                < 7.0%         Assessment/Plan   Diagnoses and all orders for this visit:    1. Mild episode of recurrent major depressive disorder (HCC)  -     mirtazapine (REMERON) 7.5 MG tablet; Take 1 tablet by mouth Every Night.  Dispense: 90 tablet; Refill: 0  -     sertraline (ZOLOFT) 100 MG tablet; Take 2 tablets by mouth Daily.  Dispense: 180 tablet; Refill: 0    2. Generalized anxiety disorder  -     mirtazapine (REMERON) 7.5 MG tablet; Take 1 tablet by mouth Every Night.  Dispense: 90 tablet; Refill: 0  -     sertraline (ZOLOFT) 100 MG tablet; Take 2 tablets by mouth Daily.  Dispense: 180 tablet; Refill: 0    3. Phase of life problem in adult  -     mirtazapine (REMERON) 7.5 MG tablet; Take 1 tablet by mouth Every Night.  Dispense: 90 tablet; Refill: 0  -     sertraline (ZOLOFT) 100 MG tablet; Take 2 tablets by mouth Daily.  Dispense: 180 tablet; Refill: 0    4. Other insomnia  -     mirtazapine (REMERON) 7.5 MG tablet; Take 1 tablet by mouth Every Night.  Dispense: 90 tablet; Refill: 0    -Patient having some worsening depressive and anxious symptoms.  She has been ill which complicates the picture but she reports that before that she did not notice any improvement in symptoms.  At last visit she is doing relatively well and she reports today that she has not been taking the full dose of Zoloft which may be contributing.  We will revert to the full dose that we had prescribed before and consider a stimulant in the future for refractory depression in the elderly  should this not improve her symptoms.  -Reviewed previous documentation  -Reviewed most recent available labs  -Continue Zoloft 200mg PO daily for mood and anxiety: Patient has only been taking 100 mg p.o. daily  -Continue mirtazapine 7.5 mg p.o. nightly for mood, anxiety, insomnia, and appetite   -Encouraged patient to continue with therapy for phase of life issues and depression  -Encouraged to follow-up with neurology for tremor  -Consider low-dose Ritalin for refractory depression and should patient continue to have vegetative symptoms though these have largely improved  -Approximate appointment time 8:30 AM to 9 AM via telephone visit      Visit Diagnoses:    ICD-10-CM ICD-9-CM   1. Mild episode of recurrent major depressive disorder (HCC)  F33.0 296.31   2. Generalized anxiety disorder  F41.1 300.02   3. Phase of life problem in adult  Z60.0 V62.89   4. Other insomnia  G47.09 780.52       TREATMENT PLAN/GOALS: Continue supportive psychotherapy efforts and medications as indicated. Treatment and medication options discussed during today's visit. Patient acknowledged and verbally consented to continue with current treatment plan and was educated on the importance of compliance with treatment and follow-up appointments.    MEDICATION ISSUES:    Discussed medication options and treatment plan of prescribed medication as well as the risks, benefits, and side effects including potential falls, possible impaired driving and metabolic adversities among others. Patient is agreeable to call the office with any worsening of symptoms or onset of side effects. Patient is agreeable to call 911 or go to the nearest ER should he/she begin having SI/HI.     MEDS ORDERED DURING VISIT:  New Medications Ordered This Visit   Medications   • mirtazapine (REMERON) 7.5 MG tablet     Sig: Take 1 tablet by mouth Every Night.     Dispense:  90 tablet     Refill:  0     Don't fill until due or patient calls per her request.   •  sertraline (ZOLOFT) 100 MG tablet     Sig: Take 2 tablets by mouth Daily.     Dispense:  180 tablet     Refill:  0       Return in about 4 weeks (around 10/29/2021).             This document has been electronically signed by Des Elizondo MD  October 1, 2021 14:04 EDT

## 2021-10-04 ENCOUNTER — HOSPITAL ENCOUNTER (EMERGENCY)
Facility: HOSPITAL | Age: 66
Discharge: HOME | End: 2021-10-04
Attending: EMERGENCY MEDICINE | Admitting: EMERGENCY MEDICINE

## 2021-10-04 ENCOUNTER — TELEPHONE (OUTPATIENT)
Dept: INTERNAL MEDICINE | Facility: CLINIC | Age: 66
End: 2021-10-04

## 2021-10-04 ENCOUNTER — HOSPITAL ENCOUNTER (OUTPATIENT)
Dept: CT IMAGING | Facility: HOSPITAL | Age: 66
Discharge: HOME | End: 2021-10-04
Admitting: FAMILY MEDICINE

## 2021-10-04 ENCOUNTER — OFFICE VISIT (OUTPATIENT)
Dept: INTERNAL MEDICINE | Facility: CLINIC | Age: 66
End: 2021-10-04

## 2021-10-04 VITALS
HEIGHT: 67 IN | TEMPERATURE: 98.7 F | SYSTOLIC BLOOD PRESSURE: 118 MMHG | WEIGHT: 141.4 LBS | BODY MASS INDEX: 22.19 KG/M2 | DIASTOLIC BLOOD PRESSURE: 70 MMHG

## 2021-10-04 VITALS
HEART RATE: 88 BPM | WEIGHT: 141 LBS | HEIGHT: 67 IN | RESPIRATION RATE: 18 BRPM | BODY MASS INDEX: 22.13 KG/M2 | TEMPERATURE: 99.2 F | SYSTOLIC BLOOD PRESSURE: 129 MMHG | DIASTOLIC BLOOD PRESSURE: 63 MMHG | OXYGEN SATURATION: 99 %

## 2021-10-04 DIAGNOSIS — R10.32 LLQ ABDOMINAL PAIN: Primary | ICD-10-CM

## 2021-10-04 DIAGNOSIS — K92.1 BLOODY STOOL: ICD-10-CM

## 2021-10-04 DIAGNOSIS — R11.2 NAUSEA AND VOMITING, INTRACTABILITY OF VOMITING NOT SPECIFIED, UNSPECIFIED VOMITING TYPE: ICD-10-CM

## 2021-10-04 DIAGNOSIS — N20.0 KIDNEY STONE: Primary | ICD-10-CM

## 2021-10-04 DIAGNOSIS — R30.0 DYSURIA: ICD-10-CM

## 2021-10-04 DIAGNOSIS — N39.0 COMPLICATED UTI (URINARY TRACT INFECTION): ICD-10-CM

## 2021-10-04 DIAGNOSIS — R10.32 LLQ ABDOMINAL PAIN: ICD-10-CM

## 2021-10-04 LAB
ALBUMIN SERPL-MCNC: 4.7 G/DL (ref 3.5–5.2)
ALBUMIN/GLOB SERPL: 1.9 G/DL
ALP SERPL-CCNC: 103 U/L (ref 39–117)
ALT SERPL W P-5'-P-CCNC: 21 U/L (ref 1–33)
ANION GAP SERPL CALCULATED.3IONS-SCNC: 14.1 MMOL/L (ref 5–15)
AST SERPL-CCNC: 19 U/L (ref 1–32)
BACTERIA UR QL AUTO: ABNORMAL /HPF
BASOPHILS # BLD AUTO: 0.03 10*3/MM3 (ref 0–0.2)
BASOPHILS NFR BLD AUTO: 0.3 % (ref 0–1.5)
BILIRUB BLD-MCNC: ABNORMAL MG/DL
BILIRUB SERPL-MCNC: 0.5 MG/DL (ref 0–1.2)
BILIRUB UR QL STRIP: NEGATIVE
BUN SERPL-MCNC: 16 MG/DL (ref 8–23)
BUN/CREAT SERPL: 15.1 (ref 7–25)
CALCIUM SPEC-SCNC: 9.9 MG/DL (ref 8.6–10.5)
CHLORIDE SERPL-SCNC: 102 MMOL/L (ref 98–107)
CLARITY UR: ABNORMAL
CLARITY, POC: CLEAR
CO2 SERPL-SCNC: 23.9 MMOL/L (ref 22–29)
COD CRY URNS QL: ABNORMAL /HPF
COLOR UR: ABNORMAL
COLOR UR: YELLOW
CREAT SERPL-MCNC: 1.06 MG/DL (ref 0.57–1)
DEPRECATED RDW RBC AUTO: 41.7 FL (ref 37–54)
EOSINOPHIL # BLD AUTO: 0.03 10*3/MM3 (ref 0–0.4)
EOSINOPHIL NFR BLD AUTO: 0.3 % (ref 0.3–6.2)
ERYTHROCYTE [DISTWIDTH] IN BLOOD BY AUTOMATED COUNT: 12 % (ref 12.3–15.4)
GFR SERPL CREATININE-BSD FRML MDRD: 52 ML/MIN/1.73
GLOBULIN UR ELPH-MCNC: 2.5 GM/DL
GLUCOSE SERPL-MCNC: 111 MG/DL (ref 65–99)
GLUCOSE UR STRIP-MCNC: NEGATIVE MG/DL
GLUCOSE UR STRIP-MCNC: NEGATIVE MG/DL
HCT VFR BLD AUTO: 43.2 % (ref 34–46.6)
HGB BLD-MCNC: 14.8 G/DL (ref 12–15.9)
HGB UR QL STRIP.AUTO: ABNORMAL
HOLD SPECIMEN: NORMAL
HOLD SPECIMEN: NORMAL
HYALINE CASTS UR QL AUTO: ABNORMAL /LPF
IMM GRANULOCYTES # BLD AUTO: 0.05 10*3/MM3 (ref 0–0.05)
IMM GRANULOCYTES NFR BLD AUTO: 0.6 % (ref 0–0.5)
KETONES UR QL STRIP: ABNORMAL
KETONES UR QL: NEGATIVE
LEUKOCYTE EST, POC: NEGATIVE
LEUKOCYTE ESTERASE UR QL STRIP.AUTO: NEGATIVE
LIPASE SERPL-CCNC: 23 U/L (ref 13–60)
LYMPHOCYTES # BLD AUTO: 0.74 10*3/MM3 (ref 0.7–3.1)
LYMPHOCYTES NFR BLD AUTO: 8.1 % (ref 19.6–45.3)
MCH RBC QN AUTO: 32.4 PG (ref 26.6–33)
MCHC RBC AUTO-ENTMCNC: 34.3 G/DL (ref 31.5–35.7)
MCV RBC AUTO: 94.5 FL (ref 79–97)
MONOCYTES # BLD AUTO: 1.09 10*3/MM3 (ref 0.1–0.9)
MONOCYTES NFR BLD AUTO: 12 % (ref 5–12)
NEUTROPHILS NFR BLD AUTO: 7.15 10*3/MM3 (ref 1.7–7)
NEUTROPHILS NFR BLD AUTO: 78.7 % (ref 42.7–76)
NITRITE UR QL STRIP: NEGATIVE
NITRITE UR-MCNC: NEGATIVE MG/ML
NRBC BLD AUTO-RTO: 0 /100 WBC (ref 0–0.2)
PH UR STRIP.AUTO: <=5 [PH] (ref 5–8)
PH UR: 6 [PH] (ref 5–8)
PLATELET # BLD AUTO: 252 10*3/MM3 (ref 140–450)
PMV BLD AUTO: 10.4 FL (ref 6–12)
POTASSIUM SERPL-SCNC: 4.2 MMOL/L (ref 3.5–5.2)
PROT SERPL-MCNC: 7.2 G/DL (ref 6–8.5)
PROT UR QL STRIP: ABNORMAL
PROT UR STRIP-MCNC: ABNORMAL MG/DL
RBC # BLD AUTO: 4.57 10*6/MM3 (ref 3.77–5.28)
RBC # UR STRIP: ABNORMAL /UL
RBC # UR: ABNORMAL /HPF
REF LAB TEST METHOD: ABNORMAL
SODIUM SERPL-SCNC: 140 MMOL/L (ref 136–145)
SP GR UR STRIP: 1.02 (ref 1–1.03)
SP GR UR: 1.03 (ref 1–1.03)
SQUAMOUS #/AREA URNS HPF: ABNORMAL /HPF
UROBILINOGEN UR QL STRIP: ABNORMAL
UROBILINOGEN UR QL: NORMAL
WBC # BLD AUTO: 9.09 10*3/MM3 (ref 3.4–10.8)
WBC UR QL AUTO: ABNORMAL /HPF
WHOLE BLOOD HOLD SPECIMEN: NORMAL
WHOLE BLOOD HOLD SPECIMEN: NORMAL

## 2021-10-04 PROCEDURE — 83690 ASSAY OF LIPASE: CPT | Performed by: EMERGENCY MEDICINE

## 2021-10-04 PROCEDURE — 81001 URINALYSIS AUTO W/SCOPE: CPT | Performed by: EMERGENCY MEDICINE

## 2021-10-04 PROCEDURE — 25010000002 MORPHINE PER 10 MG: Performed by: EMERGENCY MEDICINE

## 2021-10-04 PROCEDURE — 99283 EMERGENCY DEPT VISIT LOW MDM: CPT

## 2021-10-04 PROCEDURE — 85025 COMPLETE CBC W/AUTO DIFF WBC: CPT

## 2021-10-04 PROCEDURE — 81003 URINALYSIS AUTO W/O SCOPE: CPT | Performed by: FAMILY MEDICINE

## 2021-10-04 PROCEDURE — 80053 COMPREHEN METABOLIC PANEL: CPT | Performed by: EMERGENCY MEDICINE

## 2021-10-04 PROCEDURE — 96375 TX/PRO/DX INJ NEW DRUG ADDON: CPT

## 2021-10-04 PROCEDURE — 74176 CT ABD & PELVIS W/O CONTRAST: CPT

## 2021-10-04 PROCEDURE — 25010000002 ONDANSETRON PER 1 MG: Performed by: EMERGENCY MEDICINE

## 2021-10-04 PROCEDURE — 96374 THER/PROPH/DIAG INJ IV PUSH: CPT

## 2021-10-04 PROCEDURE — 99214 OFFICE O/P EST MOD 30 MIN: CPT | Performed by: FAMILY MEDICINE

## 2021-10-04 RX ORDER — ONDANSETRON 2 MG/ML
4 INJECTION INTRAMUSCULAR; INTRAVENOUS ONCE
Status: COMPLETED | OUTPATIENT
Start: 2021-10-04 | End: 2021-10-04

## 2021-10-04 RX ORDER — HYDROCODONE BITARTRATE AND ACETAMINOPHEN 5; 325 MG/1; MG/1
1 TABLET ORAL EVERY 6 HOURS PRN
Qty: 12 TABLET | Refills: 0 | Status: SHIPPED | OUTPATIENT
Start: 2021-10-04 | End: 2021-11-09

## 2021-10-04 RX ORDER — MORPHINE SULFATE 4 MG/ML
4 INJECTION, SOLUTION INTRAMUSCULAR; INTRAVENOUS ONCE
Status: COMPLETED | OUTPATIENT
Start: 2021-10-04 | End: 2021-10-04

## 2021-10-04 RX ORDER — CIPROFLOXACIN 500 MG/1
500 TABLET, FILM COATED ORAL EVERY 12 HOURS SCHEDULED
Qty: 14 TABLET | Refills: 0 | Status: SHIPPED | OUTPATIENT
Start: 2021-10-04 | End: 2021-10-11

## 2021-10-04 RX ORDER — SODIUM CHLORIDE 0.9 % (FLUSH) 0.9 %
10 SYRINGE (ML) INJECTION AS NEEDED
Status: DISCONTINUED | OUTPATIENT
Start: 2021-10-04 | End: 2021-10-05 | Stop reason: HOSPADM

## 2021-10-04 RX ORDER — TAMSULOSIN HYDROCHLORIDE 0.4 MG/1
0.4 CAPSULE ORAL ONCE
Status: COMPLETED | OUTPATIENT
Start: 2021-10-04 | End: 2021-10-04

## 2021-10-04 RX ORDER — TAMSULOSIN HYDROCHLORIDE 0.4 MG/1
1 CAPSULE ORAL DAILY
Qty: 14 CAPSULE | Refills: 0 | Status: SHIPPED | OUTPATIENT
Start: 2021-10-04 | End: 2021-11-09

## 2021-10-04 RX ORDER — ONDANSETRON 4 MG/1
4 TABLET, ORALLY DISINTEGRATING ORAL EVERY 6 HOURS PRN
Qty: 10 TABLET | Refills: 0 | Status: SHIPPED | OUTPATIENT
Start: 2021-10-04 | End: 2021-11-09

## 2021-10-04 RX ADMIN — SODIUM CHLORIDE 1000 ML: 9 INJECTION, SOLUTION INTRAVENOUS at 21:30

## 2021-10-04 RX ADMIN — TAMSULOSIN HYDROCHLORIDE 0.4 MG: 0.4 CAPSULE ORAL at 21:35

## 2021-10-04 RX ADMIN — ONDANSETRON 4 MG: 2 INJECTION INTRAMUSCULAR; INTRAVENOUS at 21:30

## 2021-10-04 RX ADMIN — MORPHINE SULFATE 4 MG: 4 INJECTION, SOLUTION INTRAMUSCULAR; INTRAVENOUS at 21:32

## 2021-10-04 NOTE — TELEPHONE ENCOUNTER
Caller: Milagro Sanderson    Relationship to patient: Self    Best call back number: [phone]    Chief complaint: STOMACH PAINS, BLOOD IN STOOL    Type of visit: ACUTE    Requested date: TODAY      Additional notes: PATIENT ONLY WANTS TO SEE DR BOB

## 2021-10-04 NOTE — PROGRESS NOTES
"Arlette Sanderson is a 66 y.o. female here for:  Chief Complaint   Patient presents with   • GI Problem     Got a flu shot on thursday then started having terrible LLQ abdominal pain. She finally threw up and the pain eased up some but still comes and goes. Even when the stomach is not cramping her abdomen is tender to touch. Since friday she has had crackers and water. She did try a donut sunday and that gave her diarrhea. Starting 9-7-21 she had blood in her BM and that kept coming every now and then. However, since this weekend she has blood in every BM she has. Bright red blood.    • Urinary Tract Infection     Yesterday she has some urgency to urinate all day and little bit today.        History per MA reviewed.    Severe pain lower left abdomen  Eating mostly crackers since then  Eating makes bowels move which causes the bleeding  Started bleeding September 2. Off and on with bowel movements. Has had 9 bouts of blood in stool  When she drinks water has to \"pee every 5 min\"  Diarrhea yesterday           The following portions of the patient's history were reviewed and updated as appropriate: allergies, current medications, past family history, past medical history, past social history, past surgical history and problem list.    Review of Systems   Constitutional: Positive for appetite change (no appetite). Negative for fever.   Gastrointestinal: Positive for abdominal pain, blood in stool, diarrhea, nausea and vomiting.         Objective   Visit Vitals  /70   Temp 98.7 °F (37.1 °C) (Temporal)   Ht 170.2 cm (67.01\")   Wt 64.1 kg (141 lb 6.4 oz)   LMP  (LMP Unknown)   BMI 22.14 kg/m²       Physical Exam  Vitals and nursing note reviewed.   Constitutional:       General: She is not in acute distress.     Appearance: Normal appearance. She is well-developed and well-groomed. She is not ill-appearing, toxic-appearing or diaphoretic.      Interventions: Face mask in place.   HENT:      Head: " Normocephalic and atraumatic.      Right Ear: Hearing normal.      Left Ear: Hearing normal.   Eyes:      General: Lids are normal. No scleral icterus.     Extraocular Movements: Extraocular movements intact.   Neck:      Trachea: Phonation normal.   Pulmonary:      Effort: Pulmonary effort is normal.   Abdominal:      General: Abdomen is flat. Bowel sounds are normal. There is no distension.      Palpations: Abdomen is soft. There is no mass.      Tenderness: There is abdominal tenderness in the epigastric area and periumbilical area. There is guarding. There is no rebound.   Musculoskeletal:      Cervical back: Neck supple.   Skin:     Coloration: Skin is not jaundiced or pale.   Neurological:      General: No focal deficit present.      Mental Status: She is alert and oriented to person, place, and time.      Motor: Motor function is intact.   Psychiatric:         Attention and Perception: Attention and perception normal.         Mood and Affect: Affect normal. Mood is anxious.         Speech: Speech normal.         Behavior: Behavior normal. Behavior is cooperative.         Thought Content: Thought content normal.         Cognition and Memory: Cognition and memory normal.         Judgment: Judgment normal.         For medical decision making review of the following was required:  Office Visit on 10/04/2021   Component Date Value Ref Range Status   • Color 10/04/2021 Dark Yellow  Yellow, Straw, Dark Yellow, Isela Final   • Clarity, UA 10/04/2021 Clear  Clear Final   • Specific Gravity  10/04/2021 1.030  1.005 - 1.030 Final   • pH, Urine 10/04/2021 6.0  5.0 - 8.0 Final   • Leukocytes 10/04/2021 Negative  Negative Final   • Nitrite, UA 10/04/2021 Negative  Negative Final   • Protein, POC 10/04/2021 1+* Negative mg/dL Final   • Glucose, UA 10/04/2021 Negative  Negative, 1000 mg/dL (3+) mg/dL Final   • Ketones, UA 10/04/2021 Negative  Negative Final   • Urobilinogen, UA 10/04/2021 Normal  Normal Final   • Bilirubin  "10/04/2021 Small (1+)* Negative Final   • Blood, UA 10/04/2021 1+* Negative Final       Last colonoscopy appears to be 10/27/14 with Dr. Amaral. \"2 hyperplastic-looking polyps in the sigmoid area\"      Assessment/Plan     Problem List Items Addressed This Visit     None      Visit Diagnoses     LLQ abdominal pain    -  Primary    Relevant Orders    CT Abdomen Pelvis Without Contrast    Bloody stool        Relevant Orders    CT Abdomen Pelvis Without Contrast    Nausea and vomiting, intractability of vomiting not specified, unspecified vomiting type        Relevant Orders    CT Abdomen Pelvis Without Contrast    Complicated UTI (urinary tract infection)        Relevant Medications    ciprofloxacin (Cipro) 500 MG tablet    Dysuria        Relevant Orders    POCT urinalysis dipstick, automated (Completed)    Urine Culture - , Urine, Clean Catch          · Covering for urinary tract infection. May need to add flagyl if ct supportive of diverticulitis. Stat ct scan ordered.    17:37 EDT scan report still pending. Discussed with BELEN Latif, who will watch for report.     Mae Valle MD  "

## 2021-10-05 DIAGNOSIS — N13.30 HYDRONEPHROSIS, UNSPECIFIED HYDRONEPHROSIS TYPE: Primary | ICD-10-CM

## 2021-10-05 DIAGNOSIS — N20.0 NEPHROLITHIASIS: ICD-10-CM

## 2021-10-05 NOTE — ED PROVIDER NOTES
"Subjective   66-year-old female presenting with abnormal imaging.  She states that for the last 4 days she has had severe left lower quadrant abdominal pain.  This does not radiate, it is intermittent, there are no alleviating or aggravating or inciting factors.  It is associated with intermittent nausea and one episode of vomiting.  She denies any fevers, chills, hematuria.  Did have some frequency a few days ago that has resolved.  She went to her primary doctor who ordered a CT scan, she was called later and told to come immediately to the emergency department.          Review of Systems   Constitutional: Negative.    HENT: Negative.    Eyes: Negative.    Respiratory: Negative.    Cardiovascular: Negative.    Gastrointestinal: Positive for abdominal pain, nausea and vomiting.   Genitourinary: Positive for frequency. Negative for flank pain.   Musculoskeletal: Negative.    Skin: Negative.    Neurological: Negative.    Psychiatric/Behavioral: Negative.        Past Medical History:   Diagnosis Date   • Benign familial tremor    • Blood in urine    • Body piercing     EARS   • Depression    • Eczema    • Elevated LDL cholesterol level    • Fever blister    • History of fracture     ARM AT AGE 4 - PATIENT REPORTS SHE DOES NOT REMEMBER LATERALITY   • History of migraine    • History of tremor    • Onychomycosis    • Problems with swallowing     REPORTS SHE FEELS LIKE PILLS ARE GETTING STUCK IN A POCKET IN HER THROAT WHEN SHE TRIES TO SWALLOW THEM   • Sebaceous cyst    • Vitamin D deficiency    • Wears glasses        Allergies   Allergen Reactions   • Hazelnut Anaphylaxis   • Macrobid [Nitrofurantoin Monohyd Macro] Other (See Comments)     REPORTS \"I FELL AND IT WAS LIKE I COULDN'T MOVE\"          Past Surgical History:   Procedure Laterality Date   • APPENDECTOMY  2012   • APPENDECTOMY     • BREAST BIOPSY Bilateral    • BREAST CYST ASPIRATION     • BREAST SURGERY Bilateral     history of mammatone left and right breast "   • COLONOSCOPY     • CYST REMOVAL      SEBACEOUS CYST X3 FROM HEAD   • ENDOSCOPY N/A 2/7/2019    Procedure: ESOPHAGOGASTRODUODENOSCOPY W/ BIOPSIES;  Surgeon: Simba Rogers MD;  Location: [facility] ENDOSCOPY;  Service: Gastroenterology   • WISDOM TOOTH EXTRACTION      EXTRACTION X2       Family History   Problem Relation Age of Onset   • Heart attack Mother    • Hypertension Mother    • Anxiety disorder Mother    • Depression Mother    • Heart attack Other    • Kidney disease Other    • Bipolar disorder Father    • Diabetes Paternal Aunt    • Diabetes Paternal Uncle    • Seizures Maternal Aunt    • Colon cancer Neg Hx    • Cirrhosis Neg Hx    • Liver disease Neg Hx    • Liver cancer Neg Hx    • Crohn's disease Neg Hx    • Ulcerative colitis Neg Hx    • Esophageal cancer Neg Hx    • Stomach cancer Neg Hx    • ADD / ADHD Neg Hx    • Alcohol abuse Neg Hx    • Dementia Neg Hx    • Drug abuse Neg Hx    • OCD Neg Hx    • Paranoid behavior Neg Hx    • Schizophrenia Neg Hx    • Self-Injurious Behavior  Neg Hx    • Suicide Attempts Neg Hx        Social History     Socioeconomic History   • Marital status: Single     Spouse name: Not on file   • Number of children: Not on file   • Years of education: Not on file   • Highest education level: Not on file   Tobacco Use   • Smoking status: Never Smoker   • Smokeless tobacco: Never Used   Vaping Use   • Vaping Use: Never used   Substance and Sexual Activity   • Alcohol use: No   • Drug use: No   • Sexual activity: Defer           Objective   Physical Exam  Constitutional:       General: She is not in acute distress.     Appearance: Normal appearance. She is not ill-appearing, toxic-appearing or diaphoretic.   HENT:      Head: Normocephalic and atraumatic.      Right Ear: External ear normal.      Left Ear: External ear normal.      Nose: Nose normal.      Mouth/Throat:      Mouth: Mucous membranes are moist.      Pharynx: Oropharynx is clear.   Eyes:      Extraocular Movements:  Extraocular movements intact.      Conjunctiva/sclera: Conjunctivae normal.      Pupils: Pupils are equal, round, and reactive to light.   Cardiovascular:      Rate and Rhythm: Normal rate and regular rhythm.      Pulses: Normal pulses.      Heart sounds: Normal heart sounds.   Pulmonary:      Effort: Pulmonary effort is normal. No respiratory distress.      Breath sounds: Normal breath sounds.   Abdominal:      General: Bowel sounds are normal. There is no distension.      Comments: No abdominal tenderness, no CVA tenderness   Musculoskeletal:         General: No swelling, tenderness or deformity. Normal range of motion.      Cervical back: Normal range of motion.   Skin:     General: Skin is warm and dry.      Capillary Refill: Capillary refill takes less than 2 seconds.      Findings: No rash.   Neurological:      General: No focal deficit present.      Mental Status: She is alert and oriented to person, place, and time.   Psychiatric:         Mood and Affect: Mood normal.         Behavior: Behavior normal.         Procedures           ED Course                                           MDM  Number of Diagnoses or Management Options  Kidney stone  Diagnosis management comments: 66 year old female with abnormal imaging.  Well-developed, well-nourished lady in no distress with exam as above.  I reviewed the CT scan obtained earlier today, she does have a 3 mm obstructing distal left ureteral stone.  No other acute findings on the scan.  Will treat symptomatically, check labs and urinalysis.  Disposition pending anticipate discharge home.    DDx: Kidney stone, UTI    Urinalysis without evidence of infection, blood work without significant abnormality.  She feels improved.  I do feel she is safe for discharge home.  We discussed outpatient follow-up with urology.  Return precautions discussed.  She is comfortable with and understand the plan.      Final diagnoses:   Kidney stone          John Muller,  MD  10/04/21 [id number]

## 2021-10-05 NOTE — PROGRESS NOTES
Ely contacted patient last night regarding scan result. She went to ER as we instructed and they let her go home. Please let pt know I've placed an urgent urology consult. She needs to stay hydrated, drink plenty of water. Can take the antibiotic as sent earlier yesterday, does not need a second antibiotic.

## 2021-10-06 LAB
BACTERIA UR CULT: NORMAL
BACTERIA UR CULT: NORMAL

## 2021-10-07 ENCOUNTER — OFFICE VISIT (OUTPATIENT)
Dept: UROLOGY | Facility: CLINIC | Age: 66
End: 2021-10-07

## 2021-10-07 VITALS
WEIGHT: 141.09 LBS | HEART RATE: 85 BPM | TEMPERATURE: 97.5 F | OXYGEN SATURATION: 96 % | HEIGHT: 67 IN | DIASTOLIC BLOOD PRESSURE: 72 MMHG | SYSTOLIC BLOOD PRESSURE: 118 MMHG | BODY MASS INDEX: 22.15 KG/M2

## 2021-10-07 DIAGNOSIS — N20.0 KIDNEY STONE: Primary | ICD-10-CM

## 2021-10-07 LAB
BILIRUB BLD-MCNC: NEGATIVE MG/DL
CLARITY, POC: CLEAR
COLOR UR: YELLOW
GLUCOSE UR STRIP-MCNC: NEGATIVE MG/DL
KETONES UR QL: ABNORMAL
LEUKOCYTE EST, POC: NEGATIVE
NITRITE UR-MCNC: NEGATIVE MG/ML
PH UR: 6 [PH] (ref 5–8)
PROT UR STRIP-MCNC: NEGATIVE MG/DL
RBC # UR STRIP: ABNORMAL /UL
SP GR UR: 1.01 (ref 1–1.03)
UROBILINOGEN UR QL: NORMAL

## 2021-10-07 PROCEDURE — 81003 URINALYSIS AUTO W/O SCOPE: CPT | Performed by: UROLOGY

## 2021-10-07 PROCEDURE — 99203 OFFICE O/P NEW LOW 30 MIN: CPT | Performed by: UROLOGY

## 2021-10-07 NOTE — PROGRESS NOTES
Chief Complaint  Kidney Stone    Mae Valle MD    HPI  Ms. Sanderson is a 66 y.o. female who presents for further management of nephrolithiasis.    She presented to ER and was diagnosed with a 3mm left distal ureteral stone. She presents today for follow up.  She is having LLQ pain.  No fever, chills, + nausea, no vomiting.  No dysuria.    Stone related history:  Family history of kidney stones  no  Renal disease or anatomic abnormality: no  Malabsorptive disease or gastric bypass: no  Frequent UTI's    no  Parathyroid disease    no    Dietary Considerations  Soda - 0 per day  Fast food - 0 per week  Water - 3 glasses per day  no Adds salt to foods    Takes topamax and vitamin C    Past Medical History  Past Medical History:   Diagnosis Date   • Benign familial tremor    • Blood in urine    • Body piercing     EARS   • Depression    • Eczema    • Elevated LDL cholesterol level    • Fever blister    • History of fracture     ARM AT AGE 4 - PATIENT REPORTS SHE DOES NOT REMEMBER LATERALITY   • History of migraine    • History of tremor    • Onychomycosis    • Problems with swallowing     REPORTS SHE FEELS LIKE PILLS ARE GETTING STUCK IN A POCKET IN HER THROAT WHEN SHE TRIES TO SWALLOW THEM   • Sebaceous cyst    • Vitamin D deficiency    • Wears glasses        Past Surgical History  Past Surgical History:   Procedure Laterality Date   • APPENDECTOMY  2012   • APPENDECTOMY     • BREAST BIOPSY Bilateral    • BREAST CYST ASPIRATION     • BREAST SURGERY Bilateral     history of mammatone left and right breast   • COLONOSCOPY     • CYST REMOVAL      SEBACEOUS CYST X3 FROM HEAD   • ENDOSCOPY N/A 2/7/2019    Procedure: ESOPHAGOGASTRODUODENOSCOPY W/ BIOPSIES;  Surgeon: Simba Rogers MD;  Location: [facility] ENDOSCOPY;  Service: Gastroenterology   • WISDOM TOOTH EXTRACTION      EXTRACTION X2       Medications    Current Outpatient Medications:   •  Ascorbic Acid (VITAMIN C) 500 MG capsule, Take 500 mg by mouth Every Other  Day., Disp: , Rfl:   •  calcium (OS-SANTHOSH) 600 MG tablet, Take 600 mg by mouth 1 (One) Time Per Week., Disp: , Rfl:   •  Cholecalciferol (Vitamin D3) 1.25 MG (97247 UT) capsule, Take 1 capsule by mouth Every 7 (Seven) Days. Indications: Vitamin D Deficiency, Disp: 12 capsule, Rfl: 1  •  ciprofloxacin (Cipro) 500 MG tablet, Take 1 tablet by mouth Every 12 (Twelve) Hours for 7 days., Disp: 14 tablet, Rfl: 0  •  folic acid (FOLVITE) 1 MG tablet, Take 1 tablet by mouth Daily. Indications: folic acid deficiency, Disp: 90 tablet, Rfl: 3  •  HYDROcodone-acetaminophen (NORCO) 5-325 MG per tablet, Take 1 tablet by mouth Every 6 (Six) Hours As Needed for Severe Pain ., Disp: 12 tablet, Rfl: 0  •  magnesium chloride ER 64 MG DR tablet, Take 250 mg by mouth Every 14 (Fourteen) Days., Disp: , Rfl:   •  mirtazapine (REMERON) 7.5 MG tablet, Take 1 tablet by mouth Every Night., Disp: 90 tablet, Rfl: 0  •  ondansetron ODT (ZOFRAN-ODT) 4 MG disintegrating tablet, Place 1 tablet under the tongue Every 6 (Six) Hours As Needed for Nausea or Vomiting., Disp: 10 tablet, Rfl: 0  •  primidone (MYSOLINE) 250 MG tablet, , Disp: , Rfl:   •  propranolol (INDERAL) 20 MG tablet, Take 1 tablet by mouth 3 (Three) Times a Day. For tremor. Monitor blood pressure, Disp: 270 tablet, Rfl: 1  •  sertraline (ZOLOFT) 100 MG tablet, Take 2 tablets by mouth Daily., Disp: 180 tablet, Rfl: 0  •  tamsulosin (FLOMAX) 0.4 MG capsule 24 hr capsule, Take 1 capsule by mouth Daily., Disp: 14 capsule, Rfl: 0  •  topiramate (TOPAMAX) 50 MG tablet, , Disp: , Rfl:   •  Cholecalciferol (VITAMIN D-3 PO), Take 2,000 Units by mouth Daily., Disp: , Rfl:   •  Diclofenac Sodium (VOLTAREN) 1 % gel gel, Apply 4 g topically to the appropriate area as directed 4 (Four) Times a Day As Needed (joint pain, max 32 grams/day)., Disp: 350 g, Rfl: 11  •  Multiple Vitamins-Minerals (CENTRUM SILVER ULTRA WOMENS PO), Take 1 tablet by mouth Every Other Day., Disp: , Rfl:   •  Omega-3 Fatty  "Acids (FISH OIL) 1000 MG capsule capsule, Take 1,200 mg by mouth Daily With Breakfast., Disp: , Rfl:     Allergies  Allergies   Allergen Reactions   • Hazelnut Anaphylaxis   • Macrobid [Nitrofurantoin Monohyd Macro] Other (See Comments)     REPORTS \"I FELL AND IT WAS LIKE I COULDN'T MOVE\"          Social History  Social History     Socioeconomic History   • Marital status: Single     Spouse name: Not on file   • Number of children: Not on file   • Years of education: Not on file   • Highest education level: Not on file   Tobacco Use   • Smoking status: Never Smoker   • Smokeless tobacco: Never Used   Vaping Use   • Vaping Use: Never used   Substance and Sexual Activity   • Alcohol use: No   • Drug use: No   • Sexual activity: Defer       Family History  Family History   Problem Relation Age of Onset   • Heart attack Mother    • Hypertension Mother    • Anxiety disorder Mother    • Depression Mother    • Heart attack Other    • Kidney disease Other    • Bipolar disorder Father    • Diabetes Paternal Aunt    • Diabetes Paternal Uncle    • Seizures Maternal Aunt    • Colon cancer Neg Hx    • Cirrhosis Neg Hx    • Liver disease Neg Hx    • Liver cancer Neg Hx    • Crohn's disease Neg Hx    • Ulcerative colitis Neg Hx    • Esophageal cancer Neg Hx    • Stomach cancer Neg Hx    • ADD / ADHD Neg Hx    • Alcohol abuse Neg Hx    • Dementia Neg Hx    • Drug abuse Neg Hx    • OCD Neg Hx    • Paranoid behavior Neg Hx    • Schizophrenia Neg Hx    • Self-Injurious Behavior  Neg Hx    • Suicide Attempts Neg Hx        Review of Systems  A full review of systems was performed and was notable for nausea in past.    Physical Exam  Visit Vitals  /72   Pulse 85   Temp 97.5 °F (36.4 °C)   Ht 170.2 cm (67.01\")   Wt 64 kg (141 lb 1.5 oz)   LMP  (LMP Unknown)   SpO2 96%   BMI 22.09 kg/m²     Physical exam was notable for thin, tremors.     Labs  Lab Results   Component Value Date    GLUCOSE 111 (H) 10/04/2021    BUN 16 10/04/2021    " CREATININE 1.06 (H) 10/04/2021    EGFRIFNONA 52 (L) 10/04/2021    EGFRIFAFRI 95 08/06/2021    BCR 15.1 10/04/2021    K 4.2 10/04/2021    CO2 23.9 10/04/2021    CALCIUM 9.9 10/04/2021    PROTENTOTREF 7.1 08/06/2021    ALBUMIN 4.70 10/04/2021    LABIL2 2.0 08/06/2021    AST 19 10/04/2021    ALT 21 10/04/2021       Lab Results   Component Value Date    WBC 9.09 10/04/2021    HGB 14.8 10/04/2021    HCT 43.2 10/04/2021    MCV 94.5 10/04/2021     10/04/2021       No results found for: LDH, URICACID    Lab Results   Component Value Date    CALCIUM 9.9 10/04/2021       Brief Urine Lab Results  (Last result in the past 365 days)      Color   Clarity   Blood   Leuk Est   Nitrite   Protein   CREAT   Urine HCG        10/07/21 1418 Yellow Clear 1+ Negative Negative Negative               Urine Culture   Date Value Ref Range Status   10/04/2021 Final report  Final       )No components found for: STONEANALYSI      Radiologic Studies  CT Abdomen Pelvis Without Contrast  Result Date: 10/4/2021  3 mm obstructing stone distal left ureter with severe left hydronephrosis. Authenticated by Winston Barker MD on 10/04/2021 05:48:06 PM    I have personally reviewed these labs and images.      Assessment  Ms. Sanderson is a 66 y.o. female with 3mm left distal ureteral stone.    We had informed discussion about the causes of stones, in the main treatments for nephrolithiasis.  The 3 main surgical treatments for stones are percutaneous nephrolithotomy, ureteroscopy and laser lithotripsy, and shockwave lithotripsy.  Based on patient factors and the stone size and location, I have recommended repeat imaging.      Plan  1. FU in 3 weeks w/ renal US  2. Keep taking flomax

## 2021-10-15 ENCOUNTER — HOSPITAL ENCOUNTER (OUTPATIENT)
Dept: ULTRASOUND IMAGING | Facility: HOSPITAL | Age: 66
Discharge: HOME | End: 2021-10-15
Admitting: UROLOGY

## 2021-10-15 DIAGNOSIS — N20.0 KIDNEY STONE: ICD-10-CM

## 2021-10-15 PROCEDURE — 76775 US EXAM ABDO BACK WALL LIM: CPT

## 2021-10-21 ENCOUNTER — OFFICE VISIT (OUTPATIENT)
Dept: UROLOGY | Facility: CLINIC | Age: 66
End: 2021-10-21

## 2021-10-21 VITALS
WEIGHT: 141 LBS | HEIGHT: 67 IN | SYSTOLIC BLOOD PRESSURE: 120 MMHG | DIASTOLIC BLOOD PRESSURE: 76 MMHG | TEMPERATURE: 98.2 F | OXYGEN SATURATION: 97 % | HEART RATE: 67 BPM | BODY MASS INDEX: 22.13 KG/M2

## 2021-10-21 DIAGNOSIS — N20.0 KIDNEY STONE: Primary | ICD-10-CM

## 2021-10-21 PROCEDURE — 99213 OFFICE O/P EST LOW 20 MIN: CPT | Performed by: UROLOGY

## 2021-10-21 NOTE — PROGRESS NOTES
Chief Complaint   Patient presents with   • Follow-up     2 week follow up w/ renal US       HPI  Ms. Sanderson is a 66 y.o. female with history below in assessment, who presents for follow up.     At this visit doing well no flank pain    Past Medical History:   Diagnosis Date   • Benign familial tremor    • Blood in urine    • Body piercing     EARS   • Depression    • Eczema    • Elevated LDL cholesterol level    • Fever blister    • History of fracture     ARM AT AGE 4 - PATIENT REPORTS SHE DOES NOT REMEMBER LATERALITY   • History of migraine    • History of tremor    • Onychomycosis    • Problems with swallowing     REPORTS SHE FEELS LIKE PILLS ARE GETTING STUCK IN A POCKET IN HER THROAT WHEN SHE TRIES TO SWALLOW THEM   • Sebaceous cyst    • Vitamin D deficiency    • Wears glasses        Past Surgical History:   Procedure Laterality Date   • APPENDECTOMY  2012   • APPENDECTOMY     • BREAST BIOPSY Bilateral    • BREAST CYST ASPIRATION     • BREAST SURGERY Bilateral     history of mammatone left and right breast   • COLONOSCOPY     • CYST REMOVAL      SEBACEOUS CYST X3 FROM HEAD   • ENDOSCOPY N/A 2/7/2019    Procedure: ESOPHAGOGASTRODUODENOSCOPY W/ BIOPSIES;  Surgeon: Simba Rogers MD;  Location: [facility] ENDOSCOPY;  Service: Gastroenterology   • WISDOM TOOTH EXTRACTION      EXTRACTION X2         Current Outpatient Medications:   •  Ascorbic Acid (VITAMIN C) 500 MG capsule, Take 500 mg by mouth Every Other Day., Disp: , Rfl:   •  calcium (OS-SANTHOSH) 600 MG tablet, Take 600 mg by mouth 1 (One) Time Per Week., Disp: , Rfl:   •  Cholecalciferol (VITAMIN D-3 PO), Take 2,000 Units by mouth Daily., Disp: , Rfl:   •  Cholecalciferol (Vitamin D3) 1.25 MG (78095 UT) capsule, Take 1 capsule by mouth Every 7 (Seven) Days. Indications: Vitamin D Deficiency, Disp: 12 capsule, Rfl: 1  •  Diclofenac Sodium (VOLTAREN) 1 % gel gel, Apply 4 g topically to the appropriate area as directed 4 (Four) Times a Day As Needed (joint pain, max 32  "grams/day)., Disp: 350 g, Rfl: 11  •  folic acid (FOLVITE) 1 MG tablet, Take 1 tablet by mouth Daily. Indications: folic acid deficiency, Disp: 90 tablet, Rfl: 3  •  HYDROcodone-acetaminophen (NORCO) 5-325 MG per tablet, Take 1 tablet by mouth Every 6 (Six) Hours As Needed for Severe Pain ., Disp: 12 tablet, Rfl: 0  •  magnesium chloride ER 64 MG DR tablet, Take 250 mg by mouth Every 14 (Fourteen) Days., Disp: , Rfl:   •  mirtazapine (REMERON) 7.5 MG tablet, Take 1 tablet by mouth Every Night., Disp: 90 tablet, Rfl: 0  •  Multiple Vitamins-Minerals (CENTRUM SILVER ULTRA WOMENS PO), Take 1 tablet by mouth Every Other Day., Disp: , Rfl:   •  Omega-3 Fatty Acids (FISH OIL) 1000 MG capsule capsule, Take 1,200 mg by mouth Daily With Breakfast., Disp: , Rfl:   •  ondansetron ODT (ZOFRAN-ODT) 4 MG disintegrating tablet, Place 1 tablet under the tongue Every 6 (Six) Hours As Needed for Nausea or Vomiting., Disp: 10 tablet, Rfl: 0  •  primidone (MYSOLINE) 250 MG tablet, , Disp: , Rfl:   •  propranolol (INDERAL) 20 MG tablet, Take 1 tablet by mouth 3 (Three) Times a Day. For tremor. Monitor blood pressure, Disp: 270 tablet, Rfl: 1  •  sertraline (ZOLOFT) 100 MG tablet, Take 2 tablets by mouth Daily., Disp: 180 tablet, Rfl: 0  •  tamsulosin (FLOMAX) 0.4 MG capsule 24 hr capsule, Take 1 capsule by mouth Daily., Disp: 14 capsule, Rfl: 0  •  topiramate (TOPAMAX) 50 MG tablet, , Disp: , Rfl:      Physical Exam  Visit Vitals  /76   Pulse 67   Temp 98.2 °F (36.8 °C)   Ht 170.2 cm (67.01\")   Wt 64 kg (141 lb)   LMP  (LMP Unknown)   SpO2 97%   BMI 22.08 kg/m²       Labs  Brief Urine Lab Results  (Last result in the past 365 days)      Color   Clarity   Blood   Leuk Est   Nitrite   Protein   CREAT   Urine HCG        10/07/21 1418 Yellow   Clear   1+   Negative   Negative   Negative                 Lab Results   Component Value Date    GLUCOSE 111 (H) 10/04/2021    CALCIUM 9.9 10/04/2021     10/04/2021    K 4.2 10/04/2021    " CO2 23.9 10/04/2021     10/04/2021    BUN 16 10/04/2021    CREATININE 1.06 (H) 10/04/2021    EGFRIFAFRI 95 08/06/2021    EGFRIFNONA 52 (L) 10/04/2021    BCR 15.1 10/04/2021    ANIONGAP 14.1 10/04/2021       Lab Results   Component Value Date    WBC 9.09 10/04/2021    HGB 14.8 10/04/2021    HCT 43.2 10/04/2021    MCV 94.5 10/04/2021     10/04/2021       Urine Culture    Urine Culture 10/4/21   Urine Culture Final report              Radiographic Studies  CT Abdomen Pelvis Without Contrast  Result Date: 10/4/2021  3 mm obstructing stone distal left ureter with severe left hydronephrosis. Authenticated by Winston Barker MD on 10/04/2021 05:48:06 PM    US Renal Bilateral  Result Date: 10/15/2021  Bilateral renal cysts.      Images were reviewed, interpreted, and dictated by Dr. Margie Last M.D. Transcribed by Anastasia Finnegan PA-C.  This report was finalized on 10/15/2021 1:42 PM by Margie Last M.D..      I have reviewed the above labs and imaging.     Assessment  66 y.o. female with 3mm left distal ureteral stone.  Hx of topamax usage. Likes microwave dinners. Low water. We have made changes to these.    Plan  1. FU PRN  2. She has stopped flomax and will work on diet; we discussed OTC preventative supplements

## 2021-11-05 ENCOUNTER — OFFICE VISIT (OUTPATIENT)
Dept: PSYCHIATRY | Facility: CLINIC | Age: 66
End: 2021-11-05

## 2021-11-05 DIAGNOSIS — Z60.0 PHASE OF LIFE PROBLEM IN ADULT: ICD-10-CM

## 2021-11-05 DIAGNOSIS — F33.0 MILD EPISODE OF RECURRENT MAJOR DEPRESSIVE DISORDER (HCC): Primary | ICD-10-CM

## 2021-11-05 DIAGNOSIS — F41.1 GENERALIZED ANXIETY DISORDER: ICD-10-CM

## 2021-11-05 DIAGNOSIS — G47.09 OTHER INSOMNIA: ICD-10-CM

## 2021-11-05 PROCEDURE — 99214 OFFICE O/P EST MOD 30 MIN: CPT | Performed by: PSYCHIATRY & NEUROLOGY

## 2021-11-05 SDOH — SOCIAL STABILITY - SOCIAL INSECURITY: PROBLEMS OF ADJUSTMENT TO LIFE-CYCLE TRANSITIONS: Z60.0

## 2021-11-05 NOTE — PROGRESS NOTES
Subjective   Milagro Sanderson is a 66 y.o. female who presents today for follow up     This provider is located at [facility] at [address] [place] [address]. The provider identified himself as well as his credentials.   The Patient is at home using her phone because problems with video connection. The patient's condition being diagnosed/treated is appropriate for telemedicine. The patient gave consent to be seen remotely, and when consent is given they understand that the consent allows for patient identifiable information to be sent to a third party as needed.   They may refuse to be seen remotely at any time. The electronic data is encrypted and password protected, and the patient has been advised of the potential risks to privacy not withstanding such measures        Chief Complaint: Depression    History of Present Illness: Patient reports that since last visit she has been relatively stable.  She did have a kidney stone and has follow-up with urology but passed the stone and does not have any follow-up needed unless symptoms recur.  Topamax was discontinued as it can lead to renal stones and patient notes that her tremor has worsened since stopping the medication.  She does seem to have some mild dysphoria and anxiety with anxiety being the most problematic but is reluctant to change medications at this time.  She is coping relatively appropriately but has not been as socially active and has not been getting out and doing things that she enjoys which may be contributing.  We will hold off on medication adjustments at this time and get through the holiday season and see if this improves.  She denies SI/HI/AVH.    The following portions of the patient's history were reviewed and updated as appropriate: allergies, current medications, past family history, past medical history, past social history, past surgical history and problem list.      Past Medical History:  Past Medical  History:   Diagnosis Date   • Benign familial tremor    • Blood in urine    • Body piercing     EARS   • Depression    • Eczema    • Elevated LDL cholesterol level    • Fever blister    • History of fracture     ARM AT AGE 4 - PATIENT REPORTS SHE DOES NOT REMEMBER LATERALITY   • History of migraine    • History of tremor    • Onychomycosis    • Problems with swallowing     REPORTS SHE FEELS LIKE PILLS ARE GETTING STUCK IN A POCKET IN HER THROAT WHEN SHE TRIES TO SWALLOW THEM   • Sebaceous cyst    • Vitamin D deficiency    • Wears glasses        Social History:  Social History     Socioeconomic History   • Marital status: Single   Tobacco Use   • Smoking status: Never Smoker   • Smokeless tobacco: Never Used   Vaping Use   • Vaping Use: Never used   Substance and Sexual Activity   • Alcohol use: No   • Drug use: No   • Sexual activity: Defer       Family History:  Family History   Problem Relation Age of Onset   • Heart attack Mother    • Hypertension Mother    • Anxiety disorder Mother    • Depression Mother    • Heart attack Other    • Kidney disease Other    • Bipolar disorder Father    • Diabetes Paternal Aunt    • Diabetes Paternal Uncle    • Seizures Maternal Aunt    • Colon cancer Neg Hx    • Cirrhosis Neg Hx    • Liver disease Neg Hx    • Liver cancer Neg Hx    • Crohn's disease Neg Hx    • Ulcerative colitis Neg Hx    • Esophageal cancer Neg Hx    • Stomach cancer Neg Hx    • ADD / ADHD Neg Hx    • Alcohol abuse Neg Hx    • Dementia Neg Hx    • Drug abuse Neg Hx    • OCD Neg Hx    • Paranoid behavior Neg Hx    • Schizophrenia Neg Hx    • Self-Injurious Behavior  Neg Hx    • Suicide Attempts Neg Hx        Past Surgical History:  Past Surgical History:   Procedure Laterality Date   • APPENDECTOMY  2012   • APPENDECTOMY     • BREAST BIOPSY Bilateral    • BREAST CYST ASPIRATION     • BREAST SURGERY Bilateral     history of mammatone left and right breast   • COLONOSCOPY     • CYST REMOVAL      SEBACEOUS CYST X3  "FROM HEAD   • ENDOSCOPY N/A 2/7/2019    Procedure: ESOPHAGOGASTRODUODENOSCOPY W/ BIOPSIES;  Surgeon: Simba Rogers MD;  Location: [facility];  Service: Gastroenterology   • WISDOM TOOTH EXTRACTION      EXTRACTION X2       Problem List:  Patient Active Problem List   Diagnosis   • Episode of recurrent major depressive disorder (HCC)   • Eczema   • Vitamin B12 deficiency   • Vitamin D deficiency   • Hereditary essential tremor   • Onychomycosis   • Recurrent cold sores   • GERD with esophagitis   • High risk medication use       Allergy:   Allergies   Allergen Reactions   • Hazelnut Anaphylaxis   • Macrobid [Nitrofurantoin Monohyd Macro] Other (See Comments)     REPORTS \"I FELL AND IT WAS LIKE I COULDN'T MOVE\"           Current Medications:   Current Outpatient Medications   Medication Sig Dispense Refill   • Ascorbic Acid (VITAMIN C) 500 MG capsule Take 500 mg by mouth Every Other Day.     • calcium (OS-SANTHOSH) 600 MG tablet Take 600 mg by mouth 1 (One) Time Per Week.     • Cholecalciferol (VITAMIN D-3 PO) Take 2,000 Units by mouth Daily.     • Cholecalciferol (Vitamin D3) 1.25 MG (91196 UT) capsule Take 1 capsule by mouth Every 7 (Seven) Days. Indications: Vitamin D Deficiency 12 capsule 1   • Diclofenac Sodium (VOLTAREN) 1 % gel gel Apply 4 g topically to the appropriate area as directed 4 (Four) Times a Day As Needed (joint pain, max 32 grams/day). 350 g 11   • folic acid (FOLVITE) 1 MG tablet Take 1 tablet by mouth Daily. Indications: folic acid deficiency 90 tablet 3   • HYDROcodone-acetaminophen (NORCO) 5-325 MG per tablet Take 1 tablet by mouth Every 6 (Six) Hours As Needed for Severe Pain . 12 tablet 0   • magnesium chloride ER 64 MG DR tablet Take 250 mg by mouth Every 14 (Fourteen) Days.     • mirtazapine (REMERON) 7.5 MG tablet Take 1 tablet by mouth Every Night. 90 tablet 0   • Multiple Vitamins-Minerals (CENTRUM SILVER ULTRA WOMENS PO) Take 1 tablet by mouth Every Other Day.     • Omega-3 Fatty " Acids (FISH OIL) 1000 MG capsule capsule Take 1,200 mg by mouth Daily With Breakfast.     • ondansetron ODT (ZOFRAN-ODT) 4 MG disintegrating tablet Place 1 tablet under the tongue Every 6 (Six) Hours As Needed for Nausea or Vomiting. 10 tablet 0   • primidone (MYSOLINE) 250 MG tablet      • propranolol (INDERAL) 20 MG tablet Take 1 tablet by mouth 3 (Three) Times a Day. For tremor. Monitor blood pressure 270 tablet 1   • sertraline (ZOLOFT) 100 MG tablet Take 2 tablets by mouth Daily. 180 tablet 0   • tamsulosin (FLOMAX) 0.4 MG capsule 24 hr capsule Take 1 capsule by mouth Daily. 14 capsule 0     No current facility-administered medications for this visit.       Review of Symptoms:    Review of Systems   Constitutional: Positive for activity change (improved) and fatigue. Negative for appetite change (improved), chills, diaphoresis, fever and unexpected weight loss.   HENT: Negative.    Eyes: Negative.    Respiratory: Negative.    Cardiovascular: Negative.    Gastrointestinal: Negative for abdominal pain, nausea and vomiting.   Endocrine: Negative.    Genitourinary: Negative.    Musculoskeletal: Negative.    Skin: Negative.    Allergic/Immunologic: Negative.    Neurological: Positive for tremors.   Hematological: Negative.    Psychiatric/Behavioral: Positive for dysphoric mood and stress. Negative for agitation, decreased concentration, sleep disturbance (improved), suicidal ideas and depressed mood. The patient is nervous/anxious.          Physical Exam:   not currently breastfeeding.  Unable to assess, telephone visit    Appearance: Unable to assess, telephone visit  Gait, Station, Strength: Unable to assess, telephone visit    Mental Status Exam:     Hygiene:   Unable to assess, telephone visit  Cooperation:  Cooperative  Eye Contact:  Unable to assess, telephone visit  Psychomotor Behavior:  Unable to assess, telephone visit  Affect:  Restricted  Mood: anxious, mildly dysphoric, stable  Hopelessness:  Denies  Speech:  Normal  Thought Process:  Goal directed and Linear  Thought Content:  Normal  Suicidal:  None  Homicidal:  None  Hallucinations:  None  Delusion:  None  Memory:  Intact  Orientation:  Person, Place, Time and Situation  Reliability:  good  Insight:  Fair  Judgement:  Good  Impulse Control:  Good  Physical/Medical Issues:  No        Lab Results:   Office Visit on 10/07/2021   Component Date Value Ref Range Status   • Color 10/07/2021 Yellow  Yellow, Straw, Dark Yellow, Isela Final   • Clarity, UA 10/07/2021 Clear  Clear Final   • Specific Gravity  10/07/2021 1.010  1.005 - 1.030 Final   • pH, Urine 10/07/2021 6.0  5.0 - 8.0 Final   • Leukocytes 10/07/2021 Negative  Negative Final   • Nitrite, UA 10/07/2021 Negative  Negative Final   • Protein, POC 10/07/2021 Negative  Negative mg/dL Final   • Glucose, UA 10/07/2021 Negative  Negative, 1000 mg/dL (3+) mg/dL Final   • Ketones, UA 10/07/2021 Trace* Negative Final   • Urobilinogen, UA 10/07/2021 Normal  Normal Final   • Bilirubin 10/07/2021 Negative  Negative Final   • Blood, UA 10/07/2021 1+* Negative Final       Assessment/Plan   Diagnoses and all orders for this visit:    1. Mild episode of recurrent major depressive disorder (HCC) (Primary)    2. Generalized anxiety disorder    3. Phase of life problem in adult    4. Other insomnia    -Patient relatively stable at baseline but is having continued anxiety symptoms leading to dysphoria but also patient is isolating and not participating in activities she previously enjoyed and has some seasonal component to her mood symptoms.  We discussed the possibility of increasing her mirtazapine at night but will hold off on that change at this time as patient feels it is very beneficial to sleep and she is fearful to lose her good quality sleep.  -Reviewed previous documentation  -Reviewed most recent available labs  -Continue Zoloft 200mg PO daily for mood and anxiety: Patient has only been taking 100 mg p.o.  daily  -Continue mirtazapine 7.5 mg p.o. nightly for mood, anxiety, insomnia, and appetite   -Encouraged patient to continue with therapy for phase of life issues and depression  -Encouraged to follow-up with neurology for tremor  -Consider low-dose Ritalin for refractory depression and should patient continue to have vegetative symptoms though these have largely improved  -Approximate appointment time 10 AM to 10:15 AM via telephone visit      Visit Diagnoses:  No diagnosis found.    TREATMENT PLAN/GOALS: Continue supportive psychotherapy efforts and medications as indicated. Treatment and medication options discussed during today's visit. Patient acknowledged and verbally consented to continue with current treatment plan and was educated on the importance of compliance with treatment and follow-up appointments.    MEDICATION ISSUES:    Discussed medication options and treatment plan of prescribed medication as well as the risks, benefits, and side effects including potential falls, possible impaired driving and metabolic adversities among others. Patient is agreeable to call the office with any worsening of symptoms or onset of side effects. Patient is agreeable to call 911 or go to the nearest ER should he/she begin having SI/HI.     MEDS ORDERED DURING VISIT:  No orders of the defined types were placed in this encounter.      Return in about 3 months (around 2/5/2022).             This document has been electronically signed by Des Elizondo MD  November 5, 2021 14:24 EDT

## 2021-11-09 ENCOUNTER — OFFICE VISIT (OUTPATIENT)
Dept: INTERNAL MEDICINE | Facility: CLINIC | Age: 66
End: 2021-11-09

## 2021-11-09 VITALS
WEIGHT: 139 LBS | OXYGEN SATURATION: 98 % | DIASTOLIC BLOOD PRESSURE: 70 MMHG | HEIGHT: 67 IN | SYSTOLIC BLOOD PRESSURE: 118 MMHG | TEMPERATURE: 97.8 F | HEART RATE: 71 BPM | BODY MASS INDEX: 21.82 KG/M2

## 2021-11-09 DIAGNOSIS — E53.8 FOLIC ACID DEFICIENCY: ICD-10-CM

## 2021-11-09 DIAGNOSIS — M25.512 ACUTE PAIN OF LEFT SHOULDER: ICD-10-CM

## 2021-11-09 DIAGNOSIS — E55.9 VITAMIN D DEFICIENCY: ICD-10-CM

## 2021-11-09 DIAGNOSIS — G25.0 HEREDITARY ESSENTIAL TREMOR: ICD-10-CM

## 2021-11-09 DIAGNOSIS — R94.6 ABNORMAL THYROID FUNCTION TEST: ICD-10-CM

## 2021-11-09 DIAGNOSIS — M25.40 JOINT SWELLING: Primary | ICD-10-CM

## 2021-11-09 DIAGNOSIS — N20.0 NEPHROLITHIASIS: ICD-10-CM

## 2021-11-09 PROCEDURE — 99214 OFFICE O/P EST MOD 30 MIN: CPT | Performed by: FAMILY MEDICINE

## 2021-11-09 NOTE — PROGRESS NOTES
"Subjective    Milagro Sanderson is a 66 y.o. female here for:  Chief Complaint   Patient presents with   • Tremors     follow up - tremors worse Dr. Massey changed medicaiton changed to 3 tabs of primidone, no topamax    • Depression     discuss vitamin D (once weekly) and vitamin folic acid daily    • Joint Swelling     swelling in fingers x 2 weeks   • Shoulder Pain     left shoulder pain off and on - aches down arm        History per MA reviewed.    Tremors:  Worse per pt  rubén took off Topamax and increased primidone to three full tabs a day.   She called them yesterday about not being better, waiting to hear back.  Seems to get worse as day goes on. \"I guess I get more nervous.\"     Joint swelling:  Started not long after last visit. Not sure why hands swell. Can't get rings on. No other joints swollen but both wrists hurt, feel weak.      Left shoulder   hurt when she fell and went backwards.  Sometimes has some aches down arm. (not associated with chest pain, pressure or shortness of breath)     67 yo with first kidney stone (last visit). Off Topamax now aware she shouldn't take again.         The following portions of the patient's history were reviewed and updated as appropriate: allergies, current medications, past family history, past medical history, past social history, past surgical history and problem list.    Review of Systems   Constitutional: Negative for fever.   Musculoskeletal: Positive for arthralgias and joint swelling.   Neurological: Positive for tremors.   Psychiatric/Behavioral: Positive for dysphoric mood.         Objective   Visit Vitals  /70   Pulse 71   Temp 97.8 °F (36.6 °C) (Infrared)   Ht 170.2 cm (67.01\")   Wt 63 kg (139 lb)   LMP  (LMP Unknown)   SpO2 98%   BMI 21.76 kg/m²       Physical Exam  Vitals and nursing note reviewed.   Constitutional:       General: She is not in acute distress.     Appearance: Normal appearance. She is well-developed and well-groomed. She is not " ill-appearing, toxic-appearing or diaphoretic.      Interventions: Face mask in place.   HENT:      Head: Normocephalic and atraumatic.      Right Ear: Hearing normal.      Left Ear: Hearing normal.   Eyes:      General: Lids are normal. No scleral icterus.     Extraocular Movements: Extraocular movements intact.   Neck:      Trachea: Phonation normal.   Pulmonary:      Effort: Pulmonary effort is normal.   Musculoskeletal:      Right hand: Swelling and tenderness present.      Left hand: Swelling and tenderness present.      Cervical back: Neck supple.   Skin:     Coloration: Skin is not jaundiced or pale.   Neurological:      General: No focal deficit present.      Mental Status: She is alert and oriented to person, place, and time.      Motor: Tremor present.   Psychiatric:         Attention and Perception: Attention and perception normal.         Mood and Affect: Affect normal. Mood is anxious.         Speech: Speech normal.         Behavior: Behavior normal. Behavior is cooperative.         Thought Content: Thought content normal.         Cognition and Memory: Cognition and memory normal.         Judgment: Judgment normal.         For medical decision making review of the following was required:  Lab Results   Component Value Date    WBC 9.09 10/04/2021    HGB 14.8 10/04/2021    HCT 43.2 10/04/2021    MCV 94.5 10/04/2021     10/04/2021     Lab Results   Component Value Date    GLUCOSE 111 (H) 10/04/2021    BUN 16 10/04/2021    CREATININE 1.06 (H) 10/04/2021    EGFRIFNONA 52 (L) 10/04/2021    EGFRIFAFRI 95 08/06/2021    BCR 15.1 10/04/2021    K 4.2 10/04/2021    CO2 23.9 10/04/2021    CALCIUM 9.9 10/04/2021    PROTENTOTREF 7.1 08/06/2021    ALBUMIN 4.70 10/04/2021    LABIL2 2.0 08/06/2021    AST 19 10/04/2021    ALT 21 10/04/2021     Lab Results   Component Value Date    TSH 3.220 08/06/2021     Lab Results   Component Value Date    FREET4 0.80 (L) 08/06/2021           Assessment/Plan     Problem List  Items Addressed This Visit        Endocrine and Metabolic    Vitamin D deficiency    Relevant Orders    Comprehensive Metabolic Panel    Vitamin D 25 Hydroxy    PTH, Intact    Calcium, Ionized       Neuro    Hereditary essential tremor    Overview     Evaluated by neurology 1/2020 and not felt to be Parkinson's, worsened with Sinemet         Relevant Orders    TSH+Free T4    Primidone level    RPR      Other Visit Diagnoses     Joint swelling    -  Primary    Relevant Orders    Sedimentation Rate    CBC (No Diff)    BARBY by IFA, Reflex 9-biomarkers profile    Rheumatoid Factor    Acute pain of left shoulder        Relevant Medications    Diclofenac Sodium (VOLTAREN) 1 % gel gel    Nephrolithiasis        Relevant Orders    Comprehensive Metabolic Panel    Vitamin D 25 Hydroxy    PTH, Intact    Calcium, Ionized    Folic acid deficiency        Relevant Orders    CBC (No Diff)    Vitamin B12    Folate    Abnormal thyroid function test        Relevant Orders    TSH+Free T4          · Reminded pt about Voltaren, topical, may help with some of her joint pains.   · First kidney stone age 66, possibly due to Topamax alone, but with ongoing health issues warrants further check of PTH. If hyperparathyroidism suggested on labs will need to proceed with DEXA.  · Follow up with psych, [facility]. Checking primidone level in case Dr. Massey at  needs level for further dosage changes.    Return in about 4 months (around 3/9/2022) for follow up.     Mae Valle MD

## 2021-11-10 DIAGNOSIS — R94.6 ABNORMAL THYROID FUNCTION TEST: Primary | ICD-10-CM

## 2021-11-10 RX ORDER — LEVOTHYROXINE SODIUM 0.03 MG/1
25 TABLET ORAL DAILY
Qty: 30 TABLET | Refills: 3 | Status: SHIPPED | OUTPATIENT
Start: 2021-11-10 | End: 2022-03-02

## 2021-11-11 ENCOUNTER — TELEPHONE (OUTPATIENT)
Dept: INTERNAL MEDICINE | Facility: CLINIC | Age: 66
End: 2021-11-11

## 2021-11-11 DIAGNOSIS — R76.8 CENTROMERE ANTIBODY POSITIVE: ICD-10-CM

## 2021-11-11 DIAGNOSIS — R76.8 ANA POSITIVE: ICD-10-CM

## 2021-11-11 DIAGNOSIS — M25.40 JOINT SWELLING: Primary | ICD-10-CM

## 2021-11-11 DIAGNOSIS — G25.0 HEREDITARY ESSENTIAL TREMOR: ICD-10-CM

## 2021-11-11 DIAGNOSIS — M25.512 ACUTE PAIN OF LEFT SHOULDER: ICD-10-CM

## 2021-11-11 LAB
25(OH)D3+25(OH)D2 SERPL-MCNC: 59.8 NG/ML (ref 30–100)
ALBUMIN SERPL-MCNC: 4.3 G/DL (ref 3.8–4.8)
ALBUMIN/GLOB SERPL: 2 {RATIO} (ref 1.2–2.2)
ALP SERPL-CCNC: 85 IU/L (ref 44–121)
ALT SERPL-CCNC: 14 IU/L (ref 0–32)
ANA TITR SER IF: POSITIVE {TITER}
AST SERPL-CCNC: 15 IU/L (ref 0–40)
BILIRUB SERPL-MCNC: 0.2 MG/DL (ref 0–1.2)
BUN SERPL-MCNC: 13 MG/DL (ref 8–27)
BUN/CREAT SERPL: 20 (ref 12–28)
CA-I SERPL ISE-MCNC: 5.2 MG/DL (ref 4.5–5.6)
CALCIUM SERPL-MCNC: 9.6 MG/DL (ref 8.7–10.3)
CENTROMERE AB TITR SER IF: ABNORMAL {TITER}
CENTROMERE B AB SER-ACNC: >8 AI (ref 0–0.9)
CHLORIDE SERPL-SCNC: 102 MMOL/L (ref 96–106)
CHROMATIN AB SERPL-ACNC: <0.2 AI (ref 0–0.9)
CO2 SERPL-SCNC: 24 MMOL/L (ref 20–29)
CREAT SERPL-MCNC: 0.64 MG/DL (ref 0.57–1)
DSDNA AB SER-ACNC: <1 IU/ML (ref 0–9)
ENA JO1 AB SER-ACNC: <0.2 AI (ref 0–0.9)
ENA RNP AB SER-ACNC: <0.2 AI (ref 0–0.9)
ENA SCL70 AB SER-ACNC: <0.2 AI (ref 0–0.9)
ENA SM AB SER-ACNC: <0.2 AI (ref 0–0.9)
ENA SS-A AB SER-ACNC: 3 AI (ref 0–0.9)
ENA SS-B AB SER-ACNC: 0.3 AI (ref 0–0.9)
ERYTHROCYTE [DISTWIDTH] IN BLOOD BY AUTOMATED COUNT: 12.9 % (ref 11.7–15.4)
ERYTHROCYTE [SEDIMENTATION RATE] IN BLOOD BY WESTERGREN METHOD: 2 MM/HR (ref 0–40)
FOLATE SERPL-MCNC: >20 NG/ML
GLOBULIN SER CALC-MCNC: 2.2 G/DL (ref 1.5–4.5)
GLUCOSE SERPL-MCNC: 71 MG/DL (ref 65–99)
HCT VFR BLD AUTO: 41 % (ref 34–46.6)
HGB BLD-MCNC: 14.2 G/DL (ref 11.1–15.9)
LABORATORY COMMENT REPORT: ABNORMAL
Lab: ABNORMAL
Lab: ABNORMAL
MCH RBC QN AUTO: 32.4 PG (ref 26.6–33)
MCHC RBC AUTO-ENTMCNC: 34.6 G/DL (ref 31.5–35.7)
MCV RBC AUTO: 94 FL (ref 79–97)
PHENOBARB SERPL-MCNC: 38 UG/ML (ref 15–40)
PLATELET # BLD AUTO: 250 X10E3/UL (ref 150–450)
POTASSIUM SERPL-SCNC: 4.5 MMOL/L (ref 3.5–5.2)
PRIMIDONE SERPL-MCNC: 13.8 UG/ML (ref 5–12)
PROT SERPL-MCNC: 6.5 G/DL (ref 6–8.5)
PTH-INTACT SERPL-MCNC: 23 PG/ML (ref 15–65)
RBC # BLD AUTO: 4.38 X10E6/UL (ref 3.77–5.28)
RHEUMATOID FACT SERPL-ACNC: <10 IU/ML (ref 0–13.9)
RPR SER QL: NON REACTIVE
SODIUM SERPL-SCNC: 141 MMOL/L (ref 134–144)
T4 FREE SERPL-MCNC: 0.85 NG/DL (ref 0.82–1.77)
TSH SERPL DL<=0.005 MIU/L-ACNC: 4.27 UIU/ML (ref 0.45–4.5)
VIT B12 SERPL-MCNC: 413 PG/ML (ref 232–1245)
WBC # BLD AUTO: 4.1 X10E3/UL (ref 3.4–10.8)

## 2021-11-11 NOTE — TELEPHONE ENCOUNTER
Other labs came back. Some abnormal in terms of possible autoimmune issue. I suggest we get her in with rheumatology. Can send to Dr. Sebastian locally. She'll be contacted regarding the appointment.    Also, primidone level was a bit over normal, please send a copy of that lab to Dr. Massey's office at [facility] to keep him in the loop.

## 2021-11-11 NOTE — TELEPHONE ENCOUNTER
I did call patient and informed of other abnormal labs and rheumatology referral, Can you print off the primidone level lab and fax over to Dr. Massey per Dr. Valle?

## 2021-11-11 NOTE — TELEPHONE ENCOUNTER
Caller: Milagro Sanderson    Relationship: Self    Best call back number: [phone]     What test was performed: LABS     When was the test performed: 11/11/2021    Where was the test performed: IN OFFICE     Additional notes: PATIENT STATES THAT SHE WAS TOLD BY [facility] THAT SHE HAD A PRESCRIPTION READY. SHE CALLED THEM TO SEE WHAT IT MIGHT BE AND THEY INFORMED HER THAT IT WAS FOR HER THYROID. PATIENT WOULD LIKE TO KNOW HER RESULTS AND TO SEE IF THERE ARE ANY OTHER MEDICATIONS THAT SHE IS GOING TO BE NEEDING.

## 2021-11-11 NOTE — PROGRESS NOTES
Please let her know not all labs back, but if any of the rest come back abnormal we'll let her know. Thyroid level is technically normal range but TSH upper normal, free T4 low normal. with levels getting closer and closer to underactive thyroid I suggest we try low dose levothyroxine (smallest dose) to see if that helps at all with mood, tremor, energy levels. I'll send it in, take at least 30 min before other meds and eating. Kidney function was down on last labs, she's back up to normal.

## 2021-11-16 DIAGNOSIS — Z60.0 PHASE OF LIFE PROBLEM IN ADULT: ICD-10-CM

## 2021-11-16 DIAGNOSIS — F41.1 GENERALIZED ANXIETY DISORDER: ICD-10-CM

## 2021-11-16 DIAGNOSIS — F33.0 MILD EPISODE OF RECURRENT MAJOR DEPRESSIVE DISORDER (HCC): ICD-10-CM

## 2021-11-16 RX ORDER — SERTRALINE HYDROCHLORIDE 100 MG/1
TABLET, FILM COATED ORAL
Qty: 180 TABLET | Refills: 0 | OUTPATIENT
Start: 2021-11-16

## 2021-11-16 SDOH — SOCIAL STABILITY - SOCIAL INSECURITY: PROBLEMS OF ADJUSTMENT TO LIFE-CYCLE TRANSITIONS: Z60.0

## 2021-11-17 RX ORDER — SERTRALINE HYDROCHLORIDE 100 MG/1
200 TABLET, FILM COATED ORAL DAILY
Qty: 180 TABLET | Refills: 0 | Status: SHIPPED | OUTPATIENT
Start: 2021-11-17 | End: 2021-12-10 | Stop reason: SDUPTHER

## 2021-11-17 NOTE — TELEPHONE ENCOUNTER
RX REQUEST, PHARMACY DIDN'T GET THE REFILL OF ZOLOFT   Rx Refill Note  Requested Prescriptions     Pending Prescriptions Disp Refills   • sertraline (ZOLOFT) 100 MG tablet 180 tablet 0     Sig: Take 2 tablets by mouth Daily.     Refused Prescriptions Disp Refills   • sertraline (ZOLOFT) 100 MG tablet [Pharmacy Med Name: SERTRALINE  MG TABLET] 180 tablet 0     Sig: TAKE TWO TABLETS BY MOUTH DAILY     Refused By: GUILLERMINA MAURO     Reason for Refusal: Refill not appropriate      Last office visit with prescribing clinician: 11/5/2021      Next office visit with prescribing clinician: 12/10/2021          {TIP  Is Refill Pharmacy correct?:23}  Guillermina Muaro CMA  11/17/21, 10:23 EST

## 2021-12-10 ENCOUNTER — OFFICE VISIT (OUTPATIENT)
Dept: PSYCHIATRY | Facility: CLINIC | Age: 66
End: 2021-12-10

## 2021-12-10 DIAGNOSIS — Z60.0 PHASE OF LIFE PROBLEM IN ADULT: ICD-10-CM

## 2021-12-10 DIAGNOSIS — F33.0 MILD EPISODE OF RECURRENT MAJOR DEPRESSIVE DISORDER (HCC): ICD-10-CM

## 2021-12-10 DIAGNOSIS — F41.1 GENERALIZED ANXIETY DISORDER: ICD-10-CM

## 2021-12-10 DIAGNOSIS — G47.09 OTHER INSOMNIA: ICD-10-CM

## 2021-12-10 PROCEDURE — 99214 OFFICE O/P EST MOD 30 MIN: CPT | Performed by: PSYCHIATRY & NEUROLOGY

## 2021-12-10 RX ORDER — MIRTAZAPINE 7.5 MG/1
7.5 TABLET, FILM COATED ORAL NIGHTLY
Qty: 90 TABLET | Refills: 0 | Status: SHIPPED | OUTPATIENT
Start: 2021-12-10 | End: 2022-05-20 | Stop reason: SDUPTHER

## 2021-12-10 RX ORDER — SERTRALINE HYDROCHLORIDE 100 MG/1
200 TABLET, FILM COATED ORAL DAILY
Qty: 180 TABLET | Refills: 0 | Status: SHIPPED | OUTPATIENT
Start: 2021-12-10 | End: 2022-05-20 | Stop reason: SDUPTHER

## 2021-12-10 SDOH — SOCIAL STABILITY - SOCIAL INSECURITY: PROBLEMS OF ADJUSTMENT TO LIFE-CYCLE TRANSITIONS: Z60.0

## 2021-12-10 NOTE — PROGRESS NOTES
Subjective   Milagro Sanderson is a 66 y.o. female who presents today for follow up     This provider is located at [facility] at [address] [place] [address]. The provider identified himself as well as his credentials.   The Patient is at home using her phone because problems with video connection. The patient's condition being diagnosed/treated is appropriate for telemedicine. The patient gave consent to be seen remotely, and when consent is given they understand that the consent allows for patient identifiable information to be sent to a third party as needed.   They may refuse to be seen remotely at any time. The electronic data is encrypted and password protected, and the patient has been advised of the potential risks to privacy not withstanding such measures        Chief Complaint: Depression    History of Present Illness: Patient reports that since last visit she was found to have low thyroid levels and was started on levothyroxine.  Since that time she reports her mood and anxiety symptoms have improved.  She states that she has more energy during the day and her affect appears to be more bright and cheerful.  She is not currently having any medication side effects.  She denies any issues with sleep or appetite.  She denies SI/HI/AVH.    The following portions of the patient's history were reviewed and updated as appropriate: allergies, current medications, past family history, past medical history, past social history, past surgical history and problem list.      Past Medical History:  Past Medical History:   Diagnosis Date   • Benign familial tremor    • Blood in urine    • Body piercing     EARS   • Depression    • Eczema    • Elevated LDL cholesterol level    • Fever blister    • History of fracture     ARM AT AGE 4 - PATIENT REPORTS SHE DOES NOT REMEMBER LATERALITY   • History of migraine    • History of tremor    • Onychomycosis    • Problems with swallowing     REPORTS  SHE FEELS LIKE PILLS ARE GETTING STUCK IN A POCKET IN HER THROAT WHEN SHE TRIES TO SWALLOW THEM   • Sebaceous cyst    • Vitamin D deficiency    • Wears glasses        Social History:  Social History     Socioeconomic History   • Marital status: Single   Tobacco Use   • Smoking status: Never Smoker   • Smokeless tobacco: Never Used   Vaping Use   • Vaping Use: Never used   Substance and Sexual Activity   • Alcohol use: No   • Drug use: No   • Sexual activity: Defer       Family History:  Family History   Problem Relation Age of Onset   • Heart attack Mother    • Hypertension Mother    • Anxiety disorder Mother    • Depression Mother    • Heart attack Other    • Kidney disease Other    • Bipolar disorder Father    • Diabetes Paternal Aunt    • Diabetes Paternal Uncle    • Seizures Maternal Aunt    • Colon cancer Neg Hx    • Cirrhosis Neg Hx    • Liver disease Neg Hx    • Liver cancer Neg Hx    • Crohn's disease Neg Hx    • Ulcerative colitis Neg Hx    • Esophageal cancer Neg Hx    • Stomach cancer Neg Hx    • ADD / ADHD Neg Hx    • Alcohol abuse Neg Hx    • Dementia Neg Hx    • Drug abuse Neg Hx    • OCD Neg Hx    • Paranoid behavior Neg Hx    • Schizophrenia Neg Hx    • Self-Injurious Behavior  Neg Hx    • Suicide Attempts Neg Hx        Past Surgical History:  Past Surgical History:   Procedure Laterality Date   • APPENDECTOMY  2012   • APPENDECTOMY     • BREAST BIOPSY Bilateral    • BREAST CYST ASPIRATION     • BREAST SURGERY Bilateral     history of mammatone left and right breast   • COLONOSCOPY     • CYST REMOVAL      SEBACEOUS CYST X3 FROM HEAD   • ENDOSCOPY N/A 2/7/2019    Procedure: ESOPHAGOGASTRODUODENOSCOPY W/ BIOPSIES;  Surgeon: Simba Rogers MD;  Location: [facility] ENDOSCOPY;  Service: Gastroenterology   • WISDOM TOOTH EXTRACTION      EXTRACTION X2       Problem List:  Patient Active Problem List   Diagnosis   • Episode of recurrent major depressive disorder (HCC)   • Eczema   • Vitamin B12 deficiency   •  "Vitamin D deficiency   • Hereditary essential tremor   • Onychomycosis   • Recurrent cold sores   • GERD with esophagitis   • High risk medication use       Allergy:   Allergies   Allergen Reactions   • Hazelnut Anaphylaxis   • Macrobid [Nitrofurantoin Monohyd Macro] Other (See Comments)     REPORTS \"I FELL AND IT WAS LIKE I COULDN'T MOVE\"           Current Medications:   Current Outpatient Medications   Medication Sig Dispense Refill   • Cholecalciferol (Vitamin D3) 1.25 MG (14213 UT) capsule Take 1 capsule by mouth Every 7 (Seven) Days. Indications: Vitamin D Deficiency 12 capsule 1   • Diclofenac Sodium (VOLTAREN) 1 % gel gel Apply 4 g topically to the appropriate area as directed 4 (Four) Times a Day As Needed (joint pain, max 32 grams/day). 350 g 11   • folic acid (FOLVITE) 1 MG tablet Take 1 tablet by mouth Daily. Indications: folic acid deficiency 90 tablet 3   • levothyroxine (SYNTHROID, LEVOTHROID) 25 MCG tablet Take 1 tablet by mouth Daily. Take on empty stomach at least 30 min before eating and other meds. 30 tablet 3   • mirtazapine (REMERON) 7.5 MG tablet Take 1 tablet by mouth Every Night. 90 tablet 0   • primidone (MYSOLINE) 250 MG tablet      • propranolol (INDERAL) 20 MG tablet Take 1 tablet by mouth 3 (Three) Times a Day. For tremor. Monitor blood pressure 270 tablet 1   • sertraline (ZOLOFT) 100 MG tablet Take 2 tablets by mouth Daily. 180 tablet 0     No current facility-administered medications for this visit.       Review of Symptoms:    Review of Systems   Constitutional: Negative for activity change (improved), appetite change (improved), chills, diaphoresis, fatigue, fever and unexpected weight loss.   HENT: Negative.    Eyes: Negative.    Respiratory: Negative.    Cardiovascular: Negative.    Gastrointestinal: Negative for abdominal pain, nausea and vomiting.   Endocrine: Negative.    Genitourinary: Negative.    Musculoskeletal: Negative.    Skin: Negative.    Allergic/Immunologic: " Negative.    Neurological: Positive for tremors.   Hematological: Negative.    Psychiatric/Behavioral: Negative for agitation, decreased concentration, dysphoric mood, sleep disturbance (improved), suicidal ideas, depressed mood and stress. The patient is not nervous/anxious.          Physical Exam:   not currently breastfeeding.  Unable to assess, telephone visit    Appearance: Unable to assess, telephone visit  Gait, Station, Strength: Unable to assess, telephone visit    Mental Status Exam:     Hygiene:   Unable to assess, telephone visit  Cooperation:  Cooperative  Eye Contact:  Unable to assess, telephone visit  Psychomotor Behavior:  Unable to assess, telephone visit  Affect:  Full range  Mood: normal  Hopelessness: Denies  Speech:  Normal  Thought Process:  Goal directed and Linear  Thought Content:  Normal  Suicidal:  None  Homicidal:  None  Hallucinations:  None  Delusion:  None  Memory:  Intact  Orientation:  Person, Place, Time and Situation  Reliability:  good  Insight:  Fair  Judgement:  Good  Impulse Control:  Good  Physical/Medical Issues:  No        Lab Results:   No visits with results within 1 Month(s) from this visit.   Latest known visit with results is:   Office Visit on 11/09/2021   Component Date Value Ref Range Status   • TSH 11/09/2021 4.270  0.450 - 4.500 uIU/mL Final   • Free T4 11/09/2021 0.85  0.82 - 1.77 ng/dL Final   • Sed Rate 11/09/2021 2  0 - 40 mm/hr Final   • Glucose 11/09/2021 71  65 - 99 mg/dL Final   • BUN 11/09/2021 13  8 - 27 mg/dL Final   • Creatinine 11/09/2021 0.64  0.57 - 1.00 mg/dL Final   • eGFR Non  Am 11/09/2021 92  >59 mL/min/1.73 Final   • eGFR African Am 11/09/2021 106  >59 mL/min/1.73 Final    Comment: **In accordance with recommendations from the NKF-ASN Task force,**    [facility] is in the process of updating its eGFR calculation to the    2021 CKD-EPI creatinine equation that estimates kidney function    without a race variable.     • BUN/Creatinine Ratio  11/09/2021 20  12 - 28 Final   • Sodium 11/09/2021 141  134 - 144 mmol/L Final   • Potassium 11/09/2021 4.5  3.5 - 5.2 mmol/L Final   • Chloride 11/09/2021 102  96 - 106 mmol/L Final   • Total CO2 11/09/2021 24  20 - 29 mmol/L Final   • Calcium 11/09/2021 9.6  8.7 - 10.3 mg/dL Final   • Total Protein 11/09/2021 6.5  6.0 - 8.5 g/dL Final   • Albumin 11/09/2021 4.3  3.8 - 4.8 g/dL Final   • Globulin 11/09/2021 2.2  1.5 - 4.5 g/dL Final   • A/G Ratio 11/09/2021 2.0  1.2 - 2.2 Final   • Total Bilirubin 11/09/2021 0.2  0.0 - 1.2 mg/dL Final   • Alkaline Phosphatase 11/09/2021 85  44 - 121 IU/L Final                  **Please note reference interval change**   • AST (SGOT) 11/09/2021 15  0 - 40 IU/L Final   • ALT (SGPT) 11/09/2021 14  0 - 32 IU/L Final   • WBC 11/09/2021 4.1  3.4 - 10.8 x10E3/uL Final   • RBC 11/09/2021 4.38  3.77 - 5.28 x10E6/uL Final   • Hemoglobin 11/09/2021 14.2  11.1 - 15.9 g/dL Final   • Hematocrit 11/09/2021 41.0  34.0 - 46.6 % Final   • MCV 11/09/2021 94  79 - 97 fL Final   • MCH 11/09/2021 32.4  26.6 - 33.0 pg Final   • MCHC 11/09/2021 34.6  31.5 - 35.7 g/dL Final   • RDW 11/09/2021 12.9  11.7 - 15.4 % Final   • Platelets 11/09/2021 250  150 - 450 x10E3/uL Final   • Vitamin B-12 11/09/2021 413  232 - 1,245 pg/mL Final   • Folate 11/09/2021 >20.0  >3.0 ng/mL Final    Comment: A serum folate concentration of less than 3.1 ng/mL is  considered to represent clinical deficiency.     • 25 Hydroxy, Vitamin D 11/09/2021 59.8  30.0 - 100.0 ng/mL Final    Comment: Vitamin D deficiency has been defined by the [place] of  Medicine and an Endocrine Society practice guideline as a  level of serum 25-OH vitamin D less than 20 ng/mL (1,2).  The Endocrine Society went on to further define vitamin D  insufficiency as a level between 21 and 29 ng/mL (2).  1. IOM ([place] of Medicine). 2010. Dietary reference     intakes for calcium and D. [place]: The     National Academies Press.  2. Wally ALLEN, Lina  NC, Megan CELAYA, et al.     Evaluation, treatment, and prevention of vitamin D     deficiency: an Endocrine Society clinical practice     guideline. JCEM. 2011 Jul; 96(7):1911-30.     • PTH, Intact 11/09/2021 23  15 - 65 pg/mL Final   • Ionized Calcium 11/09/2021 5.2  4.5 - 5.6 mg/dL Final   • Primidone Level 11/09/2021 13.8* 5.0 - 12.0 ug/mL Final    Comment:                                 Detection Limit = 0.3                           <0.3 indicates None Detected  Patient drug level exceeds published reference range.  Evaluate  clinically for signs of potential toxicity.     • Phenobarbital 11/09/2021 38  15 - 40 ug/mL Final                                    Detection Limit = 3   • BABRY 11/09/2021 Positive*  Final    Comment:                                      Negative   <1:80                                       Borderline  1:80                                       Positive   >1:80     • Please note 11/09/2021 Comment   Final    Comment: BARBY Multiplex methodology was designed to detect up to 11 antibodies  of the 100+ antibodies that may be detected by BARBY IFA methodology.     • RA Latex Turbid 11/09/2021 <10.0  0.0 - 13.9 IU/mL Final   • RPR 11/09/2021 Non Reactive  Non Reactive Final   • Centromere Pattern 11/09/2021 1:320*  Final    ICAP nomenclature: AC-3   • Note: (Reference) 11/09/2021 Comment   Final    Comment: For more information about Hep-2 cell patterns use  ANApatterns.org, the official website for the International  Consensus on Antinuclear Antibody (BARBY) Patterns (ICAP).  ------------------------------------------------------------  A positive BARBY result may occur in healthy individuals (low  titer) or be associated with a variety of diseases.  See  interpretation chart which is not all inclusive:  Pattern      Antigen Detected  Suggested Disease Association  -----------  ----------------  -----------------------------  Homogeneous  DNA(ds,ss),       SLE - High titers                Nucleosomes,               Histones          Drug-induced SLE  -----------  ----------------  -----------------------------  Speckled     Sm, RNP, SCL-70,  SLE,MCTD,PSS (diffuse form),               SS-A/SS-B         Sjogrens  -----------  ----------------  -----------------------------  Nucleolar    SCL-70, PM-1/SCL  High titers Scleroderma,                                 PM/DM  -----------  --------------                           --  -----------------------------  Centromere   Centromere        PSS (limited form) w/Crest                                 syndrome variable  -----------  ----------------  -----------------------------  Nuclear Dot  [id number]  Primary Biliary Cirrhosis  -----------  ----------------  -----------------------------  Nuclear      ,            Primary Biliary Cirrhosis  Membrane     donte A,B,C  -----------  ----------------  -----------------------------     • Anti-DNA (DS) Ab Qn 11/09/2021 <1  0 - 9 IU/mL Final    Comment:                                    Negative      <5                                     Equivocal  5 - 9                                     Positive      >9     • RNP Antibodies 11/09/2021 <0.2  0.0 - 0.9 AI Final   • [place] Antibodies 11/09/2021 <0.2  0.0 - 0.9 AI Final   • Antiscleroderma-70 Antibodies 11/09/2021 <0.2  0.0 - 0.9 AI Final   • JAVON SSA (RO) Ab 11/09/2021 3.0* 0.0 - 0.9 AI Final   • JAVON SSB (LA) Ab 11/09/2021 0.3  0.0 - 0.9 AI Final   • Antichromatin Antibodies 11/09/2021 <0.2  0.0 - 0.9 AI Final   • ALEX-1 IgG 11/09/2021 <0.2  0.0 - 0.9 AI Final   • Anti-Centromere B Antibodies 11/09/2021 >8.0* 0.0 - 0.9 AI Final   • See below: 11/09/2021 Comment   Final    Comment: Autoantibody                       Disease Association  ------------------------------------------------------------                          Condition                  Frequency  ---------------------   ------------------------   ---------  Antinuclear Antibody,    SLE, mixed  connective  Direct (BARBY-D)           tissue diseases  ---------------------   ------------------------   ---------  dsDNA                    SLE                        40 - 60%  ---------------------   ------------------------   ---------  Chromatin                Drug induced SLE                90%                           SLE                        48 - 97%  ---------------------   ------------------------   ---------  SSA (Ro)                 SLE                        25 - 35%                           Sjogren's Syndrome         40 - 70%                            Lupus                 100%  ---------------------   ------------------------   ---------  SSB (La)                 SLE                                                        10%                           Sjogren's Syndrome              30%  ---------------------   -----------------------    ---------  Sm (anti-Smith)          SLE                        15 - 30%  ---------------------   -----------------------    ---------  RNP                      Mixed Connective Tissue                           Disease                         95%  (U1 nRNP,                SLE                        30 - 50%  anti-ribonucleoprotein)  Polymyositis and/or                           Dermatomyositis                 20%  ---------------------   ------------------------   ---------  Scl-70 (antiDNA          Scleroderma (diffuse)      20 - 35%  topoisomerase)           Crest                           13%  ---------------------   ------------------------   ---------  Ivanna-1                     Polymyositis and/or                           Dermatomyositis            20 - 40%  ---------------------   ------------------------   ---------  Centromere B             Scleroderma -                            Crest                           variant                         80%         Assessment/Plan   Diagnoses and all orders for this visit:    1. Mild episode of recurrent major  depressive disorder (HCC)  -     mirtazapine (REMERON) 7.5 MG tablet; Take 1 tablet by mouth Every Night.  Dispense: 90 tablet; Refill: 0  -     sertraline (ZOLOFT) 100 MG tablet; Take 2 tablets by mouth Daily.  Dispense: 180 tablet; Refill: 0    2. Generalized anxiety disorder  -     mirtazapine (REMERON) 7.5 MG tablet; Take 1 tablet by mouth Every Night.  Dispense: 90 tablet; Refill: 0  -     sertraline (ZOLOFT) 100 MG tablet; Take 2 tablets by mouth Daily.  Dispense: 180 tablet; Refill: 0    3. Phase of life problem in adult  -     mirtazapine (REMERON) 7.5 MG tablet; Take 1 tablet by mouth Every Night.  Dispense: 90 tablet; Refill: 0  -     sertraline (ZOLOFT) 100 MG tablet; Take 2 tablets by mouth Daily.  Dispense: 180 tablet; Refill: 0    4. Other insomnia  -     mirtazapine (REMERON) 7.5 MG tablet; Take 1 tablet by mouth Every Night.  Dispense: 90 tablet; Refill: 0    -Patient doing well.  She is having improvement in anxiety symptoms and denies any major symptom burden at this time.  She is found to have low thyroid levels and started on levothyroxine which may have been a contributing factor to her chronic depressive symptoms.  -Reviewed previous documentation  -Reviewed most recent available labs  -Continue Zoloft 200mg PO daily for mood and anxiety: Patient has only been taking 100 mg p.o. daily  -Continue mirtazapine 7.5 mg p.o. nightly for mood, anxiety, insomnia, and appetite   -Encouraged patient to continue with therapy for phase of life issues and depression  -Encouraged to follow-up with neurology for tremor  -Approximate appointment time 10 AM to 10:15 AM via telephone visit      Visit Diagnoses:    ICD-10-CM ICD-9-CM   1. Mild episode of recurrent major depressive disorder (HCC)  F33.0 296.31   2. Generalized anxiety disorder  F41.1 300.02   3. Phase of life problem in adult  Z60.0 V62.89   4. Other insomnia  G47.09 780.52       TREATMENT PLAN/GOALS: Continue supportive psychotherapy efforts and  medications as indicated. Treatment and medication options discussed during today's visit. Patient acknowledged and verbally consented to continue with current treatment plan and was educated on the importance of compliance with treatment and follow-up appointments.    MEDICATION ISSUES:    Discussed medication options and treatment plan of prescribed medication as well as the risks, benefits, and side effects including potential falls, possible impaired driving and metabolic adversities among others. Patient is agreeable to call the office with any worsening of symptoms or onset of side effects. Patient is agreeable to call 911 or go to the nearest ER should he/she begin having SI/HI.     MEDS ORDERED DURING VISIT:  New Medications Ordered This Visit   Medications   • mirtazapine (REMERON) 7.5 MG tablet     Sig: Take 1 tablet by mouth Every Night.     Dispense:  90 tablet     Refill:  0     Don't fill until due or patient calls per her request.   • sertraline (ZOLOFT) 100 MG tablet     Sig: Take 2 tablets by mouth Daily.     Dispense:  180 tablet     Refill:  0     Do not fill until patient calls per her request.       Return in about 3 months (around 3/10/2022).             This document has been electronically signed by Des Elizondo MD  December 10, 2021 12:20 EST

## 2022-02-28 ENCOUNTER — TELEPHONE (OUTPATIENT)
Dept: INTERNAL MEDICINE | Facility: CLINIC | Age: 67
End: 2022-02-28

## 2022-03-01 DIAGNOSIS — E55.9 VITAMIN D DEFICIENCY: ICD-10-CM

## 2022-03-01 DIAGNOSIS — R94.6 ABNORMAL THYROID FUNCTION TEST: ICD-10-CM

## 2022-03-02 RX ORDER — CHOLECALCIFEROL (VITAMIN D3) 1250 MCG
CAPSULE ORAL
Qty: 12 CAPSULE | Refills: 1 | Status: SHIPPED | OUTPATIENT
Start: 2022-03-02 | End: 2022-08-19

## 2022-03-02 RX ORDER — LEVOTHYROXINE SODIUM 0.03 MG/1
TABLET ORAL
Qty: 30 TABLET | Refills: 3 | Status: SHIPPED | OUTPATIENT
Start: 2022-03-02 | End: 2022-07-06

## 2022-03-03 NOTE — TELEPHONE ENCOUNTER
Called pt, verbally informed that she should continue to take her medication as prescribed and that both got sent to [facility] yesterday, pt verbalized understanding

## 2022-03-11 ENCOUNTER — OFFICE VISIT (OUTPATIENT)
Dept: INTERNAL MEDICINE | Facility: CLINIC | Age: 67
End: 2022-03-11

## 2022-03-11 VITALS
RESPIRATION RATE: 16 BRPM | OXYGEN SATURATION: 98 % | WEIGHT: 135.6 LBS | BODY MASS INDEX: 21.28 KG/M2 | SYSTOLIC BLOOD PRESSURE: 108 MMHG | DIASTOLIC BLOOD PRESSURE: 68 MMHG | HEART RATE: 73 BPM | HEIGHT: 67 IN | TEMPERATURE: 98.4 F

## 2022-03-11 DIAGNOSIS — E03.8 OTHER SPECIFIED HYPOTHYROIDISM: ICD-10-CM

## 2022-03-11 DIAGNOSIS — G25.0 HEREDITARY ESSENTIAL TREMOR: Primary | ICD-10-CM

## 2022-03-11 DIAGNOSIS — Z51.81 MEDICATION MONITORING ENCOUNTER: ICD-10-CM

## 2022-03-11 DIAGNOSIS — E55.9 VITAMIN D DEFICIENCY: ICD-10-CM

## 2022-03-11 DIAGNOSIS — E53.8 FOLIC ACID DEFICIENCY: ICD-10-CM

## 2022-03-11 PROCEDURE — 99214 OFFICE O/P EST MOD 30 MIN: CPT | Performed by: FAMILY MEDICINE

## 2022-03-11 RX ORDER — PROPRANOLOL HCL 60 MG
60 CAPSULE, EXTENDED RELEASE 24HR ORAL DAILY
Qty: 90 CAPSULE | Refills: 3 | Status: SHIPPED | OUTPATIENT
Start: 2022-03-11 | End: 2022-08-15

## 2022-03-11 NOTE — PROGRESS NOTES
"Subjective    Milagro Sanderson is a 67 y.o. female here for:  Chief Complaint   Patient presents with   • Tremors     4 month follow up       History per MA reviewed.    Goes to see Dr. Massey in May  Off Topamax due to risks of kidney function  For last three weeks she feels tremor has been worse.   Unsure on how to take propranolol with frequency           The following portions of the patient's history were reviewed and updated as appropriate: allergies, current medications, past family history, past medical history, past social history, past surgical history and problem list.    Review of Systems   Constitutional: Negative for fever.   Neurological: Positive for tremors.         Objective   Visit Vitals  /68 (BP Location: Left arm, Patient Position: Sitting, Cuff Size: Adult)   Pulse 73   Temp 98.4 °F (36.9 °C) (Temporal)   Resp 16   Ht 170.2 cm (67\")   Wt 61.5 kg (135 lb 9.6 oz)   LMP  (LMP Unknown)   SpO2 98%   BMI 21.24 kg/m²       Physical Exam  Vitals and nursing note reviewed.   Constitutional:       General: She is not in acute distress.     Appearance: Normal appearance. She is well-developed and well-groomed. She is not ill-appearing, toxic-appearing or diaphoretic.      Interventions: Face mask in place.   HENT:      Head: Normocephalic and atraumatic.      Right Ear: Hearing normal.      Left Ear: Hearing normal.   Eyes:      General: Lids are normal. No scleral icterus.     Extraocular Movements: Extraocular movements intact.   Neck:      Trachea: Phonation normal.   Cardiovascular:      Rate and Rhythm: Normal rate and regular rhythm.   Pulmonary:      Effort: Pulmonary effort is normal.   Musculoskeletal:      Cervical back: Neck supple.   Skin:     Coloration: Skin is not jaundiced or pale.   Neurological:      General: No focal deficit present.      Mental Status: She is alert and oriented to person, place, and time.      Motor: Tremor present.   Psychiatric:         Attention and Perception: " Attention and perception normal.         Mood and Affect: Mood and affect normal.         Speech: Speech normal.         Behavior: Behavior normal. Behavior is cooperative.         Thought Content: Thought content normal.         Cognition and Memory: Cognition and memory normal.         Judgment: Judgment normal.         For medical decision making review of the following was required:  Lab Results   Component Value Date    WBC 4.1 11/09/2021    HGB 14.2 11/09/2021    HCT 41.0 11/09/2021    MCV 94 11/09/2021     11/09/2021     Lab Results   Component Value Date    GLUCOSE 71 11/09/2021    BUN 13 11/09/2021    CREATININE 0.64 11/09/2021    EGFRIFNONA 92 11/09/2021    EGFRIFAFRI 106 11/09/2021    BCR 20 11/09/2021    K 4.5 11/09/2021    CO2 24 11/09/2021    CALCIUM 9.6 11/09/2021    PROTENTOTREF 6.5 11/09/2021    ALBUMIN 4.3 11/09/2021    LABIL2 2.0 11/09/2021    AST 15 11/09/2021    ALT 14 11/09/2021     Lab Results   Component Value Date    TSH 4.270 11/09/2021     Lab Results   Component Value Date    FREET4 0.85 11/09/2021     Office Visit with Des Elizondo MD (12/10/2021)  RHEUMATOLOGY - SCAN - EVAL,ARTHRITIS&OSTEOPOROSIS CENTER [facility],[id number] (03/01/2022)      Assessment/Plan     Problem List Items Addressed This Visit        Endocrine and Metabolic    Vitamin D deficiency    Relevant Orders    Vitamin D 25 Hydroxy    Comprehensive Metabolic Panel       Neuro    Hereditary essential tremor - Primary    Overview     Evaluated by neurology 1/2020 and not felt to be Parkinson's, worsened with Sinemet           Relevant Medications    propranolol LA (INDERAL LA) 60 MG 24 hr capsule      Other Visit Diagnoses     Other specified hypothyroidism        Relevant Medications    propranolol LA (INDERAL LA) 60 MG 24 hr capsule    Other Relevant Orders    TSH+Free T4    Medication monitoring encounter        Relevant Orders    Primidone level    Folic acid deficiency        Relevant Orders    Vitamin B12     Folate    CBC (No Diff)          · Follow up with Dr. Massey  movement clinic as scheduled. Discussed propanolol, per up to date can convert to once daily dosing when daily maintenance dose lined out. Can consider going up to 80 mg once a day if tremors further worsen. No change on primidone today but checking level. To me her mood seems more upbeat, this may be related to levothyroxine. Checking levels today, may need slight bump up on levothyroxine dose. Monitor tremor, if we increase dose and tremor worsens can certainly back back down. Follow up with psychiatry as scheduled.     Return in about 5 months (around 8/8/2022) for Medicare Wellness (366 days from last AWV).     Mae Valle MD

## 2022-03-14 LAB
25(OH)D3+25(OH)D2 SERPL-MCNC: 50.9 NG/ML (ref 30–100)
ALBUMIN SERPL-MCNC: 4.8 G/DL (ref 3.5–5.2)
ALBUMIN/GLOB SERPL: 2.2 G/DL
ALP SERPL-CCNC: 103 U/L (ref 39–117)
ALT SERPL-CCNC: 22 U/L (ref 1–33)
AST SERPL-CCNC: 19 U/L (ref 1–32)
BILIRUB SERPL-MCNC: 0.3 MG/DL (ref 0–1.2)
BUN SERPL-MCNC: 14 MG/DL (ref 8–23)
BUN/CREAT SERPL: 17.9 (ref 7–25)
CALCIUM SERPL-MCNC: 9.7 MG/DL (ref 8.6–10.5)
CHLORIDE SERPL-SCNC: 101 MMOL/L (ref 98–107)
CO2 SERPL-SCNC: 28.5 MMOL/L (ref 22–29)
CREAT SERPL-MCNC: 0.78 MG/DL (ref 0.57–1)
EGFR GENE MUT ANL BLD/T: 82.3 ML/MIN/1.73
ERYTHROCYTE [DISTWIDTH] IN BLOOD BY AUTOMATED COUNT: 12.9 % (ref 12.3–15.4)
FOLATE SERPL-MCNC: >20 NG/ML (ref 4.78–24.2)
GLOBULIN SER CALC-MCNC: 2.2 GM/DL
GLUCOSE SERPL-MCNC: 79 MG/DL (ref 65–99)
HCT VFR BLD AUTO: 41.4 % (ref 34–46.6)
HGB BLD-MCNC: 14.2 G/DL (ref 12–15.9)
MCH RBC QN AUTO: 32.2 PG (ref 26.6–33)
MCHC RBC AUTO-ENTMCNC: 34.3 G/DL (ref 31.5–35.7)
MCV RBC AUTO: 93.9 FL (ref 79–97)
PHENOBARB SERPL-MCNC: 30 UG/ML (ref 15–40)
PLATELET # BLD AUTO: 236 10*3/MM3 (ref 140–450)
POTASSIUM SERPL-SCNC: 4.3 MMOL/L (ref 3.5–5.2)
PRIMIDONE SERPL-MCNC: 4.5 UG/ML (ref 5–12)
PROT SERPL-MCNC: 7 G/DL (ref 6–8.5)
RBC # BLD AUTO: 4.41 10*6/MM3 (ref 3.77–5.28)
SODIUM SERPL-SCNC: 140 MMOL/L (ref 136–145)
T4 FREE SERPL-MCNC: 1.01 NG/DL (ref 0.93–1.7)
TSH SERPL DL<=0.005 MIU/L-ACNC: 2.84 UIU/ML (ref 0.27–4.2)
VIT B12 SERPL-MCNC: 371 PG/ML (ref 211–946)
WBC # BLD AUTO: 4.66 10*3/MM3 (ref 3.4–10.8)

## 2022-03-18 ENCOUNTER — TELEPHONE (OUTPATIENT)
Dept: INTERNAL MEDICINE | Facility: CLINIC | Age: 67
End: 2022-03-18

## 2022-03-18 NOTE — TELEPHONE ENCOUNTER
PT CALLED STATED THAT RX  propranolol LA (INDERAL LA) 60 MG 24 hr capsule IS CAUSING HER TO HAVE DIARRHEA AND WOULD LIKE TO KNOW IF DR SUGGEST FOR HER TO TAKE 3 A DAY NOW.    PLEASE ADVISE.  CALL BACK:[phone]

## 2022-05-20 ENCOUNTER — OFFICE VISIT (OUTPATIENT)
Dept: PSYCHIATRY | Facility: CLINIC | Age: 67
End: 2022-05-20

## 2022-05-20 DIAGNOSIS — G47.09 OTHER INSOMNIA: ICD-10-CM

## 2022-05-20 DIAGNOSIS — F33.0 MILD EPISODE OF RECURRENT MAJOR DEPRESSIVE DISORDER: ICD-10-CM

## 2022-05-20 DIAGNOSIS — F41.1 GENERALIZED ANXIETY DISORDER: ICD-10-CM

## 2022-05-20 DIAGNOSIS — Z60.0 PHASE OF LIFE PROBLEM IN ADULT: ICD-10-CM

## 2022-05-20 DIAGNOSIS — G25.0 ESSENTIAL TREMOR: Primary | ICD-10-CM

## 2022-05-20 PROCEDURE — 99214 OFFICE O/P EST MOD 30 MIN: CPT | Performed by: PSYCHIATRY & NEUROLOGY

## 2022-05-20 RX ORDER — SERTRALINE HYDROCHLORIDE 100 MG/1
200 TABLET, FILM COATED ORAL DAILY
Qty: 180 TABLET | Refills: 0 | Status: SHIPPED | OUTPATIENT
Start: 2022-05-20 | End: 2022-06-17 | Stop reason: SDUPTHER

## 2022-05-20 RX ORDER — MIRTAZAPINE 7.5 MG/1
7.5 TABLET, FILM COATED ORAL NIGHTLY
Qty: 90 TABLET | Refills: 0 | Status: SHIPPED | OUTPATIENT
Start: 2022-05-20 | End: 2022-09-19 | Stop reason: SDUPTHER

## 2022-05-20 RX ORDER — GABAPENTIN 300 MG/1
300 CAPSULE ORAL 3 TIMES DAILY
Qty: 90 CAPSULE | Refills: 0 | Status: SHIPPED | OUTPATIENT
Start: 2022-05-20 | End: 2022-06-17 | Stop reason: SDUPTHER

## 2022-05-20 SDOH — SOCIAL STABILITY - SOCIAL INSECURITY: PROBLEMS OF ADJUSTMENT TO LIFE-CYCLE TRANSITIONS: Z60.0

## 2022-05-20 NOTE — PROGRESS NOTES
Subjective   Milagro Sanderson is a 67 y.o. female who presents today for follow up     This provider is located at [facility] at [address] [place] [address]. The provider identified himself as well as his credentials.   The Patient is at home using her phone because problems with video connection. The patient's condition being diagnosed/treated is appropriate for telemedicine. The patient gave consent to be seen remotely, and when consent is given they understand that the consent allows for patient identifiable information to be sent to a third party as needed.   They may refuse to be seen remotely at any time. The electronic data is encrypted and password protected, and the patient has been advised of the potential risks to privacy not withstanding such measures        Chief Complaint: Depression    History of Present Illness: Patient presenting today for follow-up.  Since last visit, patient reports that she has been doing relatively well from a mental health standpoint but endorses some worsening mood symptoms secondary to her essential tremor which has gotten worse over the past few months.  She went to her neurologist who recommended some changes and sent them to her primary care doctor but she has not seen her primary care doctor yet and has not had any adjustments made.  She denies any medication side effects currently.  She reports no issues with sleep or appetite.  She does endorse that she has been isolating to the house somewhat more often due to her significant tremor.  She denies SI/HI/AVH.    The following portions of the patient's history were reviewed and updated as appropriate: allergies, current medications, past family history, past medical history, past social history, past surgical history and problem list.      Past Medical History:  Past Medical History:   Diagnosis Date   • Benign familial tremor    • Blood in urine    • Body piercing     EARS   • Depression     • Eczema    • Elevated LDL cholesterol level    • Fever blister    • History of fracture     ARM AT AGE 4 - PATIENT REPORTS SHE DOES NOT REMEMBER LATERALITY   • History of migraine    • History of tremor    • Onychomycosis    • Problems with swallowing     REPORTS SHE FEELS LIKE PILLS ARE GETTING STUCK IN A POCKET IN HER THROAT WHEN SHE TRIES TO SWALLOW THEM   • Sebaceous cyst    • Vitamin D deficiency    • Wears glasses        Social History:  Social History     Socioeconomic History   • Marital status: Single   Tobacco Use   • Smoking status: Never Smoker   • Smokeless tobacco: Never Used   Vaping Use   • Vaping Use: Never used   Substance and Sexual Activity   • Alcohol use: No   • Drug use: No   • Sexual activity: Defer       Family History:  Family History   Problem Relation Age of Onset   • Heart attack Mother    • Hypertension Mother    • Anxiety disorder Mother    • Depression Mother    • Heart attack Other    • Kidney disease Other    • Bipolar disorder Father    • Diabetes Paternal Aunt    • Diabetes Paternal Uncle    • Seizures Maternal Aunt    • Colon cancer Neg Hx    • Cirrhosis Neg Hx    • Liver disease Neg Hx    • Liver cancer Neg Hx    • Crohn's disease Neg Hx    • Ulcerative colitis Neg Hx    • Esophageal cancer Neg Hx    • Stomach cancer Neg Hx    • ADD / ADHD Neg Hx    • Alcohol abuse Neg Hx    • Dementia Neg Hx    • Drug abuse Neg Hx    • OCD Neg Hx    • Paranoid behavior Neg Hx    • Schizophrenia Neg Hx    • Self-Injurious Behavior  Neg Hx    • Suicide Attempts Neg Hx        Past Surgical History:  Past Surgical History:   Procedure Laterality Date   • APPENDECTOMY  2012   • APPENDECTOMY     • BREAST BIOPSY Bilateral    • BREAST CYST ASPIRATION     • BREAST SURGERY Bilateral     history of mammatone left and right breast   • COLONOSCOPY     • CYST REMOVAL      SEBACEOUS CYST X3 FROM HEAD   • ENDOSCOPY N/A 2/7/2019    Procedure: ESOPHAGOGASTRODUODENOSCOPY W/ BIOPSIES;  Surgeon: Simba Rogers  "MD;  Location: [facility] ENDOSCOPY;  Service: Gastroenterology   • WISDOM TOOTH EXTRACTION      EXTRACTION X2       Problem List:  Patient Active Problem List   Diagnosis   • Episode of recurrent major depressive disorder (HCC)   • Eczema   • Vitamin B12 deficiency   • Vitamin D deficiency   • Hereditary essential tremor   • Onychomycosis   • Recurrent cold sores   • GERD with esophagitis   • High risk medication use       Allergy:   Allergies   Allergen Reactions   • Hazelnut Anaphylaxis   • Macrobid [Nitrofurantoin Monohyd Macro] Other (See Comments)     REPORTS \"I FELL AND IT WAS LIKE I COULDN'T MOVE\"           Current Medications:   Current Outpatient Medications   Medication Sig Dispense Refill   • mirtazapine (REMERON) 7.5 MG tablet Take 1 tablet by mouth Every Night. 90 tablet 0   • sertraline (ZOLOFT) 100 MG tablet Take 2 tablets by mouth Daily. 180 tablet 0   • Cholecalciferol (Vitamin D3) 1.25 MG (20520 UT) capsule TAKE ONE CAPSULE BY MOUTH EVERY 7 DAYS 12 capsule 1   • Diclofenac Sodium (VOLTAREN) 1 % gel gel Apply 4 g topically to the appropriate area as directed 4 (Four) Times a Day As Needed (joint pain, max 32 grams/day). 350 g 11   • folic acid (FOLVITE) 1 MG tablet Take 1 tablet by mouth Daily. Indications: folic acid deficiency 90 tablet 3   • gabapentin (NEURONTIN) 300 MG capsule Take 1 capsule by mouth 3 (Three) Times a Day. 90 capsule 0   • levothyroxine (SYNTHROID, LEVOTHROID) 25 MCG tablet TAKE ONE TABLET BY MOUTH DAILY TAKE ONE AN EMPTY STOMACH AT LEAST 30 MINUTES BEFORE EATING AND OTHER MEDS 30 tablet 3   • primidone (MYSOLINE) 250 MG tablet      • propranolol LA (INDERAL LA) 60 MG 24 hr capsule Take 1 capsule by mouth Daily. In morning for tremor 90 capsule 3     No current facility-administered medications for this visit.       Review of Symptoms:    Review of Systems   Constitutional: Negative for activity change (improved), appetite change (improved), chills, diaphoresis, fatigue, fever and " unexpected weight loss.   HENT: Negative.    Eyes: Negative.    Respiratory: Negative.    Cardiovascular: Negative.    Gastrointestinal: Negative for abdominal pain, nausea and vomiting.   Endocrine: Negative.    Genitourinary: Negative.    Musculoskeletal: Negative.    Skin: Negative.    Allergic/Immunologic: Negative.    Neurological: Positive for tremors.   Hematological: Negative.    Psychiatric/Behavioral: Positive for dysphoric mood. Negative for agitation, decreased concentration, sleep disturbance (improved), suicidal ideas, depressed mood and stress. The patient is not nervous/anxious.          Physical Exam:   not currently breastfeeding.  Unable to assess, telephone visit    Appearance: Unable to assess, telephone visit  Gait, Station, Strength: Unable to assess, telephone visit    Mental Status Exam:     Hygiene:   Unable to assess, telephone visit  Cooperation:  Cooperative  Eye Contact:  Unable to assess, telephone visit  Psychomotor Behavior:  Unable to assess, telephone visit  Affect:  Full range  Mood: dysphoric secondary to tremor   Hopelessness: Denies  Speech:  Normal  Thought Process:  Goal directed and Linear  Thought Content:  Normal  Suicidal:  None  Homicidal:  None  Hallucinations:  None  Delusion:  None  Memory:  Intact  Orientation:  Person, Place, Time and Situation  Reliability:  good  Insight:  Fair  Judgement:  Good  Impulse Control:  Good  Physical/Medical Issues:  No        Lab Results:   No visits with results within 1 Month(s) from this visit.   Latest known visit with results is:   Office Visit on 03/11/2022   Component Date Value Ref Range Status   • TSH 03/11/2022 2.840  0.270 - 4.200 uIU/mL Final   • Free T4 03/11/2022 1.01  0.93 - 1.70 ng/dL Final    Results may be falsely increased if patient taking Biotin.   • 25 Hydroxy, Vitamin D 03/11/2022 50.9  30.0 - 100.0 ng/ml Final    Comment: Results may be falsely increased if patient taking Biotin.  Reference Range for Total  Vitamin D 25(OH)  Deficiency <20.0 ng/mL  Insufficiency 21-29 ng/mL  Sufficiency  ng/mL  Toxicity >100 ng/ml     • Vitamin B-12 03/11/2022 371  211 - 946 pg/mL Final    Results may be falsely increased if patient taking Biotin.   • Folate 03/11/2022 >20.00  4.78 - 24.20 ng/mL Final    Results may be falsely increased if patient taking Biotin.   • WBC 03/11/2022 4.66  3.40 - 10.80 10*3/mm3 Final   • RBC 03/11/2022 4.41  3.77 - 5.28 10*6/mm3 Final   • Hemoglobin 03/11/2022 14.2  12.0 - 15.9 g/dL Final   • Hematocrit 03/11/2022 41.4  34.0 - 46.6 % Final   • MCV 03/11/2022 93.9  79.0 - 97.0 fL Final   • MCH 03/11/2022 32.2  26.6 - 33.0 pg Final   • MCHC 03/11/2022 34.3  31.5 - 35.7 g/dL Final   • RDW 03/11/2022 12.9  12.3 - 15.4 % Final   • Platelets 03/11/2022 236  140 - 450 10*3/mm3 Final   • Glucose 03/11/2022 79  65 - 99 mg/dL Final   • BUN 03/11/2022 14  8 - 23 mg/dL Final   • Creatinine 03/11/2022 0.78  0.57 - 1.00 mg/dL Final   • EGFR Result 03/11/2022 82.3  >60.0 mL/min/1.73 Final    Comment: National Kidney Foundation and American Society of  Nephrology (ASN) Task Force recommended calculation based  on the Chronic Kidney Disease Epidemiology Collaboration  (CKD-EPI) equation refit without adjustment for race.  GFR Normal >60  Chronic Kidney Disease <60  Kidney Failure <15     • BUN/Creatinine Ratio 03/11/2022 17.9  7.0 - 25.0 Final   • Sodium 03/11/2022 140  136 - 145 mmol/L Final   • Potassium 03/11/2022 4.3  3.5 - 5.2 mmol/L Final   • Chloride 03/11/2022 101  98 - 107 mmol/L Final   • Total CO2 03/11/2022 28.5  22.0 - 29.0 mmol/L Final   • Calcium 03/11/2022 9.7  8.6 - 10.5 mg/dL Final   • Total Protein 03/11/2022 7.0  6.0 - 8.5 g/dL Final   • Albumin 03/11/2022 4.80  3.50 - 5.20 g/dL Final   • Globulin 03/11/2022 2.2  gm/dL Final   • A/G Ratio 03/11/2022 2.2  g/dL Final   • Total Bilirubin 03/11/2022 0.3  0.0 - 1.2 mg/dL Final   • Alkaline Phosphatase 03/11/2022 103  39 - 117 U/L Final   • AST (SGOT)  03/11/2022 19  1 - 32 U/L Final   • ALT (SGPT) 03/11/2022 22  1 - 33 U/L Final   • Primidone Level 03/11/2022 4.5 (A) 5.0 - 12.0 ug/mL Final    Comment:                                 Detection Limit = 0.3                           <0.3 indicates None Detected     • Phenobarbital 03/11/2022 30  15 - 40 ug/mL Final                                    Detection Limit = 3       Assessment & Plan   Diagnoses and all orders for this visit:    1. Essential tremor (Primary)  -     gabapentin (NEURONTIN) 300 MG capsule; Take 1 capsule by mouth 3 (Three) Times a Day.  Dispense: 90 capsule; Refill: 0    2. Mild episode of recurrent major depressive disorder (HCC)  -     mirtazapine (REMERON) 7.5 MG tablet; Take 1 tablet by mouth Every Night.  Dispense: 90 tablet; Refill: 0  -     sertraline (ZOLOFT) 100 MG tablet; Take 2 tablets by mouth Daily.  Dispense: 180 tablet; Refill: 0    3. Generalized anxiety disorder  -     gabapentin (NEURONTIN) 300 MG capsule; Take 1 capsule by mouth 3 (Three) Times a Day.  Dispense: 90 capsule; Refill: 0  -     mirtazapine (REMERON) 7.5 MG tablet; Take 1 tablet by mouth Every Night.  Dispense: 90 tablet; Refill: 0  -     sertraline (ZOLOFT) 100 MG tablet; Take 2 tablets by mouth Daily.  Dispense: 180 tablet; Refill: 0    4. Phase of life problem in adult  -     mirtazapine (REMERON) 7.5 MG tablet; Take 1 tablet by mouth Every Night.  Dispense: 90 tablet; Refill: 0  -     sertraline (ZOLOFT) 100 MG tablet; Take 2 tablets by mouth Daily.  Dispense: 180 tablet; Refill: 0    5. Other insomnia  -     mirtazapine (REMERON) 7.5 MG tablet; Take 1 tablet by mouth Every Night.  Dispense: 90 tablet; Refill: 0    -Patient overall relatively stable but is having some worsening dysphoria secondary to isolation she is experiencing due to not wanting to go out with her worsening essential tremor which has progressed over the past few months.  -Reviewed previous documentation  -Reviewed most recent available  labs  -ODALIS reviewed and appropriate. Patient counseled on use of controlled substances.   -Reviewed her documentation from her visit with neurology for essential tremor through [facility] from 5/6/2022 with [facility] neurology.  They recommended initiating gabapentin to see if it can improve tremor and we will follow the plan detailed in Dr. Massey's note  -Continue Zoloft 200mg PO daily for mood and anxiety: Patient has only been taking 100 mg p.o. daily  -Continue mirtazapine 7.5 mg p.o. nightly for mood, anxiety, insomnia, and appetite   -Start Gabapentin 300 mg 3 times daily for anxiety and essential tremor  -Encouraged patient to continue with therapy for phase of life issues and depression  -Encouraged to follow-up with neurology for tremor  -Approximate appointment time 9:45 AM to 10:05 AM via telephone visit      Visit Diagnoses:    ICD-10-CM ICD-9-CM   1. Essential tremor  G25.0 333.1   2. Mild episode of recurrent major depressive disorder (HCC)  F33.0 296.31   3. Generalized anxiety disorder  F41.1 300.02   4. Phase of life problem in adult  Z60.0 V62.89   5. Other insomnia  G47.09 780.52       TREATMENT PLAN/GOALS: Continue supportive psychotherapy efforts and medications as indicated. Treatment and medication options discussed during today's visit. Patient acknowledged and verbally consented to continue with current treatment plan and was educated on the importance of compliance with treatment and follow-up appointments.    MEDICATION ISSUES:    Discussed medication options and treatment plan of prescribed medication as well as the risks, benefits, and side effects including potential falls, possible impaired driving and metabolic adversities among others. Patient is agreeable to call the office with any worsening of symptoms or onset of side effects. Patient is agreeable to call 911 or go to the nearest ER should he/she begin having SI/HI.     MEDS ORDERED DURING VISIT:  New Medications Ordered  This Visit   Medications   • gabapentin (NEURONTIN) 300 MG capsule     Sig: Take 1 capsule by mouth 3 (Three) Times a Day.     Dispense:  90 capsule     Refill:  0   • mirtazapine (REMERON) 7.5 MG tablet     Sig: Take 1 tablet by mouth Every Night.     Dispense:  90 tablet     Refill:  0     Don't fill until due or patient calls per her request.   • sertraline (ZOLOFT) 100 MG tablet     Sig: Take 2 tablets by mouth Daily.     Dispense:  180 tablet     Refill:  0     Do not fill until patient calls per her request.       Return in about 4 weeks (around 6/17/2022).             This document has been electronically signed by Des Elizondo MD  May 20, 2022 14:51 EDT

## 2022-06-01 DIAGNOSIS — G25.0 ESSENTIAL TREMOR: ICD-10-CM

## 2022-06-01 DIAGNOSIS — F41.1 GENERALIZED ANXIETY DISORDER: ICD-10-CM

## 2022-06-01 RX ORDER — GABAPENTIN 300 MG/1
CAPSULE ORAL
Qty: 90 CAPSULE | OUTPATIENT
Start: 2022-06-01

## 2022-06-17 ENCOUNTER — OFFICE VISIT (OUTPATIENT)
Dept: PSYCHIATRY | Facility: CLINIC | Age: 67
End: 2022-06-17

## 2022-06-17 DIAGNOSIS — F41.1 GENERALIZED ANXIETY DISORDER: ICD-10-CM

## 2022-06-17 DIAGNOSIS — G25.0 ESSENTIAL TREMOR: ICD-10-CM

## 2022-06-17 DIAGNOSIS — G47.09 OTHER INSOMNIA: ICD-10-CM

## 2022-06-17 DIAGNOSIS — Z60.0 PHASE OF LIFE PROBLEM IN ADULT: ICD-10-CM

## 2022-06-17 DIAGNOSIS — F33.0 MILD EPISODE OF RECURRENT MAJOR DEPRESSIVE DISORDER: ICD-10-CM

## 2022-06-17 PROCEDURE — 99214 OFFICE O/P EST MOD 30 MIN: CPT | Performed by: PSYCHIATRY & NEUROLOGY

## 2022-06-17 RX ORDER — GABAPENTIN 300 MG/1
300 CAPSULE ORAL 3 TIMES DAILY
Qty: 90 CAPSULE | Refills: 2 | Status: SHIPPED | OUTPATIENT
Start: 2022-06-17 | End: 2022-10-17

## 2022-06-17 RX ORDER — SERTRALINE HYDROCHLORIDE 100 MG/1
100 TABLET, FILM COATED ORAL DAILY
Qty: 90 TABLET | Refills: 0 | Status: SHIPPED | OUTPATIENT
Start: 2022-06-17 | End: 2022-09-23 | Stop reason: SDUPTHER

## 2022-06-17 SDOH — SOCIAL STABILITY - SOCIAL INSECURITY: PROBLEMS OF ADJUSTMENT TO LIFE-CYCLE TRANSITIONS: Z60.0

## 2022-06-23 NOTE — PROGRESS NOTES
Subjective   Milagro Sanderson is a 67 y.o. female who presents today for follow up     This provider is located at [facility] at [address] [place] [address]. The provider identified himself as well as his credentials.   The Patient is at home using her phone because problems with video connection. The patient's condition being diagnosed/treated is appropriate for telemedicine. The patient gave consent to be seen remotely, and when consent is given they understand that the consent allows for patient identifiable information to be sent to a third party as needed.   They may refuse to be seen remotely at any time. The electronic data is encrypted and password protected, and the patient has been advised of the potential risks to privacy not withstanding such measures        Chief Complaint: Depression    History of Present Illness: Patient presenting for follow-up.  She reports the changes at last visit do seem to be helping her mentally.  She feels that her mood is improved and she is having less anxiety.  She has more motivation to do things.  She does have more energy during the day as well.  She states that her tremor remains somewhat unchanged.  We we will hold off on any changes at current as patient does seem to be doing much better with the gabapentin but we may consider decreasing sertraline in the future as it could be contributing in some way to the tremor.  She denies any other medication side effects.  Sleep and appetite are appropriate.  She denies SI/HI/AVH.    The following portions of the patient's history were reviewed and updated as appropriate: allergies, current medications, past family history, past medical history, past social history, past surgical history and problem list.      Past Medical History:  Past Medical History:   Diagnosis Date   • Benign familial tremor    • Blood in urine    • Body piercing     EARS   • Depression    • Eczema    • Elevated LDL  cholesterol level    • Fever blister    • History of fracture     ARM AT AGE 4 - PATIENT REPORTS SHE DOES NOT REMEMBER LATERALITY   • History of migraine    • History of tremor    • Onychomycosis    • Problems with swallowing     REPORTS SHE FEELS LIKE PILLS ARE GETTING STUCK IN A POCKET IN HER THROAT WHEN SHE TRIES TO SWALLOW THEM   • Sebaceous cyst    • Vitamin D deficiency    • Wears glasses        Social History:  Social History     Socioeconomic History   • Marital status: Single   Tobacco Use   • Smoking status: Never Smoker   • Smokeless tobacco: Never Used   Vaping Use   • Vaping Use: Never used   Substance and Sexual Activity   • Alcohol use: No   • Drug use: No   • Sexual activity: Defer       Family History:  Family History   Problem Relation Age of Onset   • Heart attack Mother    • Hypertension Mother    • Anxiety disorder Mother    • Depression Mother    • Heart attack Other    • Kidney disease Other    • Bipolar disorder Father    • Diabetes Paternal Aunt    • Diabetes Paternal Uncle    • Seizures Maternal Aunt    • Colon cancer Neg Hx    • Cirrhosis Neg Hx    • Liver disease Neg Hx    • Liver cancer Neg Hx    • Crohn's disease Neg Hx    • Ulcerative colitis Neg Hx    • Esophageal cancer Neg Hx    • Stomach cancer Neg Hx    • ADD / ADHD Neg Hx    • Alcohol abuse Neg Hx    • Dementia Neg Hx    • Drug abuse Neg Hx    • OCD Neg Hx    • Paranoid behavior Neg Hx    • Schizophrenia Neg Hx    • Self-Injurious Behavior  Neg Hx    • Suicide Attempts Neg Hx        Past Surgical History:  Past Surgical History:   Procedure Laterality Date   • APPENDECTOMY  2012   • APPENDECTOMY     • BREAST BIOPSY Bilateral    • BREAST CYST ASPIRATION     • BREAST SURGERY Bilateral     history of mammatone left and right breast   • COLONOSCOPY     • CYST REMOVAL      SEBACEOUS CYST X3 FROM HEAD   • ENDOSCOPY N/A 2/7/2019    Procedure: ESOPHAGOGASTRODUODENOSCOPY W/ BIOPSIES;  Surgeon: Simba Rogers MD;  Location: [facility]  "ENDOSCOPY;  Service: Gastroenterology   • WISDOM TOOTH EXTRACTION      EXTRACTION X2       Problem List:  Patient Active Problem List   Diagnosis   • Episode of recurrent major depressive disorder (HCC)   • Eczema   • Vitamin B12 deficiency   • Vitamin D deficiency   • Hereditary essential tremor   • Onychomycosis   • Recurrent cold sores   • GERD with esophagitis   • High risk medication use       Allergy:   Allergies   Allergen Reactions   • Hazelnut Anaphylaxis   • Macrobid [Nitrofurantoin Monohyd Macro] Other (See Comments)     REPORTS \"I FELL AND IT WAS LIKE I COULDN'T MOVE\"           Current Medications:   Current Outpatient Medications   Medication Sig Dispense Refill   • gabapentin (NEURONTIN) 300 MG capsule Take 1 capsule by mouth 3 (Three) Times a Day. 90 capsule 2   • sertraline (ZOLOFT) 100 MG tablet Take 1 tablet by mouth Daily. 90 tablet 0   • Cholecalciferol (Vitamin D3) 1.25 MG (78348 UT) capsule TAKE ONE CAPSULE BY MOUTH EVERY 7 DAYS 12 capsule 1   • Diclofenac Sodium (VOLTAREN) 1 % gel gel Apply 4 g topically to the appropriate area as directed 4 (Four) Times a Day As Needed (joint pain, max 32 grams/day). 350 g 11   • folic acid (FOLVITE) 1 MG tablet Take 1 tablet by mouth Daily. Indications: folic acid deficiency 90 tablet 3   • levothyroxine (SYNTHROID, LEVOTHROID) 25 MCG tablet TAKE ONE TABLET BY MOUTH DAILY TAKE ONE AN EMPTY STOMACH AT LEAST 30 MINUTES BEFORE EATING AND OTHER MEDS 30 tablet 3   • mirtazapine (REMERON) 7.5 MG tablet Take 1 tablet by mouth Every Night. 90 tablet 0   • primidone (MYSOLINE) 250 MG tablet      • propranolol LA (INDERAL LA) 60 MG 24 hr capsule Take 1 capsule by mouth Daily. In morning for tremor 90 capsule 3     No current facility-administered medications for this visit.       Review of Symptoms:    Review of Systems   Constitutional: Negative for activity change (improved), appetite change (improved), chills, diaphoresis, fatigue, fever and unexpected weight loss. "   HENT: Negative.    Eyes: Negative.    Respiratory: Negative.    Cardiovascular: Negative.    Gastrointestinal: Negative for abdominal pain, nausea and vomiting.   Endocrine: Negative.    Genitourinary: Negative.    Musculoskeletal: Negative.    Skin: Negative.    Allergic/Immunologic: Negative.    Neurological: Positive for tremors.   Hematological: Negative.    Psychiatric/Behavioral: Negative for agitation, decreased concentration, dysphoric mood, sleep disturbance (improved), suicidal ideas, depressed mood and stress. The patient is not nervous/anxious.          Physical Exam:   not currently breastfeeding.  Unable to assess, telephone visit    Appearance: Unable to assess, telephone visit  Gait, Station, Strength: Unable to assess, telephone visit    Mental Status Exam:     Hygiene:   Unable to assess, telephone visit  Cooperation:  Cooperative  Eye Contact:  Unable to assess, telephone visit  Psychomotor Behavior:  Unable to assess, telephone visit  Affect:  Full range  Mood: normal and improved   Hopelessness: Denies  Speech:  Normal  Thought Process:  Goal directed and Linear  Thought Content:  Normal  Suicidal:  None  Homicidal:  None  Hallucinations:  None  Delusion:  None  Memory:  Intact  Orientation:  Person, Place, Time and Situation  Reliability:  good  Insight:  Fair  Judgement:  Good  Impulse Control:  Good  Physical/Medical Issues:  No        Lab Results:   No visits with results within 1 Month(s) from this visit.   Latest known visit with results is:   Office Visit on 03/11/2022   Component Date Value Ref Range Status   • TSH 03/11/2022 2.840  0.270 - 4.200 uIU/mL Final   • Free T4 03/11/2022 1.01  0.93 - 1.70 ng/dL Final    Results may be falsely increased if patient taking Biotin.   • 25 Hydroxy, Vitamin D 03/11/2022 50.9  30.0 - 100.0 ng/ml Final    Comment: Results may be falsely increased if patient taking Biotin.  Reference Range for Total Vitamin D 25(OH)  Deficiency <20.0  ng/mL  Insufficiency 21-29 ng/mL  Sufficiency  ng/mL  Toxicity >100 ng/ml     • Vitamin B-12 03/11/2022 371  211 - 946 pg/mL Final    Results may be falsely increased if patient taking Biotin.   • Folate 03/11/2022 >20.00  4.78 - 24.20 ng/mL Final    Results may be falsely increased if patient taking Biotin.   • WBC 03/11/2022 4.66  3.40 - 10.80 10*3/mm3 Final   • RBC 03/11/2022 4.41  3.77 - 5.28 10*6/mm3 Final   • Hemoglobin 03/11/2022 14.2  12.0 - 15.9 g/dL Final   • Hematocrit 03/11/2022 41.4  34.0 - 46.6 % Final   • MCV 03/11/2022 93.9  79.0 - 97.0 fL Final   • MCH 03/11/2022 32.2  26.6 - 33.0 pg Final   • MCHC 03/11/2022 34.3  31.5 - 35.7 g/dL Final   • RDW 03/11/2022 12.9  12.3 - 15.4 % Final   • Platelets 03/11/2022 236  140 - 450 10*3/mm3 Final   • Glucose 03/11/2022 79  65 - 99 mg/dL Final   • BUN 03/11/2022 14  8 - 23 mg/dL Final   • Creatinine 03/11/2022 0.78  0.57 - 1.00 mg/dL Final   • EGFR Result 03/11/2022 82.3  >60.0 mL/min/1.73 Final    Comment: National Kidney Foundation and American Society of  Nephrology (ASN) Task Force recommended calculation based  on the Chronic Kidney Disease Epidemiology Collaboration  (CKD-EPI) equation refit without adjustment for race.  GFR Normal >60  Chronic Kidney Disease <60  Kidney Failure <15     • BUN/Creatinine Ratio 03/11/2022 17.9  7.0 - 25.0 Final   • Sodium 03/11/2022 140  136 - 145 mmol/L Final   • Potassium 03/11/2022 4.3  3.5 - 5.2 mmol/L Final   • Chloride 03/11/2022 101  98 - 107 mmol/L Final   • Total CO2 03/11/2022 28.5  22.0 - 29.0 mmol/L Final   • Calcium 03/11/2022 9.7  8.6 - 10.5 mg/dL Final   • Total Protein 03/11/2022 7.0  6.0 - 8.5 g/dL Final   • Albumin 03/11/2022 4.80  3.50 - 5.20 g/dL Final   • Globulin 03/11/2022 2.2  gm/dL Final   • A/G Ratio 03/11/2022 2.2  g/dL Final   • Total Bilirubin 03/11/2022 0.3  0.0 - 1.2 mg/dL Final   • Alkaline Phosphatase 03/11/2022 103  39 - 117 U/L Final   • AST (SGOT) 03/11/2022 19  1 - 32 U/L Final   •  ALT (SGPT) 03/11/2022 22  1 - 33 U/L Final   • Primidone Level 03/11/2022 4.5 (A) 5.0 - 12.0 ug/mL Final    Comment:                                 Detection Limit = 0.3                           <0.3 indicates None Detected     • Phenobarbital 03/11/2022 30  15 - 40 ug/mL Final                                    Detection Limit = 3       Assessment & Plan   Diagnoses and all orders for this visit:    1. Generalized anxiety disorder  -     gabapentin (NEURONTIN) 300 MG capsule; Take 1 capsule by mouth 3 (Three) Times a Day.  Dispense: 90 capsule; Refill: 2  -     sertraline (ZOLOFT) 100 MG tablet; Take 1 tablet by mouth Daily.  Dispense: 90 tablet; Refill: 0    2. Essential tremor  -     gabapentin (NEURONTIN) 300 MG capsule; Take 1 capsule by mouth 3 (Three) Times a Day.  Dispense: 90 capsule; Refill: 2    3. Mild episode of recurrent major depressive disorder (HCC)  -     sertraline (ZOLOFT) 100 MG tablet; Take 1 tablet by mouth Daily.  Dispense: 90 tablet; Refill: 0    4. Phase of life problem in adult  -     sertraline (ZOLOFT) 100 MG tablet; Take 1 tablet by mouth Daily.  Dispense: 90 tablet; Refill: 0    5. Other insomnia    -Patient having improved mood and anxiety symptoms with more even mood, motivation, and is more energetic during the day but her tremor remains unchanged.  We may need to consider reducing her sertraline in the future as it could be contributing at least partially to her tremor.  -Reviewed previous documentation  -Reviewed most recent available labs  -ODALIS reviewed and appropriate. Patient counseled on use of controlled substances.   -Continue Zoloft 200mg PO daily for mood and anxiety: Patient has only been taking 100 mg p.o. daily  -Continue mirtazapine 7.5 mg p.o. nightly for mood, anxiety, insomnia, and appetite   -Continue gabapentin 300 mg 3 times daily for anxiety and essential tremor  -Encouraged patient to continue with therapy for phase of life issues and  depression  -Encouraged to follow-up with neurology for tremor  -Approximate appointment time 8:30 AM to 8:45 AM via telephone visit      Visit Diagnoses:    ICD-10-CM ICD-9-CM   1. Generalized anxiety disorder  F41.1 300.02   2. Essential tremor  G25.0 333.1   3. Mild episode of recurrent major depressive disorder (HCC)  F33.0 296.31   4. Phase of life problem in adult  Z60.0 V62.89   5. Other insomnia  G47.09 780.52       TREATMENT PLAN/GOALS: Continue supportive psychotherapy efforts and medications as indicated. Treatment and medication options discussed during today's visit. Patient acknowledged and verbally consented to continue with current treatment plan and was educated on the importance of compliance with treatment and follow-up appointments.    MEDICATION ISSUES:    Discussed medication options and treatment plan of prescribed medication as well as the risks, benefits, and side effects including potential falls, possible impaired driving and metabolic adversities among others. Patient is agreeable to call the office with any worsening of symptoms or onset of side effects. Patient is agreeable to call 911 or go to the nearest ER should he/she begin having SI/HI.     MEDS ORDERED DURING VISIT:  New Medications Ordered This Visit   Medications   • gabapentin (NEURONTIN) 300 MG capsule     Sig: Take 1 capsule by mouth 3 (Three) Times a Day.     Dispense:  90 capsule     Refill:  2   • sertraline (ZOLOFT) 100 MG tablet     Sig: Take 1 tablet by mouth Daily.     Dispense:  90 tablet     Refill:  0     Do not fill until patient calls per her request.       Return in about 3 months (around 9/17/2022).             This document has been electronically signed by Des Elizondo MD  June 23, 2022 15:33 EDT

## 2022-07-02 DIAGNOSIS — R94.6 ABNORMAL THYROID FUNCTION TEST: ICD-10-CM

## 2022-07-06 RX ORDER — LEVOTHYROXINE SODIUM 0.03 MG/1
TABLET ORAL
Qty: 90 TABLET | Refills: 1 | Status: SHIPPED | OUTPATIENT
Start: 2022-07-06 | End: 2022-12-28 | Stop reason: SDUPTHER

## 2022-08-15 ENCOUNTER — OFFICE VISIT (OUTPATIENT)
Dept: INTERNAL MEDICINE | Facility: CLINIC | Age: 67
End: 2022-08-15

## 2022-08-15 VITALS
DIASTOLIC BLOOD PRESSURE: 62 MMHG | HEIGHT: 67 IN | BODY MASS INDEX: 21.94 KG/M2 | OXYGEN SATURATION: 95 % | SYSTOLIC BLOOD PRESSURE: 104 MMHG | RESPIRATION RATE: 16 BRPM | TEMPERATURE: 97.8 F | WEIGHT: 139.8 LBS | HEART RATE: 70 BPM

## 2022-08-15 DIAGNOSIS — G47.8 SLEEP PARALYSIS: ICD-10-CM

## 2022-08-15 DIAGNOSIS — F33.9 EPISODE OF RECURRENT MAJOR DEPRESSIVE DISORDER, UNSPECIFIED DEPRESSION EPISODE SEVERITY: ICD-10-CM

## 2022-08-15 DIAGNOSIS — R94.6 ABNORMAL THYROID FUNCTION TEST: ICD-10-CM

## 2022-08-15 DIAGNOSIS — M35.9 UNDIFFERENTIATED CONNECTIVE TISSUE DISEASE: ICD-10-CM

## 2022-08-15 DIAGNOSIS — Z12.31 ENCOUNTER FOR SCREENING MAMMOGRAM FOR BREAST CANCER: ICD-10-CM

## 2022-08-15 DIAGNOSIS — Z00.00 MEDICARE ANNUAL WELLNESS VISIT, SUBSEQUENT: Primary | ICD-10-CM

## 2022-08-15 DIAGNOSIS — Z91.81 AT HIGH RISK FOR FALLS: ICD-10-CM

## 2022-08-15 DIAGNOSIS — G25.0 HEREDITARY ESSENTIAL TREMOR: ICD-10-CM

## 2022-08-15 PROCEDURE — G0439 PPPS, SUBSEQ VISIT: HCPCS | Performed by: FAMILY MEDICINE

## 2022-08-15 PROCEDURE — 1170F FXNL STATUS ASSESSED: CPT | Performed by: FAMILY MEDICINE

## 2022-08-15 PROCEDURE — 99397 PER PM REEVAL EST PAT 65+ YR: CPT | Performed by: FAMILY MEDICINE

## 2022-08-15 PROCEDURE — 1159F MED LIST DOCD IN RCRD: CPT | Performed by: FAMILY MEDICINE

## 2022-08-15 PROCEDURE — 99213 OFFICE O/P EST LOW 20 MIN: CPT | Performed by: FAMILY MEDICINE

## 2022-08-15 RX ORDER — HYDROXYCHLOROQUINE SULFATE 200 MG/1
200 TABLET, FILM COATED ORAL DAILY
COMMUNITY
Start: 2022-07-27

## 2022-08-15 RX ORDER — PROPRANOLOL HYDROCHLORIDE 20 MG/1
20 TABLET ORAL 3 TIMES DAILY
COMMUNITY

## 2022-08-15 RX ORDER — METHOTREXATE 2.5 MG/1
2.5 TABLET ORAL
COMMUNITY
Start: 2022-07-27 | End: 2022-09-23

## 2022-08-15 NOTE — PROGRESS NOTES
"The ABCs of the Annual Wellness Visit  Subsequent Medicare Wellness Visit    Chief Complaint   Patient presents with   • Medicare Wellness-subsequent     AWV and preventive care      Subjective    History of Present Illness:  Milagro Sanderson is a 67 y.o. female who presents for a Subsequent Medicare Wellness Visit and to follow up on chronic health conditions.    Reviewed note from Dr. Massey,  movement clinic, from 5/6/22:  Impression:  1. Essential Tremor, possibly familial  2. Peripheral neuropathy  3. Depression by hx, improved on SSRI     Plan:  1. Again educated Ms. Sanderson and friend about etiologies, treatments and available resources for Essential Tremor for 15 minutes. Given [employer] web site. We also discussed using weighted, larger implements such as table ware, heavier cups/glasses and pens. Suggested trying wrist weights.   2. She will continue propranolol 20 mg, 1 tid and primidone 250 mg tabs, 1 (8AM), 1 (3PM), 0.5 (11P)  3. Request Dr. Valle provide trial of gabapentin 300 mg tid for ET. If helpful, can then consider reducing primidone by 1/2 tab q weekly until symptoms worsen or medication stopped.   4. Discussed again surgical therapies including Deep Brain Stimulation and focused ultrasound. She would like to try medication manipulation before considering these options.   5. RTC 6 months     Has decreased gabapentin to bid. Questions it affecting memory (was taking tid). Dr. Massey has retired and she's scheduled to follow up with another movement specialist at  in November. Hasn't tried heavy silverware yet. Doesn't for see she'll be able to come off primidone.     Describes a bout after last covid-19 booster where she woke up and couldn't move for hours. Recalls having another spell like this years ago. Questions sleep paralysis. Hesitant to add any meds as she feels she's already taking so many things.    Being followed by Dr. Sebastian who is treating for \"undifferentiated connective tissue disease\" " and is prescribing plaquenil. Eye exam up to date and in chart (7/6/22). He also has her on methotrexate and she's taking folic acid. Has labs next week with him.   RHEUMATOLOGY - SCAN - F/U VISIT, [facility], 04/06/2022 (04/06/2022)      The following portions of the patient's history were reviewed and   updated as appropriate: allergies, current medications, past family history, past medical history, past social history, past surgical history and problem list.    Compared to one year ago, the patient feels her physical   health is the same.    Compared to one year ago, the patient feels her mental   health is better.    Recent Hospitalizations:  She was not admitted to the hospital during the last year.       Current Medical Providers:  Patient Care Team:  Mae Valle MD as PCP - General (Family Medicine)  Praful Massey MD as Consulting Physician (Neurology)  Des Elizondo MD as Consulting Physician (Psychiatry)  Alan Putnam MD as Consulting Physician (Ophthalmology)    Outpatient Medications Prior to Visit   Medication Sig Dispense Refill   • Cholecalciferol (Vitamin D3) 1.25 MG (13039 UT) capsule TAKE ONE CAPSULE BY MOUTH EVERY 7 DAYS 12 capsule 1   • Diclofenac Sodium (VOLTAREN) 1 % gel gel Apply 4 g topically to the appropriate area as directed 4 (Four) Times a Day As Needed (joint pain, max 32 grams/day). 350 g 11   • folic acid (FOLVITE) 1 MG tablet Take 1 tablet by mouth Daily. Indications: folic acid deficiency 90 tablet 3   • gabapentin (NEURONTIN) 300 MG capsule Take 1 capsule by mouth 3 (Three) Times a Day. 90 capsule 2   • hydroxychloroquine (PLAQUENIL) 200 MG tablet Take 200 mg by mouth Daily.     • levothyroxine (SYNTHROID, LEVOTHROID) 25 MCG tablet TAKE ONE TABLET BY MOUTH DAILY TAKE ON AN EMPTY STOMACH AT LEAST 30 MINUTES BEFORE EATING AND OTHER MEDS 90 tablet 1   • methotrexate 2.5 MG tablet Take 2.5 mg by mouth. 4 times weekly     • mirtazapine (REMERON) 7.5 MG  "tablet Take 1 tablet by mouth Every Night. 90 tablet 0   • primidone (MYSOLINE) 250 MG tablet      • propranolol (INDERAL) 20 MG tablet Take 20 mg by mouth 3 (Three) Times a Day.     • sertraline (ZOLOFT) 100 MG tablet Take 1 tablet by mouth Daily. 90 tablet 0   • propranolol LA (INDERAL LA) 60 MG 24 hr capsule Take 1 capsule by mouth Daily. In morning for tremor 90 capsule 3     No facility-administered medications prior to visit.       No opioid medication identified on active medication list. I have reviewed chart for other potential  high risk medication/s and harmful drug interactions in the elderly.          Aspirin is not on active medication list.  Aspirin use is not indicated based on review of current medical condition/s. Risk of harm outweighs potential benefits.  .    Patient Active Problem List   Diagnosis   • Episode of recurrent major depressive disorder (HCC)   • Eczema   • Vitamin B12 deficiency   • Vitamin D deficiency   • Hereditary essential tremor   • Onychomycosis   • Recurrent cold sores   • GERD with esophagitis   • High risk medication use   • Undifferentiated connective tissue disease (HCC)     Advance Care Planning  Advance Directive is not on file.  ACP discussion was held with the patient during this visit. Patient does not have an advance directive, declines further assistance.    Review of Systems   Constitutional: Negative for fever.   Neurological: Positive for tremors and confusion (gabapentin?).        Objective    Vitals:    08/15/22 1323   BP: 104/62   BP Location: Right arm   Patient Position: Sitting   Cuff Size: Adult   Pulse: 70   Resp: 16   Temp: 97.8 °F (36.6 °C)   TempSrc: Temporal   SpO2: 95%   Weight: 63.4 kg (139 lb 12.8 oz)   Height: 170.2 cm (67\")   PainSc: 0-No pain     Estimated body mass index is 21.9 kg/m² as calculated from the following:    Height as of this encounter: 170.2 cm (67\").    Weight as of this encounter: 63.4 kg (139 lb 12.8 oz).    BMI is within " normal parameters. No other follow-up for BMI required.      Does the patient have evidence of cognitive impairment? No    Physical Exam  Vitals and nursing note reviewed.   Constitutional:       General: She is not in acute distress.     Appearance: Normal appearance. She is well-developed and well-groomed. She is not ill-appearing, toxic-appearing or diaphoretic.      Interventions: Face mask in place.   HENT:      Head: Normocephalic and atraumatic.      Right Ear: Hearing, tympanic membrane, ear canal and external ear normal.      Left Ear: Hearing, tympanic membrane, ear canal and external ear normal.   Eyes:      General: Lids are normal. No scleral icterus.     Extraocular Movements: Extraocular movements intact.   Neck:      Trachea: Phonation normal.   Cardiovascular:      Rate and Rhythm: Normal rate and regular rhythm.   Pulmonary:      Effort: Pulmonary effort is normal.      Breath sounds: Normal breath sounds.   Musculoskeletal:      Cervical back: Neck supple.   Skin:     Coloration: Skin is not jaundiced or pale.   Neurological:      General: No focal deficit present.      Mental Status: She is alert and oriented to person, place, and time.      Motor: Tremor present.   Psychiatric:         Attention and Perception: Attention and perception normal.         Mood and Affect: Mood and affect normal.         Speech: Speech normal.         Behavior: Behavior normal. Behavior is cooperative.         Thought Content: Thought content normal.         Cognition and Memory: Cognition and memory normal.         Judgment: Judgment normal.                 HEALTH RISK ASSESSMENT    Smoking Status:  Social History     Tobacco Use   Smoking Status Never Smoker   Smokeless Tobacco Never Used     Alcohol Consumption:  Social History     Substance and Sexual Activity   Alcohol Use No     Fall Risk Screen:    STEADI Fall Risk Assessment was completed, and patient is at MODERATE risk for falls. Assessment completed  on:8/15/2022    Depression Screening:  PHQ-2/PHQ-9 Depression Screening 8/15/2022   Retired PHQ-9 Total Score -   Retired Total Score -   Little Interest or Pleasure in Doing Things 0-->not at all   Feeling Down, Depressed or Hopeless 1-->several days   PHQ-9: Brief Depression Severity Measure Score 1       Health Habits and Functional and Cognitive Screening:  Functional & Cognitive Status 8/15/2022   Do you have difficulty preparing food and eating? No   Do you have difficulty bathing yourself, getting dressed or grooming yourself? No   Do you have difficulty using the toilet? No   Do you have difficulty moving around from place to place? No   Do you have trouble with steps or getting out of a bed or a chair? No   Current Diet Well Balanced Diet   Dental Exam Up to date   Eye Exam Up to date   Exercise (times per week) 0 times per week   Current Exercises Include No Regular Exercise   Current Exercise Activities Include -   Do you need help using the phone?  No   Are you deaf or do you have serious difficulty hearing?  No   Do you need help with transportation? No   Do you need help shopping? No   Do you need help preparing meals?  No   Do you need help with housework?  No   Do you need help with laundry? No   Do you need help taking your medications? No   Do you need help managing money? No   Do you ever drive or ride in a car without wearing a seat belt? No   Have you felt unusual stress, anger or loneliness in the last month? Yes   Who do you live with? Alone   If you need help, do you have trouble finding someone available to you? No   Have you been bothered in the last four weeks by sexual problems? No   Do you have difficulty concentrating, remembering or making decisions? Yes       Age-appropriate Screening Schedule:  Refer to the list below for future screening recommendations based on patient's age, sex and/or medical conditions. Orders for these recommended tests are listed in the plan section. The  patient has been provided with a written plan.    Health Maintenance   Topic Date Due   • DXA SCAN  Never done   • MAMMOGRAM  07/30/2022   • TDAP/TD VACCINES (1 - Tdap) 08/01/2023 (Originally 1/24/1974)   • INFLUENZA VACCINE  10/01/2022   • ZOSTER VACCINE  Completed   • PAP SMEAR  Discontinued              Assessment & Plan   CMS Preventative Services Quick Reference  Risk Factors Identified During Encounter  Dementia/Memory   Fall Risk-High or Moderate  Polypharmacy  The above risks/problems have been discussed with the patient.  Follow up actions/plans if indicated are seen below in the Assessment/Plan Section.  Pertinent information has been shared with the patient in the After Visit Summary.    Diagnoses and all orders for this visit:    1. Medicare annual wellness visit, subsequent (Primary)    2. Undifferentiated connective tissue disease (HCC)  Comments:  follow up with rheumatology; discussed importance of folic acid. stay utd on eye exams    3. Episode of recurrent major depressive disorder, unspecified depression episode severity (HCC)  Comments:  follow up with psychiatry    4. Hereditary essential tremor  Comments:  follow up with [facility] as scheduled, continue current meds, discussed tid dosing on gabapentin    5. Sleep paralysis  Comments:  discussed this but doesn't last hours. cannot rule out TIA; discussed statin therapy, pt to consider    6. Abnormal thyroid function test  Comments:  order printed so she can have labs checked when she has other labs with rheumatology, one stick.  Orders:  -     TSH+Free T4; Future    7. Encounter for screening mammogram for breast cancer  -     Mammo Screening Digital Tomosynthesis Bilateral With CAD; Future    8. At high risk for falls  Comments:  info via avs        Follow Up:   Return in about 1 year (around 8/18/2023) for Medicare Wellness. or sooner if needed, discussed with patient.    An After Visit Summary and PPPS were made available to the patient.          I  spent 35 minutes caring for Milagro on this date of service. This time includes time spent by me in the following activities:preparing for the visit, reviewing tests, performing a medically appropriate examination and/or evaluation , counseling and educating the patient/family/caregiver, ordering medications, tests, or procedures and documenting information in the medical record    I spent 15 minutes on the separately reported service of [id number]. This time is not included in the time used to support the E/M service also reported today.

## 2022-08-15 NOTE — PATIENT INSTRUCTIONS
Fall Prevention in the Home, Adult  Falls can cause injuries and can affect people from all age groups. There are many simple things that you can do to make your home safe and to help prevent falls. Ask for help when making these changes, if needed.  What actions can I take to prevent falls?  General instructions  Use good lighting in all rooms. Replace any light bulbs that burn out.  Turn on lights if it is dark. Use night-lights.  Place frequently used items in easy-to-reach places. Lower the shelves around your home if necessary.  Set up furniture so that there are clear paths around it. Avoid moving your furniture around.  Remove throw rugs and other tripping hazards from the floor.  Avoid walking on wet floors.  Fix any uneven floor surfaces.  Add color or contrast paint or tape to grab bars and handrails in your home. Place contrasting color strips on the first and last steps of stairways.  When you use a stepladder, make sure that it is completely opened and that the sides are firmly locked. Have someone hold the ladder while you are using it. Do not climb a closed stepladder.  Be aware of any and all pets.  What can I do in the bathroom?         Keep the floor dry. Immediately clean up any water that spills onto the floor.  Remove soap buildup in the tub or shower on a regular basis.  Use non-skid mats or decals on the floor of the tub or shower.  Attach bath mats securely with double-sided, non-slip rug tape.  If you need to sit down while you are in the shower, use a plastic, non-slip stool.  Install grab bars by the toilet and in the tub and shower. Do not use towel bars as grab bars.  What can I do in the bedroom?  Make sure that a bedside light is easy to reach.  Do not use oversized bedding that drapes onto the floor.  Have a firm chair that has side arms to use for getting dressed.  What can I do in the kitchen?  Clean up any spills right away.  If you need to reach for something above you, use a  sturdy step stool that has a grab bar.  Keep electrical cables out of the way.  Do not use floor polish or wax that makes floors slippery. If you must use wax, make sure that it is non-skid floor wax.  What can I do in the stairways?  Do not leave any items on the stairs.  Make sure that you have a light switch at the top of the stairs and the bottom of the stairs. Have them installed if you do not have them.  Make sure that there are handrails on both sides of the stairs. Fix handrails that are broken or loose. Make sure that handrails are as long as the stairways.  Install non-slip stair treads on all stairs in your home.  Avoid having throw rugs at the top or bottom of stairways, or secure the rugs with carpet tape to prevent them from moving.  Choose a carpet design that does not hide the edge of steps on the stairway.  Check any carpeting to make sure that it is firmly attached to the stairs. Fix any carpet that is loose or worn.  What can I do on the outside of my home?  Use bright outdoor lighting.  Regularly repair the edges of walkways and driveways and fix any cracks.  Remove high doorway thresholds.  Trim any shrubbery on the main path into your home.  Regularly check that handrails are securely fastened and in good repair. Both sides of any steps should have handrails.  Install guardrails along the edges of any raised decks or porches.  Clear walkways of debris and clutter, including tools and rocks.  Have leaves, snow, and ice cleared regularly.  Use sand or salt on walkways during winter months.  In the garage, clean up any spills right away, including grease or oil spills.  What other actions can I take?  Wear closed-toe shoes that fit well and support your feet. Wear shoes that have rubber soles or low heels.  Use mobility aids as needed, such as canes, walkers, scooters, and crutches.  Review your medicines with your health care provider. Some medicines can cause dizziness or changes in blood  pressure, which increase your risk of falling.  Talk with your health care provider about other ways that you can decrease your risk of falls. This may include working with a physical therapist or  to improve your strength, balance, and endurance.  Where to find more information  Centers for Disease Control and PreventionJOANNA: https://www.cdc.gov  [employer] [place] on Aging: https://yc6ixjq.mikaela.nih.gov  Contact a health care provider if:  You are afraid of falling at home.  You feel weak, drowsy, or dizzy at home.  You fall at home.  Summary  There are many simple things that you can do to make your home safe and to help prevent falls.  Ways to make your home safe include removing tripping hazards and installing grab bars in the bathroom.  Ask for help when making these changes in your home.  This information is not intended to replace advice given to you by your health care provider. Make sure you discuss any questions you have with your health care provider.  Document Revised: 2018 Document Reviewed: 2018  [employer] Patient Education 2021 [employer]      Medicare Wellness  Personal Prevention Plan of Service     Date of Office Visit:    Encounter Provider:  Mae Valle MD  Place of Service:  [facility]  Patient Name: Milagro Sanderson  :  1955    As part of the Medicare Wellness portion of your visit today, we are providing you with this personalized preventive plan of services (PPPS). This plan is based upon recommendations of the [place] Preventive Services Task Force (USPSTF) and the Advisory Committee on Immunization Practices (ACIP).    This lists the preventive care services that should be considered, and provides dates of when you are due. Items listed as completed are up-to-date and do not require any further intervention.    Health Maintenance   Topic Date Due    DXA SCAN  Never done    MAMMOGRAM  2022    ANNUAL WELLNESS  VISIT  08/06/2022    COVID-19 Vaccine (4 - Booster for Moderna series) 09/03/2022 (Originally 2/26/2022)    TDAP/TD VACCINES (1 - Tdap) 08/01/2023 (Originally 1/24/1974)    INFLUENZA VACCINE  10/01/2022    COLORECTAL CANCER SCREENING  03/27/2025    HEPATITIS C SCREENING  Completed    Pneumococcal Vaccine 65+  Completed    ZOSTER VACCINE  Completed    PAP SMEAR  Discontinued       Orders Placed This Encounter   Procedures    Mammo Screening Digital Tomosynthesis Bilateral With CAD     Standing Status:   Future     Standing Expiration Date:   8/15/2023     Order Specific Question:   Reason for Exam:     Answer:   screening     Order Specific Question:   Does this patient have a diabetic monitoring/medication delivering device on?     Answer:   No    TSH+Free T4     Standing Status:   Future     Standing Expiration Date:   8/15/2023     Order Specific Question:   Release to patient     Answer:   Routine Release       Return in about 1 year (around 8/18/2023) for Medicare Wellness.

## 2022-08-19 DIAGNOSIS — E55.9 VITAMIN D DEFICIENCY: ICD-10-CM

## 2022-08-19 RX ORDER — CHOLECALCIFEROL (VITAMIN D3) 1250 MCG
CAPSULE ORAL
Qty: 12 CAPSULE | Refills: 1 | Status: SHIPPED | OUTPATIENT
Start: 2022-08-19 | End: 2023-02-20 | Stop reason: SDUPTHER

## 2022-08-19 NOTE — TELEPHONE ENCOUNTER
Rx Refill Note  Requested Prescriptions     Pending Prescriptions Disp Refills   • Cholecalciferol (Vitamin D3) 1.25 MG (40088 UT) capsule [Pharmacy Med Name: VITAMIN D3 (CHOLECAL) 50,000 IU CAP] 12 capsule 1     Sig: TAKE ONE CAPSULE BY MOUTH ONCE WEEKLY      Last office visit with prescribing clinician: 8/15/2022      Next office visit with prescribing clinician: 8/18/2023            Nilton Treadwell MA  08/19/22, 13:59 EDT

## 2022-08-22 ENCOUNTER — TELEPHONE (OUTPATIENT)
Dept: INTERNAL MEDICINE | Facility: CLINIC | Age: 67
End: 2022-08-22

## 2022-08-22 NOTE — TELEPHONE ENCOUNTER
Caller: Milagro Sanderson    Relationship: Self    Best call back number: [phone]    What orders are you requesting (i.e. lab or imaging): ABNORMAL THYROID FUNCTION TEST     In what timeframe would the patient need to come in: 8/23/22 OR 8/24/22    Where will you receive your lab/imaging services: LAB DOWNSTAIRS     Additional notes: PATIENT STATES SHE WAS GOING TO HAVE LABS DRAWN WHEN SHE SAW DR. JOSHI TODAY BUT INSURANCE WOULD NOT COVER HER TO HAVE THE LABS DRAWN AT HIS OFFICE.

## 2022-08-23 LAB
T4 FREE SERPL-MCNC: 1.07 NG/DL (ref 0.93–1.7)
TSH SERPL DL<=0.005 MIU/L-ACNC: 2.88 UIU/ML (ref 0.27–4.2)

## 2022-09-16 DIAGNOSIS — F33.0 MILD EPISODE OF RECURRENT MAJOR DEPRESSIVE DISORDER: ICD-10-CM

## 2022-09-16 DIAGNOSIS — F41.1 GENERALIZED ANXIETY DISORDER: ICD-10-CM

## 2022-09-16 DIAGNOSIS — G47.09 OTHER INSOMNIA: ICD-10-CM

## 2022-09-16 DIAGNOSIS — Z60.0 PHASE OF LIFE PROBLEM IN ADULT: ICD-10-CM

## 2022-09-16 SDOH — SOCIAL STABILITY - SOCIAL INSECURITY: PROBLEMS OF ADJUSTMENT TO LIFE-CYCLE TRANSITIONS: Z60.0

## 2022-09-19 RX ORDER — MIRTAZAPINE 7.5 MG/1
7.5 TABLET, FILM COATED ORAL NIGHTLY
Qty: 90 TABLET | Refills: 0 | Status: SHIPPED | OUTPATIENT
Start: 2022-09-19 | End: 2022-11-04 | Stop reason: SDUPTHER

## 2022-09-23 ENCOUNTER — OFFICE VISIT (OUTPATIENT)
Dept: PSYCHIATRY | Facility: CLINIC | Age: 67
End: 2022-09-23

## 2022-09-23 DIAGNOSIS — G25.0 ESSENTIAL TREMOR: ICD-10-CM

## 2022-09-23 DIAGNOSIS — F33.0 MILD EPISODE OF RECURRENT MAJOR DEPRESSIVE DISORDER: Primary | ICD-10-CM

## 2022-09-23 DIAGNOSIS — G47.09 OTHER INSOMNIA: ICD-10-CM

## 2022-09-23 DIAGNOSIS — F41.1 GENERALIZED ANXIETY DISORDER: ICD-10-CM

## 2022-09-23 DIAGNOSIS — Z60.0 PHASE OF LIFE PROBLEM IN ADULT: ICD-10-CM

## 2022-09-23 PROCEDURE — 99214 OFFICE O/P EST MOD 30 MIN: CPT | Performed by: PSYCHIATRY & NEUROLOGY

## 2022-09-23 RX ORDER — METHOTREXATE 2.5 MG/1
15 TABLET ORAL WEEKLY
Status: SHIPPED | COMMUNITY
Start: 2022-09-23

## 2022-09-23 RX ORDER — SERTRALINE HYDROCHLORIDE 100 MG/1
150 TABLET, FILM COATED ORAL DAILY
Qty: 135 TABLET | Refills: 0 | Status: SHIPPED | OUTPATIENT
Start: 2022-09-23 | End: 2022-11-04 | Stop reason: SDUPTHER

## 2022-09-23 SDOH — SOCIAL STABILITY - SOCIAL INSECURITY: PROBLEMS OF ADJUSTMENT TO LIFE-CYCLE TRANSITIONS: Z60.0

## 2022-09-23 NOTE — PROGRESS NOTES
"Arlette Sanderson is a 67 y.o. female who presents today for follow up     This provider is located at [facility] at [address] [place] [address]. The provider identified himself as well as his credentials.   The Patient is at home using her phone because problems with video connection. The patient's condition being diagnosed/treated is appropriate for telemedicine. The patient gave consent to be seen remotely, and when consent is given they understand that the consent allows for patient identifiable information to be sent to a third party as needed.   They may refuse to be seen remotely at any time. The electronic data is encrypted and password protected, and the patient has been advised of the potential risks to privacy not withstanding such measures        Chief Complaint: Depression    History of Present Illness: Patient presented today for follow-up.  Since last visit, patient has been, \"doing pretty good.\"  She states that she has had some down days after reducing Zoloft and she did try to go back to 200 mg daily which caused diarrhea so she went back down to 100 mg daily.  She continues to have intermittently bad days with increased anxiety or dysphoria but it is much less severe and less frequent than it has been in the past.  I still think it may be beneficial to increase Zoloft, though perhaps not quite to 200 mg.  She is not having any other medication side effects.  Sleep and appetite are appropriate.  She denies SI/HI/AVH.    The following portions of the patient's history were reviewed and updated as appropriate: allergies, current medications, past family history, past medical history, past social history, past surgical history and problem list.      Past Medical History:  Past Medical History:   Diagnosis Date   • Benign familial tremor    • Blood in urine    • Body piercing     EARS   • Depression    • Eczema    • Elevated LDL cholesterol level    • Fever " blister    • History of fracture     ARM AT AGE 4 - PATIENT REPORTS SHE DOES NOT REMEMBER LATERALITY   • History of migraine    • History of tremor    • Onychomycosis    • Problems with swallowing     REPORTS SHE FEELS LIKE PILLS ARE GETTING STUCK IN A POCKET IN HER THROAT WHEN SHE TRIES TO SWALLOW THEM   • Sebaceous cyst    • Vitamin D deficiency    • Wears glasses        Social History:  Social History     Socioeconomic History   • Marital status: Single   Tobacco Use   • Smoking status: Never Smoker   • Smokeless tobacco: Never Used   Vaping Use   • Vaping Use: Never used   Substance and Sexual Activity   • Alcohol use: No   • Drug use: No   • Sexual activity: Defer       Family History:  Family History   Problem Relation Age of Onset   • Heart attack Mother    • Hypertension Mother    • Anxiety disorder Mother    • Depression Mother    • Heart attack Other    • Kidney disease Other    • Bipolar disorder Father    • Diabetes Paternal Aunt    • Diabetes Paternal Uncle    • Seizures Maternal Aunt    • Colon cancer Neg Hx    • Cirrhosis Neg Hx    • Liver disease Neg Hx    • Liver cancer Neg Hx    • Crohn's disease Neg Hx    • Ulcerative colitis Neg Hx    • Esophageal cancer Neg Hx    • Stomach cancer Neg Hx    • ADD / ADHD Neg Hx    • Alcohol abuse Neg Hx    • Dementia Neg Hx    • Drug abuse Neg Hx    • OCD Neg Hx    • Paranoid behavior Neg Hx    • Schizophrenia Neg Hx    • Self-Injurious Behavior  Neg Hx    • Suicide Attempts Neg Hx        Past Surgical History:  Past Surgical History:   Procedure Laterality Date   • APPENDECTOMY  2012   • APPENDECTOMY     • BREAST BIOPSY Bilateral    • BREAST CYST ASPIRATION     • BREAST SURGERY Bilateral     history of mammatone left and right breast   • COLONOSCOPY     • CYST REMOVAL      SEBACEOUS CYST X3 FROM HEAD   • ENDOSCOPY N/A 2/7/2019    Procedure: ESOPHAGOGASTRODUODENOSCOPY W/ BIOPSIES;  Surgeon: Simba Rogers MD;  [facility];  Service:  "Gastroenterology   • WISDOM TOOTH EXTRACTION      EXTRACTION X2       Problem List:  Patient Active Problem List   Diagnosis   • Episode of recurrent major depressive disorder (HCC)   • Eczema   • Vitamin B12 deficiency   • Vitamin D deficiency   • Hereditary essential tremor   • Onychomycosis   • Recurrent cold sores   • GERD with esophagitis   • High risk medication use   • Undifferentiated connective tissue disease (HCC)       Allergy:   Allergies   Allergen Reactions   • Hazelnut Anaphylaxis   • Macrobid [Nitrofurantoin Monohyd Macro] Other (See Comments)     REPORTS \"I FELL AND IT WAS LIKE I COULDN'T MOVE\"           Current Medications:   Current Outpatient Medications   Medication Sig Dispense Refill   • methotrexate 2.5 MG tablet Take 6 tablets by mouth 1 (One) Time Per Week. 4 times weekly     • mirtazapine (REMERON) 7.5 MG tablet Take 1 tablet by mouth Every Night. 90 tablet 0   • sertraline (ZOLOFT) 100 MG tablet Take 1.5 tablets by mouth Daily. 135 tablet 0   • Cholecalciferol (Vitamin D3) 1.25 MG (57998 UT) capsule TAKE ONE CAPSULE BY MOUTH ONCE WEEKLY 12 capsule 1   • Diclofenac Sodium (VOLTAREN) 1 % gel gel Apply 4 g topically to the appropriate area as directed 4 (Four) Times a Day As Needed (joint pain, max 32 grams/day). 350 g 11   • folic acid (FOLVITE) 1 MG tablet Take 1 tablet by mouth Daily. Indications: folic acid deficiency 90 tablet 3   • gabapentin (NEURONTIN) 300 MG capsule Take 1 capsule by mouth 3 (Three) Times a Day. 90 capsule 2   • hydroxychloroquine (PLAQUENIL) 200 MG tablet Take 200 mg by mouth Daily.     • levothyroxine (SYNTHROID, LEVOTHROID) 25 MCG tablet TAKE ONE TABLET BY MOUTH DAILY TAKE ON AN EMPTY STOMACH AT LEAST 30 MINUTES BEFORE EATING AND OTHER MEDS 90 tablet 1   • primidone (MYSOLINE) 250 MG tablet      • propranolol (INDERAL) 20 MG tablet Take 20 mg by mouth 3 (Three) Times a Day.       No current facility-administered medications for this visit.       Review of " Symptoms:    Review of Systems   Constitutional: Negative for activity change (improved), appetite change (improved), chills, diaphoresis, fatigue, fever and unexpected weight loss.   HENT: Negative.    Eyes: Negative.    Respiratory: Negative.    Cardiovascular: Negative.    Gastrointestinal: Negative for abdominal pain, nausea and vomiting.   Endocrine: Negative.    Genitourinary: Negative.    Musculoskeletal: Negative.    Skin: Negative.    Allergic/Immunologic: Negative.    Neurological: Positive for tremors.   Hematological: Negative.    Psychiatric/Behavioral: Positive for dysphoric mood. Negative for agitation, decreased concentration, sleep disturbance (improved), suicidal ideas, depressed mood and stress. The patient is not nervous/anxious.          Physical Exam:   not currently breastfeeding.  Unable to assess, telephone visit    Appearance: Unable to assess, telephone visit  Gait, Station, Strength: Unable to assess, telephone visit    Mental Status Exam:     Hygiene:   Unable to assess, telephone visit  Cooperation:  Cooperative  Eye Contact:  Unable to assess, telephone visit  Psychomotor Behavior:  Unable to assess, telephone visit  Affect:  Full range  Mood: normal with some increased, intermittent, dysphoria   Hopelessness: Denies  Speech:  Normal  Thought Process:  Goal directed and Linear  Thought Content:  Normal  Suicidal:  None  Homicidal:  None  Hallucinations:  None  Delusion:  None  Memory:  Intact  Orientation:  Person, Place, Time and Situation  Reliability:  good  Insight:  Fair  Judgement:  Good  Impulse Control:  Good  Physical/Medical Issues:  No        Lab Results:   No visits with results within 1 Month(s) from this visit.   Latest known visit with results is:   Orders Only on 08/23/2022   Component Date Value Ref Range Status   • TSH 08/23/2022 2.880  0.270 - 4.200 uIU/mL Final   • Free T4 08/23/2022 1.07  0.93 - 1.70 ng/dL Final    Results may be falsely increased if patient taking  Biotin.       Assessment & Plan   Diagnoses and all orders for this visit:    1. Mild episode of recurrent major depressive disorder (HCC) (Primary)  -     sertraline (ZOLOFT) 100 MG tablet; Take 1.5 tablets by mouth Daily.  Dispense: 135 tablet; Refill: 0    2. Generalized anxiety disorder  -     sertraline (ZOLOFT) 100 MG tablet; Take 1.5 tablets by mouth Daily.  Dispense: 135 tablet; Refill: 0    3. Phase of life problem in adult  -     sertraline (ZOLOFT) 100 MG tablet; Take 1.5 tablets by mouth Daily.  Dispense: 135 tablet; Refill: 0    4. Other insomnia    5. Essential tremor    -Patient overall doing relatively well and is stable but is having some minor increase in dysphoria that is intermittent and not day-to-day  -Reviewed previous documentation  -Reviewed most recent available labs  -ODALIS reviewed and appropriate. Patient counseled on use of controlled substances.   -Patient only taking 100 mg of Zoloft daily, increased to 150 mg p.o. daily for mood and anxiety  -Continue mirtazapine 7.5 mg p.o. nightly for mood, anxiety, insomnia, and appetite   -Continue gabapentin 300 mg 3 times daily for anxiety and essential tremor  -Encouraged patient to continue with therapy for phase of life issues and depression  -Encouraged to follow-up with neurology for tremor  -Approximate appointment time 10:30 AM to 10:45 AM via telephone visit due to patient having technical difficulty with video visit software and being a vulnerable population for COVID-19      Visit Diagnoses:    ICD-10-CM ICD-9-CM   1. Mild episode of recurrent major depressive disorder (HCC)  F33.0 296.31   2. Generalized anxiety disorder  F41.1 300.02   3. Phase of life problem in adult  Z60.0 V62.89   4. Other insomnia  G47.09 780.52   5. Essential tremor  G25.0 333.1       TREATMENT PLAN/GOALS: Continue supportive psychotherapy efforts and medications as indicated. Treatment and medication options discussed during today's visit. Patient  acknowledged and verbally consented to continue with current treatment plan and was educated on the importance of compliance with treatment and follow-up appointments.    MEDICATION ISSUES:    Discussed medication options and treatment plan of prescribed medication as well as the risks, benefits, and side effects including potential falls, possible impaired driving and metabolic adversities among others. Patient is agreeable to call the office with any worsening of symptoms or onset of side effects. Patient is agreeable to call 911 or go to the nearest ER should he/she begin having SI/HI.     MEDS ORDERED DURING VISIT:  New Medications Ordered This Visit   Medications   • sertraline (ZOLOFT) 100 MG tablet     Sig: Take 1.5 tablets by mouth Daily.     Dispense:  135 tablet     Refill:  0     Do not fill until patient calls per her request.       Return in about 2 months (around 11/23/2022).             This document has been electronically signed by Des Elizondo MD  September 23, 2022 12:30 EDT

## 2022-10-16 DIAGNOSIS — F41.1 GENERALIZED ANXIETY DISORDER: ICD-10-CM

## 2022-10-16 DIAGNOSIS — G25.0 ESSENTIAL TREMOR: ICD-10-CM

## 2022-10-17 RX ORDER — GABAPENTIN 300 MG/1
CAPSULE ORAL
Qty: 90 CAPSULE | Refills: 2 | Status: SHIPPED | OUTPATIENT
Start: 2022-10-17 | End: 2023-01-17

## 2022-10-25 ENCOUNTER — HOSPITAL ENCOUNTER (OUTPATIENT)
Dept: MAMMOGRAPHY | Facility: HOSPITAL | Age: 67
Discharge: HOME | End: 2022-10-25
Admitting: FAMILY MEDICINE

## 2022-10-25 DIAGNOSIS — Z12.31 ENCOUNTER FOR SCREENING MAMMOGRAM FOR BREAST CANCER: ICD-10-CM

## 2022-10-25 PROCEDURE — 77063 BREAST TOMOSYNTHESIS BI: CPT

## 2022-10-25 PROCEDURE — 77067 SCR MAMMO BI INCL CAD: CPT

## 2022-10-26 NOTE — PROGRESS NOTES
Mammogram is normal. Based on American Rad[employer] Association's recommendations I suggest repeating mammogram yearly. Mammograms are recommended by [place] Preventative Services Task Force until age 75; after this age it is up to the patient if she wishes to continue screenings.

## 2022-11-04 ENCOUNTER — OFFICE VISIT (OUTPATIENT)
Dept: PSYCHIATRY | Facility: CLINIC | Age: 67
End: 2022-11-04

## 2022-11-04 DIAGNOSIS — Z60.0 PHASE OF LIFE PROBLEM IN ADULT: ICD-10-CM

## 2022-11-04 DIAGNOSIS — G47.09 OTHER INSOMNIA: ICD-10-CM

## 2022-11-04 DIAGNOSIS — F41.1 GENERALIZED ANXIETY DISORDER: ICD-10-CM

## 2022-11-04 DIAGNOSIS — F33.0 MILD EPISODE OF RECURRENT MAJOR DEPRESSIVE DISORDER: ICD-10-CM

## 2022-11-04 PROCEDURE — 99214 OFFICE O/P EST MOD 30 MIN: CPT | Performed by: PSYCHIATRY & NEUROLOGY

## 2022-11-04 RX ORDER — SERTRALINE HYDROCHLORIDE 100 MG/1
150 TABLET, FILM COATED ORAL DAILY
Qty: 135 TABLET | Refills: 0 | Status: SHIPPED | OUTPATIENT
Start: 2022-11-04 | End: 2023-02-03 | Stop reason: SDUPTHER

## 2022-11-04 RX ORDER — MIRTAZAPINE 7.5 MG/1
7.5 TABLET, FILM COATED ORAL NIGHTLY
Qty: 90 TABLET | Refills: 0 | Status: SHIPPED | OUTPATIENT
Start: 2022-11-04 | End: 2023-02-03 | Stop reason: SDUPTHER

## 2022-11-04 SDOH — SOCIAL STABILITY - SOCIAL INSECURITY: PROBLEMS OF ADJUSTMENT TO LIFE-CYCLE TRANSITIONS: Z60.0

## 2022-11-04 NOTE — PROGRESS NOTES
Subjective   Milagro Sanderson is a 67 y.o. female who presents today for follow up     This provider is located at [facility] at [address] [place] [address]. The provider identified himself as well as his credentials.   The Patient is at home using her phone because problems with video connection. The patient's condition being diagnosed/treated is appropriate for telemedicine. The patient gave consent to be seen remotely, and when consent is given they understand that the consent allows for patient identifiable information to be sent to a third party as needed.   They may refuse to be seen remotely at any time. The electronic data is encrypted and password protected, and the patient has been advised of the potential risks to privacy not withstanding such measures        Chief Complaint: Depression    History of Present Illness: Patient presented today for follow-up.  Since last visit, she reports that she has been doing well.  She denies any major depressive or anxious symptoms.  She has been more active in day-to-day life.  She does dread the winter time and has some seasonal depressive symptoms during that time but they are generally manageable.  She is not having medication side effects.  She denies any issues with sleep or appetite.  She denies SI/HI/AVH.    The following portions of the patient's history were reviewed and updated as appropriate: allergies, current medications, past family history, past medical history, past social history, past surgical history and problem list.      Past Medical History:  Past Medical History:   Diagnosis Date   • Benign familial tremor    • Blood in urine    • Body piercing     EARS   • Depression    • Eczema    • Elevated LDL cholesterol level    • Fever blister    • History of fracture     ARM AT AGE 4 - PATIENT REPORTS SHE DOES NOT REMEMBER LATERALITY   • History of migraine    • History of tremor    • Onychomycosis    • Problems with  swallowing     REPORTS SHE FEELS LIKE PILLS ARE GETTING STUCK IN A POCKET IN HER THROAT WHEN SHE TRIES TO SWALLOW THEM   • Sebaceous cyst    • Vitamin D deficiency    • Wears glasses        Social History:  Social History     Socioeconomic History   • Marital status: Single   Tobacco Use   • Smoking status: Never   • Smokeless tobacco: Never   Vaping Use   • Vaping Use: Never used   Substance and Sexual Activity   • Alcohol use: No   • Drug use: No   • Sexual activity: Defer       Family History:  Family History   Problem Relation Age of Onset   • Heart attack Mother    • Hypertension Mother    • Anxiety disorder Mother    • Depression Mother    • Bipolar disorder Father    • Seizures Maternal Aunt    • Diabetes Paternal Aunt    • Diabetes Paternal Uncle    • Heart attack Other    • Kidney disease Other    • Colon cancer Neg Hx    • Cirrhosis Neg Hx    • Liver disease Neg Hx    • Liver cancer Neg Hx    • Crohn's disease Neg Hx    • Ulcerative colitis Neg Hx    • Esophageal cancer Neg Hx    • Stomach cancer Neg Hx    • ADD / ADHD Neg Hx    • Alcohol abuse Neg Hx    • Dementia Neg Hx    • Drug abuse Neg Hx    • OCD Neg Hx    • Paranoid behavior Neg Hx    • Schizophrenia Neg Hx    • Self-Injurious Behavior  Neg Hx    • Suicide Attempts Neg Hx    • Breast cancer Neg Hx        Past Surgical History:  Past Surgical History:   Procedure Laterality Date   • APPENDECTOMY  2012   • APPENDECTOMY     • BREAST BIOPSY Bilateral    • BREAST CYST ASPIRATION     • BREAST SURGERY Bilateral     history of mammatone left and right breast   • COLONOSCOPY     • CYST REMOVAL      SEBACEOUS CYST X3 FROM HEAD   • ENDOSCOPY N/A 2/7/2019    Procedure: ESOPHAGOGASTRODUODENOSCOPY W/ BIOPSIES;  Surgeon: Simba Rogers MD;  Location: [facility];  Service: Gastroenterology   • WISDOM TOOTH EXTRACTION      EXTRACTION X2       Problem List:  Patient Active Problem List   Diagnosis   • Episode of recurrent major depressive disorder (HCC)   •  "Eczema   • Vitamin B12 deficiency   • Vitamin D deficiency   • Hereditary essential tremor   • Onychomycosis   • Recurrent cold sores   • GERD with esophagitis   • High risk medication use   • Undifferentiated connective tissue disease (HCC)       Allergy:   Allergies   Allergen Reactions   • Hazelnut Anaphylaxis   • Macrobid [Nitrofurantoin Monohyd Macro] Other (See Comments)     REPORTS \"I FELL AND IT WAS LIKE I COULDN'T MOVE\"           Current Medications:   Current Outpatient Medications   Medication Sig Dispense Refill   • mirtazapine (REMERON) 7.5 MG tablet Take 1 tablet by mouth Every Night. 90 tablet 0   • sertraline (ZOLOFT) 100 MG tablet Take 1.5 tablets by mouth Daily. 135 tablet 0   • Cholecalciferol (Vitamin D3) 1.25 MG (23789 UT) capsule TAKE ONE CAPSULE BY MOUTH ONCE WEEKLY 12 capsule 1   • Diclofenac Sodium (VOLTAREN) 1 % gel gel Apply 4 g topically to the appropriate area as directed 4 (Four) Times a Day As Needed (joint pain, max 32 grams/day). 350 g 11   • folic acid (FOLVITE) 1 MG tablet Take 1 tablet by mouth Daily. Indications: folic acid deficiency 90 tablet 3   • gabapentin (NEURONTIN) 300 MG capsule TAKE ONE CAPSULE BY MOUTH THREE TIMES A DAY 90 capsule 2   • hydroxychloroquine (PLAQUENIL) 200 MG tablet Take 200 mg by mouth Daily.     • levothyroxine (SYNTHROID, LEVOTHROID) 25 MCG tablet TAKE ONE TABLET BY MOUTH DAILY TAKE ON AN EMPTY STOMACH AT LEAST 30 MINUTES BEFORE EATING AND OTHER MEDS 90 tablet 1   • methotrexate 2.5 MG tablet Take 6 tablets by mouth 1 (One) Time Per Week. 4 times weekly     • primidone (MYSOLINE) 250 MG tablet      • propranolol (INDERAL) 20 MG tablet Take 20 mg by mouth 3 (Three) Times a Day.       No current facility-administered medications for this visit.       Review of Symptoms:    Review of Systems   Constitutional: Negative for activity change (improved), appetite change (improved), chills, diaphoresis, fatigue, fever and unexpected weight loss.   HENT: " Negative.    Eyes: Negative.    Respiratory: Negative.    Cardiovascular: Negative.    Gastrointestinal: Negative for abdominal pain, nausea and vomiting.   Endocrine: Negative.    Genitourinary: Negative.    Musculoskeletal: Negative.    Skin: Negative.    Allergic/Immunologic: Negative.    Neurological: Positive for tremors.   Hematological: Negative.    Psychiatric/Behavioral: Negative for agitation, decreased concentration, dysphoric mood, sleep disturbance (improved), suicidal ideas, depressed mood and stress. The patient is not nervous/anxious.          Physical Exam:   not currently breastfeeding.  Unable to assess, telephone visit    Appearance: Unable to assess, telephone visit  Gait, Station, Strength: Unable to assess, telephone visit    Mental Status Exam:     Hygiene:   Unable to assess, telephone visit  Cooperation:  Cooperative  Eye Contact:  Unable to assess, telephone visit  Psychomotor Behavior:  Unable to assess, telephone visit  Affect:  Full range  Mood: normal, improved   Hopelessness: Denies  Speech:  Normal  Thought Process:  Goal directed and Linear  Thought Content:  Normal  Suicidal:  None  Homicidal:  None  Hallucinations:  None  Delusion:  None  Memory:  Intact  Orientation:  Person, Place, Time and Situation  Reliability:  good  Insight:  Fair  Judgement:  Good  Impulse Control:  Good  Physical/Medical Issues:  No        Lab Results:   No visits with results within 1 Month(s) from this visit.   Latest known visit with results is:   Orders Only on 08/23/2022   Component Date Value Ref Range Status   • TSH 08/23/2022 2.880  0.270 - 4.200 uIU/mL Final   • Free T4 08/23/2022 1.07  0.93 - 1.70 ng/dL Final    Results may be falsely increased if patient taking Biotin.       Assessment & Plan   Diagnoses and all orders for this visit:    1. Mild episode of recurrent major depressive disorder (HCC)  -     mirtazapine (REMERON) 7.5 MG tablet; Take 1 tablet by mouth Every Night.  Dispense: 90  tablet; Refill: 0  -     sertraline (ZOLOFT) 100 MG tablet; Take 1.5 tablets by mouth Daily.  Dispense: 135 tablet; Refill: 0    2. Generalized anxiety disorder  -     mirtazapine (REMERON) 7.5 MG tablet; Take 1 tablet by mouth Every Night.  Dispense: 90 tablet; Refill: 0  -     sertraline (ZOLOFT) 100 MG tablet; Take 1.5 tablets by mouth Daily.  Dispense: 135 tablet; Refill: 0    3. Phase of life problem in adult  -     mirtazapine (REMERON) 7.5 MG tablet; Take 1 tablet by mouth Every Night.  Dispense: 90 tablet; Refill: 0  -     sertraline (ZOLOFT) 100 MG tablet; Take 1.5 tablets by mouth Daily.  Dispense: 135 tablet; Refill: 0    4. Other insomnia  -     mirtazapine (REMERON) 7.5 MG tablet; Take 1 tablet by mouth Every Night.  Dispense: 90 tablet; Refill: 0    -Patient doing very well and is not having any major symptom burden at this time aside from some seasonal depressive symptoms which are minor  -Reviewed previous documentation  -Reviewed most recent available labs  -ODALIS reviewed and appropriate. Patient counseled on use of controlled substances.   -Continue Zoloft 150 mg p.o. daily for mood and anxiety  -Continue mirtazapine 7.5 mg p.o. nightly for mood, anxiety, insomnia, and appetite   -Continue gabapentin 300 mg 3 times daily for anxiety and essential tremor  -Encouraged patient to continue with therapy for phase of life issues and depression  -Encouraged to follow-up with neurology for tremor  -Approximate appointment time 9:45 AM to 10 AM via telephone visit due to patient having technical difficulty with video visit software and being a vulnerable population for COVID-19      Visit Diagnoses:    ICD-10-CM ICD-9-CM   1. Mild episode of recurrent major depressive disorder (HCC)  F33.0 296.31   2. Generalized anxiety disorder  F41.1 300.02   3. Phase of life problem in adult  Z60.0 V62.89   4. Other insomnia  G47.09 780.52       TREATMENT PLAN/GOALS: Continue supportive psychotherapy efforts and  medications as indicated. Treatment and medication options discussed during today's visit. Patient acknowledged and verbally consented to continue with current treatment plan and was educated on the importance of compliance with treatment and follow-up appointments.    MEDICATION ISSUES:    Discussed medication options and treatment plan of prescribed medication as well as the risks, benefits, and side effects including potential falls, possible impaired driving and metabolic adversities among others. Patient is agreeable to call the office with any worsening of symptoms or onset of side effects. Patient is agreeable to call 911 or go to the nearest ER should he/she begin having SI/HI.     MEDS ORDERED DURING VISIT:  New Medications Ordered This Visit   Medications   • mirtazapine (REMERON) 7.5 MG tablet     Sig: Take 1 tablet by mouth Every Night.     Dispense:  90 tablet     Refill:  0     Don't fill until due or patient calls per her request.   • sertraline (ZOLOFT) 100 MG tablet     Sig: Take 1.5 tablets by mouth Daily.     Dispense:  135 tablet     Refill:  0     Do not fill until patient calls per her request.       Return in about 3 months (around 2/4/2023).             This document has been electronically signed by Des Elizondo MD  November 4, 2022 14:19 EDT

## 2022-12-20 ENCOUNTER — TELEPHONE (OUTPATIENT)
Dept: INTERNAL MEDICINE | Facility: CLINIC | Age: 67
End: 2022-12-20

## 2022-12-20 ENCOUNTER — TELEPHONE (OUTPATIENT)
Dept: PSYCHIATRY | Facility: CLINIC | Age: 67
End: 2022-12-20

## 2022-12-20 NOTE — TELEPHONE ENCOUNTER
[facility] no longer accepts her insurance. Changed to [facility] on [facility] in [facility].

## 2022-12-20 NOTE — TELEPHONE ENCOUNTER
Caller: Milagro Sanderson    Relationship: Self      Best call back number:  [phone]     Who are you requesting to speak with (clinical staff, provider,  specific staff member):  CLINICAL     What was the call regarding:  PATIENT  SAID HER INSURANCE  WANTS HER TO USE [facility] [address] [place] [address]   FROM NOW ON        Do you require a callback:  NO ONLY IF NEEDED

## 2022-12-28 DIAGNOSIS — R94.6 ABNORMAL THYROID FUNCTION TEST: ICD-10-CM

## 2022-12-28 RX ORDER — LEVOTHYROXINE SODIUM 0.03 MG/1
TABLET ORAL
Qty: 90 TABLET | Refills: 1 | Status: SHIPPED | OUTPATIENT
Start: 2022-12-28

## 2023-01-17 DIAGNOSIS — G25.0 ESSENTIAL TREMOR: ICD-10-CM

## 2023-01-17 DIAGNOSIS — F41.1 GENERALIZED ANXIETY DISORDER: ICD-10-CM

## 2023-01-17 RX ORDER — GABAPENTIN 300 MG/1
CAPSULE ORAL
Qty: 90 CAPSULE | Refills: 2 | Status: SHIPPED | OUTPATIENT
Start: 2023-01-17 | End: 2023-02-03 | Stop reason: SDUPTHER

## 2023-01-24 NOTE — PROGRESS NOTES
"Subjective    Milagro Sanderson is a 65 y.o. female here for:  Chief Complaint   Patient presents with   • Depression     She went to the phsychiatrist and he added bupropion to the Zoloft. She hates taking 2 pills but admits she reacts better with both medicines. She is not sure she needs to keep seeing the phsychiatrist, she would like to just see Dr. Valle.    • Tremors     Dr. Elizalde treats her for the tremors. However, she states her tremors have not changed at all.        History per MA reviewed.    History of Present Illness   Continues to aparicio depression, anxiety. Followed by psych, was put on Wellbutrin to go with Zoloft. She reports she feels \"a smidge\" better.     Tremor ongoing issue, not improved. On primidone and propranolol, followed by neurology.     Reports poor appetite, hair is dry and falling out a bit, hairs themselves small in some areas, has a cyst on scalp that is getting larger, has been cut out twice by Dr. Amaral but has come back, does not want to have taken out at this time. Also notes being itchy, has a patch mid lower back that itches a lot. She reports a spot on skin from scratching.     Also mentions pain in the left great toe, she isn't sure what is going on, doesn't think she has an ingrown toenail. Hesitant to see podiatry. Feels she has recurrent issues with toenail fungus.    The following portions of the patient's history were reviewed and updated as appropriate: allergies, current medications, past family history, past medical history, past social history, past surgical history and problem list.    Review of Systems   Constitutional: Positive for appetite change.   Skin:        itching   Neurological: Positive for tremors.   Psychiatric/Behavioral: Positive for stress. The patient is nervous/anxious.        Visit Vitals  /60   Pulse 69   Temp 96.4 °F (35.8 °C) (Temporal)   Resp 18   Ht 170.2 cm (67.01\")   Wt 61.3 kg (135 lb 4 oz)   LMP  (LMP Unknown)   SpO2 98%   BMI " Physical Therapy Visit    Visit Type: Daily Treatment Note  Visit: 8  Referring Provider: Maris Cash MD  Medical Diagnosis (from order): Diagnosis Information    Diagnosis  719.46 (ICD-9-CM) - M25.561, M25.562 (ICD-10-CM) - Arthralgia of both knees         SUBJECTIVE                                                                                                               Patient reports more soreness and swolln in right knee 7/10 today. Feels clicking in left knee but this is only a 4/10 soreness. Unsure why right know more bothersome as she didn't do     Did a \"fat burn\" yoga routine that made patient cry. It was overwhelming because of the speed between poses and general challenge. But already did 30 minutes of exercise beforehand so stopped. Patient has been doing 30 days challenge which is 15-20 minutes a day.     Feels \"50% confident\" in knees now since starting. Would like to continue with more sessions.     Pain / Symptoms  - Pain rating (out of 10): Current: 7       OBJECTIVE                                                                                                                                       Treatment     Therapeutic Exercise  Nustep seat, 10 legs only level 2, 5 minutes   Sit to stand from 18\" 3x, pain with end range flexion 19\" x 8  Seated abduction blue band 15x- cues for increase range of motion  Straight leg raise 8x1 each leg   Supine bridges 10x- patient improved range of motion   Lower trunks rotations 10x  Alternating heel slides with sliders cues to push into mat for hamstring contraction 10x2         Therapeutic Activity  Floor to waist lift 5x milk crate  Milk crate carry with 10lbs 100ft x 4       Skilled input: verbal instruction/cues and as detailed above    Writer verbally educated and received verbal consent for hand placement, positioning of patient, and techniques to be performed today from patient for therapist position for techniques and hand placement and  21.18 kg/m²         Objective   Physical Exam   Constitutional: She is oriented to person, place, and time. Vital signs are normal. She appears well-developed and well-nourished. She is active.  Non-toxic appearance. She does not appear ill. No distress. Face mask in place.   HENT:   Head: Normocephalic and atraumatic. Hair is abnormal (dry, no alopecia noted).       Right Ear: Hearing normal.   Left Ear: Hearing normal.   Mouth/Throat: Mucous membranes are not dry.   Eyes: EOM are normal. No scleral icterus.   Neck: Phonation normal. Neck supple.   Pulmonary/Chest: Effort normal.   Neurological: She is alert and oriented to person, place, and time. She displays tremor. No cranial nerve deficit.   Skin: Skin is warm. She is not diaphoretic. No pallor.        Left great toenail thickened, yellowed. The toenail on left great toe appears to be slightly ingrown   Psychiatric: Her speech is normal and behavior is normal. Judgment and thought content normal. Her mood appears anxious. Cognition and memory are normal.   Nursing note and vitals reviewed.        Assessment/Plan     Problem List Items Addressed This Visit        Nervous and Auditory    Hereditary essential tremor    Overview     Evaluated by neurology 1/2020 and not felt to be Parkinson's, worsened with Sinemet            Musculoskeletal and Integument    Onychomycosis    Relevant Orders    Ambulatory Referral to Podiatry       Other    Severe episode of recurrent major depressive disorder, without psychotic features (CMS/HCC) - Primary    Overview     · Past therapy: Lexapro, Prozac  · Zoloft replacing Prozac due to weight loss, decreased appetite           Other Visit Diagnoses     Ingrown toenail of right foot        Relevant Orders    Ambulatory Referral to Podiatry    Itchy skin        previous thyroid levels, LFTs wnl; discussed CeraVe anti-itch formula    Scalp cyst        Poor appetite        Dry hair              · Follow up with psych and neurology.  palpation for techniques as described above and how they are pertinent to the patient's plan of care.    Home Exercise Program  Access Code: [email or web address]  URL: [email or web address]  Date: 12/20/2022  Prepared by: Henrietta Edwards    Exercises  ? Seated Long Arc Quad - 1 x daily - 7 x weekly - 3 sets - 10 reps  ? Seated Hamstring Stretch - 1 x daily - 7 x weekly - 3 sets - 10 reps - 30 seconds hold  ? Sit to Stand - 1 x daily - 7 x weekly - 3 sets - 8 reps  ? Small Range Straight Leg Raise - 1 x daily - 7 x weekly - 2 sets - 5 reps  ? Supine March - 1 x daily - 7 x weekly - 2 sets - 10 reps  ? Supine Bridge - 1 x daily - 7 x weekly - 2 sets - 8 reps  ? Seated Hip Abduction with Resistance - 1 x daily - 7 x weekly - 2 sets - 10 reps  ? Seated March with Resistance - 1 x daily - 7 x weekly - 2 sets - 10 reps  ? Standing Tandem Balance with Counter Support - 1 x daily - 7 x weekly - 1 sets - 3 reps - 30 seconds hold  ? Standing Hip Extension with Counter Support - 1 x daily - 7 x weekly - 3 sets - 10 reps  ? Standing Hip Abduction with Counter Support - 1 x daily - 7 x weekly - 3 sets - 10 reps  ? Supine Heel Slide with Strap - 1 x daily - 7 x weekly - 3 sets - 10 reps  ? Standing Alternating Knee Flexion - 1 x daily - 7 x weekly - 3 sets - 10 reps           ASSESSMENT                                                                                                            Patient came into therapy a bit more sore than usual but tolerated carrying based activities well. Patient continues to report some instability in knees with prolonged ambulation activities, and some pain with weight bearing knee flexion. Patient overall is progressing with strength but still has limitations. Patient would benefit from skilled physical therapy to address above impairments.     Pain/symptoms after session (out of 10): 7  Education:   - Results of above outlined education: Verbalizes understanding    PLAN                                                                                                                            Suggestions for next session as indicated: Progress per plan of care, progress lower extremity strengthening and increase ambulation distance/time on feet        Therapy procedure time and total treatment time can be found documented on the Time Entry flowsheet     "Discussed Wellbutrin may affect appetite, may see weight loss and sometimes causes/worsens tremor, but must weigh out side effects vs benefits. Continue primidone and propranolol for tremor, follow up with neuro, but discussed dose of primidone can go up possibly  · Encouraged better nutrition  · Discussed scalp cyst and at some point may need to refer to gen surg for removal, discouraged \"popping\"    Mae Valle MD  "

## 2023-02-03 ENCOUNTER — OFFICE VISIT (OUTPATIENT)
Dept: PSYCHIATRY | Facility: CLINIC | Age: 68
End: 2023-02-03
Payer: MEDICARE

## 2023-02-03 DIAGNOSIS — G25.0 ESSENTIAL TREMOR: ICD-10-CM

## 2023-02-03 DIAGNOSIS — Z60.0 PHASE OF LIFE PROBLEM IN ADULT: ICD-10-CM

## 2023-02-03 DIAGNOSIS — G47.09 OTHER INSOMNIA: ICD-10-CM

## 2023-02-03 DIAGNOSIS — F41.1 GENERALIZED ANXIETY DISORDER: ICD-10-CM

## 2023-02-03 DIAGNOSIS — F33.0 MILD EPISODE OF RECURRENT MAJOR DEPRESSIVE DISORDER: ICD-10-CM

## 2023-02-03 PROCEDURE — 99214 OFFICE O/P EST MOD 30 MIN: CPT | Performed by: PSYCHIATRY & NEUROLOGY

## 2023-02-03 RX ORDER — GABAPENTIN 300 MG/1
300 CAPSULE ORAL 3 TIMES DAILY
Qty: 90 CAPSULE | Refills: 2 | Status: SHIPPED | OUTPATIENT
Start: 2023-02-03 | End: 2023-04-07 | Stop reason: SDUPTHER

## 2023-02-03 RX ORDER — MIRTAZAPINE 7.5 MG/1
7.5 TABLET, FILM COATED ORAL NIGHTLY
Qty: 90 TABLET | Refills: 0 | Status: SHIPPED | OUTPATIENT
Start: 2023-02-03 | End: 2023-04-07 | Stop reason: SDUPTHER

## 2023-02-03 RX ORDER — SERTRALINE HYDROCHLORIDE 100 MG/1
150 TABLET, FILM COATED ORAL DAILY
Qty: 135 TABLET | Refills: 0 | Status: SHIPPED | OUTPATIENT
Start: 2023-02-03

## 2023-02-03 SDOH — SOCIAL STABILITY - SOCIAL INSECURITY: PROBLEMS OF ADJUSTMENT TO LIFE-CYCLE TRANSITIONS: Z60.0

## 2023-02-17 NOTE — PROGRESS NOTES
Subjective   Milagro Sanderson is a 68 y.o. female who presents today for follow up     This provider is located at [facility] at [address] [place] [address]. The provider identified himself as well as his credentials.   The Patient is at home using her phone because problems with video connection. The patient's condition being diagnosed/treated is appropriate for telemedicine. The patient gave consent to be seen remotely, and when consent is given they understand that the consent allows for patient identifiable information to be sent to a third party as needed.   They may refuse to be seen remotely at any time. The electronic data is encrypted and password protected, and the patient has been advised of the potential risks to privacy not withstanding such measures        Chief Complaint: Depression    History of Present Illness: Patient presented today for follow-up.  Since last visit, she reports that she has struggled somewhat.  She has been sick for the last 2 weeks with COVID and has had chills, congestion, and coughing.  She also lost her cousin right before she got sick and is having some dysphoria and grief as well as anxiety secondary to illness and loss.  She is not having any medication side effects.  She denies any issues with sleep or appetite.  She denies SI/HI/AVH.  She seems to be having normal emotional response to her situational stressors so we will hold off on any adjustments today.    The following portions of the patient's history were reviewed and updated as appropriate: allergies, current medications, past family history, past medical history, past social history, past surgical history and problem list.      Past Medical History:  Past Medical History:   Diagnosis Date   • Benign familial tremor    • Blood in urine    • Body piercing     EARS   • Depression    • Eczema    • Elevated LDL cholesterol level    • Fever blister    • History of fracture     ARM AT AGE 4  - PATIENT REPORTS SHE DOES NOT REMEMBER LATERALITY   • History of migraine    • History of tremor    • Onychomycosis    • Problems with swallowing     REPORTS SHE FEELS LIKE PILLS ARE GETTING STUCK IN A POCKET IN HER THROAT WHEN SHE TRIES TO SWALLOW THEM   • Sebaceous cyst    • Vitamin D deficiency    • Wears glasses        Social History:  Social History     Socioeconomic History   • Marital status: Single   Tobacco Use   • Smoking status: Never   • Smokeless tobacco: Never   Vaping Use   • Vaping Use: Never used   Substance and Sexual Activity   • Alcohol use: No   • Drug use: No   • Sexual activity: Defer       Family History:  Family History   Problem Relation Age of Onset   • Heart attack Mother    • Hypertension Mother    • Anxiety disorder Mother    • Depression Mother    • Bipolar disorder Father    • Seizures Maternal Aunt    • Diabetes Paternal Aunt    • Diabetes Paternal Uncle    • Heart attack Other    • Kidney disease Other    • Colon cancer Neg Hx    • Cirrhosis Neg Hx    • Liver disease Neg Hx    • Liver cancer Neg Hx    • Crohn's disease Neg Hx    • Ulcerative colitis Neg Hx    • Esophageal cancer Neg Hx    • Stomach cancer Neg Hx    • ADD / ADHD Neg Hx    • Alcohol abuse Neg Hx    • Dementia Neg Hx    • Drug abuse Neg Hx    • OCD Neg Hx    • Paranoid behavior Neg Hx    • Schizophrenia Neg Hx    • Self-Injurious Behavior  Neg Hx    • Suicide Attempts Neg Hx    • Breast cancer Neg Hx        Past Surgical History:  Past Surgical History:   Procedure Laterality Date   • APPENDECTOMY  2012   • APPENDECTOMY     • BREAST BIOPSY Bilateral    • BREAST CYST ASPIRATION     • BREAST SURGERY Bilateral     history of mammatone left and right breast   • COLONOSCOPY     • CYST REMOVAL      SEBACEOUS CYST X3 FROM HEAD   • ENDOSCOPY N/A 2/7/2019    Procedure: ESOPHAGOGASTRODUODENOSCOPY W/ BIOPSIES;  Surgeon: Simba Rogers MD;  Location: [facility] END[facility]COPY;  Service: Gastroenterology   • WISDOM TOOTH EXTRACTION       "EXTRACTION X2       Problem List:  Patient Active Problem List   Diagnosis   • Episode of recurrent major depressive disorder (HCC)   • Eczema   • Vitamin B12 deficiency   • Vitamin D deficiency   • Hereditary essential tremor   • Onychomycosis   • Recurrent cold sores   • GERD with esophagitis   • High risk medication use   • Undifferentiated connective tissue disease (HCC)       Allergy:   Allergies   Allergen Reactions   • Hazelnut Anaphylaxis   • Macrobid [Nitrofurantoin Monohyd Macro] Other (See Comments)     REPORTS \"I FELL AND IT WAS LIKE I COULDN'T MOVE\"           Current Medications:   Current Outpatient Medications   Medication Sig Dispense Refill   • gabapentin (NEURONTIN) 300 MG capsule Take 1 capsule by mouth 3 (Three) Times a Day. 90 capsule 2   • mirtazapine (REMERON) 7.5 MG tablet Take 1 tablet by mouth Every Night. 90 tablet 0   • sertraline (ZOLOFT) 100 MG tablet Take 1.5 tablets by mouth Daily. 135 tablet 0   • Cholecalciferol (Vitamin D3) 1.25 MG (66845 UT) capsule TAKE ONE CAPSULE BY MOUTH ONCE WEEKLY 12 capsule 1   • Diclofenac Sodium (VOLTAREN) 1 % gel gel Apply 4 g topically to the appropriate area as directed 4 (Four) Times a Day As Needed (joint pain, max 32 grams/day). 350 g 11   • folic acid (FOLVITE) 1 MG tablet Take 1 tablet by mouth Daily. Indications: folic acid deficiency 90 tablet 3   • hydroxychloroquine (PLAQUENIL) 200 MG tablet Take 200 mg by mouth Daily.     • levothyroxine (SYNTHROID, LEVOTHROID) 25 MCG tablet TAKE ONE TABLET BY MOUTH DAILY TAKE ON AN EMPTY STOMACH AT LEAST 30 MINUTES BEFORE EATING AND OTHER MEDS 90 tablet 1   • methotrexate 2.5 MG tablet Take 6 tablets by mouth 1 (One) Time Per Week. 4 times weekly     • primidone (MYSOLINE) 250 MG tablet      • propranolol (INDERAL) 20 MG tablet Take 20 mg by mouth 3 (Three) Times a Day.       No current facility-administered medications for this visit.       Review of Symptoms:    Review of Systems   Constitutional: Positive " for chills and fatigue. Negative for activity change (improved), appetite change (improved), diaphoresis, fever and unexpected weight loss.   HENT: Positive for congestion and rhinorrhea.    Eyes: Negative.    Respiratory: Negative.    Cardiovascular: Negative.    Gastrointestinal: Negative for abdominal pain, nausea and vomiting.   Endocrine: Negative.    Genitourinary: Negative.    Musculoskeletal: Negative.    Skin: Negative.    Allergic/Immunologic: Negative.    Neurological: Positive for tremors.   Hematological: Negative.    Psychiatric/Behavioral: Positive for dysphoric mood. Negative for agitation, decreased concentration, sleep disturbance (improved), suicidal ideas, depressed mood and stress. The patient is nervous/anxious.          Physical Exam:   not currently breastfeeding.  Unable to assess, telephone visit    Appearance: Unable to assess, telephone visit  Gait, Station, Strength: Unable to assess, telephone visit    Mental Status Exam:     Hygiene:   Unable to assess, telephone visit  Cooperation:  Cooperative  Eye Contact:  Unable to assess, telephone visit  Psychomotor Behavior:  Unable to assess, telephone visit  Affect:  Full range  Mood: sad, grieving  Hopelessness: Denies  Speech:  Normal  Thought Process:  Goal directed and Linear  Thought Content:  Normal  Suicidal:  None  Homicidal:  None  Hallucinations:  None  Delusion:  None  Memory:  Intact  Orientation:  Person, Place, Time and Situation  Reliability:  good  Insight:  Fair  Judgement:  Good  Impulse Control:  Good  Physical/Medical Issues:  No        Lab Results:   Admission on 02/02/2023, Discharged on 02/02/2023   Component Date Value Ref Range Status   • COVID19 02/02/2023 Detected (A)   Final   • Influenza A Antigen IRMA 02/02/2023 Not Detected   Final   • Influenza B Antigen IRMA 02/02/2023 Not Detected   Final   • Internal Control 02/02/2023 Passed   Final   • Lot Number 02/02/2023 [id number]   Final   • Expiration Date 02/02/2023  03/23/2024   Final       Assessment & Plan   Diagnoses and all orders for this visit:    1. Mild episode of recurrent major depressive disorder (HCC)  -     mirtazapine (REMERON) 7.5 MG tablet; Take 1 tablet by mouth Every Night.  Dispense: 90 tablet; Refill: 0  -     sertraline (ZOLOFT) 100 MG tablet; Take 1.5 tablets by mouth Daily.  Dispense: 135 tablet; Refill: 0    2. Generalized anxiety disorder  -     mirtazapine (REMERON) 7.5 MG tablet; Take 1 tablet by mouth Every Night.  Dispense: 90 tablet; Refill: 0  -     sertraline (ZOLOFT) 100 MG tablet; Take 1.5 tablets by mouth Daily.  Dispense: 135 tablet; Refill: 0  -     gabapentin (NEURONTIN) 300 MG capsule; Take 1 capsule by mouth 3 (Three) Times a Day.  Dispense: 90 capsule; Refill: 2    3. Phase of life problem in adult  -     mirtazapine (REMERON) 7.5 MG tablet; Take 1 tablet by mouth Every Night.  Dispense: 90 tablet; Refill: 0  -     sertraline (ZOLOFT) 100 MG tablet; Take 1.5 tablets by mouth Daily.  Dispense: 135 tablet; Refill: 0    4. Other insomnia  -     mirtazapine (REMERON) 7.5 MG tablet; Take 1 tablet by mouth Every Night.  Dispense: 90 tablet; Refill: 0    5. Essential tremor  -     gabapentin (NEURONTIN) 300 MG capsule; Take 1 capsule by mouth 3 (Three) Times a Day.  Dispense: 90 capsule; Refill: 2    -Patient having some increased dysphoria and anxiety secondary to grief and illness but seems to be having an appropriate response given the circumstances so we will hold off on any changes today.  -Reviewed previous documentation  -Reviewed most recent available labs  -ODALIS reviewed and appropriate. Patient counseled on use of controlled substances.   -Continue Zoloft 150 mg p.o. daily for mood and anxiety  -Continue mirtazapine 7.5 mg p.o. nightly for mood, anxiety, insomnia, and appetite   -Continue gabapentin 300 mg 3 times daily for anxiety and essential tremor  -Encouraged patient to continue with therapy for phase of life issues and  depression  -Encouraged to follow-up with neurology for tremor  -Approximate appointment time 8 AM to 8:15 AM via telephone visit due to patient having technical difficulty with video visit software and being a vulnerable population for COVID-19      Visit Diagnoses:    ICD-10-CM ICD-9-CM   1. Mild episode of recurrent major depressive disorder (HCC)  F33.0 296.31   2. Generalized anxiety disorder  F41.1 300.02   3. Phase of life problem in adult  Z60.0 V62.89   4. Other insomnia  G47.09 780.52   5. Essential tremor  G25.0 333.1       TREATMENT PLAN/GOALS: Continue supportive psychotherapy efforts and medications as indicated. Treatment and medication options discussed during today's visit. Patient acknowledged and verbally consented to continue with current treatment plan and was educated on the importance of compliance with treatment and follow-up appointments.    MEDICATION ISSUES:    Discussed medication options and treatment plan of prescribed medication as well as the risks, benefits, and side effects including potential falls, possible impaired driving and metabolic adversities among others. Patient is agreeable to call the office with any worsening of symptoms or onset of side effects. Patient is agreeable to call 911 or go to the nearest ER should he/she begin having SI/HI.     MEDS ORDERED DURING VISIT:  New Medications Ordered This Visit   Medications   • mirtazapine (REMERON) 7.5 MG tablet     Sig: Take 1 tablet by mouth Every Night.     Dispense:  90 tablet     Refill:  0     Don't fill until due or patient calls per her request.   • sertraline (ZOLOFT) 100 MG tablet     Sig: Take 1.5 tablets by mouth Daily.     Dispense:  135 tablet     Refill:  0     Do not fill until patient calls per her request.   • gabapentin (NEURONTIN) 300 MG capsule     Sig: Take 1 capsule by mouth 3 (Three) Times a Day.     Dispense:  90 capsule     Refill:  2       Return in about 2 months (around 4/3/2023).             This  document has been electronically signed by Des Elizondo MD  February 17, 2023 13:56 EST

## 2023-02-20 DIAGNOSIS — E55.9 VITAMIN D DEFICIENCY: ICD-10-CM

## 2023-02-20 NOTE — TELEPHONE ENCOUNTER
Caller: Milagro Sanderson    Relationship: Self    Best call back number: [phone]    Requested Prescriptions:   Requested Prescriptions     Pending Prescriptions Disp Refills   • Cholecalciferol (Vitamin D3) 1.25 MG (59305 UT) capsule 12 capsule 1     Sig: Take 1 capsule by mouth 1 (One) Time Per Week.        Pharmacy where request should be sent: [id number] - [place] [address] AT [facility] [address] - [phone]  - [phone] FX         Does the patient have less than a 3 day supply:  [x] Yes  [] No    Would you like a call back once the refill request has been completed: [x] Yes [] No    If the office needs to give you a call back, can they leave a voicemail: [x] Yes [] No    Ray Jones Rep   02/20/23 10:03 EST

## 2023-02-21 RX ORDER — CHOLECALCIFEROL (VITAMIN D3) 1250 MCG
50000 CAPSULE ORAL WEEKLY
Qty: 12 CAPSULE | Refills: 1 | Status: SHIPPED | OUTPATIENT
Start: 2023-02-21

## 2023-04-07 ENCOUNTER — OFFICE VISIT (OUTPATIENT)
Dept: PSYCHIATRY | Facility: CLINIC | Age: 68
End: 2023-04-07
Payer: MEDICARE

## 2023-04-07 DIAGNOSIS — G47.09 OTHER INSOMNIA: ICD-10-CM

## 2023-04-07 DIAGNOSIS — F33.0 MILD EPISODE OF RECURRENT MAJOR DEPRESSIVE DISORDER: ICD-10-CM

## 2023-04-07 DIAGNOSIS — Z60.0 PHASE OF LIFE PROBLEM IN ADULT: ICD-10-CM

## 2023-04-07 DIAGNOSIS — G25.0 ESSENTIAL TREMOR: ICD-10-CM

## 2023-04-07 DIAGNOSIS — F41.1 GENERALIZED ANXIETY DISORDER: ICD-10-CM

## 2023-04-07 PROCEDURE — 1159F MED LIST DOCD IN RCRD: CPT | Performed by: PSYCHIATRY & NEUROLOGY

## 2023-04-07 PROCEDURE — 99214 OFFICE O/P EST MOD 30 MIN: CPT | Performed by: PSYCHIATRY & NEUROLOGY

## 2023-04-07 PROCEDURE — 1160F RVW MEDS BY RX/DR IN RCRD: CPT | Performed by: PSYCHIATRY & NEUROLOGY

## 2023-04-07 RX ORDER — MIRTAZAPINE 7.5 MG/1
7.5 TABLET, FILM COATED ORAL NIGHTLY
Qty: 90 TABLET | Refills: 0 | Status: SHIPPED | OUTPATIENT
Start: 2023-04-07

## 2023-04-07 RX ORDER — GABAPENTIN 300 MG/1
300 CAPSULE ORAL 3 TIMES DAILY
Qty: 90 CAPSULE | Refills: 2 | Status: SHIPPED | OUTPATIENT
Start: 2023-04-07

## 2023-04-07 SDOH — SOCIAL STABILITY - SOCIAL INSECURITY: PROBLEMS OF ADJUSTMENT TO LIFE-CYCLE TRANSITIONS: Z60.0

## 2023-04-07 NOTE — PROGRESS NOTES
Subjective   Milagro Sanderson is a 68 y.o. female who presents today for follow up     This provider is located at [facility], [facility] Health at [address] [place] [address]. The provider identified himself as well as his credentials.   The Patient is at home using her phone because problems with video connection. The patient's condition being diagnosed/treated is appropriate for telemedicine. The patient gave consent to be seen remotely, and when consent is given they understand that the consent allows for patient identifiable information to be sent to a third party as needed.   They may refuse to be seen remotely at any time. The electronic data is encrypted and password protected, and the patient has been advised of the potential risks to privacy not withstanding such measures        Chief Complaint: Depression    History of Present Illness: Patient presented today for follow-up.  Since last visit, patient reports that she has recovered from her illnesses and is feeling much better.  Since she has recovered, and anxiety symptoms have improved and she reports that she is doing pretty well.  She is not having any medication side effects.  Sleep and appetite are appropriate.  She is enjoying the warmer weather and is looking forward to [facility].  She denies SI/HI/AVH.      The following portions of the patient's history were reviewed and updated as appropriate: allergies, current medications, past family history, past medical history, past social history, past surgical history and problem list.      Past Medical History:  Past Medical History:   Diagnosis Date   • Benign familial tremor    • Blood in urine    • Body piercing     EARS   • Depression    • Eczema    • Elevated LDL cholesterol level    • Fever blister    • History of fracture     ARM AT AGE 4 - PATIENT REPORTS SHE DOES NOT REMEMBER LATERALITY   • History of migraine    • History of tremor    • Onychomycosis    • Problems with swallowing      REPORTS SHE FEELS LIKE PILLS ARE GETTING STUCK IN A POCKET IN HER THROAT WHEN SHE TRIES TO SWALLOW THEM   • Sebaceous cyst    • Vitamin D deficiency    • Wears glasses        Social History:  Social History     Socioeconomic History   • Marital status: Single   Tobacco Use   • Smoking status: Never   • Smokeless tobacco: Never   Vaping Use   • Vaping Use: Never used   Substance and Sexual Activity   • Alcohol use: No   • Drug use: No   • Sexual activity: Defer       Family History:  Family History   Problem Relation Age of Onset   • Heart attack Mother    • Hypertension Mother    • Anxiety disorder Mother    • Depression Mother    • Bipolar disorder Father    • Seizures Maternal Aunt    • Diabetes Paternal Aunt    • Diabetes Paternal Uncle    • Heart attack Other    • Kidney disease Other    • Colon cancer Neg Hx    • Cirrhosis Neg Hx    • Liver disease Neg Hx    • Liver cancer Neg Hx    • Crohn's disease Neg Hx    • Ulcerative colitis Neg Hx    • Esophageal cancer Neg Hx    • Stomach cancer Neg Hx    • ADD / ADHD Neg Hx    • Alcohol abuse Neg Hx    • Dementia Neg Hx    • Drug abuse Neg Hx    • OCD Neg Hx    • Paranoid behavior Neg Hx    • Schizophrenia Neg Hx    • Self-Injurious Behavior  Neg Hx    • Suicide Attempts Neg Hx    • Breast cancer Neg Hx        Past Surgical History:  Past Surgical History:   Procedure Laterality Date   • APPENDECTOMY  2012   • APPENDECTOMY     • BREAST BIOPSY Bilateral    • BREAST CYST ASPIRATION     • BREAST SURGERY Bilateral     history of mammatone left and right breast   • COLONOSCOPY     • CYST REMOVAL      SEBACEOUS CYST X3 FROM HEAD   • ENDOSCOPY N/A 2/7/2019    Procedure: ESOPHAGOGASTRODUODENOSCOPY W/ BIOPSIES;  Surgeon: Simba Rogers MD;  Location: [facility] ENDOSCOPY;  Service: Gastroenterology   • WISDOM TOOTH EXTRACTION      EXTRACTION X2       Problem List:  Patient Active Problem List   Diagnosis   • Episode of recurrent major depressive disorder   • Eczema   • Vitamin B12  "deficiency   • Vitamin D deficiency   • Hereditary essential tremor   • Onychomycosis   • Recurrent cold sores   • GERD with esophagitis   • High risk medication use   • Undifferentiated connective tissue disease       Allergy:   Allergies   Allergen Reactions   • Hazelnut Anaphylaxis   • Macrobid [Nitrofurantoin Monohyd Macro] Other (See Comments)     REPORTS \"I FELL AND IT WAS LIKE I COULDN'T MOVE\"           Current Medications:   Current Outpatient Medications   Medication Sig Dispense Refill   • Cholecalciferol (Vitamin D3) 1.25 MG (40277 UT) capsule Take 1 capsule by mouth 1 (One) Time Per Week. 12 capsule 1   • Diclofenac Sodium (VOLTAREN) 1 % gel gel Apply 4 g topically to the appropriate area as directed 4 (Four) Times a Day As Needed (joint pain, max 32 grams/day). 350 g 11   • folic acid (FOLVITE) 1 MG tablet Take 1 tablet by mouth Daily. Indications: folic acid deficiency 90 tablet 3   • gabapentin (NEURONTIN) 300 MG capsule Take 1 capsule by mouth 3 (Three) Times a Day. 90 capsule 2   • hydroxychloroquine (PLAQUENIL) 200 MG tablet Take 200 mg by mouth Daily.     • levothyroxine (SYNTHROID, LEVOTHROID) 25 MCG tablet TAKE ONE TABLET BY MOUTH DAILY TAKE ON AN EMPTY STOMACH AT LEAST 30 MINUTES BEFORE EATING AND OTHER MEDS 90 tablet 1   • methotrexate 2.5 MG tablet Take 6 tablets by mouth 1 (One) Time Per Week. 4 times weekly     • mirtazapine (REMERON) 7.5 MG tablet Take 1 tablet by mouth Every Night. 90 tablet 0   • primidone (MYSOLINE) 250 MG tablet      • propranolol (INDERAL) 20 MG tablet Take 20 mg by mouth 3 (Three) Times a Day.     • sertraline (ZOLOFT) 100 MG tablet Take 1.5 tablets by mouth Daily. 135 tablet 0     No current facility-administered medications for this visit.       Review of Symptoms:    Review of Systems   Constitutional: Negative for activity change (improved), appetite change (improved), chills, diaphoresis, fatigue, fever and unexpected weight loss.   HENT: Negative for congestion " and rhinorrhea.    Eyes: Negative.    Respiratory: Negative.    Cardiovascular: Negative.    Gastrointestinal: Negative for abdominal pain, nausea and vomiting.   Endocrine: Negative.    Genitourinary: Negative.    Musculoskeletal: Negative.    Skin: Negative.    Allergic/Immunologic: Negative.    Neurological: Positive for tremors.   Hematological: Negative.    Psychiatric/Behavioral: Negative for agitation, decreased concentration, dysphoric mood, sleep disturbance (improved), suicidal ideas, depressed mood and stress. The patient is not nervous/anxious.          Physical Exam:   not currently breastfeeding.  Unable to assess, telephone visit    Appearance: Unable to assess, telephone visit  Gait, Station, Strength: Unable to assess, telephone visit    Mental Status Exam:     Hygiene:   Unable to assess, telephone visit  Cooperation:  Cooperative  Eye Contact:  Unable to assess, telephone visit  Psychomotor Behavior:  Unable to assess, telephone visit  Affect:  Full range  Mood: normal  Hopelessness: Denies  Speech:  Normal  Thought Process:  Goal directed and Linear  Thought Content:  Normal  Suicidal:  None  Homicidal:  None  Hallucinations:  None  Delusion:  None  Memory:  Intact  Orientation:  Person, Place, Time and Situation  Reliability:  good  Insight:  Fair  Judgement:  Good  Impulse Control:  Good  Physical/Medical Issues:  No        Lab Results:   No visits with results within 1 Month(s) from this visit.   Latest known visit with results is:   Admission on 02/02/2023, Discharged on 02/02/2023   Component Date Value Ref Range Status   • COVID19 02/02/2023 Detected (A)   Final   • Influenza A Antigen IRMA 02/02/2023 Not Detected   Final   • Influenza B Antigen IRMA 02/02/2023 Not Detected   Final   • Internal Control 02/02/2023 Passed   Final   • Lot Number 02/02/2023 [id number]   Final   • Expiration Date 02/02/2023 03/23/2024   Final       Assessment & Plan   Diagnoses and all orders for this visit:    1.  Generalized anxiety disorder  -     gabapentin (NEURONTIN) 300 MG capsule; Take 1 capsule by mouth 3 (Three) Times a Day.  Dispense: 90 capsule; Refill: 2  -     mirtazapine (REMERON) 7.5 MG tablet; Take 1 tablet by mouth Every Night.  Dispense: 90 tablet; Refill: 0    2. Essential tremor  -     gabapentin (NEURONTIN) 300 MG capsule; Take 1 capsule by mouth 3 (Three) Times a Day.  Dispense: 90 capsule; Refill: 2    3. Mild episode of recurrent major depressive disorder  -     mirtazapine (REMERON) 7.5 MG tablet; Take 1 tablet by mouth Every Night.  Dispense: 90 tablet; Refill: 0    4. Phase of life problem in adult  -     mirtazapine (REMERON) 7.5 MG tablet; Take 1 tablet by mouth Every Night.  Dispense: 90 tablet; Refill: 0    5. Other insomnia  -     mirtazapine (REMERON) 7.5 MG tablet; Take 1 tablet by mouth Every Night.  Dispense: 90 tablet; Refill: 0    -Patient doing much better since last visit denies any major symptom burden at this time.  -Reviewed previous documentation  -Reviewed most recent available labs  -ODALIS reviewed and appropriate. Patient counseled on use of controlled substances.   -Continue Zoloft 150 mg p.o. daily for mood and anxiety  -Continue mirtazapine 7.5 mg p.o. nightly for mood, anxiety, insomnia, and appetite   -Continue gabapentin 300 mg 3 times daily for anxiety and essential tremor  -Encouraged patient to continue with therapy for phase of life issues and depression  -Encouraged to follow-up with neurology for tremor  -Approximate appointment time 8:15 AM to 8:30 AM via telephone visit due to patient having technical difficulty with video visit software and being a vulnerable population for COVID-19      Visit Diagnoses:    ICD-10-CM ICD-9-CM   1. Generalized anxiety disorder  F41.1 300.02   2. Essential tremor  G25.0 333.1   3. Mild episode of recurrent major depressive disorder  F33.0 296.31   4. Phase of life problem in adult  Z60.0 V62.89   5. Other insomnia  G47.09 780.52        TREATMENT PLAN/GOALS: Continue supportive psychotherapy efforts and medications as indicated. Treatment and medication options discussed during today's visit. Patient acknowledged and verbally consented to continue with current treatment plan and was educated on the importance of compliance with treatment and follow-up appointments.    MEDICATION ISSUES:    Discussed medication options and treatment plan of prescribed medication as well as the risks, benefits, and side effects including potential falls, possible impaired driving and metabolic adversities among others. Patient is agreeable to call the office with any worsening of symptoms or onset of side effects. Patient is agreeable to call 911 or go to the nearest ER should he/she begin having SI/HI.     MEDS ORDERED DURING VISIT:  New Medications Ordered This Visit   Medications   • gabapentin (NEURONTIN) 300 MG capsule     Sig: Take 1 capsule by mouth 3 (Three) Times a Day.     Dispense:  90 capsule     Refill:  2   • mirtazapine (REMERON) 7.5 MG tablet     Sig: Take 1 tablet by mouth Every Night.     Dispense:  90 tablet     Refill:  0     Don't fill until due or patient calls per her request.       Return in about 4 months (around 8/7/2023).             This document has been electronically signed by Des Elizondo MD  April 7, 2023 09:17 EDT

## 2023-06-16 DIAGNOSIS — R94.6 ABNORMAL THYROID FUNCTION TEST: ICD-10-CM

## 2023-06-16 RX ORDER — LEVOTHYROXINE SODIUM 0.03 MG/1
TABLET ORAL
Qty: 90 TABLET | Refills: 1 | Status: SHIPPED | OUTPATIENT
Start: 2023-06-16

## 2023-08-11 ENCOUNTER — TELEMEDICINE (OUTPATIENT)
Dept: PSYCHIATRY | Facility: CLINIC | Age: 68
End: 2023-08-11
Payer: MEDICARE

## 2023-08-11 VITALS
HEART RATE: 68 BPM | HEIGHT: 67 IN | BODY MASS INDEX: 21.35 KG/M2 | DIASTOLIC BLOOD PRESSURE: 62 MMHG | SYSTOLIC BLOOD PRESSURE: 94 MMHG | WEIGHT: 136 LBS

## 2023-08-11 DIAGNOSIS — F33.0 MILD EPISODE OF RECURRENT MAJOR DEPRESSIVE DISORDER: ICD-10-CM

## 2023-08-11 DIAGNOSIS — Z60.0 PHASE OF LIFE PROBLEM IN ADULT: ICD-10-CM

## 2023-08-11 DIAGNOSIS — G25.0 ESSENTIAL TREMOR: ICD-10-CM

## 2023-08-11 DIAGNOSIS — F41.1 GENERALIZED ANXIETY DISORDER: ICD-10-CM

## 2023-08-11 DIAGNOSIS — G47.09 OTHER INSOMNIA: ICD-10-CM

## 2023-08-11 RX ORDER — MIRTAZAPINE 7.5 MG/1
7.5 TABLET, FILM COATED ORAL NIGHTLY
Qty: 90 TABLET | Refills: 0 | Status: SHIPPED | OUTPATIENT
Start: 2023-08-11

## 2023-08-11 RX ORDER — SERTRALINE HYDROCHLORIDE 100 MG/1
200 TABLET, FILM COATED ORAL DAILY
Qty: 180 TABLET | Refills: 0 | Status: SHIPPED | OUTPATIENT
Start: 2023-08-11

## 2023-08-11 RX ORDER — GABAPENTIN 300 MG/1
300 CAPSULE ORAL 3 TIMES DAILY
Qty: 90 CAPSULE | Refills: 2 | Status: SHIPPED | OUTPATIENT
Start: 2023-08-11

## 2023-08-11 SDOH — SOCIAL STABILITY - SOCIAL INSECURITY: PROBLEMS OF ADJUSTMENT TO LIFE-CYCLE TRANSITIONS: Z60.0

## 2023-08-11 NOTE — PROGRESS NOTES
Arlette Sanderson is a 68 y.o. female who presents today for follow up     This provider is located at [facility], [facility], [address] [place] [address], using Zoom.  The patient is seen remotely at [facility], Behavioral health, [address], [address] [facility] in [facility] via Vid[employer], an encrypted service from one [facility] Facility to another, with staff present. The patient's condition being diagnosed/treated is appropriate for telemedecine.  The provider identified himself as well as his credentials.      The patient and/or patient's guardian consent to be seen remotely, and when consent is given they understand that the consent allows for patient identifiable information to be sent to a third party as needed.  They may refuse to be seen remotely at any time.  The electronic data is encrypted and password protected, and the patient has been advised of the potential risks to privacy notwithstanding such measures.        Chief Complaint: Depression    History of Present Illness: Patient presenting today for follow-up.  Since last visit, she reports that she feels that she has been more depressed which is largely secondary to her tremor which has gotten worse and causes decreased functionality.  She is not having medication side effects.  Sleep and appetite are stable.  She denies SI/HI/AVH.      The following portions of the patient's history were reviewed and updated as appropriate: allergies, current medications, past family history, past medical history, past social history, past surgical history and problem list.      Past Medical History:  Past Medical History:   Diagnosis Date    Benign familial tremor     Blood in urine     Body piercing     EARS    Depression     Eczema     Elevated LDL cholesterol level     Fever blister     History of fracture     ARM AT AGE 4 - PATIENT REPORTS SHE DOES NOT REMEMBER LATERALITY    History of migraine     History of tremor      Onychomycosis     Problems with swallowing     REPORTS SHE FEELS LIKE PILLS ARE GETTING STUCK IN A POCKET IN HER THROAT WHEN SHE TRIES TO SWALLOW THEM    Sebaceous cyst     Vitamin D deficiency     Wears glasses        Social History:  Social History     Socioeconomic History    Marital status: Single   Tobacco Use    Smoking status: Never     Passive exposure: Never    Smokeless tobacco: Never   Vaping Use    Vaping Use: Never used   Substance and Sexual Activity    Alcohol use: Never    Drug use: Never    Sexual activity: Defer       Family History:  Family History   Problem Relation Age of Onset    Heart attack Mother     Hypertension Mother     Anxiety disorder Mother     Depression Mother     Bipolar disorder Father     Seizures Maternal Aunt     Diabetes Paternal Aunt     Diabetes Paternal Uncle     Heart attack Other     Kidney disease Other     Colon cancer Neg Hx     Cirrhosis Neg Hx     Liver disease Neg Hx     Liver cancer Neg Hx     Crohn's disease Neg Hx     Ulcerative colitis Neg Hx     Esophageal cancer Neg Hx     Stomach cancer Neg Hx     ADD / ADHD Neg Hx     Alcohol abuse Neg Hx     Dementia Neg Hx     Drug abuse Neg Hx     OCD Neg Hx     Paranoid behavior Neg Hx     Schizophrenia Neg Hx     Self-Injurious Behavior  Neg Hx     Suicide Attempts Neg Hx     Breast cancer Neg Hx        Past Surgical History:  Past Surgical History:   Procedure Laterality Date    APPENDECTOMY  2012    APPENDECTOMY      BREAST BIOPSY Bilateral     BREAST CYST ASPIRATION      BREAST SURGERY Bilateral     history of mammatone left and right breast    COLONOSCOPY      CYST REMOVAL      SEBACEOUS CYST X3 FROM HEAD    ENDOSCOPY N/A 2/7/2019    Procedure: ESOPHAGOGASTRODUODENOSCOPY W/ BIOPSIES;  Surgeon: Simba Rogers MD;  Location: [facility] ENDOSCOPY;  Service: Gastroenterology    WISDOM TOOTH EXTRACTION      EXTRACTION X2       Problem List:  Patient Active Problem List   Diagnosis    Episode of recurrent major depressive  "disorder    Eczema    Vitamin B12 deficiency    Vitamin D deficiency    Hereditary essential tremor    Onychomycosis    Recurrent cold sores    GERD with esophagitis    High risk medication use    Undifferentiated connective tissue disease       Allergy:   Allergies   Allergen Reactions    Hazelnut Anaphylaxis    Macrobid [Nitrofurantoin Monohyd Macro] Other (See Comments)     REPORTS \"I FELL AND IT WAS LIKE I COULDN'T MOVE\"           Current Medications:   Current Outpatient Medications   Medication Sig Dispense Refill    Cholecalciferol (Vitamin D3) 1.25 MG (38070 UT) capsule Take 1 capsule by mouth 1 (One) Time Per Week. 12 capsule 1    folic acid (FOLVITE) 1 MG tablet Take 1 tablet by mouth Daily. Indications: folic acid deficiency 90 tablet 3    gabapentin (NEURONTIN) 300 MG capsule Take 1 capsule by mouth 3 (Three) Times a Day. 90 capsule 2    levothyroxine (SYNTHROID, LEVOTHROID) 25 MCG tablet TAKE 1 TABLET BY MOUTH EVERY DAY ON EMPTY STOMACH AT LEAST 30 MINUTES BEFORE EATING AND OTHER MEDS 90 tablet 1    methotrexate 2.5 MG tablet Take 8 mg by mouth 1 (One) Time Per Week.      mirtazapine (REMERON) 7.5 MG tablet Take 1 tablet by mouth Every Night. 90 tablet 0    primidone (MYSOLINE) 250 MG tablet       propranolol (INDERAL) 20 MG tablet Take 1 tablet by mouth 3 (Three) Times a Day.      sertraline (ZOLOFT) 100 MG tablet Take 1.5 tablets by mouth Daily. 135 tablet 0    Diclofenac Sodium (VOLTAREN) 1 % gel gel Apply 4 g topically to the appropriate area as directed 4 (Four) Times a Day As Needed (joint pain, max 32 grams/day). (Patient not taking: Reported on 8/11/2023) 350 g 11     No current facility-administered medications for this visit.       Review of Symptoms:    Review of Systems   Constitutional:  Negative for activity change (improved), appetite change (improved), chills, diaphoresis, fatigue, fever and unexpected weight loss.   HENT:  Negative for congestion and rhinorrhea.    Eyes: Negative.  " "  Respiratory: Negative.     Cardiovascular: Negative.    Gastrointestinal:  Negative for abdominal pain, nausea and vomiting.   Endocrine: Negative.    Genitourinary: Negative.    Musculoskeletal: Negative.    Skin: Negative.    Allergic/Immunologic: Negative.    Neurological:  Positive for tremors.   Hematological: Negative.    Psychiatric/Behavioral:  Negative for agitation, decreased concentration, dysphoric mood, sleep disturbance (improved), suicidal ideas, depressed mood and stress. The patient is not nervous/anxious.        Physical Exam:   Blood pressure 94/62, pulse 68, height 170.2 cm (67\"), weight 61.7 kg (136 lb), not currently breastfeeding.      Appearance: CF of stated age, NAD   Gait, Station, Strength: Telehealth Visit     Mental Status Exam:     Hygiene:   good  Cooperation:  Cooperative  Eye Contact:  Good  Psychomotor Behavior:  Appropriate  Affect:  Full range  Mood: depressed secondary to tremor  Hopelessness: Denies  Speech:  Normal  Thought Process:  Goal directed and Linear  Thought Content:  Normal  Suicidal:  None  Homicidal:  None  Hallucinations:  None  Delusion:  None  Memory:  Intact  Orientation:  Person, Place, Time and Situation  Reliability:  good  Insight:  Fair  Judgement:  Good  Impulse Control:  Good  Physical/Medical Issues:  No        Lab Results:   No visits with results within 1 Month(s) from this visit.   Latest known visit with results is:   Admission on 02/02/2023, Discharged on 02/02/2023   Component Date Value Ref Range Status    COVID19 02/02/2023 Detected (A)   Final    Influenza A Antigen IRMA 02/02/2023 Not Detected   Final    Influenza B Antigen IRMA 02/02/2023 Not Detected   Final    Internal Control 02/02/2023 Passed   Final    Lot Number 02/02/2023 [id number]   Final    Expiration Date 02/02/2023 03/23/2024   Final       Assessment & Plan   Diagnoses and all orders for this visit:    1. Generalized anxiety disorder  -     mirtazapine (REMERON) 7.5 MG tablet; Take " 1 tablet by mouth Every Night.  Dispense: 90 tablet; Refill: 0  -     gabapentin (NEURONTIN) 300 MG capsule; Take 1 capsule by mouth 3 (Three) Times a Day.  Dispense: 90 capsule; Refill: 2  -     sertraline (ZOLOFT) 100 MG tablet; Take 2 tablets by mouth Daily.  Dispense: 180 tablet; Refill: 0    2. Phase of life problem in adult  -     mirtazapine (REMERON) 7.5 MG tablet; Take 1 tablet by mouth Every Night.  Dispense: 90 tablet; Refill: 0  -     sertraline (ZOLOFT) 100 MG tablet; Take 2 tablets by mouth Daily.  Dispense: 180 tablet; Refill: 0    3. Other insomnia  -     mirtazapine (REMERON) 7.5 MG tablet; Take 1 tablet by mouth Every Night.  Dispense: 90 tablet; Refill: 0    4. Mild episode of recurrent major depressive disorder  -     mirtazapine (REMERON) 7.5 MG tablet; Take 1 tablet by mouth Every Night.  Dispense: 90 tablet; Refill: 0  -     sertraline (ZOLOFT) 100 MG tablet; Take 2 tablets by mouth Daily.  Dispense: 180 tablet; Refill: 0    5. Essential tremor  -     gabapentin (NEURONTIN) 300 MG capsule; Take 1 capsule by mouth 3 (Three) Times a Day.  Dispense: 90 capsule; Refill: 2    -Patient having some worsening depression due to her tremor worsening.  -Reviewed previous documentation  -Reviewed most recent available labs  -ODALIS reviewed and appropriate. Patient counseled on use of controlled substances.   -Increase Zoloft 150 mg to 100 mg p.o. daily for mood and anxiety  -Continue mirtazapine 7.5 mg p.o. nightly for mood, anxiety, insomnia, and appetite   -Continue gabapentin 300 mg 3 times daily for anxiety and essential tremor  -Encouraged patient to continue with therapy for phase of life issues and depression  -Encouraged to follow-up with neurology for tremor        Visit Diagnoses:    ICD-10-CM ICD-9-CM   1. Generalized anxiety disorder  F41.1 300.02   2. Phase of life problem in adult  Z60.0 V62.89   3. Other insomnia  G47.09 780.52   4. Mild episode of recurrent major depressive disorder   F33.0 296.31   5. Essential tremor  G25.0 333.1       TREATMENT PLAN/GOALS: Continue supportive psychotherapy efforts and medications as indicated. Treatment and medication options discussed during today's visit. Patient acknowledged and verbally consented to continue with current treatment plan and was educated on the importance of compliance with treatment and follow-up appointments.    MEDICATION ISSUES:    Discussed medication options and treatment plan of prescribed medication as well as the risks, benefits, and side effects including potential falls, possible impaired driving and metabolic adversities among others. Patient is agreeable to call the office with any worsening of symptoms or onset of side effects. Patient is agreeable to call 911 or go to the nearest ER should he/she begin having SI/HI.     MEDS ORDERED DURING VISIT:  New Medications Ordered This Visit   Medications    mirtazapine (REMERON) 7.5 MG tablet     Sig: Take 1 tablet by mouth Every Night.     Dispense:  90 tablet     Refill:  0     Don't fill until due or patient calls per her request.    gabapentin (NEURONTIN) 300 MG capsule     Sig: Take 1 capsule by mouth 3 (Three) Times a Day.     Dispense:  90 capsule     Refill:  2    sertraline (ZOLOFT) 100 MG tablet     Sig: Take 2 tablets by mouth Daily.     Dispense:  180 tablet     Refill:  0     Do not fill until patient calls per her request.       Return in about 3 months (around 11/11/2023).             This document has been electronically signed by Des Elizondo MD  August 11, 2023 10:28 EDT

## 2023-08-12 DIAGNOSIS — E55.9 VITAMIN D DEFICIENCY: ICD-10-CM

## 2023-08-14 RX ORDER — CHOLECALCIFEROL (VITAMIN D3) 1250 MCG
CAPSULE ORAL
Qty: 12 CAPSULE | Refills: 1 | OUTPATIENT
Start: 2023-08-14

## 2023-08-18 ENCOUNTER — OFFICE VISIT (OUTPATIENT)
Dept: INTERNAL MEDICINE | Facility: CLINIC | Age: 68
End: 2023-08-18
Payer: MEDICARE

## 2023-08-18 VITALS
HEIGHT: 67 IN | TEMPERATURE: 97.1 F | WEIGHT: 135.4 LBS | RESPIRATION RATE: 16 BRPM | SYSTOLIC BLOOD PRESSURE: 100 MMHG | HEART RATE: 68 BPM | DIASTOLIC BLOOD PRESSURE: 68 MMHG | OXYGEN SATURATION: 95 % | BODY MASS INDEX: 21.25 KG/M2

## 2023-08-18 DIAGNOSIS — Z51.81 MEDICATION MONITORING ENCOUNTER: ICD-10-CM

## 2023-08-18 DIAGNOSIS — M35.9 UNDIFFERENTIATED CONNECTIVE TISSUE DISEASE: ICD-10-CM

## 2023-08-18 DIAGNOSIS — G25.0 HEREDITARY ESSENTIAL TREMOR: ICD-10-CM

## 2023-08-18 DIAGNOSIS — E55.9 VITAMIN D DEFICIENCY: ICD-10-CM

## 2023-08-18 DIAGNOSIS — Z12.31 ENCOUNTER FOR SCREENING MAMMOGRAM FOR BREAST CANCER: ICD-10-CM

## 2023-08-18 DIAGNOSIS — Z00.00 MEDICARE ANNUAL WELLNESS VISIT, SUBSEQUENT: Primary | ICD-10-CM

## 2023-08-18 DIAGNOSIS — Z91.81 AT HIGH RISK FOR FALLS: ICD-10-CM

## 2023-08-18 DIAGNOSIS — E53.8 VITAMIN B12 DEFICIENCY: ICD-10-CM

## 2023-08-18 DIAGNOSIS — E78.1 HYPERTRIGLYCERIDEMIA: ICD-10-CM

## 2023-08-18 DIAGNOSIS — E03.8 OTHER SPECIFIED HYPOTHYROIDISM: ICD-10-CM

## 2023-08-18 DIAGNOSIS — E53.8 FOLIC ACID DEFICIENCY: ICD-10-CM

## 2023-08-18 DIAGNOSIS — R79.9 ABNORMAL BLOOD CHEMISTRY: ICD-10-CM

## 2023-08-18 PROCEDURE — 99397 PER PM REEVAL EST PAT 65+ YR: CPT | Performed by: FAMILY MEDICINE

## 2023-08-18 PROCEDURE — 1160F RVW MEDS BY RX/DR IN RCRD: CPT | Performed by: FAMILY MEDICINE

## 2023-08-18 PROCEDURE — 1159F MED LIST DOCD IN RCRD: CPT | Performed by: FAMILY MEDICINE

## 2023-08-18 PROCEDURE — 1170F FXNL STATUS ASSESSED: CPT | Performed by: FAMILY MEDICINE

## 2023-08-18 PROCEDURE — G0439 PPPS, SUBSEQ VISIT: HCPCS | Performed by: FAMILY MEDICINE

## 2023-08-18 NOTE — PROGRESS NOTES
The ABCs of the Annual Wellness Visit  Subsequent Medicare Wellness Visit    Subjective    Milagro Sanderson is a 68 y.o. female who presents for a Subsequent Medicare Wellness Visit.    The following portions of the patient's history were reviewed and   updated as appropriate: allergies, current medications, past family history, past medical history, past social history, past surgical history, and problem list.    Compared to one year ago, the patient feels her physical   health is worse.    Compared to one year ago, the patient feels her mental   health is worse.    Recent Hospitalizations:  She was not admitted to the hospital during the last year.       Current Medical Providers:  Patient Care Team:  Mae Valle MD as PCP - General (Family Medicine)  Praful Massey MD as Consulting Physician (Neurology)  Des Elizondo MD as Consulting Physician (Psychiatry)  Alan Putnam MD as Consulting Physician (Ophthalmology)  Ramone Sebastian MD as Consulting Physician (Rheumatology)    Outpatient Medications Prior to Visit   Medication Sig Dispense Refill    Cholecalciferol (Vitamin D3) 1.25 MG (08219 UT) capsule Take 1 capsule by mouth 1 (One) Time Per Week. 12 capsule 1    Diclofenac Sodium (VOLTAREN) 1 % gel gel Apply 4 g topically to the appropriate area as directed 4 (Four) Times a Day As Needed (joint pain, max 32 grams/day). 350 g 11    folic acid (FOLVITE) 1 MG tablet Take 1 tablet by mouth Daily. Indications: folic acid deficiency 90 tablet 3    gabapentin (NEURONTIN) 300 MG capsule Take 1 capsule by mouth 3 (Three) Times a Day. 90 capsule 2    levothyroxine (SYNTHROID, LEVOTHROID) 25 MCG tablet TAKE 1 TABLET BY MOUTH EVERY DAY ON EMPTY STOMACH AT LEAST 30 MINUTES BEFORE EATING AND OTHER MEDS 90 tablet 1    methotrexate 2.5 MG tablet Take 8 mg by mouth 1 (One) Time Per Week.      mirtazapine (REMERON) 7.5 MG tablet Take 1 tablet by mouth Every Night. 90 tablet 0    primidone  "(MYSOLINE) 250 MG tablet       propranolol (INDERAL) 20 MG tablet Take 1 tablet by mouth 3 (Three) Times a Day.      sertraline (ZOLOFT) 100 MG tablet Take 2 tablets by mouth Daily. 180 tablet 0     No facility-administered medications prior to visit.       No opioid medication identified on active medication list. I have reviewed chart for other potential  high risk medication/s and harmful drug interactions in the elderly.        Aspirin is not on active medication list.  Aspirin use is not indicated based on review of current medical condition/s. Risk of harm outweighs potential benefits.  .    Patient Active Problem List   Diagnosis    Episode of recurrent major depressive disorder    Eczema    Vitamin B12 deficiency    Vitamin D deficiency    Hereditary essential tremor    Onychomycosis    Recurrent cold sores    GERD with esophagitis    High risk medication use    Undifferentiated connective tissue disease     Advance Care Planning   Advance Care Planning     Advance Directive is not on file.  ACP discussion was held with the patient during this visit. Patient does not have an advance directive, declines further assistance.     Objective    Vitals:    08/18/23 1336   BP: 100/68   BP Location: Left arm   Patient Position: Sitting   Cuff Size: Adult   Pulse: 68   Resp: 16   Temp: 97.1 øF (36.2 øC)   TempSrc: Temporal   SpO2: 95%   Weight: 61.4 kg (135 lb 6.4 oz)   Height: 170.2 cm (67\")   PainSc: 0-No pain     Estimated body mass index is 21.21 kg/mý as calculated from the following:    Height as of this encounter: 170.2 cm (67\").    Weight as of this encounter: 61.4 kg (135 lb 6.4 oz).    BMI is within normal parameters. No other follow-up for BMI required.      Does the patient have evidence of cognitive impairment? No          HEALTH RISK ASSESSMENT    Smoking Status:  Social History     Tobacco Use   Smoking Status Never    Passive exposure: Never   Smokeless Tobacco Never     Alcohol " Consumption:  Social History     Substance and Sexual Activity   Alcohol Use Never     Fall Risk Screen:    JOANNA Fall Risk Assessment was completed, and patient is at MODERATE risk for falls. Assessment completed on:2023    Depression Screenin/18/2023     1:33 PM   PHQ-2/PHQ-9 Depression Screening   Little Interest or Pleasure in Doing Things 3-->nearly every day   Feeling Down, Depressed or Hopeless 1-->several days   Trouble Falling or Staying Asleep, or Sleeping Too Much 0-->not at all   Feeling Tired or Having Little Energy 3-->nearly every day   Poor Appetite or Overeating 0-->not at all   Feeling Bad about Yourself - or that You are a Failure or Have Let Yourself or Your Family Down 1-->several days   Trouble Concentrating on Things, Such as Reading the Newspaper or Watching Television 1-->several days   Moving or Speaking So Slowly that Other People Could Have Noticed? Or the Opposite - Being So Fidgety 0-->not at all   Thoughts that You Would be Better Off Dead or of Hurting Yourself in Some Way 0-->not at all   PHQ-9: Brief Depression Severity Measure Score 9   If You Checked Off Any Problems, How Difficult Have These Problems Made It For You to Do Your Work, Take Care of Things at Home, or Get Along with Other People? very difficult       Health Habits and Functional and Cognitive Screenin/18/2023     1:29 PM   Functional & Cognitive Status   Do you have difficulty preparing food and eating? Yes   Do you have difficulty bathing yourself, getting dressed or grooming yourself? No   Do you have difficulty using the toilet? No   Do you have difficulty moving around from place to place? No   Current Diet Well Balanced Diet   Dental Exam Up to date   Eye Exam Up to date   Exercise (times per week) 0 times per week   Current Exercises Include No Regular Exercise   Do you need help using the phone?  No   Are you deaf or do you have serious difficulty hearing?  No   Do you need help to go  to places out of walking distance? No   Do you need help shopping? No   Do you need help preparing meals?  No   Do you need help with housework?  No   Do you need help with laundry? No   Do you need help taking your medications? No   Do you need help managing money? No   Do you ever drive or ride in a car without wearing a seat belt? No   Have you felt unusual stress, anger or loneliness in the last month? Yes   Who do you live with? Alone   If you need help, do you have trouble finding someone available to you? No   Have you been bothered in the last four weeks by sexual problems? No   Do you have difficulty concentrating, remembering or making decisions? Yes       Age-appropriate Screening Schedule:  Refer to the list below for future screening recommendations based on patient's age, sex and/or medical conditions. Orders for these recommended tests are listed in the plan section. The patient has been provided with a written plan.    Health Maintenance   Topic Date Due    LIPID PANEL  08/18/2023    TDAP/TD VACCINES (1 - Tdap) 08/18/2023 (Originally 1/24/1974)    COVID-19 Vaccine (6 - Moderna series) 01/01/2024 (Originally 12/20/2022)    INFLUENZA VACCINE  10/01/2023    MAMMOGRAM  10/25/2023    ANNUAL WELLNESS VISIT  08/18/2024    COLORECTAL CANCER SCREENING  03/27/2025    HEPATITIS C SCREENING  Completed    Pneumococcal Vaccine 65+  Completed    ZOSTER VACCINE  Completed    PAP SMEAR  Discontinued    DXA SCAN  Discontinued                  CMS Preventative Services Quick Reference  Risk Factors Identified During Encounter  Fall Risk-High or Moderate: Information on Fall Prevention Shared in After Visit Summary and Sit to Stand Exercise Information Shared in After Visit Summary  The above risks/problems have been discussed with the patient.  Pertinent information has been shared with the patient in the After Visit Summary.  An After Visit Summary and PPPS were made available to the patient.    Follow Up:  "  Next Medicare Wellness visit to be scheduled in 1 year.       Additional E&M Note during same encounter follows:  Patient has multiple medical problems which are significant and separately identifiable that require additional work above and beyond the Medicare Wellness Visit.      Chief Complaint  Medicare Wellness-subsequent (AWV and preventive care )    Subjective        Has MRI scheduled at  due to tremor which has been refractory to all medicines. Upon chart review tremor worsened with sinemet. Followed by psychiatry, he recently increased Zoloft.    Milagro Sanderson is also being seen today for follow up of chronic conditions.         Objective   Vital Signs:  /68 (BP Location: Left arm, Patient Position: Sitting, Cuff Size: Adult)   Pulse 68   Temp 97.1 øF (36.2 øC) (Temporal)   Resp 16   Ht 170.2 cm (67\")   Wt 61.4 kg (135 lb 6.4 oz)   SpO2 95%   BMI 21.21 kg/mý     Physical Exam  Vitals and nursing note reviewed.   Constitutional:       General: She is not in acute distress.     Appearance: Normal appearance. She is well-developed and well-groomed. She is not ill-appearing, toxic-appearing or diaphoretic.   HENT:      Head: Normocephalic and atraumatic.      Right Ear: Hearing normal.      Left Ear: Hearing normal.   Eyes:      General: Lids are normal. No scleral icterus.     Extraocular Movements: Extraocular movements intact.   Neck:      Trachea: Phonation normal.   Cardiovascular:      Rate and Rhythm: Normal rate and regular rhythm.   Pulmonary:      Effort: Pulmonary effort is normal.      Breath sounds: Normal breath sounds.   Musculoskeletal:      Cervical back: Neck supple.   Skin:     Coloration: Skin is not jaundiced or pale.   Neurological:      General: No focal deficit present.      Mental Status: She is alert and oriented to person, place, and time.      Motor: Tremor present. No weakness, atrophy or seizure activity.   Psychiatric:         Attention and Perception: Attention and " perception normal.         Mood and Affect: Mood and affect normal.         Speech: Speech normal.         Behavior: Behavior normal. Behavior is cooperative.         Thought Content: Thought content normal.         Cognition and Memory: Cognition and memory normal.         Judgment: Judgment normal.        The following data was reviewed by: Mae Valle MD on 08/18/2023:  Lab Results   Component Value Date    CHLPL 166 08/06/2021    TRIG 170 (H) 08/06/2021    HDL 44 08/06/2021    LDL 93 08/06/2021     Lab Results   Component Value Date    TSH 2.880 08/23/2022     Lab Results   Component Value Date    FREET4 1.07 08/23/2022         Data reviewed : Consultant notes Dr. Carlton neurosurgery 7/13/23           Assessment and Plan   Diagnoses and all orders for this visit:    1. Medicare annual wellness visit, subsequent (Primary)    2. Undifferentiated connective tissue disease  -     CBC (No Diff)  -     Comprehensive Metabolic Panel    3. Hereditary essential tremor  -     Primidone level  -     TSH+Free T4    4. Other specified hypothyroidism  -     TSH+Free T4    5. Vitamin B12 deficiency  -     CBC (No Diff)  -     Vitamin B6  -     Vitamin B12    6. Vitamin D deficiency  -     Vitamin D,25-Hydroxy    7. Folic acid deficiency  -     CBC (No Diff)  -     Folate    8. Hypertriglyceridemia  -     Lipid Panel    9. Encounter for screening mammogram for breast cancer  -     Mammo Screening Digital Tomosynthesis Bilateral With CAD; Future    10. Abnormal blood chemistry  -     Primidone level  -     Lipid Panel  -     TSH+Free T4  -     Vitamin D,25-Hydroxy  -     CBC (No Diff)  -     Comprehensive Metabolic Panel  -     Folate  -     Vitamin B6  -     Vitamin B12    11. Medication monitoring encounter  -     Primidone level  -     Lipid Panel  -     TSH+Free T4  -     Vitamin D,25-Hydroxy  -     CBC (No Diff)  -     Comprehensive Metabolic Panel  -     Folate  -     Vitamin B6  -     Vitamin B12    12. At  high risk for falls  Comments:  info via avs    Counseling/anticipatory guidance/risk factor interventions for age provided. See AVS.         Follow Up   Return in about 1 year (around 8/18/2024) for Medicare Wellness.  Patient was given instructions and counseling regarding her condition or for health maintenance advice. Please see specific information pulled into the AVS if appropriate.

## 2023-08-18 NOTE — PATIENT INSTRUCTIONS
Medicare Wellness  Personal Prevention Plan of Service     Date of Office Visit:    Encounter Provider:  Mae Valle MD  Place of Service:  [facility]  Patient Name: Milagro Sanderson  :  1955    As part of the Medicare Wellness portion of your visit today, we are providing you with this personalized preventive plan of services (PPPS). This plan is based upon recommendations of the [place] Preventive Services Task Force (USPSTF) and the Advisory Committee on Immunization Practices (ACIP).    This lists the preventive care services that should be considered, and provides dates of when you are due. Items listed as completed are up-to-date and do not require any further intervention.    Health Maintenance   Topic Date Due    DXA SCAN  Never done    TDAP/TD VACCINES (1 - Tdap) Never done    ANNUAL WELLNESS VISIT  08/15/2023    COVID-19 Vaccine (6 - Moderna series) 2024 (Originally 2022)    INFLUENZA VACCINE  10/01/2023    MAMMOGRAM  10/25/2024    COLORECTAL CANCER SCREENING  2025    HEPATITIS C SCREENING  Completed    Pneumococcal Vaccine 65+  Completed    ZOSTER VACCINE  Completed    PAP SMEAR  Discontinued       No orders of the defined types were placed in this encounter.      No follow-ups on file.        Health Maintenance for Postmenopausal Women  Menopause is a normal process in which your reproductive ability comes to an end. This process happens gradually over a span of months to years, usually between the ages of 48 and 55. Menopause is complete when you have missed 12 consecutive menstrual periods.  It is important to talk with your health care provider about some of the most common conditions that affect postmenopausal women, such as heart disease, cancer, and bone loss (osteoporosis). Adopting a healthy lifestyle and getting preventive care can help to promote your health and wellness. Those actions can also lower your chances of  developing some of these common conditions.  What should I know about menopause?  During menopause, you may experience a number of symptoms, such as:  Moderate-to-severe hot flashes.  Night sweats.  Decrease in sex drive.  Mood swings.  Headaches.  Tiredness.  Irritability.  Memory problems.  Insomnia.     Choosing to treat or not to treat menopausal changes is an individual decision that you make with your health care provider.  What should I know about hormone replacement therapy and supplements?  Hormone therapy products are effective for treating symptoms that are associated with menopause, such as hot flashes and night sweats. Hormone replacement carries certain risks, especially as you become older. If you are thinking about using estrogen or estrogen with progestin treatments, discuss the benefits and risks with your health care provider.  What should I know about heart disease and stroke?  Heart disease, heart attack, and stroke become more likely as you age. This may be due, in part, to the hormonal changes that your body experiences during menopause. These can affect how your body processes dietary fats, triglycerides, and cholesterol. Heart attack and stroke are both medical emergencies.    There are many things that you can do to help prevent heart disease and stroke:  Have your blood pressure checked at least every 1-2 years. High blood pressure causes heart disease and increases the risk of stroke.  If you are 55-79 years old, ask your health care provider if you should take aspirin to prevent a heart attack or a stroke.  Do not use any tobacco products, including cigarettes, chewing tobacco, or electronic cigarettes. If you need help quitting, ask your health care provider.  It is important to eat a healthy diet and maintain a healthy weight.  Be sure to include plenty of vegetables, fruits, low-fat dairy products, and lean protein.  Avoid eating foods that are high in solid fats, added sugars, or  salt (sodium).  Get regular exercise. This is one of the most important things that you can do for your health.  Try to exercise for at least 150 minutes each week. The type of exercise that you do should increase your heart rate and make you sweat. This is known as moderate-intensity exercise.  Try to do strengthening exercises at least twice each week. Do these in addition to the moderate-intensity exercise.  Know your numbers. Ask your health care provider to check your cholesterol and your blood glucose. Continue to have your blood tested as directed by your health care provider.     What should I know about cancer screening?  There are several types of cancer. Take the following steps to reduce your risk and to catch any cancer development as early as possible.  Breast Cancer  Practice breast self-awareness.  This means understanding how your breasts normally appear and feel.  It also means doing regular breast self-exams. Let your health care provider know about any changes, no matter how small.  If you are 40 or older, have a clinician do a breast exam (clinical breast exam or CBE) every year. Depending on your age, family history, and medical history, it may be recommended that you also have a yearly breast X-ray (mammogram).  If you have a family history of breast cancer, talk with your health care provider about genetic screening.  If you are at high risk for breast cancer, talk with your health care provider about having an MRI and a mammogram every year.  Breast cancer (BRCA) gene test is recommended for women who have family members with BRCA-related cancers. Results of the assessment will determine the need for genetic counseling and BRCA1 and for BRCA2 testing. BRCA-related cancers include these types:  Breast. This occurs in males or females.  Ovarian.  Tubal. This may also be called fallopian tube cancer.  Cancer of the abdominal or pelvic lining (peritoneal cancer).  Prostate.  Pancreatic.      Cervical, Uterine, and Ovarian Cancer  Your health care provider may recommend that you be screened regularly for cancer of the pelvic organs. These include your ovaries, uterus, and vagina. This screening involves a pelvic exam, which includes checking for microscopic changes to the surface of your cervix (Pap test).  For women ages 21-65, health care providers may recommend a pelvic exam and a Pap test every three years. For women ages 30-65, they may recommend the Pap test and pelvic exam, combined with testing for human papilloma virus (HPV), every five years. Some types of HPV increase your risk of cervical cancer. Testing for HPV may also be done on women of any age who have unclear Pap test results.  Other health care providers may not recommend any screening for nonpregnant women who are considered low risk for pelvic cancer and have no symptoms. Ask your health care provider if a screening pelvic exam is right for you.  If you have had past treatment for cervical cancer or a condition that could lead to cancer, you need Pap tests and screening for cancer for at least 20 years after your treatment. If Pap tests have been discontinued for you, your risk factors (such as having a new sexual partner) need to be reassessed to determine if you should start having screenings again. Some women have medical problems that increase the chance of getting cervical cancer. In these cases, your health care provider may recommend that you have screening and Pap tests more often.  If you have a family history of uterine cancer or ovarian cancer, talk with your health care provider about genetic screening.  If you have vaginal bleeding after reaching menopause, tell your health care provider.  There are currently no reliable tests available to screen for ovarian cancer.     Lung Cancer  Lung cancer screening is recommended for adults 55-80 years old who are at high risk for lung cancer because of a history of smoking. A  yearly low-dose CT scan of the lungs is recommended if you:  Currently smoke.  Have a history of at least 30 pack-years of smoking and you currently smoke or have quit within the past 15 years. A pack-year is smoking an average of one pack of cigarettes per day for one year.     Yearly screening should:  Continue until it has been 15 years since you quit.  Stop if you develop a health problem that would prevent you from having lung cancer treatment.     Colorectal Cancer  This type of cancer can be detected and can often be prevented.  Routine colorectal cancer screening usually begins at age 50 and continues through age 75.  If you have risk factors for colon cancer, your health care provider may recommend that you be screened at an earlier age.  If you have a family history of colorectal cancer, talk with your health care provider about genetic screening.  Your health care provider may also recommend using home test kits to check for hidden blood in your stool.  A small camera at the end of a tube can be used to examine your colon directly (sigmoidoscopy or colonoscopy). This is done to check for the earliest forms of colorectal cancer.  Direct examination of the colon should be repeated every 5-10 years until age 75. However, if early forms of precancerous polyps or small growths are found or if you have a family history or genetic risk for colorectal cancer, you may need to be screened more often.     Skin Cancer  Check your skin from head to toe regularly.  Monitor any moles. Be sure to tell your health care provider:  About any new moles or changes in moles, especially if there is a change in a mole's shape or color.  If you have a mole that is larger than the size of a pencil eraser.  If any of your family members has a history of skin cancer, especially at a young age, talk with your health care provider about genetic screening.  Always use sunscreen. Apply sunscreen liberally and repeatedly throughout the  day.  Whenever you are outside, protect yourself by wearing long sleeves, pants, a wide-brimmed hat, and sunglasses.     What should I know about osteoporosis?  Osteoporosis is a condition in which bone destruction happens more quickly than new bone creation. After menopause, you may be at an increased risk for osteoporosis. To help prevent osteoporosis or the bone fractures that can happen because of osteoporosis, the following is recommended:  If you are 19-50 years old, get at least 1,000 mg of calcium and at least 600 mg of vitamin D per day.  If you are older than age 50 but younger than age 70, get at least 1,200 mg of calcium and at least 600 mg of vitamin D per day.  If you are older than age 70, get at least 1,200 mg of calcium and at least 800 mg of vitamin D per day.     Smoking and excessive alcohol intake increase the risk of osteoporosis. Eat foods that are rich in calcium and vitamin D, and do weight-bearing exercises several times each week as directed by your health care provider.  What should I know about how menopause affects my mental health?  Depression may occur at any age, but it is more common as you become older. Common symptoms of depression include:  Low or sad mood.  Changes in sleep patterns.  Changes in appetite or eating patterns.  Feeling an overall lack of motivation or enjoyment of activities that you previously enjoyed.  Frequent crying spells.     Talk with your health care provider if you think that you are experiencing depression.  What should I know about immunizations?  It is important that you get and maintain your immunizations. These include:  Tetanus, diphtheria, and pertussis (Tdap) booster vaccine.  Influenza every year before the flu season begins.  Pneumonia vaccine.  Shingles vaccine.     Your health care provider may also recommend other immunizations.  This information is not intended to replace advice given to you by your health care provider. Make sure you discuss  any questions you have with your health care provider.  Document Released: 02/09/2007 Document Revised: 07/07/2017 Document Reviewed: 09/20/2016  [employer] Patient Education c 2018 [employer]        Fall Prevention in the Home, Adult  Falls can cause injuries and affect people of all ages. There are many simple things that you can do to make your home safe and to help prevent falls. Ask for help when making these changes, if needed.  What actions can I take to prevent falls?  General instructions  Use good lighting in all rooms. Replace any light bulbs that burn out, turn on lights if it is dark, and use night-lights.  Place frequently used items in easy-to-reach places. Lower the shelves around your home if necessary.  Set up furniture so that there are clear paths around it. Avoid moving your furniture around.  Remove throw rugs and other tripping hazards from the floor.  Avoid walking on wet floors.  Fix any uneven floor surfaces.  Add color or contrast paint or tape to grab bars and handrails in your home. Place contrasting color strips on the first and last steps of staircases.  When you use a stepladder, make sure that it is completely opened and that the sides and supports are firmly locked. Have someone hold the ladder while you are using it. Do not climb a closed stepladder.  Know where your pets are when moving through your home.  What can I do in the bathroom?         Keep the floor dry. Immediately clean up any water that is on the floor.  Remove soap buildup in the tub or shower regularly.  Use nonskid mats or decals on the floor of the tub or shower.  Attach bath mats securely with double-sided, nonslip rug tape.  If you need to sit down while you are in the shower, use a plastic, nonslip stool.  Install grab bars by the toilet and in the tub and shower. Do not use towel bars as grab bars.  What can I do in the bedroom?  Make sure that a bedside light is easy to reach.  Do not use  oversized bedding that reaches the floor.  Have a firm chair that has side arms to use for getting dressed.  What can I do in the kitchen?  Clean up any spills right away.  If you need to reach for something above you, use a sturdy step stool that has a grab bar.  Keep electrical cables out of the way.  Do not use floor polish or wax that makes floors slippery. If you must use wax, make sure that it is non-skid floor wax.  What can I do with my stairs?  Do not leave any items on the stairs.  Make sure that you have a light switch at the top and the bottom of the stairs. Have them installed if you do not have them.  Make sure that there are handrails on both sides of the stairs. Fix handrails that are broken or loose. Make sure that handrails are as long as the staircases.  Install non-slip stair treads on all stairs in your home.  Avoid having throw rugs at the top or bottom of stairs, or secure the rugs with carpet tape to prevent them from moving.  Choose a carpet design that does not hide the edge of steps on the stairs.  Check any carpeting to make sure that it is firmly attached to the stairs. Fix any carpet that is loose or worn.  What can I do on the outside of my home?  Use bright outdoor lighting.  Regularly repair the edges of walkways and driveways and fix any cracks.  Remove high doorway thresholds.  Trim any shrubbery on the main path into your home.  Regularly check that handrails are securely fastened and in good repair. Both sides of all steps should have handrails.  Install guardrails along the edges of any raised decks or porches.  Clear walkways of debris and clutter, including tools and rocks.  Have leaves, snow, and ice cleared regularly.  Use sand or salt on walkways during winter months.  In the garage, clean up any spills right away, including grease or oil spills.  What other actions can I take?  Wear closed-toe shoes that fit well and support your feet. Wear shoes that have rubber soles or  low heels.  Use mobility aids as needed, such as canes, walkers, scooters, and crutches.  Review your medicines with your health care provider. Some medicines can cause dizziness or changes in blood pressure, which increase your risk of falling.  Talk with your health care provider about other ways that you can decrease your risk of falls. This may include working with a physical therapist or  to improve your strength, balance, and endurance.  Where to find more information  Centers for Disease Control and PreventionJOANNA: www.cdc.gov  [place] on Aging: www.mikaela.nih.gov  Contact a health care provider if:  You are afraid of falling at home.  You feel weak, drowsy, or dizzy at home.  You fall at home.  Summary  There are many simple things that you can do to make your home safe and to help prevent falls.  Ways to make your home safe include removing tripping hazards and installing grab bars in the bathroom.  Ask for help when making these changes in your home.  This information is not intended to replace advice given to you by your health care provider. Make sure you discuss any questions you have with your health care provider.  Document Revised: 09/19/2022 Document Reviewed: 07/21/2021  [employer] Patient Education c 2023 [employer]    Sit-to-Stand Exercise    The sit-to-stand exercise (also known as the chair stand or chair rise exercise) strengthens your lower body and helps you maintain or improve your mobility and independence. The end goal is to do the sit-to-stand exercise without using your hands. This will be easier as you become stronger. You should always talk with your health care provider before starting any exercise program, especially if you have had recent surgery.  Do the exercise exactly as told by your health care provider and adjust it as directed. It is normal to feel mild stretching, pulling, tightness, or discomfort as you do this exercise, but you should stop right away  if you feel sudden pain or your pain gets worse. Do not begin doing this exercise until told by your health care provider.  What the sit-to-stand exercise does  The sit-to-stand exercise helps to strengthen the muscles in your thighs and the muscles in the center of your body that give you stability (core muscles). This exercise is especially helpful if:  You have had knee or hip surgery.  You have trouble getting up from a chair, out of a car, or off the toilet due to muscle weakness.  How to do the sit-to-stand exercise  Sit toward the front edge of a sturdy chair without armrests. Your knees should be bent and your feet should be flat on the floor and shoulder-width apart and underneath your hips.  Place your hands lightly on each side of the seat. Keep your back and neck as straight as possible, with your chest slightly forward.  Breathe in slowly. Lean forward and slightly shift your weight to the front of your feet.  Breathe out as you slowly stand up. Try not to support any weight with your hands.  Stand and pause for a full breath in and out.  Breathe in as you sit down slowly. Tighten your core and abdominal muscles to control your lowering as much as possible. You should lower yourself back to the chair slowly, not just drop back into the seat.  Breathe out slowly.  Do this exercise 10-15 times. If needed, do it fewer times until you build up strength.  Rest for 1 minute, then do another set of 10-15 repetitions.  To change the difficulty of the sit-to-stand exercise  If the exercise is too difficult, use a chair with sturdy armrests, and push off the armrests to help you come to the standing position. You can also use the armrests to help slowly lower yourself back to sitting. As this gets easier, try to use your arms less. You can also place a firm cushion or pillow on the chair to make the surface higher.  If this exercise is too easy, do not use your arms to help raise or lower yourself. You can also  wear a weighted vest, use hand weights, increase your repetitions, or try a lower chair.  General tips  You may feel tired when starting an exercise routine. This is normal.  You may have muscle soreness that lasts a few days. This is normal. As you get stronger, you may not feel muscle soreness.  Use smooth, steady movements.  Do not  hold your breath during strength exercises. This can cause unsafe changes in your blood pressure.  Breathe in slowly through your nose, and breathe out slowly through your mouth.  Summary  Strengthening your lower body is an important step to help you move safely and independently.  The sit-to-stand exercise helps strengthen the muscles in your thighs and core.  You should always talk with your health care provider before starting any exercise program, especially if you have had recent surgery.  This information is not intended to replace advice given to you by your health care provider. Make sure you discuss any questions you have with your health care provider.  Document Revised: 04/10/2022 Document Reviewed: 04/10/2022  [employer] Patient Education c 2023 [employer]    Exercising to Stay Healthy  To become healthy and stay healthy, it is recommended that you do moderate-intensity and vigorous-intensity exercise. You can tell that you are exercising at a moderate intensity if your heart starts beating faster and you start breathing faster but can still hold a conversation. You can tell that you are exercising at a vigorous intensity if you are breathing much harder and faster and cannot hold a conversation while exercising.  How can exercise benefit me?  Exercising regularly is important. It has many health benefits, such as:  Improving overall fitness, flexibility, and endurance.  Increasing bone density.  Helping with weight control.  Decreasing body fat.  Increasing muscle strength and endurance.  Reducing stress and tension, anxiety, depression, or anger.  Improving overall  health.  What guidelines should I follow while exercising?  Before you start a new exercise program, talk with your health care provider.  Do not exercise so much that you hurt yourself, feel dizzy, or get very short of breath.  Wear comfortable clothes and wear shoes with good support.  Drink plenty of water while you exercise to prevent dehydration or heat stroke.  Work out until your breathing and your heartbeat get faster (moderate intensity).  How often should I exercise?  Choose an activity that you enjoy, and set realistic goals. Your health care provider can help you make an activity plan that is individually designed and works best for you.  Exercise regularly as told by your health care provider. This may include:  Doing strength training two times a week, such as:  Lifting weights.  Using resistance bands.  Push-ups.  Sit-ups.  Yoga.  Doing a certain intensity of exercise for a given amount of time. Choose from these options:  A total of 150 minutes of moderate-intensity exercise every week.  A total of 75 minutes of vigorous-intensity exercise every week.  A mix of moderate-intensity and vigorous-intensity exercise every week.  Children, pregnant women, people who have not exercised regularly, people who are overweight, and older adults may need to talk with a health care provider about what activities are safe to perform. If you have a medical condition, be sure to talk with your health care provider before you start a new exercise program.  What are some exercise ideas?  Moderate-intensity exercise ideas include:  Walking 1 mile (1.6 km) in about 15 minutes.  Biking.  Hiking.  Golfing.  Dancing.  Water aerobics.  Vigorous-intensity exercise ideas include:  Walking 4.5 miles (7.2 km) or more in about 1 hour.  Jogging or running 5 miles (8 km) in about 1 hour.  Biking 10 miles (16.1 km) or more in about 1 hour.  Lap swimming.  Roller-skating or in-line skating.  Cross-country skiing.  Vigorous  competitive sports, such as football, basketball, and soccer.  Jumping rope.  Aerobic dancing.  What are some everyday activities that can help me get exercise?  Yard work, such as:  Pushing a .  Raking and bagging leaves.  Washing your car.  Pushing a stroller.  Shoveling snow.  Gardening.  Washing windows or floors.  How can I be more active in my day-to-day activities?  Use stairs instead of an elevator.  Take a walk during your lunch break.  If you drive, park your car farther away from your work or school.  If you take public transportation, get off one stop early and walk the rest of the way.  Stand up or walk around during all of your indoor phone calls.  Get up, stretch, and walk around every 30 minutes throughout the day.  Enjoy exercise with a friend. Support to continue exercising will help you keep a regular routine of activity.  Where to find more information  You can find more information about exercising to stay healthy from:  U.S. Department of Health and Human Services: www.hhs.gov  Centers for Disease Control and Prevention (CDC): www.cdc.gov  Summary  Exercising regularly is important. It will improve your overall fitness, flexibility, and endurance.  Regular exercise will also improve your overall health. It can help you control your weight, reduce stress, and improve your bone density.  Do not exercise so much that you hurt yourself, feel dizzy, or get very short of breath.  Before you start a new exercise program, talk with your health care provider.  This information is not intended to replace advice given to you by your health care provider. Make sure you discuss any questions you have with your health care provider.  Document Revised: 04/15/2022 Document Reviewed: 04/15/2022  Elsevier Patient Education c 2023 [employer]

## 2023-08-21 DIAGNOSIS — R94.6 ABNORMAL THYROID FUNCTION TEST: Primary | ICD-10-CM

## 2023-08-21 DIAGNOSIS — E53.1 VITAMIN B6 DEFICIENCY: ICD-10-CM

## 2023-08-21 RX ORDER — DIPHENHYDRAMINE HYDROCHLORIDE 25 MG/1
25 CAPSULE ORAL DAILY
Qty: 90 TABLET | Refills: 1 | Status: SHIPPED | OUTPATIENT
Start: 2023-08-21

## 2023-08-22 DIAGNOSIS — F41.1 GENERALIZED ANXIETY DISORDER: ICD-10-CM

## 2023-08-22 DIAGNOSIS — G25.0 ESSENTIAL TREMOR: ICD-10-CM

## 2023-08-22 LAB — Lab: NORMAL

## 2023-08-22 RX ORDER — GABAPENTIN 300 MG/1
CAPSULE ORAL
Qty: 90 CAPSULE | OUTPATIENT
Start: 2023-08-22

## 2023-08-23 LAB
25(OH)D3+25(OH)D2 SERPL-MCNC: 79.4 NG/ML (ref 30–100)
ALBUMIN SERPL-MCNC: 4.8 G/DL (ref 3.5–5.2)
ALBUMIN/GLOB SERPL: 2.7 G/DL
ALP SERPL-CCNC: 97 U/L (ref 39–117)
ALT SERPL-CCNC: 16 U/L (ref 1–33)
AST SERPL-CCNC: 16 U/L (ref 1–32)
BILIRUB SERPL-MCNC: <0.2 MG/DL (ref 0–1.2)
BUN SERPL-MCNC: 12 MG/DL (ref 8–23)
BUN/CREAT SERPL: 15.8 (ref 7–25)
CALCIUM SERPL-MCNC: 9.9 MG/DL (ref 8.6–10.5)
CHLORIDE SERPL-SCNC: 103 MMOL/L (ref 98–107)
CHOLEST SERPL-MCNC: 171 MG/DL (ref 0–200)
CO2 SERPL-SCNC: 32.6 MMOL/L (ref 22–29)
CREAT SERPL-MCNC: 0.76 MG/DL (ref 0.57–1)
EGFRCR SERPLBLD CKD-EPI 2021: 84.4 ML/MIN/1.73
ERYTHROCYTE [DISTWIDTH] IN BLOOD BY AUTOMATED COUNT: 13.3 % (ref 12.3–15.4)
FOLATE SERPL-MCNC: >20 NG/ML (ref 4.78–24.2)
GLOBULIN SER CALC-MCNC: 1.8 GM/DL
GLUCOSE SERPL-MCNC: 76 MG/DL (ref 65–99)
HCT VFR BLD AUTO: 38.1 % (ref 34–46.6)
HDLC SERPL-MCNC: 50 MG/DL (ref 40–60)
HGB BLD-MCNC: 13.3 G/DL (ref 12–15.9)
LDLC SERPL CALC-MCNC: 89 MG/DL (ref 0–100)
MCH RBC QN AUTO: 34.4 PG (ref 26.6–33)
MCHC RBC AUTO-ENTMCNC: 34.9 G/DL (ref 31.5–35.7)
MCV RBC AUTO: 98.4 FL (ref 79–97)
PHENOBARB SERPL-MCNC: 32 UG/ML (ref 15–40)
PLATELET # BLD AUTO: 214 10*3/MM3 (ref 140–450)
POTASSIUM SERPL-SCNC: 5.1 MMOL/L (ref 3.5–5.2)
PRIMIDONE SERPL-MCNC: 10 UG/ML (ref 5–12)
PROT SERPL-MCNC: 6.6 G/DL (ref 6–8.5)
PYRIDOXAL PHOS SERPL-MCNC: 4.5 UG/L (ref 3.4–65.2)
RBC # BLD AUTO: 3.87 10*6/MM3 (ref 3.77–5.28)
SODIUM SERPL-SCNC: 141 MMOL/L (ref 136–145)
T4 FREE SERPL-MCNC: 0.89 NG/DL (ref 0.93–1.7)
TRIGL SERPL-MCNC: 185 MG/DL (ref 0–150)
TSH SERPL DL<=0.005 MIU/L-ACNC: 1.88 UIU/ML (ref 0.27–4.2)
VIT B12 SERPL-MCNC: 400 PG/ML (ref 211–946)
VLDLC SERPL CALC-MCNC: 32 MG/DL (ref 5–40)
WBC # BLD AUTO: 4.31 10*3/MM3 (ref 3.4–10.8)

## 2023-08-29 ENCOUNTER — TELEPHONE (OUTPATIENT)
Dept: INTERNAL MEDICINE | Facility: CLINIC | Age: 68
End: 2023-08-29

## 2023-08-29 NOTE — TELEPHONE ENCOUNTER
Caller: Milagro Sanderson    Relationship: Self    Best call back number:       [phone] (Home)     Caller requesting test results:     PATIENT    What test was performed:     BLOOD WORK - PATIENT WASN'T SURE WHAT THE BLOOD WORK WAS TO CHECK SPECIFICALLY EXCEPT FOR POSSIBLY VITAMIN D    When was the test performed:     FRIDAY - 8/18    Where was the test performed:    DOWNSTAIRS - [facility]    Additional notes:     PATIENT REQUESTED RESULTS FROM RECENT BLOOD WORK BE MAILED TO HER HOME AT:    [address] [place] [address]  [id number]    PATIENT ALSO STATED THE REFERRAL FOR ENDOCRINOLOGIST WON'T BE ABLE TO SCHEDULE AN APPOINTMENT UNTIL 12/27    PATIENT IS CONCERNED TO WAIT UNTIL DECEMBER    PATIENT REQUESTED A CALL BACK FROM THE OFFICE WITH ANY OTHER RECOMMENDATIONS FOR A REFERRAL FOR ENDOCRINOLOGIST TO BE SOONER    PATIENT STATED A VOICEMAIL MAY BE LEFT ON HER ANSWERING MACHINE    DR BOB  REFERRAL COORDINATOR

## 2023-09-01 NOTE — TELEPHONE ENCOUNTER
I'm not sure whether  could see the patient sooner than 12/27 or not.  We can definitely submit her referral/records to them and see what they can offer!

## 2023-12-08 ENCOUNTER — TELEMEDICINE (OUTPATIENT)
Dept: PSYCHIATRY | Facility: CLINIC | Age: 68
End: 2023-12-08
Payer: MEDICARE

## 2023-12-08 VITALS
DIASTOLIC BLOOD PRESSURE: 70 MMHG | WEIGHT: 136.2 LBS | HEART RATE: 64 BPM | BODY MASS INDEX: 21.38 KG/M2 | HEIGHT: 67 IN | SYSTOLIC BLOOD PRESSURE: 104 MMHG | OXYGEN SATURATION: 96 %

## 2023-12-08 DIAGNOSIS — G47.09 OTHER INSOMNIA: ICD-10-CM

## 2023-12-08 DIAGNOSIS — Z60.0 PHASE OF LIFE PROBLEM IN ADULT: ICD-10-CM

## 2023-12-08 DIAGNOSIS — G25.0 ESSENTIAL TREMOR: ICD-10-CM

## 2023-12-08 DIAGNOSIS — F41.1 GENERALIZED ANXIETY DISORDER: ICD-10-CM

## 2023-12-08 DIAGNOSIS — F33.0 MILD EPISODE OF RECURRENT MAJOR DEPRESSIVE DISORDER: ICD-10-CM

## 2023-12-08 RX ORDER — SERTRALINE HYDROCHLORIDE 100 MG/1
200 TABLET, FILM COATED ORAL DAILY
Qty: 180 TABLET | Refills: 0 | Status: SHIPPED | OUTPATIENT
Start: 2023-12-08

## 2023-12-08 RX ORDER — MIRTAZAPINE 7.5 MG/1
7.5 TABLET, FILM COATED ORAL NIGHTLY
Qty: 90 TABLET | Refills: 0 | Status: SHIPPED | OUTPATIENT
Start: 2023-12-08

## 2023-12-08 RX ORDER — GABAPENTIN 300 MG/1
300 CAPSULE ORAL 3 TIMES DAILY
Qty: 90 CAPSULE | Refills: 2 | Status: SHIPPED | OUTPATIENT
Start: 2023-12-08

## 2023-12-08 SDOH — SOCIAL STABILITY - SOCIAL INSECURITY: PROBLEMS OF ADJUSTMENT TO LIFE-CYCLE TRANSITIONS: Z60.0

## 2023-12-08 NOTE — PROGRESS NOTES
"Arlette Sanderson is a 68 y.o. female who presents today for follow up     This provider is located at [facility], [facility], [address] [place] [address], using Zoom.  The patient is seen remotely at [facility], Behavioral health, [address], [address] [facility] in [facility] via Vidyo, an encrypted service from one [facility] Facility to another, with staff present. The patient's condition being diagnosed/treated is appropriate for telemedecine.  The provider identified himself as well as his credentials.      The patient and/or patient's guardian consent to be seen remotely, and when consent is given they understand that the consent allows for patient identifiable information to be sent to a third party as needed.  They may refuse to be seen remotely at any time.  The electronic data is encrypted and password protected, and the patient has been advised of the potential risks to privacy notwithstanding such measures.        Chief Complaint: Depression    History of Present Illness: Patient presented today for follow-up.  She reports that since last visit, \"she has been getting by.\"  She reports her tremors are getting bad to the point that she cannot do things, even simple things and this has caused significant depression and her continued loss of function.  Her sleep is improved and her appetite has normalized but it had been low for several weeks.  She is not having any current medication side effects.  She denies SI/HI/AVH.      The following portions of the patient's history were reviewed and updated as appropriate: allergies, current medications, past family history, past medical history, past social history, past surgical history and problem list.      Past Medical History:  Past Medical History:   Diagnosis Date    Benign familial tremor     Blood in urine     Body piercing     EARS    Depression     Eczema     Elevated LDL cholesterol level     Fever blister     " History of fracture     ARM AT AGE 4 - PATIENT REPORTS SHE DOES NOT REMEMBER LATERALITY    History of migraine     History of tremor     Onychomycosis     Problems with swallowing     REPORTS SHE FEELS LIKE PILLS ARE GETTING STUCK IN A POCKET IN HER THROAT WHEN SHE TRIES TO SWALLOW THEM    Sebaceous cyst     Vitamin D deficiency     Wears glasses        Social History:  Social History     Socioeconomic History    Marital status: Single   Tobacco Use    Smoking status: Never     Passive exposure: Never    Smokeless tobacco: Never   Vaping Use    Vaping Use: Never used   Substance and Sexual Activity    Alcohol use: Never    Drug use: Never    Sexual activity: Defer       Family History:  Family History   Problem Relation Age of Onset    Heart attack Mother     Hypertension Mother     Anxiety disorder Mother     Depression Mother     Bipolar disorder Father     Seizures Maternal Aunt     Diabetes Paternal Aunt     Diabetes Paternal Uncle     Heart attack Other     Kidney disease Other     Colon cancer Neg Hx     Cirrhosis Neg Hx     Liver disease Neg Hx     Liver cancer Neg Hx     Crohn's disease Neg Hx     Ulcerative colitis Neg Hx     Esophageal cancer Neg Hx     Stomach cancer Neg Hx     ADD / ADHD Neg Hx     Alcohol abuse Neg Hx     Dementia Neg Hx     Drug abuse Neg Hx     OCD Neg Hx     Paranoid behavior Neg Hx     Schizophrenia Neg Hx     Self-Injurious Behavior  Neg Hx     Suicide Attempts Neg Hx     Breast cancer Neg Hx        Past Surgical History:  Past Surgical History:   Procedure Laterality Date    APPENDECTOMY  2012    APPENDECTOMY      BREAST BIOPSY Bilateral     BREAST CYST ASPIRATION      BREAST SURGERY Bilateral     history of mammatone left and right breast    COLONOSCOPY      CYST REMOVAL      SEBACEOUS CYST X3 FROM HEAD    ENDOSCOPY N/A 2/7/2019    Procedure: ESOPHAGOGASTRODUODENOSCOPY W/ BIOPSIES;  Surgeon: Simba Rogers MD;  Location: [facility] ENDOSCOPY;  Service: Gastroenterology    LUIS MIGUEL  "TOOTH EXTRACTION      EXTRACTION X2       Problem List:  Patient Active Problem List   Diagnosis    Episode of recurrent major depressive disorder    Eczema    Vitamin B12 deficiency    Vitamin D deficiency    Hereditary essential tremor    Onychomycosis    Recurrent cold sores    GERD with esophagitis    High risk medication use    Undifferentiated connective tissue disease       Allergy:   Allergies   Allergen Reactions    Hazelnut Anaphylaxis    Macrobid [Nitrofurantoin Monohyd Macro] Other (See Comments)     REPORTS \"I FELL AND IT WAS LIKE I COULDN'T MOVE\"           Current Medications:   Current Outpatient Medications   Medication Sig Dispense Refill    folic acid (FOLVITE) 1 MG tablet Take 1 tablet by mouth Daily. Indications: folic acid deficiency 90 tablet 3    gabapentin (NEURONTIN) 300 MG capsule Take 1 capsule by mouth 3 (Three) Times a Day. 90 capsule 2    levothyroxine (SYNTHROID, LEVOTHROID) 25 MCG tablet TAKE 1 TABLET BY MOUTH EVERY DAY ON EMPTY STOMACH AT LEAST 30 MINUTES BEFORE EATING AND OTHER MEDS 90 tablet 1    methotrexate 2.5 MG tablet Take 8 mg by mouth 1 (One) Time Per Week.      mirtazapine (REMERON) 7.5 MG tablet Take 1 tablet by mouth Every Night. 90 tablet 0    primidone (MYSOLINE) 250 MG tablet       propranolol (INDERAL) 20 MG tablet Take 1 tablet by mouth 3 (Three) Times a Day.      sertraline (ZOLOFT) 100 MG tablet Take 2 tablets by mouth Daily. 180 tablet 0     No current facility-administered medications for this visit.       Review of Symptoms:    Review of Systems   Constitutional:  Negative for activity change (improved), appetite change (improved), chills, diaphoresis, fatigue, fever and unexpected weight loss.   HENT:  Negative for congestion and rhinorrhea.    Eyes: Negative.    Respiratory: Negative.     Cardiovascular: Negative.    Gastrointestinal:  Negative for abdominal pain, nausea and vomiting.   Endocrine: Negative.    Genitourinary: Negative.    Musculoskeletal: " "Negative.    Skin: Negative.    Allergic/Immunologic: Negative.    Neurological:  Positive for tremors.   Hematological: Negative.    Psychiatric/Behavioral:  Negative for agitation, decreased concentration, dysphoric mood, sleep disturbance (improved), suicidal ideas, depressed mood and stress. The patient is not nervous/anxious.          Physical Exam:   Blood pressure 104/70, pulse 64, height 170.2 cm (67\"), weight 61.8 kg (136 lb 3.2 oz), SpO2 96%, not currently breastfeeding.      Appearance: CF of stated age, NAD   Gait, Station, Strength: Telehealth Visit     Mental Status Exam:     Mental Status exam performed 12/08/2023  and patient shows no significant changes from previous exam.     Hygiene:   good  Cooperation:  Cooperative  Eye Contact:  Good  Psychomotor Behavior:  Appropriate  Affect:  Full range  Mood: depressed secondary to tremor  Hopelessness: Denies  Speech:  Normal  Thought Process:  Goal directed and Linear  Thought Content:  Normal  Suicidal:  None  Homicidal:  None  Hallucinations:  None  Delusion:  None  Memory:  Intact  Orientation:  Person, Place, Time and Situation  Reliability:  good  Insight:  Fair  Judgement:  Good  Impulse Control:  Good  Physical/Medical Issues:  No        Lab Results:   No visits with results within 1 Month(s) from this visit.   Latest known visit with results is:   Office Visit on 08/18/2023   Component Date Value Ref Range Status    Primidone Level 08/18/2023 10.0  5.0 - 12.0 ug/mL Final    Comment:                                 Detection Limit = 2.5                           <2.5 indicates None Detected      Phenobarbital 08/18/2023 32  15 - 40 ug/mL Final                                    Detection Limit = 3    Total Cholesterol 08/18/2023 171  0 - 200 mg/dL Final    Comment: Cholesterol Reference Ranges  (U.S. Department of Health and Human Services ATP III  Classifications)  Desirable          <200 mg/dL  Borderline High    200-239 mg/dL  High Risk          " >240 mg/dL  Triglyceride Reference Ranges  (U.S. Department of Health and Human Services ATP III  Classifications)  Normal           <150 mg/dL  Borderline High  150-199 mg/dL  High             200-499 mg/dL  Very High        >500 mg/dL  HDL Reference Ranges  (U.S. Department of Health and Human Services ATP III  Classifications)  Low     <40 mg/dl (major risk factor for CHD)  High    >60 mg/dl ('negative' risk factor for CHD)  LDL Reference Ranges  (U.S. Department of Health and Human Services ATP III  Classifications)  Optimal          <100 mg/dL  Near Optimal     100-129 mg/dL  Borderline High  130-159 mg/dL  High             160-189 mg/dL  Very High        >189 mg/dL      Triglycerides 08/18/2023 185 (H)  0 - 150 mg/dL Final    HDL Cholesterol 08/18/2023 50  40 - 60 mg/dL Final    VLDL Cholesterol Lm 08/18/2023 32  5 - 40 mg/dL Final    LDL Chol Calc (NIH) 08/18/2023 89  0 - 100 mg/dL Final    TSH 08/18/2023 1.880  0.270 - 4.200 uIU/mL Final    Free T4 08/18/2023 0.89 (L)  0.93 - 1.70 ng/dL Final    Results may be falsely increased if patient taking Biotin.    25 Hydroxy, Vitamin D 08/18/2023 79.4  30.0 - 100.0 ng/ml Final    Comment: Reference Range for Total Vitamin D 25(OH)  Deficiency <20.0 ng/mL  Insufficiency 21-29 ng/mL  Sufficiency  ng/mL  Toxicity >100 ng/ml      WBC 08/18/2023 4.31  3.40 - 10.80 10*3/mm3 Final    RBC 08/18/2023 3.87  3.77 - 5.28 10*6/mm3 Final    Hemoglobin 08/18/2023 13.3  12.0 - 15.9 g/dL Final    Hematocrit 08/18/2023 38.1  34.0 - 46.6 % Final    MCV 08/18/2023 98.4 (H)  79.0 - 97.0 fL Final    MCH 08/18/2023 34.4 (H)  26.6 - 33.0 pg Final    MCHC 08/18/2023 34.9  31.5 - 35.7 g/dL Final    RDW 08/18/2023 13.3  12.3 - 15.4 % Final    Platelets 08/18/2023 214  140 - 450 10*3/mm3 Final    Glucose 08/18/2023 76  65 - 99 mg/dL Final    BUN 08/18/2023 12  8 - 23 mg/dL Final    Creatinine 08/18/2023 0.76  0.57 - 1.00 mg/dL Final    EGFR Result 08/18/2023 84.4  >60.0 mL/min/1.73  Final    Comment: GFR Normal >60  Chronic Kidney Disease <60  Kidney Failure <15      BUN/Creatinine Ratio 08/18/2023 15.8  7.0 - 25.0 Final    Sodium 08/18/2023 141  136 - 145 mmol/L Final    Potassium 08/18/2023 5.1  3.5 - 5.2 mmol/L Final    Chloride 08/18/2023 103  98 - 107 mmol/L Final    Total CO2 08/18/2023 32.6 (H)  22.0 - 29.0 mmol/L Final    Calcium 08/18/2023 9.9  8.6 - 10.5 mg/dL Final    Total Protein 08/18/2023 6.6  6.0 - 8.5 g/dL Final    Albumin 08/18/2023 4.8  3.5 - 5.2 g/dL Final    Globulin 08/18/2023 1.8  gm/dL Final    A/G Ratio 08/18/2023 2.7  g/dL Final    Total Bilirubin 08/18/2023 <0.2  0.0 - 1.2 mg/dL Final    Alkaline Phosphatase 08/18/2023 97  39 - 117 U/L Final    AST (SGOT) 08/18/2023 16  1 - 32 U/L Final    ALT (SGPT) 08/18/2023 16  1 - 33 U/L Final    Folate 08/18/2023 >20.00  4.78 - 24.20 ng/mL Final    Results may be falsely increased if patient taking Biotin.    Vitamin B6 08/18/2023 4.5  3.4 - 65.2 ug/L Final    Comment:                              Deficiency:         <3.4                               Marginal:      3.4 - 5.1                               Adequate:           >5.1      Vitamin B-12 08/18/2023 400  211 - 946 pg/mL Final    Results may be falsely increased if patient taking Biotin.       Assessment & Plan   Diagnoses and all orders for this visit:    1. Generalized anxiety disorder  -     gabapentin (NEURONTIN) 300 MG capsule; Take 1 capsule by mouth 3 (Three) Times a Day.  Dispense: 90 capsule; Refill: 2  -     mirtazapine (REMERON) 7.5 MG tablet; Take 1 tablet by mouth Every Night.  Dispense: 90 tablet; Refill: 0  -     sertraline (ZOLOFT) 100 MG tablet; Take 2 tablets by mouth Daily.  Dispense: 180 tablet; Refill: 0    2. Essential tremor  -     gabapentin (NEURONTIN) 300 MG capsule; Take 1 capsule by mouth 3 (Three) Times a Day.  Dispense: 90 capsule; Refill: 2    3. Phase of life problem in adult  -     mirtazapine (REMERON) 7.5 MG tablet; Take 1 tablet by  mouth Every Night.  Dispense: 90 tablet; Refill: 0  -     sertraline (ZOLOFT) 100 MG tablet; Take 2 tablets by mouth Daily.  Dispense: 180 tablet; Refill: 0    4. Other insomnia  -     mirtazapine (REMERON) 7.5 MG tablet; Take 1 tablet by mouth Every Night.  Dispense: 90 tablet; Refill: 0    5. Mild episode of recurrent major depressive disorder  -     mirtazapine (REMERON) 7.5 MG tablet; Take 1 tablet by mouth Every Night.  Dispense: 90 tablet; Refill: 0  -     sertraline (ZOLOFT) 100 MG tablet; Take 2 tablets by mouth Daily.  Dispense: 180 tablet; Refill: 0      -Patient continues to have some worsening depression due to her tremor worsening.  -Reviewed previous documentation  -Reviewed most recent available labs  -ODALIS reviewed and appropriate. Patient counseled on use of controlled substances.   -Continue Zoloft 200 mg p.o. daily for mood and anxiety  -Continue mirtazapine 7.5 mg p.o. nightly for mood, anxiety, insomnia, and appetite   -Continue gabapentin 300 mg 3 times daily for anxiety and essential tremor  -Encouraged patient to continue with therapy for phase of life issues and depression  -Encouraged to follow-up with neurology for tremor        Visit Diagnoses:    ICD-10-CM ICD-9-CM   1. Generalized anxiety disorder  F41.1 300.02   2. Essential tremor  G25.0 333.1   3. Phase of life problem in adult  Z60.0 V62.89   4. Other insomnia  G47.09 780.52   5. Mild episode of recurrent major depressive disorder  F33.0 296.31         TREATMENT PLAN/GOALS: Continue supportive psychotherapy efforts and medications as indicated. Treatment and medication options discussed during today's visit. Patient acknowledged and verbally consented to continue with current treatment plan and was educated on the importance of compliance with treatment and follow-up appointments.    MEDICATION ISSUES:    Discussed medication options and treatment plan of prescribed medication as well as the risks, benefits, and side effects  including potential falls, possible impaired driving and metabolic adversities among others. Patient is agreeable to call the office with any worsening of symptoms or onset of side effects. Patient is agreeable to call 911 or go to the nearest ER should he/she begin having SI/HI.     MEDS ORDERED DURING VISIT:  New Medications Ordered This Visit   Medications    gabapentin (NEURONTIN) 300 MG capsule     Sig: Take 1 capsule by mouth 3 (Three) Times a Day.     Dispense:  90 capsule     Refill:  2    mirtazapine (REMERON) 7.5 MG tablet     Sig: Take 1 tablet by mouth Every Night.     Dispense:  90 tablet     Refill:  0     Don't fill until due or patient calls per her request.    sertraline (ZOLOFT) 100 MG tablet     Sig: Take 2 tablets by mouth Daily.     Dispense:  180 tablet     Refill:  0     Do not fill until patient calls per her request.       Return in about 6 months (around 6/8/2024).             This document has been electronically signed by Des Elizondo MD  December 8, 2023 10:30 EST

## 2023-12-12 ENCOUNTER — HOSPITAL ENCOUNTER (OUTPATIENT)
Dept: MAMMOGRAPHY | Facility: HOSPITAL | Age: 68
Discharge: HOME | End: 2023-12-12
Admitting: FAMILY MEDICINE
Payer: MEDICARE

## 2023-12-12 DIAGNOSIS — Z12.31 ENCOUNTER FOR SCREENING MAMMOGRAM FOR BREAST CANCER: ICD-10-CM

## 2023-12-12 PROCEDURE — 77063 BREAST TOMOSYNTHESIS BI: CPT

## 2023-12-12 PROCEDURE — 77067 SCR MAMMO BI INCL CAD: CPT

## 2024-02-29 ENCOUNTER — READMISSION MANAGEMENT (OUTPATIENT)
Dept: CALL CENTER | Facility: HOSPITAL | Age: 69
End: 2024-02-29
Payer: MEDICARE

## 2024-03-01 ENCOUNTER — TRANSITIONAL CARE MANAGEMENT TELEPHONE ENCOUNTER (OUTPATIENT)
Dept: CALL CENTER | Facility: HOSPITAL | Age: 69
End: 2024-03-01
Payer: MEDICARE

## 2024-03-01 NOTE — OUTREACH NOTE
Prep Survey      Flowsheet Row Responses   [facility] facility patient discharged from? Non-BH   Is LACE score < 7 ? Non-BH Discharge   Eligibility [facility]   Date of Admission 02/28/24   Date of Discharge 02/29/24   Discharge Disposition Home or Self Care   Discharge diagnosis Essential tremor -Deep brain stimulator insertion   Does the patient have one of the following disease processes/diagnoses(primary or secondary)? General Surgery   Does the patient have Home health ordered? No   Is there a DME ordered? No   Prep survey completed? Yes            GORDON PAEZ - Registered Nurse

## 2024-03-01 NOTE — OUTREACH NOTE
Call Center TCM Note      Flowsheet Row Responses   [facility] patient discharged from? Non-  [[facility]]   Does the patient have one of the following disease processes/diagnoses(primary or secondary)? General Surgery   TCM attempt successful? Yes   Call start time 1514   Call end time 1518   Discharge diagnosis Essential tremor -Deep brain stimulator insertion   Person spoke with today (if not patient) and relationship Patient   Meds reviewed with patient/caregiver? Yes  [Patient reports discharge meds stayed the same.]   Does the patient have all medications ordered at discharge? N/A  [No new meds ordered at discharge.]   Is the patient taking all medications as directed (includes completed medication regime)? Yes   Medication comments Patient states that they will wean her off of the tremor medication   Comments PCP Dr Valle. Patient declined need to follow up with primary care at this time. She will be following up with specialty.   Does the patient have an appointment with their PCP within 7-14 days of discharge? No   Nursing Interventions Patient declined scheduling/rescheduling appointment at this time, Patient desires to follow up with specialty only, Routed TCM call to PCP office   Has home health visited the patient within 72 hours of discharge? N/A   Psychosocial issues? No   Did the patient receive a copy of their discharge instructions? Yes   What is the patient's perception of their health status since discharge? Improving  [Patient reports that she is having good results from stimulator,  much improved tremors]   Is the patient/caregiver able to teach back signs and symptoms related to disease process for when to call PCP? Yes   Is the patient/caregiver able to teach back signs and symptoms related to disease process for when to call 911? Yes   Is the patient/caregiver able to teach back the hierarchy of who to call/visit for symptoms/problems? PCP, Specialist, Home health nurse, Urgent  [facility], ED, 911 Yes   If the patient is a current smoker, are they able to teach back resources for cessation? Not a smoker   TCM call completed? Yes   Wrap up additional comments Patient states that she will follow up with neurosurgeon in two weeks.   Call end time 1518   Would this patient benefit from a Referral to [facility] Social Work? No   Is the patient interested in additional calls from an ambulatory ? No            Christiane Ureña RN    3/1/2024, 15:21 EST

## 2024-03-02 ENCOUNTER — READMISSION MANAGEMENT (OUTPATIENT)
Dept: CALL CENTER | Facility: HOSPITAL | Age: 69
End: 2024-03-02
Payer: MEDICARE

## 2024-03-02 NOTE — OUTREACH NOTE
Prep Survey      Flowsheet Row Responses   [facility] facility patient discharged from? Non-BH   Is LACE score < 7 ? Non- Discharge   Eligibility [facility]   Date of Admission 02/28/24   Date of Discharge 02/29/24   Discharge Disposition Home or Self Care   Discharge diagnosis Essential Tremor  [INSERTION, ELECTRODE LEAD AND PULSE GENERATOR, DEEP BRAIN STIMULATOR INSERTION, ELECTRODE LEAD, DEEP BRAIN STIMULATOR, STAGE 1]   Does the patient have one of the following disease processes/diagnoses(primary or secondary)? General Surgery   Does the patient have Home health ordered? No   Is there a DME ordered? No   Comments regarding appointments f/u with Neurology, ELIAZAR Loomis on 3/14/24 at 10:30am. f/u with Neurosurgery, Dr. Wagner Carlton on 3/14/24 at 1pm. Ambulatory referral to [facility] health for PT and OT.   Prep survey completed? Yes            Nichelle BRISCOE - Registered Nurse

## 2024-03-04 ENCOUNTER — TRANSITIONAL CARE MANAGEMENT TELEPHONE ENCOUNTER (OUTPATIENT)
Dept: CALL CENTER | Facility: HOSPITAL | Age: 69
End: 2024-03-04
Payer: MEDICARE

## 2024-03-04 NOTE — OUTREACH NOTE
Call Center TCM Note      Flowsheet Row Responses   [facility] patient discharged from? Non-  []   Does the patient have one of the following disease processes/diagnoses(primary or secondary)? General Surgery   TCM attempt successful? Yes   Call start time 1355   Call end time 1355   Discharge diagnosis Essential Tremor- Brain Stimulator Insertion   TCM call completed? Yes   Wrap up additional comments Patient has a completed TCM call on 3/1 by nurse call center.  This fulfills TCM requirment.  Patient declined hospital f/u appt with PCP to be made during that call.   Call end time 1355   Would this patient benefit from a Referral to [facility] Social Work? No   Is the patient interested in additional calls from an ambulatory ? No            Margo Treadwell RN    3/4/2024, 13:56 EST

## 2024-03-13 DIAGNOSIS — Z60.0 PHASE OF LIFE PROBLEM IN ADULT: ICD-10-CM

## 2024-03-13 DIAGNOSIS — F33.0 MILD EPISODE OF RECURRENT MAJOR DEPRESSIVE DISORDER: ICD-10-CM

## 2024-03-13 DIAGNOSIS — G47.09 OTHER INSOMNIA: ICD-10-CM

## 2024-03-13 DIAGNOSIS — F41.1 GENERALIZED ANXIETY DISORDER: ICD-10-CM

## 2024-03-13 RX ORDER — MIRTAZAPINE 7.5 MG/1
7.5 TABLET, FILM COATED ORAL NIGHTLY
Qty: 90 TABLET | Refills: 0 | Status: SHIPPED | OUTPATIENT
Start: 2024-03-13

## 2024-03-13 RX ORDER — SERTRALINE HYDROCHLORIDE 100 MG/1
200 TABLET, FILM COATED ORAL DAILY
Qty: 180 TABLET | Refills: 0 | Status: SHIPPED | OUTPATIENT
Start: 2024-03-13

## 2024-03-13 SDOH — SOCIAL STABILITY - SOCIAL INSECURITY: PROBLEMS OF ADJUSTMENT TO LIFE-CYCLE TRANSITIONS: Z60.0

## 2024-03-14 RX ORDER — SERTRALINE HYDROCHLORIDE 100 MG/1
200 TABLET, FILM COATED ORAL DAILY
Qty: 180 TABLET | Refills: 0 | OUTPATIENT
Start: 2024-03-14

## 2024-04-29 DIAGNOSIS — F41.1 GENERALIZED ANXIETY DISORDER: ICD-10-CM

## 2024-04-29 DIAGNOSIS — G25.0 ESSENTIAL TREMOR: ICD-10-CM

## 2024-04-29 RX ORDER — GABAPENTIN 300 MG/1
300 CAPSULE ORAL 3 TIMES DAILY
Qty: 90 CAPSULE | OUTPATIENT
Start: 2024-04-29

## 2024-04-29 RX ORDER — GABAPENTIN 300 MG/1
300 CAPSULE ORAL 3 TIMES DAILY
Qty: 90 CAPSULE | Refills: 2 | Status: SHIPPED | OUTPATIENT
Start: 2024-04-29

## 2024-06-07 ENCOUNTER — TELEMEDICINE (OUTPATIENT)
Dept: PSYCHIATRY | Facility: CLINIC | Age: 69
End: 2024-06-07
Payer: MEDICARE

## 2024-06-07 DIAGNOSIS — F33.0 MILD EPISODE OF RECURRENT MAJOR DEPRESSIVE DISORDER: ICD-10-CM

## 2024-06-07 DIAGNOSIS — G25.0 ESSENTIAL TREMOR: ICD-10-CM

## 2024-06-07 DIAGNOSIS — F41.1 GENERALIZED ANXIETY DISORDER: ICD-10-CM

## 2024-06-07 DIAGNOSIS — Z60.0 PHASE OF LIFE PROBLEM IN ADULT: ICD-10-CM

## 2024-06-07 DIAGNOSIS — G47.09 OTHER INSOMNIA: ICD-10-CM

## 2024-06-07 PROCEDURE — 1159F MED LIST DOCD IN RCRD: CPT | Performed by: PSYCHIATRY & NEUROLOGY

## 2024-06-07 PROCEDURE — 99214 OFFICE O/P EST MOD 30 MIN: CPT | Performed by: PSYCHIATRY & NEUROLOGY

## 2024-06-07 PROCEDURE — 1160F RVW MEDS BY RX/DR IN RCRD: CPT | Performed by: PSYCHIATRY & NEUROLOGY

## 2024-06-07 RX ORDER — GABAPENTIN 300 MG/1
300 CAPSULE ORAL 3 TIMES DAILY
Qty: 90 CAPSULE | Refills: 2 | Status: SHIPPED | OUTPATIENT
Start: 2024-06-07

## 2024-06-07 RX ORDER — MIRTAZAPINE 7.5 MG/1
7.5 TABLET, FILM COATED ORAL NIGHTLY
Qty: 90 TABLET | Refills: 0 | Status: SHIPPED | OUTPATIENT
Start: 2024-06-07

## 2024-06-07 RX ORDER — SERTRALINE HYDROCHLORIDE 100 MG/1
200 TABLET, FILM COATED ORAL DAILY
Qty: 180 TABLET | Refills: 0 | Status: SHIPPED | OUTPATIENT
Start: 2024-06-07

## 2024-06-07 SDOH — SOCIAL STABILITY - SOCIAL INSECURITY: PROBLEMS OF ADJUSTMENT TO LIFE-CYCLE TRANSITIONS: Z60.0

## 2024-06-07 NOTE — PROGRESS NOTES
"Arlette Sanderson is a 69 y.o. female who presents today for follow up     This provider is located at [facility], [address] [place] [address]. The Patient is seen remotely at [facility], using Epic Mychart. Patient is being seen via telehealth and stated they are in a secure environment for this session. The patient’s condition being diagnosed/treated is appropriate for telemedicine. The provider identified himself as well as his credentials.   The patient gave consent to be seen remotely, and when consent is given they understand that the consent allows for patient identifiable information to be sent to a third party as needed.   They may refuse to be seen remotely at any time. The electronic data is encrypted and password protected, and the patient has been advised of the potential risks to privacy not withstanding such measures      Chief Complaint: Depression    History of Present Illness: Patient presented today for follow-up.  Since last visit, she reports that she has been, \"doing alright.\"  She states that she had surgery and went back on May 2 and has been doing fine.  She is not having any major mood or anxiety symptoms and denies any current medication side effects.  She got a stimulator placed for her tremor which helped at first but after readjustment seems to have made tremor worse.  Since patient has been stable, we discussed the possibility of her PCP describing her psychotropic medications as patient would like to consolidate care and simplify the amount of visits that she has to have.  Since patient is stable and does not have any major symptoms at current and is on safe and controlled medication.  I advised that this would be appropriate if her PCP is amenable.  She denies SI/HI/AVH.    The following portions of the patient's history were reviewed and updated as appropriate: allergies, current medications, past family history, past medical history, past social " history, past surgical history and problem list.      Past Medical History:  Past Medical History:   Diagnosis Date    Benign familial tremor     Blood in urine     Body piercing     EARS    Depression     Eczema     Elevated LDL cholesterol level     Fever blister     History of fracture     ARM AT AGE 4 - PATIENT REPORTS SHE DOES NOT REMEMBER LATERALITY    History of migraine     History of tremor     Onychomycosis     Problems with swallowing     REPORTS SHE FEELS LIKE PILLS ARE GETTING STUCK IN A POCKET IN HER THROAT WHEN SHE TRIES TO SWALLOW THEM    Sebaceous cyst     Vitamin D deficiency     Wears glasses        Social History:  Social History     Socioeconomic History    Marital status: Single   Tobacco Use    Smoking status: Never     Passive exposure: Never    Smokeless tobacco: Never   Vaping Use    Vaping status: Never Used   Substance and Sexual Activity    Alcohol use: Never    Drug use: Never    Sexual activity: Defer       Family History:  Family History   Problem Relation Age of Onset    Heart attack Mother     Hypertension Mother     Anxiety disorder Mother     Depression Mother     Bipolar disorder Father     Seizures Maternal Aunt     Diabetes Paternal Aunt     Diabetes Paternal Uncle     Heart attack Other     Kidney disease Other     Colon cancer Neg Hx     Cirrhosis Neg Hx     Liver disease Neg Hx     Liver cancer Neg Hx     Crohn's disease Neg Hx     Ulcerative colitis Neg Hx     Esophageal cancer Neg Hx     Stomach cancer Neg Hx     ADD / ADHD Neg Hx     Alcohol abuse Neg Hx     Dementia Neg Hx     Drug abuse Neg Hx     OCD Neg Hx     Paranoid behavior Neg Hx     Schizophrenia Neg Hx     Self-Injurious Behavior  Neg Hx     Suicide Attempts Neg Hx     Breast cancer Neg Hx        Past Surgical History:  Past Surgical History:   Procedure Laterality Date    APPENDECTOMY  2012    APPENDECTOMY      BREAST BIOPSY Bilateral     BREAST CYST ASPIRATION      BREAST SURGERY Bilateral     history of  "mammatone left and right breast    COLONOSCOPY      CYST REMOVAL      SEBACEOUS CYST X3 FROM HEAD    ENDOSCOPY N/A 02/07/2019    Procedure: ESOPHAGOGASTRODUODENOSCOPY W/ BIOPSIES;  Surgeon: Simba Rogers MD;  Location: [facility] ENDOSCOPY;  Service: Gastroenterology    WISDOM TOOTH EXTRACTION      EXTRACTION X2       Problem List:  Patient Active Problem List   Diagnosis    Episode of recurrent major depressive disorder    Eczema    Vitamin B12 deficiency    Vitamin D deficiency    Hereditary essential tremor    Onychomycosis    Recurrent cold sores    GERD with esophagitis    High risk medication use    Undifferentiated connective tissue disease       Allergy:   Allergies   Allergen Reactions    Hazelnut Anaphylaxis    Macrobid [Nitrofurantoin Monohyd Macro] Other (See Comments)     REPORTS \"I FELL AND IT WAS LIKE I COULDN'T MOVE\"           Current Medications:   Current Outpatient Medications   Medication Sig Dispense Refill    folic acid (FOLVITE) 1 MG tablet Take 1 tablet by mouth Daily. Indications: folic acid deficiency 90 tablet 3    gabapentin (NEURONTIN) 300 MG capsule TAKE 1 CAPSULE BY MOUTH THREE TIMES DAILY 90 capsule 2    levothyroxine (SYNTHROID, LEVOTHROID) 25 MCG tablet TAKE 1 TABLET BY MOUTH EVERY DAY ON EMPTY STOMACH AT LEAST 30 MINUTES BEFORE EATING AND OTHER MEDS 90 tablet 1    methotrexate 2.5 MG tablet Take 8 mg by mouth 1 (One) Time Per Week.      mirtazapine (REMERON) 7.5 MG tablet TAKE 1 TABLET BY MOUTH EVERY NIGHT 90 tablet 0    primidone (MYSOLINE) 250 MG tablet       propranolol (INDERAL) 20 MG tablet Take 1 tablet by mouth 3 (Three) Times a Day.      sertraline (ZOLOFT) 100 MG tablet TAKE 2 TABLETS BY MOUTH DAILY 180 tablet 0     No current facility-administered medications for this visit.       Review of Symptoms:    Review of Systems   Constitutional:  Negative for activity change (improved), appetite change (improved), chills, diaphoresis, fatigue, fever and unexpected weight loss. "   HENT:  Negative for congestion and rhinorrhea.    Eyes: Negative.    Respiratory: Negative.     Cardiovascular: Negative.    Gastrointestinal:  Negative for abdominal pain, nausea and vomiting.   Endocrine: Negative.    Genitourinary: Negative.    Musculoskeletal: Negative.    Skin: Negative.    Allergic/Immunologic: Negative.    Neurological:  Positive for tremors.   Hematological: Negative.    Psychiatric/Behavioral:  Negative for agitation, decreased concentration, dysphoric mood, sleep disturbance (improved), suicidal ideas, depressed mood and stress. The patient is not nervous/anxious.          Physical Exam:   not currently breastfeeding.      Appearance: CF of stated age, NAD   Gait, Station, Strength: Telehealth Visit     Mental Status Exam:       Hygiene:   good  Cooperation:  Cooperative  Eye Contact:  Good  Psychomotor Behavior:  Appropriate  Affect:  Full range  Mood: normal, stable   Hopelessness: Denies  Speech:  Normal  Thought Process:  Goal directed and Linear  Thought Content:  Normal  Suicidal:  None  Homicidal:  None  Hallucinations:  None  Delusion:  None  Memory:  Intact  Orientation:  Person, Place, Time and Situation  Reliability:  good  Insight:  Fair  Judgement:  Good  Impulse Control:  Good  Physical/Medical Issues:  No        Lab Results:   No visits with results within 1 Month(s) from this visit.   Latest known visit with results is:   Office Visit on 08/18/2023   Component Date Value Ref Range Status    Primidone Level 08/18/2023 10.0  5.0 - 12.0 ug/mL Final    Comment:                                 Detection Limit = 2.5                           <2.5 indicates None Detected      Phenobarbital 08/18/2023 32  15 - 40 ug/mL Final                                    Detection Limit = 3    Total Cholesterol 08/18/2023 171  0 - 200 mg/dL Final    Comment: Cholesterol Reference Ranges  (U.S. Department of Health and Human Services ATP III  Classifications)  Desirable          <200  mg/dL  Borderline High    200-239 mg/dL  High Risk          >240 mg/dL  Triglyceride Reference Ranges  (U.S. Department of Health and Human Services ATP III  Classifications)  Normal           <150 mg/dL  Borderline High  150-199 mg/dL  High             200-499 mg/dL  Very High        >500 mg/dL  HDL Reference Ranges  (U.S. Department of Health and Human Services ATP III  Classifications)  Low     <40 mg/dl (major risk factor for CHD)  High    >60 mg/dl ('negative' risk factor for CHD)  LDL Reference Ranges  (U.S. Department of Health and Human Services ATP III  Classifications)  Optimal          <100 mg/dL  Near Optimal     100-129 mg/dL  Borderline High  130-159 mg/dL  High             160-189 mg/dL  Very High        >189 mg/dL      Triglycerides 08/18/2023 185 (H)  0 - 150 mg/dL Final    HDL Cholesterol 08/18/2023 50  40 - 60 mg/dL Final    VLDL Cholesterol Lm 08/18/2023 32  5 - 40 mg/dL Final    LDL Chol Calc (NIH) 08/18/2023 89  0 - 100 mg/dL Final    TSH 08/18/2023 1.880  0.270 - 4.200 uIU/mL Final    Free T4 08/18/2023 0.89 (L)  0.93 - 1.70 ng/dL Final    Results may be falsely increased if patient taking Biotin.    25 Hydroxy, Vitamin D 08/18/2023 79.4  30.0 - 100.0 ng/ml Final    Comment: Reference Range for Total Vitamin D 25(OH)  Deficiency <20.0 ng/mL  Insufficiency 21-29 ng/mL  Sufficiency  ng/mL  Toxicity >100 ng/ml      WBC 08/18/2023 4.31  3.40 - 10.80 10*3/mm3 Final    RBC 08/18/2023 3.87  3.77 - 5.28 10*6/mm3 Final    Hemoglobin 08/18/2023 13.3  12.0 - 15.9 g/dL Final    Hematocrit 08/18/2023 38.1  34.0 - 46.6 % Final    MCV 08/18/2023 98.4 (H)  79.0 - 97.0 fL Final    MCH 08/18/2023 34.4 (H)  26.6 - 33.0 pg Final    MCHC 08/18/2023 34.9  31.5 - 35.7 g/dL Final    RDW 08/18/2023 13.3  12.3 - 15.4 % Final    Platelets 08/18/2023 214  140 - 450 10*3/mm3 Final    Glucose 08/18/2023 76  65 - 99 mg/dL Final    BUN 08/18/2023 12  8 - 23 mg/dL Final    Creatinine 08/18/2023 0.76  0.57 - 1.00 mg/dL  Final    EGFR Result 08/18/2023 84.4  >60.0 mL/min/1.73 Final    Comment: GFR Normal >60  Chronic Kidney Disease <60  Kidney Failure <15      BUN/Creatinine Ratio 08/18/2023 15.8  7.0 - 25.0 Final    Sodium 08/18/2023 141  136 - 145 mmol/L Final    Potassium 08/18/2023 5.1  3.5 - 5.2 mmol/L Final    Chloride 08/18/2023 103  98 - 107 mmol/L Final    Total CO2 08/18/2023 32.6 (H)  22.0 - 29.0 mmol/L Final    Calcium 08/18/2023 9.9  8.6 - 10.5 mg/dL Final    Total Protein 08/18/2023 6.6  6.0 - 8.5 g/dL Final    Albumin 08/18/2023 4.8  3.5 - 5.2 g/dL Final    Globulin 08/18/2023 1.8  gm/dL Final    A/G Ratio 08/18/2023 2.7  g/dL Final    Total Bilirubin 08/18/2023 <0.2  0.0 - 1.2 mg/dL Final    Alkaline Phosphatase 08/18/2023 97  39 - 117 U/L Final    AST (SGOT) 08/18/2023 16  1 - 32 U/L Final    ALT (SGPT) 08/18/2023 16  1 - 33 U/L Final    Folate 08/18/2023 >20.00  4.78 - 24.20 ng/mL Final    Results may be falsely increased if patient taking Biotin.    Vitamin B6 08/18/2023 4.5  3.4 - 65.2 ug/L Final    Comment:                              Deficiency:         <3.4                               Marginal:      3.4 - 5.1                               Adequate:           >5.1      Vitamin B-12 08/18/2023 400  211 - 946 pg/mL Final    Results may be falsely increased if patient taking Biotin.       Assessment & Plan   Diagnoses and all orders for this visit:    1. Generalized anxiety disorder  -     gabapentin (NEURONTIN) 300 MG capsule; Take 1 capsule by mouth 3 (Three) Times a Day.  Dispense: 90 capsule; Refill: 2  -     mirtazapine (REMERON) 7.5 MG tablet; Take 1 tablet by mouth Every Night.  Dispense: 90 tablet; Refill: 0  -     sertraline (ZOLOFT) 100 MG tablet; Take 2 tablets by mouth Daily.  Dispense: 180 tablet; Refill: 0    2. Essential tremor  -     gabapentin (NEURONTIN) 300 MG capsule; Take 1 capsule by mouth 3 (Three) Times a Day.  Dispense: 90 capsule; Refill: 2    3. Phase of life problem in adult  -      mirtazapine (REMERON) 7.5 MG tablet; Take 1 tablet by mouth Every Night.  Dispense: 90 tablet; Refill: 0  -     sertraline (ZOLOFT) 100 MG tablet; Take 2 tablets by mouth Daily.  Dispense: 180 tablet; Refill: 0    4. Other insomnia  -     mirtazapine (REMERON) 7.5 MG tablet; Take 1 tablet by mouth Every Night.  Dispense: 90 tablet; Refill: 0    5. Mild episode of recurrent major depressive disorder  -     mirtazapine (REMERON) 7.5 MG tablet; Take 1 tablet by mouth Every Night.  Dispense: 90 tablet; Refill: 0  -     sertraline (ZOLOFT) 100 MG tablet; Take 2 tablets by mouth Daily.  Dispense: 180 tablet; Refill: 0        -Patient currently stable and doing well without any major issue noted.  Since she has been stable, patient would like to reduce the amount of doctor's visits that she has and would like for her PCP to prescribe medication.  I advised that given her current status, that would be appropriate but if her PCP is uncomfortable or she have any issue, patient could call the clinic and return for a visit.  -Reviewed previous documentation  -Reviewed most recent available labs  -ODALIS reviewed and appropriate. Patient counseled on use of controlled substances.   -Continue Zoloft 200 mg p.o. daily for mood and anxiety  -Continue mirtazapine 7.5 mg p.o. nightly for mood, anxiety, insomnia, and appetite   -Continue gabapentin 300 mg 3 times daily for anxiety and essential tremor  -Encouraged patient to continue with therapy for phase of life issues and depression  -Encouraged to continue follow-up with neurology for tremor  -Approximate appointment time 10:50 AM to 11:05 AM via video visit        Visit Diagnoses:    ICD-10-CM ICD-9-CM   1. Generalized anxiety disorder  F41.1 300.02   2. Essential tremor  G25.0 333.1   3. Phase of life problem in adult  Z60.0 V62.89   4. Other insomnia  G47.09 780.52   5. Mild episode of recurrent major depressive disorder  F33.0 296.31           TREATMENT PLAN/GOALS: Continue  supportive psychotherapy efforts and medications as indicated. Treatment and medication options discussed during today's visit. Patient acknowledged and verbally consented to continue with current treatment plan and was educated on the importance of compliance with treatment and follow-up appointments.    MEDICATION ISSUES:    Discussed medication options and treatment plan of prescribed medication as well as the risks, benefits, and side effects including potential falls, possible impaired driving and metabolic adversities among others. Patient is agreeable to call the office with any worsening of symptoms or onset of side effects. Patient is agreeable to call 911 or go to the nearest ER should he/she begin having SI/HI.     MEDS ORDERED DURING VISIT:  New Medications Ordered This Visit   Medications    gabapentin (NEURONTIN) 300 MG capsule     Sig: Take 1 capsule by mouth 3 (Three) Times a Day.     Dispense:  90 capsule     Refill:  2    mirtazapine (REMERON) 7.5 MG tablet     Sig: Take 1 tablet by mouth Every Night.     Dispense:  90 tablet     Refill:  0    sertraline (ZOLOFT) 100 MG tablet     Sig: Take 2 tablets by mouth Daily.     Dispense:  180 tablet     Refill:  0       Return if needed.             This document has been electronically signed by Des Elizondo MD  June 7, 2024 10:56 EDT

## 2024-08-06 ENCOUNTER — TELEPHONE (OUTPATIENT)
Dept: INTERNAL MEDICINE | Facility: CLINIC | Age: 69
End: 2024-08-06
Payer: MEDICARE

## 2024-08-06 NOTE — TELEPHONE ENCOUNTER
Caller: Milagro Sanderson    Relationship: Self    Best call back number:     What orders are you requesting (i.e. lab or imaging): LABS PRIOR TO HER NEXT APPT    In what timeframe would the patient need to come in: ASAP     Where will you receive your lab/imaging services:      Additional notes: DR EVAN JOSHI DID LABS IN [place]. THE MD MIGHT WANT TO LOOK AT THEM   PLEASE CALL PATIENT WHEN THE LABS HAVE BEEN PUT IN.

## 2024-08-08 NOTE — TELEPHONE ENCOUNTER
Caller: Milagro Sanderson    Relationship: Self    Best call back number: [phone] A MESSAGE CAN BE LEFT ON THE VOICE MAIL.    What was the call regarding: PATIENT CALLED TO CHECK THE STATUS OF A REQUEST FOR LABS.  PATIENT STATED THAT SHE HAS NO WAY OF READING HER MYCHART.

## 2024-08-16 LAB
25(OH)D3+25(OH)D2 SERPL-MCNC: 31.4 NG/ML (ref 30–100)
ALBUMIN SERPL-MCNC: 4.6 G/DL (ref 3.5–5.2)
ALBUMIN/GLOB SERPL: 2.1 G/DL
ALP SERPL-CCNC: 135 U/L (ref 39–117)
ALT SERPL-CCNC: 24 U/L (ref 1–33)
AST SERPL-CCNC: 28 U/L (ref 1–32)
BILIRUB SERPL-MCNC: 0.4 MG/DL (ref 0–1.2)
BUN SERPL-MCNC: 13 MG/DL (ref 8–23)
BUN/CREAT SERPL: 15.9 (ref 7–25)
CALCIUM SERPL-MCNC: 9.6 MG/DL (ref 8.6–10.5)
CHLORIDE SERPL-SCNC: 105 MMOL/L (ref 98–107)
CHOLEST SERPL-MCNC: 172 MG/DL (ref 0–200)
CO2 SERPL-SCNC: 27.7 MMOL/L (ref 22–29)
CREAT SERPL-MCNC: 0.82 MG/DL (ref 0.57–1)
EGFRCR SERPLBLD CKD-EPI 2021: 76.6 ML/MIN/1.73
ERYTHROCYTE [DISTWIDTH] IN BLOOD BY AUTOMATED COUNT: 12.4 % (ref 12.3–15.4)
FOLATE SERPL-MCNC: 13.1 NG/ML (ref 4.78–24.2)
GLOBULIN SER CALC-MCNC: 2.2 GM/DL
GLUCOSE SERPL-MCNC: 93 MG/DL (ref 65–99)
HCT VFR BLD AUTO: 42.9 % (ref 34–46.6)
HDLC SERPL-MCNC: 45 MG/DL (ref 40–60)
HGB BLD-MCNC: 14.6 G/DL (ref 12–15.9)
LDLC SERPL CALC-MCNC: 105 MG/DL (ref 0–100)
MCH RBC QN AUTO: 31.9 PG (ref 26.6–33)
MCHC RBC AUTO-ENTMCNC: 34 G/DL (ref 31.5–35.7)
MCV RBC AUTO: 93.9 FL (ref 79–97)
PLATELET # BLD AUTO: 193 10*3/MM3 (ref 140–450)
POTASSIUM SERPL-SCNC: 4.7 MMOL/L (ref 3.5–5.2)
PROT SERPL-MCNC: 6.8 G/DL (ref 6–8.5)
PYRIDOXAL PHOS SERPL-MCNC: 8.7 UG/L (ref 3.4–65.2)
RBC # BLD AUTO: 4.57 10*6/MM3 (ref 3.77–5.28)
SODIUM SERPL-SCNC: 144 MMOL/L (ref 136–145)
T4 FREE SERPL-MCNC: 0.85 NG/DL (ref 0.92–1.68)
TRIGL SERPL-MCNC: 121 MG/DL (ref 0–150)
TSH SERPL DL<=0.005 MIU/L-ACNC: 3.44 UIU/ML (ref 0.27–4.2)
VIT B12 SERPL-MCNC: 476 PG/ML (ref 211–946)
VLDLC SERPL CALC-MCNC: 22 MG/DL (ref 5–40)
WBC # BLD AUTO: 4.82 10*3/MM3 (ref 3.4–10.8)

## 2024-08-21 ENCOUNTER — OFFICE VISIT (OUTPATIENT)
Dept: INTERNAL MEDICINE | Facility: CLINIC | Age: 69
End: 2024-08-21
Payer: MEDICARE

## 2024-08-21 VITALS
WEIGHT: 127 LBS | RESPIRATION RATE: 14 BRPM | OXYGEN SATURATION: 96 % | HEART RATE: 72 BPM | TEMPERATURE: 96.9 F | HEIGHT: 67 IN | BODY MASS INDEX: 19.93 KG/M2 | SYSTOLIC BLOOD PRESSURE: 122 MMHG | DIASTOLIC BLOOD PRESSURE: 72 MMHG

## 2024-08-21 DIAGNOSIS — Z23 NEED FOR TDAP VACCINATION: ICD-10-CM

## 2024-08-21 DIAGNOSIS — E53.8 VITAMIN B12 DEFICIENCY: ICD-10-CM

## 2024-08-21 DIAGNOSIS — E78.1 HYPERTRIGLYCERIDEMIA: ICD-10-CM

## 2024-08-21 DIAGNOSIS — R79.9 ABNORMAL BLOOD CHEMISTRY: ICD-10-CM

## 2024-08-21 DIAGNOSIS — E55.9 VITAMIN D DEFICIENCY: ICD-10-CM

## 2024-08-21 DIAGNOSIS — G25.0 ESSENTIAL TREMOR: ICD-10-CM

## 2024-08-21 DIAGNOSIS — F33.0 MILD EPISODE OF RECURRENT MAJOR DEPRESSIVE DISORDER: ICD-10-CM

## 2024-08-21 DIAGNOSIS — Z91.81 AT HIGH RISK FOR FALLS: ICD-10-CM

## 2024-08-21 DIAGNOSIS — G47.09 OTHER INSOMNIA: ICD-10-CM

## 2024-08-21 DIAGNOSIS — Z00.00 MEDICARE ANNUAL WELLNESS VISIT, SUBSEQUENT: Primary | ICD-10-CM

## 2024-08-21 DIAGNOSIS — Z12.31 ENCOUNTER FOR SCREENING MAMMOGRAM FOR BREAST CANCER: ICD-10-CM

## 2024-08-21 DIAGNOSIS — E53.1 VITAMIN B6 DEFICIENCY: ICD-10-CM

## 2024-08-21 DIAGNOSIS — Z51.81 MEDICATION MONITORING ENCOUNTER: ICD-10-CM

## 2024-08-21 DIAGNOSIS — F41.1 GENERALIZED ANXIETY DISORDER: ICD-10-CM

## 2024-08-21 PROCEDURE — 1170F FXNL STATUS ASSESSED: CPT | Performed by: FAMILY MEDICINE

## 2024-08-21 PROCEDURE — 99214 OFFICE O/P EST MOD 30 MIN: CPT | Performed by: FAMILY MEDICINE

## 2024-08-21 PROCEDURE — 1160F RVW MEDS BY RX/DR IN RCRD: CPT | Performed by: FAMILY MEDICINE

## 2024-08-21 PROCEDURE — G0439 PPPS, SUBSEQ VISIT: HCPCS | Performed by: FAMILY MEDICINE

## 2024-08-21 PROCEDURE — 99397 PER PM REEVAL EST PAT 65+ YR: CPT | Performed by: FAMILY MEDICINE

## 2024-08-21 PROCEDURE — 1159F MED LIST DOCD IN RCRD: CPT | Performed by: FAMILY MEDICINE

## 2024-08-21 PROCEDURE — 1126F AMNT PAIN NOTED NONE PRSNT: CPT | Performed by: FAMILY MEDICINE

## 2024-08-21 RX ORDER — MIRTAZAPINE 7.5 MG/1
7.5 TABLET, FILM COATED ORAL NIGHTLY
Qty: 90 TABLET | Refills: 3 | Status: SHIPPED | OUTPATIENT
Start: 2024-08-21

## 2024-08-21 RX ORDER — SERTRALINE HYDROCHLORIDE 100 MG/1
200 TABLET, FILM COATED ORAL DAILY
Qty: 180 TABLET | Refills: 3 | Status: SHIPPED | OUTPATIENT
Start: 2024-08-21

## 2024-08-21 RX ORDER — GABAPENTIN 300 MG/1
300 CAPSULE ORAL 3 TIMES DAILY
Qty: 90 CAPSULE | Refills: 2 | Status: SHIPPED | OUTPATIENT
Start: 2024-08-21

## 2024-08-21 NOTE — LETTER
August 21, 2024     Milagro Sanderson    Component      Latest Ref Rng 8/18/2023 8/9/2024   Total Cholesterol      0 - 200 mg/dL 171  172    Triglycerides      0 - 150 mg/dL 185 (H)  121    HDL Cholesterol      40 - 60 mg/dL 50  45    VLDL Cholesterol Lm      5 - 40 mg/dL 32  22    LDL Cholesterol       0 - 100 mg/dL 89  105 (H)       Component      Latest Ref Rn 8/18/2023 8/9/2024   TSH Baseline      0.270 - 4.200 uIU/mL 1.880  3.440    Free T4      0.92 - 1.68 ng/dL 0.89 (L)  0.85 (L)       Legend:  (L) Low    Component      Latest Ref Rn 8/18/2023 8/9/2024   25 Hydroxy, Vitamin D      30.0 - 100.0 ng/ml 79.4  31.4      Vitamin D low. Suggest taking vitamin D3 over the counter 1000 IU daily and trying to get 15 min of sun exposure daily to face and arms (when possible).     Component      Latest Ref Rn 8/18/2023 8/9/2024   Vitamin B6      3.4 - 65.2 ug/L 4.5  8.7          Component      Latest Ref [facility] 8/9/2024   Vitamin B-12      211 - 946 pg/mL 476        Component      Latest Ref [facility] 8/9/2024   Folate      4.78 - 24.20 ng/mL 13.10          Component      Latest Ref [facility] 8/9/2024   Glucose      65 - 99 mg/dL 93    BUN      8 - 23 mg/dL 13    Creatinine      0.57 - 1.00 mg/dL 0.82    BUN/Creatinine Ratio      7.0 - 25.0  15.9    Sodium      136 - 145 mmol/L 144    Potassium      3.5 - 5.2 mmol/L 4.7    Chloride      98 - 107 mmol/L 105    CO2      22.0 - 29.0 mmol/L 27.7    Calcium      8.6 - 10.5 mg/dL 9.6    Total Protein      6.0 - 8.5 g/dL 6.8    Albumin      3.5 - 5.2 g/dL 4.6    Globulin      gm/dL 2.2    A/G Ratio      g/dL 2.1    Total Bilirubin      0.0 - 1.2 mg/dL 0.4    Alkaline Phosphatase      39 - 117 U/L 135 (H)    AST (SGOT)      1 - 32 U/L 28    ALT (SGPT)      1 - 33 U/L 24    EGFR Result      >60.0 mL/min/1.73 76.6       Component      Latest Ref Rn 8/9/2024   WBC      3.40 - 10.80 10*3/mm3 4.82    RBC      3.77 - 5.28 10*6/mm3 4.57    Hemoglobin      12.0 - 15.9 g/dL 14.6    Hematocrit      34.0  - 46.6 % 42.9    MCV      79.0 - 97.0 fL 93.9    MCH      26.6 - 33.0 pg 31.9    MCHC      31.5 - 35.7 g/dL 34.0    RDW      12.3 - 15.4 % 12.4    Platelets      140 - 450 10*3/mm3 193

## 2024-08-21 NOTE — PATIENT INSTRUCTIONS
Medicare Wellness  Personal Prevention Plan of Service     Date of Office Visit:    Encounter Provider:  Mae Valle MD  Place of Service:  [facility]  Patient Name: Milagro Sanderson  :  1955    As part of the Medicare Wellness portion of your visit today, we are providing you with this personalized preventive plan of services (PPPS). This plan is based upon recommendations of the [place] Preventive Services Task Force (USPSTF) and the Advisory Committee on Immunization Practices (ACIP).    This lists the preventive care services that should be considered, and provides dates of when you are due. Items listed as completed are up-to-date and do not require any further intervention.    Health Maintenance   Topic Date Due   • ANNUAL WELLNESS VISIT  2024   • INFLUENZA VACCINE  2024   • TDAP/TD VACCINES (1 - Tdap) 2024 (Originally 1972)   • COVID-19 Vaccine ( season) 11/10/2024 (Originally 2023)   • MAMMOGRAM  2024   • COLORECTAL CANCER SCREENING  2025   • LIPID PANEL  2025   • HEPATITIS C SCREENING  Completed   • Pneumococcal Vaccine 65+  Completed   • ZOSTER VACCINE  Completed   • PAP SMEAR  Discontinued   • DXA SCAN  Discontinued       Orders Placed This Encounter   Procedures   • Lipid Panel     Order Specific Question:   [facility] Has the patient fasted?     Answer:   Yes     Order Specific Question:   Release to patient     Answer:   Routine Release [[id number]]   • Comprehensive Metabolic Panel     Order Specific Question:   Release to patient     Answer:   Routine Release [[id number]]     Order Specific Question:   [facility] Has the patient fasted?     Answer:   Yes   • CBC (No Diff)     Order Specific Question:   Release to patient     Answer:   Routine Release [[id number]]     Order Specific Question:   [facility] Has the patient fasted?     Answer:   Yes   • TSH+Free T4     Order Specific Question:   Release to  patient     Answer:   Routine Release [[id number]]     Order Specific Question:   [facility] Has the patient fasted?     Answer:   Yes   • Vitamin D,25-Hydroxy     Order Specific Question:   Release to patient     Answer:   Routine Release [[id number]]     Order Specific Question:   [facility] Has the patient fasted?     Answer:   Yes   • Vitamin B6     Order Specific Question:   Release to patient     Answer:   Routine Release [[id number]]     Order Specific Question:   [facility] Has the patient fasted?     Answer:   Yes   • Vitamin B12     Order Specific Question:   Release to patient     Answer:   Routine Release [[id number]]     Order Specific Question:   [facility] Has the patient fasted?     Answer:   Yes   • Folate     Order Specific Question:   Release to patient     Answer:   Routine Release [[id number]]     Order Specific Question:   [facility] Has the patient fasted?     Answer:   Yes       No follow-ups on file.        Health Maintenance After Age 65    After age 65, you are at a higher risk for certain long-term diseases and infections as well as injuries from falls. Falls are a major cause of broken bones and head injuries in people who are older than age 65. Getting regular preventive care can help to keep you healthy and well. Preventive care includes getting regular testing and making lifestyle changes as recommended by your health care provider. Talk with your health care provider about:  Which screenings and tests you should have. A screening is a test that checks for a disease when you have no symptoms.  A diet and exercise plan that is right for you.    What should I know about screenings and tests to prevent falls?  Screening and testing are the best ways to find a health problem early. Early diagnosis and treatment give you the best chance of managing medical conditions that are common after age 65. Certain conditions and lifestyle choices may make you more likely to have a fall. Your health care provider  may recommend:  Regular vision checks. Poor vision and conditions such as cataracts can make you more likely to have a fall. If you wear glasses, make sure to get your prescription updated if your vision changes.  Medicine review. Work with your health care provider to regularly review all of the medicines you are taking, including over-the-counter medicines. Ask your health care provider about any side effects that may make you more likely to have a fall. Tell your health care provider if any medicines that you take make you feel dizzy or sleepy.  Strength and balance checks. Your health care provider may recommend certain tests to check your strength and balance while standing, walking, or changing positions.  Foot health exam. Foot pain and numbness, as well as not wearing proper footwear, can make you more likely to have a fall.  Screenings, including:  Osteoporosis screening. Osteoporosis is a condition that causes the bones to get weaker and break more easily.  Blood pressure screening. Blood pressure changes and medicines to control blood pressure can make you feel dizzy.  Depression screening. You may be more likely to have a fall if you have a fear of falling, feel depressed, or feel unable to do activities that you used to do.  Alcohol use screening. Using too much alcohol can affect your balance and may make you more likely to have a fall.    Follow these instructions at home:  Lifestyle  Do not drink alcohol if:  Your health care provider tells you not to drink.  If you drink alcohol:  Limit how much you have to:  0-1 drink a day for women.  0-2 drinks a day for men.  Know how much alcohol is in your drink. In the [place] one drink equals one 12 oz bottle of beer (355 mL), one 5 oz glass of wine (148 mL), or one 1½ oz glass of hard liquor (44 mL).  Do not use any products that contain nicotine or tobacco. These products include cigarettes, chewing tobacco, and vaping devices, such as e-cigarettes. If you  need help quitting, ask your health care provider.    Activity    Follow a regular exercise program to stay fit. This will help you maintain your balance. Ask your health care provider what types of exercise are appropriate for you.  If you need a cane or walker, use it as recommended by your health care provider.  Wear supportive shoes that have nonskid soles.  Safety    Remove any tripping hazards, such as rugs, cords, and clutter.  Install safety equipment such as grab bars in bathrooms and safety rails on stairs.  Keep rooms and walkways well-lit.    General instructions  Talk with your health care provider about your risks for falling. Tell your health care provider if:  You fall. Be sure to tell your health care provider about all falls, even ones that seem minor.  You feel dizzy, tiredness (fatigue), or off-balance.  Take over-the-counter and prescription medicines only as told by your health care provider. These include supplements.  Eat a healthy diet and maintain a healthy weight. A healthy diet includes low-fat dairy products, low-fat (lean) meats, and fiber from whole grains, beans, and lots of fruits and vegetables.  Stay current with your vaccines.  Schedule regular health, dental, and eye exams.    Summary  Having a healthy lifestyle and getting preventive care can help to protect your health and wellness after age 65.  Screening and testing are the best way to find a health problem early and help you avoid having a fall. Early diagnosis and treatment give you the best chance for managing medical conditions that are more common for people who are older than age 65.  Falls are a major cause of broken bones and head injuries in people who are older than age 65. Take precautions to prevent a fall at home.  Work with your health care provider to learn what changes you can make to improve your health and wellness and to prevent falls.    This information is not intended to replace advice given to you by  your health care provider. Make sure you discuss any questions you have with your health care provider.  Document Revised: 05/09/2022 Document Reviewed: 05/09/2022  Elsevier Patient Education © 2023 [employer]  Updated 2/29/24 tc   Fall Prevention in the Home, Adult  Falls can cause injuries and affect people of all ages. There are many simple things that you can do to make your home safe and to help prevent falls.  If you need it, ask for help making these changes.  What actions can I take to prevent falls?  General information  Use good lighting in all rooms. Make sure to:  Replace any light bulbs that burn out.  Turn on lights if it is dark and use night-lights.  Keep items that you use often in easy-to-reach places. Lower the shelves around your home if needed.  Move furniture so that there are clear paths around it.  Do not keep throw rugs or other things on the floor that can make you trip.  If any of your floors are uneven, fix them.  Add color or contrast paint or tape to clearly shun and help you see:  Grab bars or handrails.  First and last steps of staircases.  Where the edge of each step is.  If you use a ladder or stepladder:  Make sure that it is fully opened. Do not climb a closed ladder.  Make sure the sides of the ladder are locked in place.  Have someone hold the ladder while you use it.  Know where your pets are as you move through your home.  What can I do in the bathroom?         Keep the floor dry. Clean up any water that is on the floor right away.  Remove soap buildup in the bathtub or shower. Buildup makes bathtubs and showers slippery.  Use non-skid mats or decals on the floor of the bathtub or shower.  Attach bath mats securely with double-sided, non-slip rug tape.  If you need to sit down while you are in the shower, use a non-slip stool.  Install grab bars by the toilet and in the bathtub and shower. Do not use towel bars as grab bars.  What can I do in the bedroom?  Make sure that  you have a light by your bed that is easy to reach.  Do not use any sheets or blankets on your bed that hang to the floor.  Have a firm bench or chair with side arms that you can use for support when you get dressed.  What can I do in the kitchen?  Clean up any spills right away.  If you need to reach something above you, use a sturdy step stool that has a grab bar.  Keep electrical cables out of the way.  Do not use floor polish or wax that makes floors slippery.  What can I do with my stairs?  Do not leave anything on the stairs.  Make sure that you have a light switch at the top and the bottom of the stairs. Have them installed if you do not have them.  Make sure that there are handrails on both sides of the stairs. Fix handrails that are broken or loose. Make sure that handrails are as long as the staircases.  Install non-slip stair treads on all stairs in your home if they do not have carpet.  Avoid having throw rugs at the top or bottom of stairs, or secure the rugs with carpet tape to prevent them from moving.  Choose a carpet design that does not hide the edge of steps on the stairs. Make sure that carpet is firmly attached to the stairs. Fix any carpet that is loose or worn.  What can I do on the outside of my home?  Use bright outdoor lighting.  Repair the edges of walkways and driveways and fix any cracks. Clear paths of anything that can make you trip, such as tools or rocks.  Add color or contrast paint or tape to clearly shun and help you see high doorway thresholds.  Trim any bushes or trees on the main path into your home.  Check that handrails are securely fastened and in good repair. Both sides of all steps should have handrails.  Install guardrails along the edges of any raised decks or porches.  Have leaves, snow, and ice cleared regularly. Use sand, salt, or ice melt on walkways during winter months if you live where there is ice and snow.  In the garage, clean up any spills right away,  including grease or oil spills.  What other actions can I take?  Review your medicines with your health care provider. Some medicines can make you confused or feel dizzy. This can increase your chance of falling.  Wear closed-toe shoes that fit well and support your feet. Wear shoes that have rubber soles and low heels.  Use a cane, walker, scooter, or crutches that help you move around if needed.  Talk with your provider about other ways that you can decrease your risk of falls. This may include seeing a physical therapist to learn to do exercises to improve movement and strength.  Where to find more information  Centers for Disease Control and Prevention, [place]: cdc.gov  [place] on Aging: mikaela.nih.gov  National [place] on Aging: mikaela.nih.gov  Contact a health care provider if:  You are afraid of falling at home.  You feel weak, drowsy, or dizzy at home.  You fall at home.  Get help right away if you:  Lose consciousness or have trouble moving after a fall.  Have a fall that causes a head injury.  These symptoms may be an emergency. Get help right away. Call 911.  Do not wait to see if the symptoms will go away.  Do not drive yourself to the hospital.  This information is not intended to replace advice given to you by your health care provider. Make sure you discuss any questions you have with your health care provider.  Document Revised: 08/21/2023 Document Reviewed: 08/21/2023  [employer] Patient Education © 2024 [employer]    Sit-to-Stand Exercise    The sit-to-stand exercise (also known as the chair stand or chair rise exercise) strengthens your lower body and helps you maintain or improve your mobility and independence. The end goal is to do the sit-to-stand exercise without using your hands. This will be easier as you become stronger. You should always talk with your health care provider before starting any exercise program, especially if you have had recent surgery.  Do the exercise exactly as  told by your health care provider and adjust it as directed. It is normal to feel mild stretching, pulling, tightness, or discomfort as you do this exercise, but you should stop right away if you feel sudden pain or your pain gets worse. Do not begin doing this exercise until told by your health care provider.  What the sit-to-stand exercise does  The sit-to-stand exercise helps to strengthen the muscles in your thighs and the muscles in the center of your body that give you stability (core muscles). This exercise is especially helpful if:  You have had knee or hip surgery.  You have trouble getting up from a chair, out of a car, or off the toilet due to muscle weakness.  How to do the sit-to-stand exercise  Sit toward the front edge of a sturdy chair without armrests. Your knees should be bent and your feet should be flat on the floor and shoulder-width apart and underneath your hips.  Place your hands lightly on each side of the seat. Keep your back and neck as straight as possible, with your chest slightly forward.  Breathe in slowly. Lean forward and slightly shift your weight to the front of your feet.  Breathe out as you slowly stand up. Try not to support any weight with your hands.  Stand and pause for a full breath in and out.  Breathe in as you sit down slowly. Tighten your core and abdominal muscles to control your lowering as much as possible. You should lower yourself back to the chair slowly, not just drop back into the seat.  Breathe out slowly.  Do this exercise 10-15 times. If needed, do it fewer times until you build up strength.  Rest for 1 minute, then do another set of 10-15 repetitions.  To change the difficulty of the sit-to-stand exercise  If the exercise is too difficult, use a chair with sturdy armrests, and push off the armrests to help you come to the standing position. You can also use the armrests to help slowly lower yourself back to sitting. As this gets easier, try to use your arms  less. You can also place a firm cushion or pillow on the chair to make the surface higher.  If this exercise is too easy, do not use your arms to help raise or lower yourself. You can also wear a weighted vest, use hand weights, increase your repetitions, or try a lower chair.  General tips  You may feel tired when starting an exercise routine. This is normal.  You may have muscle soreness that lasts a few days. This is normal. As you get stronger, you may not feel muscle soreness.  Use smooth, steady movements.  Do not  hold your breath during strength exercises. This can cause unsafe changes in your blood pressure.  Breathe in slowly through your nose, and breathe out slowly through your mouth.  Summary  Strengthening your lower body is an important step to help you move safely and independently.  The sit-to-stand exercise helps strengthen the muscles in your thighs and core.  You should always talk with your health care provider before starting any exercise program, especially if you have had recent surgery.  This information is not intended to replace advice given to you by your health care provider. Make sure you discuss any questions you have with your health care provider.  Document Revised: 04/10/2022 Document Reviewed: 04/10/2022  [employer] Patient Education © 2024 [employer]    Exercising to Stay Healthy  To become healthy and stay healthy, it is recommended that you do moderate-intensity and vigorous-intensity exercise. You can tell that you are exercising at a moderate intensity if your heart starts beating faster and you start breathing faster but can still hold a conversation. You can tell that you are exercising at a vigorous intensity if you are breathing much harder and faster and cannot hold a conversation while exercising.  How can exercise benefit me?  Exercising regularly is important. It has many health benefits, such as:  Improving overall fitness, flexibility, and endurance.  Increasing  bone density.  Helping with weight control.  Decreasing body fat.  Increasing muscle strength and endurance.  Reducing stress and tension, anxiety, depression, or anger.  Improving overall health.  What guidelines should I follow while exercising?  Before you start a new exercise program, talk with your health care provider.  Do not exercise so much that you hurt yourself, feel dizzy, or get very short of breath.  Wear comfortable clothes and wear shoes with good support.  Drink plenty of water while you exercise to prevent dehydration or heat stroke.  Work out until your breathing and your heartbeat get faster (moderate intensity).  How often should I exercise?  Choose an activity that you enjoy, and set realistic goals. Your health care provider can help you make an activity plan that is individually designed and works best for you.  Exercise regularly as told by your health care provider. This may include:  Doing strength training two times a week, such as:  Lifting weights.  Using resistance bands.  Push-ups.  Sit-ups.  Yoga.  Doing a certain intensity of exercise for a given amount of time. Choose from these options:  A total of 150 minutes of moderate-intensity exercise every week.  A total of 75 minutes of vigorous-intensity exercise every week.  A mix of moderate-intensity and vigorous-intensity exercise every week.  Children, pregnant women, people who have not exercised regularly, people who are overweight, and older adults may need to talk with a health care provider about what activities are safe to perform. If you have a medical condition, be sure to talk with your health care provider before you start a new exercise program.  What are some exercise ideas?  Moderate-intensity exercise ideas include:  Walking 1 mile (1.6 km) in about 15 minutes.  Biking.  Hiking.  Golfing.  Dancing.  Water aerobics.  Vigorous-intensity exercise ideas include:  Walking 4.5 miles (7.2 km) or more in about 1 hour.  Jogging  or running 5 miles (8 km) in about 1 hour.  Biking 10 miles (16.1 km) or more in about 1 hour.  Lap swimming.  Roller-skating or in-line skating.  Cross-country skiing.  Vigorous competitive sports, such as football, basketball, and soccer.  Jumping rope.  Aerobic dancing.  What are some everyday activities that can help me get exercise?  Yard work, such as:  Pushing a .  Raking and bagging leaves.  Washing your car.  Pushing a stroller.  Shoveling snow.  Gardening.  Washing windows or floors.  How can I be more active in my day-to-day activities?  Use stairs instead of an elevator.  Take a walk during your lunch break.  If you drive, park your car farther away from your work or school.  If you take public transportation, get off one stop early and walk the rest of the way.  Stand up or walk around during all of your indoor phone calls.  Get up, stretch, and walk around every 30 minutes throughout the day.  Enjoy exercise with a friend. Support to continue exercising will help you keep a regular routine of activity.  Where to find more information  You can find more information about exercising to stay healthy from:  [place] Department of Health and Human Services: www.hhs.gov  Centers for Disease Control and Prevention (CDC): www.cdc.gov  Summary  Exercising regularly is important. It will improve your overall fitness, flexibility, and endurance.  Regular exercise will also improve your overall health. It can help you control your weight, reduce stress, and improve your bone density.  Do not exercise so much that you hurt yourself, feel dizzy, or get very short of breath.  Before you start a new exercise program, talk with your health care provider.  This information is not intended to replace advice given to you by your health care provider. Make sure you discuss any questions you have with your health care provider.  Document Revised: 04/15/2022 Document Reviewed: 04/15/2022  Elsevier Patient Education ©  2026 [employer]

## 2024-08-21 NOTE — PROGRESS NOTES
Subjective   The ABCs of the Annual Wellness Visit  Medicare Wellness Visit      Milagro Sanderson is a 69 y.o. patient who presents for a Medicare Wellness Visit.    The following portions of the patient's history were reviewed and   updated as appropriate: allergies, current medications, past family history, past medical history, past social history, past surgical history, and problem list.    Compared to one year ago, the patient's physical   health is better.  Compared to one year ago, the patient's mental   health is better.    Recent Hospitalizations:  She was admitted within the past 365 days at [facility].     Current Medical Providers:  Patient Care Team:  Mae Valle MD as PCP - General (Family Medicine)  Shilpa, Praful Thompson MD as Consulting Physician (Neurology)  Des Elizondo MD as Consulting Physician (Psychiatry)  Alan Putnam MD as Consulting Physician (Ophthalmology)  Ramone Sebastian MD as Consulting Physician (Rheumatology)    Outpatient Medications Prior to Visit   Medication Sig Dispense Refill    gabapentin (NEURONTIN) 300 MG capsule Take 1 capsule by mouth 3 (Three) Times a Day. (Patient taking differently: Take 1 capsule by mouth 3 (Three) Times a Day. Once daily) 90 capsule 2    mirtazapine (REMERON) 7.5 MG tablet Take 1 tablet by mouth Every Night. 90 tablet 0    sertraline (ZOLOFT) 100 MG tablet Take 2 tablets by mouth Daily. 180 tablet 0    folic acid (FOLVITE) 1 MG tablet Take 1 tablet by mouth Daily. Indications: folic acid deficiency (Patient not taking: Reported on 8/21/2024) 90 tablet 3    levothyroxine (SYNTHROID, LEVOTHROID) 25 MCG tablet TAKE 1 TABLET BY MOUTH EVERY DAY ON EMPTY STOMACH AT LEAST 30 MINUTES BEFORE EATING AND OTHER MEDS (Patient not taking: Reported on 8/21/2024) 90 tablet 1    methotrexate 2.5 MG tablet Take 8 mg by mouth 1 (One) Time Per Week. (Patient not taking: Reported on 8/21/2024)      primidone (MYSOLINE) 250 MG tablet  (Patient not  "taking: Reported on 8/21/2024)      propranolol (INDERAL) 20 MG tablet Take 1 tablet by mouth 3 (Three) Times a Day. (Patient not taking: Reported on 8/21/2024)       No facility-administered medications prior to visit.     No opioid medication identified on active medication list. I have reviewed chart for other potential  high risk medication/s and harmful drug interactions in the elderly.      Aspirin is not on active medication list.  Aspirin use is not indicated based on review of current medical condition/s. Risk of harm outweighs potential benefits.  .    Patient Active Problem List   Diagnosis    Episode of recurrent major depressive disorder    Eczema    Vitamin B12 deficiency    Vitamin D deficiency    Hereditary essential tremor    Onychomycosis    Recurrent cold sores    GERD with esophagitis    High risk medication use    Undifferentiated connective tissue disease    Generalized anxiety disorder    Other insomnia    Essential tremor    Hypertriglyceridemia    Vitamin B6 deficiency     Advance Care Planning Advance Directive is not on file.  ACP discussion was held with the patient during this visit. Patient does not have an advance directive, information provided.            Objective   Vitals:    08/21/24 1111   BP: 122/72   Pulse: 72   Resp: 14   Temp: 96.9 °F (36.1 °C)   TempSrc: Temporal   SpO2: 96%   Weight: 57.6 kg (127 lb)   Height: 170.2 cm (67\")   PainSc: 0-No pain       Estimated body mass index is 19.89 kg/m² as calculated from the following:    Height as of this encounter: 170.2 cm (67\").    Weight as of this encounter: 57.6 kg (127 lb).    BMI is within normal parameters. No other follow-up for BMI required.       Does the patient have evidence of cognitive impairment? No  Lab Results   Component Value Date    CHLPL 172 08/09/2024    TRIG 121 08/09/2024    HDL 45 08/09/2024     (H) 08/09/2024    VLDL 22 08/09/2024                                                                        "                         Health  Risk Assessment    Smoking Status:  Social History     Tobacco Use   Smoking Status Never    Passive exposure: Never   Smokeless Tobacco Never     Alcohol Consumption:  Social History     Substance and Sexual Activity   Alcohol Use Never       Fall Risk Screen  STEADI Fall Risk Assessment was completed, and patient is at HIGH risk for falls. Assessment completed on:2024    Depression Screenin/21/2024    11:08 AM   PHQ-2/PHQ-9 Depression Screening   Little Interest or Pleasure in Doing Things 0-->not at all   Feeling Down, Depressed or Hopeless 0-->not at all   PHQ-9: Brief Depression Severity Measure Score 0     Health Habits and Functional and Cognitive Screenin/21/2024    11:08 AM   Functional & Cognitive Status   Do you have difficulty preparing food and eating? No   Do you have difficulty bathing yourself, getting dressed or grooming yourself? No   Do you have difficulty using the toilet? No   Do you have difficulty moving around from place to place? No   Do you have trouble with steps or getting out of a bed or a chair? Yes   Current Diet Limited Junk Food   Dental Exam Up to date   Eye Exam Up to date   Exercise (times per week) 3 times per week   Current Exercises Include Walking;House Cleaning   Do you need help using the phone?  No   Are you deaf or do you have serious difficulty hearing?  No   Do you need help to go to places out of walking distance? No   Do you need help shopping? No   Do you need help preparing meals?  No   Do you need help with housework?  No   Do you need help with laundry? No   Do you need help taking your medications? No   Do you need help managing money? No   Do you ever drive or ride in a car without wearing a seat belt? No   Have you felt unusual stress, anger or loneliness in the last month? No   Who do you live with? Alone   If you need help, do you have trouble finding someone available to you? No   Have you been bothered  in the last four weeks by sexual problems? No   Do you have difficulty concentrating, remembering or making decisions? Yes           Age-appropriate Screening Schedule:  Refer to the list below for future screening recommendations based on patient's age, sex and/or medical conditions. Orders for these recommended tests are listed in the plan section. The patient has been provided with a written plan.    Health Maintenance List  Health Maintenance   Topic Date Due    INFLUENZA VACCINE  08/01/2024    TDAP/TD VACCINES (1 - Tdap) 08/21/2024 (Originally 1/24/1974)    COVID-19 Vaccine (6 - 2023-24 season) 11/10/2024 (Originally 9/1/2023)    MAMMOGRAM  12/12/2024    COLORECTAL CANCER SCREENING  03/27/2025    LIPID PANEL  08/09/2025    ANNUAL WELLNESS VISIT  08/21/2025    HEPATITIS C SCREENING  Completed    Pneumococcal Vaccine 65+  Completed    ZOSTER VACCINE  Completed    PAP SMEAR  Discontinued    DXA SCAN  Discontinued                                                                                                                                                CMS Preventative Services Quick Reference  Risk Factors Identified During Encounter  Fall Risk-High or Moderate: Information on Fall Prevention Shared in After Visit Summary and Sit to Stand Exercise Information Shared in After Visit Summary  Immunizations Discussed/Encouraged: Tdap, Influenza, Prevnar 20 (Pneumococcal 20-valent conjugate), Shingrix, COVID19, and RSV (Respiratory Syncytial Virus)    The above risks/problems have been discussed with the patient.  Pertinent information has been shared with the patient in the After Visit Summary.  An After Visit Summary and PPPS were made available to the patient.    Follow Up:   Next Medicare Wellness visit to be scheduled in 1 year.         Additional E&M Note during same encounter follows:  Patient has additional, significant, and separately identifiable condition(s)/problem(s) that require work above and beyond  "the Medicare Wellness Visit     Chief Complaint  Medicare Wellness-subsequent, Annual Exam, and Depression    Subjective   Overall doing well.  Off many of her medicines.  Only on three and doing okay. Mood good.  Was told by psych that primary care could take over medicines.    Continues to have tremor with action not at rest  S/p deep brain stimulator at   Has follow up scheduled.    Off methotrexate and doing okay  Was going to rheumatology.  No joint swelling nor major joint pain      Milagro is also being seen today for an annual adult preventative physical exam.  and Milagro is also being seen today for additional medical problem/s.                Objective   Vital Signs:  /72   Pulse 72   Temp 96.9 °F (36.1 °C) (Temporal)   Resp 14   Ht 170.2 cm (67\")   Wt 57.6 kg (127 lb)   SpO2 96%   BMI 19.89 kg/m²   Physical Exam  Vitals and nursing note reviewed.   Constitutional:       General: She is not in acute distress.     Appearance: Normal appearance. She is well-developed, well-groomed and normal weight. She is not ill-appearing, toxic-appearing or diaphoretic.      Interventions: Face mask in place.   HENT:      Head: Normocephalic and atraumatic.      Right Ear: Hearing normal.      Left Ear: Hearing normal.   Eyes:      General: Lids are normal. No scleral icterus.     Extraocular Movements: Extraocular movements intact.   Neck:      Trachea: Phonation normal.   Cardiovascular:      Rate and Rhythm: Normal rate and regular rhythm.   Pulmonary:      Effort: Pulmonary effort is normal.   Musculoskeletal:      Cervical back: Neck supple.   Skin:     Coloration: Skin is not jaundiced or pale.   Neurological:      General: No focal deficit present.      Mental Status: She is alert and oriented to person, place, and time.      Motor: Motor function is intact. Tremor (action) present.   Psychiatric:         Attention and Perception: Attention and perception normal.         Mood and Affect: Mood and affect " normal.         Speech: Speech normal.         Behavior: Behavior normal. Behavior is cooperative.         Thought Content: Thought content normal.         Cognition and Memory: Cognition and memory normal.         Judgment: Judgment normal.         The following data was reviewed by: Mae Valle MD on 08/21/2024:  Rheumatology note Dr. Sebastian from 8/6/24.         Assessment and Plan Additional age appropriate preventative wellness advice topics were discussed during today's preventative wellness exam(some topics already addressed during AWV portion of the note above):   Nutrition: Discussed nutrition plan with patient. Information shared in after visit summary. Goal is for a well balanced diet to enhance overall health.     Healthy Weight: Discussed current and goal BMI with patient. Steps to attain this goal discussed. Information shared in after visit summary.    Diagnoses and all orders for this visit:    1. Medicare annual wellness visit, subsequent (Primary)    2. Mild episode of recurrent major depressive disorder  Overview:  Past therapy: Lexapro, Prozac, Zoloft       Assessment & Plan:  Patient's depression is a recurrent episode that is mild without psychosis. Depression is in partial remission and improving with treatment.    Plan:   Continue current medication therapy     Followup at the next regular appointment.     Orders:  -     sertraline (ZOLOFT) 100 MG tablet; Take 2 tablets by mouth Daily.  Dispense: 180 tablet; Refill: 3  -     mirtazapine (REMERON) 7.5 MG tablet; Take 1 tablet by mouth Every Night.  Dispense: 90 tablet; Refill: 3    3. Generalized anxiety disorder  -     sertraline (ZOLOFT) 100 MG tablet; Take 2 tablets by mouth Daily.  Dispense: 180 tablet; Refill: 3  -     mirtazapine (REMERON) 7.5 MG tablet; Take 1 tablet by mouth Every Night.  Dispense: 90 tablet; Refill: 3  -     gabapentin (NEURONTIN) 300 MG capsule; Take 1 capsule by mouth 3 (Three) Times a Day.  Dispense: 90  capsule; Refill: 2    4. Other insomnia  -     mirtazapine (REMERON) 7.5 MG tablet; Take 1 tablet by mouth Every Night.  Dispense: 90 tablet; Refill: 3    5. Essential tremor  Overview:  Deep brain stimulator placed at , 2/28/24  Previous use of primidone, propranolol    Assessment & Plan:  Follow up with     Orders:  -     gabapentin (NEURONTIN) 300 MG capsule; Take 1 capsule by mouth 3 (Three) Times a Day.  Dispense: 90 capsule; Refill: 2    6. Hypertriglyceridemia  Assessment & Plan:  Work on diet, increase fiber, less saturated fats.    Orders:  -     Lipid Panel  -     Comprehensive Metabolic Panel    7. Vitamin B12 deficiency  -     CBC (No Diff)  -     Vitamin B12  -     Folate    8. Vitamin D deficiency  -     Vitamin D,25-Hydroxy    9. Vitamin B6 deficiency  -     CBC (No Diff)  -     Vitamin B6    10. Abnormal blood chemistry  -     Lipid Panel  -     Comprehensive Metabolic Panel  -     CBC (No Diff)  -     TSH+Free T4  -     Vitamin D,25-Hydroxy  -     Vitamin B6  -     Vitamin B12  -     Folate    11. Need for Tdap vaccination  -     Tdap (ADACEL) 5-2-15.5 LF-MCG/0.5 injection; Inject 0.5 mL into the appropriate muscle as directed by prescriber 1 (One) Time for 1 dose.  Dispense: 0.5 mL; Refill: 0    12. Medication monitoring encounter  -     Lipid Panel  -     Comprehensive Metabolic Panel  -     CBC (No Diff)  -     TSH+Free T4  -     Vitamin D,25-Hydroxy  -     Vitamin B6  -     Vitamin B12  -     Folate    13. Encounter for screening mammogram for breast cancer  -     Mammo Screening Digital Tomosynthesis Bilateral With CAD; Future    14. At high risk for falls  Comments:  information via avs            Orders Placed This Encounter   Procedures    Mammo Screening Digital Tomosynthesis Bilateral With CAD     Schedule at [facility]     Standing Status:   Future     Standing Expiration Date:   8/21/2025     Scheduling Instructions:      Schedule at [facility]     Order  Specific Question:   Reason for Exam:     Answer:   screening     Order Specific Question:   Does this patient have a diabetic monitoring/medication delivering device on?     Answer:   No     Order Specific Question:   Release to patient     Answer:   Routine Release [1400000002]    Lipid Panel     Order Specific Question:   [facility] Has the patient fasted?     Answer:   Yes     Order Specific Question:   Release to patient     Answer:   Routine Release [1400000002]    Comprehensive Metabolic Panel     Order Specific Question:   Release to patient     Answer:   Routine Release [1400000002]     Order Specific Question:   [facility] Has the patient fasted?     Answer:   Yes    CBC (No Diff)     Order Specific Question:   Release to patient     Answer:   Routine Release [1400000002]     Order Specific Question:   [facility] Has the patient fasted?     Answer:   Yes    TSH+Free T4     Order Specific Question:   Release to patient     Answer:   Routine Release [1400000002]     Order Specific Question:   [facility] Has the patient fasted?     Answer:   Yes    Vitamin D,25-Hydroxy     Order Specific Question:   Release to patient     Answer:   Routine Release [1400000002]     Order Specific Question:   [facility] Has the patient fasted?     Answer:   Yes    Vitamin B6     Order Specific Question:   Release to patient     Answer:   Routine Release [1400000002]     Order Specific Question:   [facility] Has the patient fasted?     Answer:   Yes    Vitamin B12     Order Specific Question:   Release to patient     Answer:   Routine Release [1400000002]     Order Specific Question:   [facility] Has the patient fasted?     Answer:   Yes    Folate     Order Specific Question:   Release to patient     Answer:   Routine Release [1400000002]     Order Specific Question:   [facility] Has the patient fasted?     Answer:   Yes     New Medications Ordered This Visit   Medications    sertraline (ZOLOFT) 100 MG tablet     Sig: Take 2 tablets by mouth Daily.      Dispense:  180 tablet     Refill:  3    mirtazapine (REMERON) 7.5 MG tablet     Sig: Take 1 tablet by mouth Every Night.     Dispense:  90 tablet     Refill:  3    gabapentin (NEURONTIN) 300 MG capsule     Sig: Take 1 capsule by mouth 3 (Three) Times a Day.     Dispense:  90 capsule     Refill:  2    Tdap (ADACEL) 5-2-15.5 LF-MCG/0.5 injection     Sig: Inject 0.5 mL into the appropriate muscle as directed by prescriber 1 (One) Time for 1 dose.     Dispense:  0.5 mL     Refill:  0        I spent 37 minutes caring for Milagro on this date of service. This time includes time spent by me in the following activities:preparing for the visit, reviewing tests, performing a medically appropriate examination and/or evaluation , counseling and educating the patient/family/caregiver, ordering medications, tests, or procedures, and documenting information in the medical record    I spent 25 minutes on the separately reported service of [id number]. This time is not included in the time used to support the E/M service also reported today.     Follow Up   Return in about 8 months (around 4/21/2025) for Controlled Rx Follow Up.  Patient was given instructions and counseling regarding her condition or for health maintenance advice. Please see specific information pulled into the AVS if appropriate.

## 2024-09-03 ENCOUNTER — TELEPHONE (OUTPATIENT)
Dept: INTERNAL MEDICINE | Facility: CLINIC | Age: 69
End: 2024-09-03
Payer: MEDICARE

## 2024-09-03 NOTE — TELEPHONE ENCOUNTER
Caller: Milagro Sanderson    Relationship: Self    Best call back number: [phone]     What form or medical record are you requesting: FORMS THAT WERE DROPPED OFF THIS MORNING - HANDICAPP PLACARD    How would you like to receive the form or medical records (pick-up, mail, fax):   If fax, what is the fax number:   If mail, what is the address:   If pick-up, provide patient with address and location details    Timeframe paperwork needed: AS SOON AS POSSIBLE    Additional notes: PATIENT CALLED IN AND GOT DISCONNECTED. SHE WASN'T SURE WHO SHE WAS SPEAKING WITH, BUT WONDERED IF IT WAS ABOUT THIS PAPERWORK, SHE DIDN'T WANT ANYONE TO THINK SHE HUNG UP ON THEM

## 2024-09-12 ENCOUNTER — TREATMENT (OUTPATIENT)
Dept: PHYSICAL THERAPY | Facility: CLINIC | Age: 69
End: 2024-09-12
Payer: MEDICARE

## 2024-09-12 DIAGNOSIS — R26.89 BALANCE PROBLEM: ICD-10-CM

## 2024-09-12 DIAGNOSIS — R53.1 GENERALIZED WEAKNESS: ICD-10-CM

## 2024-09-12 DIAGNOSIS — R29.3 POSTURE IMBALANCE: Primary | ICD-10-CM

## 2024-09-12 PROCEDURE — 97161 PT EVAL LOW COMPLEX 20 MIN: CPT

## 2024-09-12 PROCEDURE — 97110 THERAPEUTIC EXERCISES: CPT

## 2024-09-12 NOTE — PROGRESS NOTES
Physical Therapy Initial Evaluation and Plan of Care  [address] [place] [address]      Patient: Milagro Sanderson   : 1955  Diagnosis/ICD-10 Code:  Posture imbalance [R29.3]  Referring practitioner: ELIAZAR Larios  Date of Initial Visit: 2024  Today's Date: 2024  Patient seen for 1 session         Visit Diagnoses:    ICD-10-CM ICD-9-CM   1. Posture imbalance  R29.3 729.90   2. Balance problem  R26.89 781.99   3. Generalized weakness  R53.1 780.79         Subjective Questionnaire: LEFS:       Subjective Evaluation    History of Present Illness  Mechanism of injury: States she was at the doctor and they noticed she was really far forward in her posture which they think may have been causing some of her falls. Has a deep brain stimulator placed in 2024 for tremors. Notes has fallen 4X in 2 months with the most recent being upstairs where she did not lift her leg high enough to get over the top step. States she feels her balance issues have improved since working on her posture some. States she becomes more off balance as the day goes on. Does not move around much. Likes to read. Has a friend who goes to the [employer] and she is considering joining. Reports stairs are difficult. Walking any distance. Has to split her house chores up due to fatigue. Had a quad cane she was using but kept banging it into her legs. Bought a SPC but does not use it.       Patient Occupation: retired teacher Quality of life: fair    Pain  No pain reported  Progression: worsening    Social Support  Lives in: one-story house (4 stairs wtih rails to get into the house)  Lives with: alone    Patient Goals  Patient goals for therapy: improved balance, increased strength and independence with ADLs/IADLs             Objective          Postural Observations    Additional Postural Observation Details  Forward head, increased thoracic kyphosis    Strength/Myotome Testing     Left Hip   Planes of  Motion   Flexion: 3+  Abduction: 3+  Adduction: 3+    Right Hip   Planes of Motion   Flexion: 3+  Abduction: 3+  Adduction: 3+    Left Knee   Flexion: 4-  Extension: 4    Right Knee   Flexion: 4-  Extension: 4    Left Ankle/Foot   Dorsiflexion: 4+  Plantar flexion: 4 (sitting)    Right Ankle/Foot   Dorsiflexion: 4+  Plantar flexion: 4 (sitting)    Ambulation     Observational Gait   Gait: crouched   Decreased stride length, left stance time, right stance time, left step length and right step length.   Left foot contact pattern: foot flat  Right foot contact pattern: foot flat  Base of support: increased     General Comments     Lumbar Comments  10 sec TUG with 1 LOB but pt was able to self correct  5 STS in 15 sec          Assessment & Plan       Assessment  Impairments: abnormal gait, activity intolerance, impaired balance, impaired physical strength and safety issue   Functional limitations: walking and standing (falls)  Assessment details: Pt is a 70 YO F who presents to the clinic with a history of falls, posture deficits and weakness. Pt will benefit from skilled PT for improving B LE strength, posture and overall wellness to improve gait mechanics and balance to aid with decreasing fall risks.  Barriers to therapy: age, co-morbidities  Prognosis: fair    Goals  Plan Goals: ST weeks    1. Pt will be independent with HEP  2. Pt will report no falls to aid with safety.  3.  Pt will have good posture and body mechanics without any verbal cues from PT   4. Pt to demonstrate half grade improvement in B LE strength to aid with falls and gait.    LT weeks    1. Pt will demonstrate 5 sit to stands in 10 sec demonstrating improved overall body wellness.  2.  Pt will demonstrate TUG of 8 sec without LOB to aid with decreasing fall risk.  3. Pt will report ability to perform multiple house chores in one day demonstrating improved endurance.  4. Pt to demonstrate 1 grade increase in B LE strength to aid with  daily demands.     Plan  Therapy options: will be seen for skilled therapy services  Planned therapy interventions: abdominal trunk stabilization, balance/weight-bearing training, flexibility, gait training, home exercise program, joint mobilization, manual therapy, neuromuscular re-education, soft tissue mobilization, strengthening, stretching, therapeutic activities and transfer training  Frequency: 2x week  Duration in weeks: 8  Treatment plan discussed with: patient        History # of Personal Factors and/or Comorbidities: HIGH (3+)  Examination of Body System(s): # of elements: LOW (1-2)  Clinical Presentation: STABLE   Clinical Decision Making: LOW       Timed:         Manual Therapy:         mins  74220;     Therapeutic Exercise:   10      mins  52803;     Neuromuscular Sylvia:       mins  95909;    Therapeutic Activity:          mins  11817;     Gait Training:          mins  19081;     Ultrasound:          mins  59217;    Ionto                                   mins   25574  Self Care                            mins   36895  Canalith Repos         mins 66186      Un-Timed:  Electrical Stimulation:         mins  78449 ( );  Dry Needling          mins self-pay  Traction          mins 47251  Low Eval   34      Mins  83716  Mod Eval          Mins  05542  High Eval                            Mins  63749        Timed Treatment:   10   mins   Total Treatment:     44   mins      PT: Heydi Maldonado PT     License Number: [id number]    Electronically signed by Heydi Maldonado PT, 09/12/24, 9:01 AM EDT    Certification Period: 9/13/2024 thru 12/11/2024  I certify that the therapy services are furnished while this patient is under my care.  The services outlined above are required by this patient, and will be reviewed every 90 days.         Physician Signature:__________________________________________________    PHYSICIAN: Linda Schultz PA  NPI: [id number]      DATE:     Please sign and return via fax to  [email or web address] . Thank you, [facility].

## 2024-09-17 ENCOUNTER — TREATMENT (OUTPATIENT)
Dept: PHYSICAL THERAPY | Facility: CLINIC | Age: 69
End: 2024-09-17
Payer: MEDICARE

## 2024-09-17 ENCOUNTER — TELEPHONE (OUTPATIENT)
Dept: INTERNAL MEDICINE | Facility: CLINIC | Age: 69
End: 2024-09-17
Payer: MEDICARE

## 2024-09-17 DIAGNOSIS — R26.89 BALANCE PROBLEM: ICD-10-CM

## 2024-09-17 DIAGNOSIS — Z29.11 NEED FOR PROPHYLACTIC VACCINATION AND INOCULATION AGAINST RESPIRATORY SYNCYTIAL VIRUS (RSV): Primary | ICD-10-CM

## 2024-09-17 DIAGNOSIS — R29.3 POSTURE IMBALANCE: Primary | ICD-10-CM

## 2024-09-17 DIAGNOSIS — R53.1 GENERALIZED WEAKNESS: ICD-10-CM

## 2024-09-17 PROCEDURE — 97110 THERAPEUTIC EXERCISES: CPT

## 2024-09-19 ENCOUNTER — TREATMENT (OUTPATIENT)
Dept: PHYSICAL THERAPY | Facility: CLINIC | Age: 69
End: 2024-09-19
Payer: MEDICARE

## 2024-09-19 DIAGNOSIS — R29.3 POSTURE IMBALANCE: Primary | ICD-10-CM

## 2024-09-19 DIAGNOSIS — R53.1 GENERALIZED WEAKNESS: ICD-10-CM

## 2024-09-19 DIAGNOSIS — R26.89 BALANCE PROBLEM: ICD-10-CM

## 2024-09-19 PROCEDURE — 97110 THERAPEUTIC EXERCISES: CPT

## 2024-09-19 PROCEDURE — 97116 GAIT TRAINING THERAPY: CPT

## 2024-09-24 ENCOUNTER — TREATMENT (OUTPATIENT)
Dept: PHYSICAL THERAPY | Facility: CLINIC | Age: 69
End: 2024-09-24
Payer: MEDICARE

## 2024-09-24 DIAGNOSIS — R26.89 BALANCE PROBLEM: ICD-10-CM

## 2024-09-24 DIAGNOSIS — R29.3 POSTURE IMBALANCE: Primary | ICD-10-CM

## 2024-09-24 PROCEDURE — 97112 NEUROMUSCULAR REEDUCATION: CPT

## 2024-09-24 PROCEDURE — 97110 THERAPEUTIC EXERCISES: CPT

## 2024-09-26 ENCOUNTER — TREATMENT (OUTPATIENT)
Dept: PHYSICAL THERAPY | Facility: CLINIC | Age: 69
End: 2024-09-26
Payer: MEDICARE

## 2024-09-26 DIAGNOSIS — R26.89 BALANCE PROBLEM: ICD-10-CM

## 2024-09-26 DIAGNOSIS — R53.1 GENERALIZED WEAKNESS: ICD-10-CM

## 2024-09-26 DIAGNOSIS — R29.3 POSTURE IMBALANCE: Primary | ICD-10-CM

## 2024-09-26 PROCEDURE — 97110 THERAPEUTIC EXERCISES: CPT

## 2024-09-26 PROCEDURE — 97112 NEUROMUSCULAR REEDUCATION: CPT

## 2024-10-01 ENCOUNTER — TREATMENT (OUTPATIENT)
Dept: PHYSICAL THERAPY | Facility: CLINIC | Age: 69
End: 2024-10-01
Payer: MEDICARE

## 2024-10-01 DIAGNOSIS — R26.89 BALANCE PROBLEM: ICD-10-CM

## 2024-10-01 DIAGNOSIS — R53.1 GENERALIZED WEAKNESS: ICD-10-CM

## 2024-10-01 DIAGNOSIS — R29.3 POSTURE IMBALANCE: Primary | ICD-10-CM

## 2024-10-01 PROCEDURE — 97112 NEUROMUSCULAR REEDUCATION: CPT

## 2024-10-01 PROCEDURE — 97110 THERAPEUTIC EXERCISES: CPT

## 2024-10-03 ENCOUNTER — TREATMENT (OUTPATIENT)
Dept: PHYSICAL THERAPY | Facility: CLINIC | Age: 69
End: 2024-10-03
Payer: MEDICARE

## 2024-10-03 DIAGNOSIS — R53.1 GENERALIZED WEAKNESS: ICD-10-CM

## 2024-10-03 DIAGNOSIS — R29.3 POSTURE IMBALANCE: Primary | ICD-10-CM

## 2024-10-03 DIAGNOSIS — R26.89 BALANCE PROBLEM: ICD-10-CM

## 2024-10-03 PROCEDURE — 97112 NEUROMUSCULAR REEDUCATION: CPT

## 2024-10-03 PROCEDURE — 97110 THERAPEUTIC EXERCISES: CPT

## 2024-10-03 NOTE — PROGRESS NOTES
Physical Therapy Daily Treatment Note  [address] [place] [address]      Patient: Milagro Sanderson   : 1955  Referring practitioner: ELIAZAR Larios  Date of Initial Visit: Type: THERAPY  Noted: 2024  Today's Date: 10/6/2024  Patient seen for 7 sessions         Visit Diagnoses:    ICD-10-CM ICD-9-CM   1. Posture imbalance  R29.3 729.90   2. Balance problem  R26.89 781.99   3. Generalized weakness  R53.1 780.79       Subjective   Patient reports doing well but notes she is sore until almost her next visit. No significant LOB    Objective   See Exercise, Manual, and Modality Logs for complete treatment.   Gait: pt demonstrates improving co-ordination when walking in the clinic and less path deviation    Assessment/Plan  Pt without report of pain during treatment today and with minimal balance deviations that the pt was able to self correct. Pt continues to demonstrate improvements in L and R co-ordination with exercise challenges and to a quicker return to the pattern with errors. Pt will see the MD next week and will call to schedule an appt if more PT is ordered. Continue with current POT progressing pt per tolerance.     Timed:         Manual Therapy:         mins  79129;     Therapeutic Exercise:     13    mins  89778;     Neuromuscular Sylvia:  25    mins  58756;    Therapeutic Activity:          mins  81720;     Gait Training:           mins  28007;     Ultrasound:          mins  30305;    Ionto                                   mins   22587  Self Care                            mins   41744  Canalith Repos         mins 48952      Un-Timed:  Electrical Stimulation:        mins  39037 ( );  Dry Needling          mins self-pay  Traction          mins 30265      Timed Treatment:   38   mins   Total Treatment:     57   mins    Heydi Maldonado PT  [place] License: [id number]

## 2024-10-04 NOTE — PROGRESS NOTES
Physical Therapy Daily Treatment Note  [address] [place] [address]      Patient: Milagro Sanderson   : 1955  Referring practitioner: ELIAZAR Larios  Date of Initial Visit: Type: THERAPY  Noted: 2024  Today's Date: 10/4/2024  Patient seen for 6 sessions         Visit Diagnoses:    ICD-10-CM ICD-9-CM   1. Posture imbalance  R29.3 729.90   2. Balance problem  R26.89 781.99   3. Generalized weakness  R53.1 780.79       Subjective   Patient reports that she has not had any falls. Stays a little sore from the exercises but that is ok.    Objective   See Exercise, Manual, and Modality Logs for complete treatment.       Assessment/Plan  Pt tolerated treatment and demonstrated improved co-ordination with cross body motions Taking less time to get the right movement started and improved re-initiation if she got off track. Educated pt to continue to be mindful of her movements and to slow down if she becomes off balance rather than continuing on in her movement. Continue with current POT progressing pt per tolerance.     Timed:         Manual Therapy:         mins  84425;     Therapeutic Exercise:     27    mins  01680;     Neuromuscular Sylvia:  28    mins  13811;    Therapeutic Activity:          mins  46722;     Gait Training:           mins  31311;     Ultrasound:          mins  77017;    Ionto                                   mins   66551  Self Care                            mins   48585  Canalith Repos         mins 57740      Un-Timed:  Electrical Stimulation:        mins  58569 ( );  Dry Needling          mins self-pay  Traction          mins 96998      Timed Treatment:   55   mins   Total Treatment:     55   mins    Heydi Maldonado PT  [place] License: [id number]

## 2024-10-09 ENCOUNTER — TRANSCRIBE ORDERS (OUTPATIENT)
Dept: PHYSICAL THERAPY | Facility: CLINIC | Age: 69
End: 2024-10-09
Payer: MEDICARE

## 2024-10-09 DIAGNOSIS — G25.0 ESSENTIAL TREMOR: ICD-10-CM

## 2024-10-09 DIAGNOSIS — R29.3 POSTURE IMBALANCE: ICD-10-CM

## 2024-10-09 DIAGNOSIS — Z96.89 STATUS POST DEEP BRAIN STIMULATOR PLACEMENT: Primary | ICD-10-CM

## 2024-10-14 ENCOUNTER — TREATMENT (OUTPATIENT)
Dept: PHYSICAL THERAPY | Facility: CLINIC | Age: 69
End: 2024-10-14
Payer: MEDICARE

## 2024-10-14 DIAGNOSIS — R53.1 GENERALIZED WEAKNESS: ICD-10-CM

## 2024-10-14 DIAGNOSIS — R26.89 BALANCE PROBLEM: ICD-10-CM

## 2024-10-14 DIAGNOSIS — R29.3 POSTURE IMBALANCE: Primary | ICD-10-CM

## 2024-10-14 PROCEDURE — 97110 THERAPEUTIC EXERCISES: CPT

## 2024-10-14 PROCEDURE — 97112 NEUROMUSCULAR REEDUCATION: CPT

## 2024-10-14 NOTE — PROGRESS NOTES
Physical Therapy Daily Treatment Note  [address] [place] [address]      Patient: Milagro Sanderson   : 1955  Referring practitioner: ELIAZAR Larios  Date of Initial Visit: Type: THERAPY  Noted: 2024  Today's Date: 10/16/2024  Patient seen for 8 sessions         Visit Diagnoses:    ICD-10-CM ICD-9-CM   1. Posture imbalance  R29.3 729.90   2. Balance problem  R26.89 781.99   3. Generalized weakness  R53.1 780.79       Subjective   Patient reports still some LOB but states it was her own fault (pt never explained what she meant) and does not feel like her tremors are better. Notes she gets improvement while in the clinic but then as soon as she leaves the clinic she feels they are the same.    Objective   See Exercise, Manual, and Modality Logs for complete treatment.       Assessment/Plan  Pt continues to demonstrate improved LE coordination but does demonstrate a tremor still when reaching for items. Attempted to include some UE use in today's session to see if retraining of UE coordination along with LE can assist with improving her tremors the same way there has been improvement with her LE. Continue with current POT progressing pt per tolerance with a focus on UE and LE involvement to aid with improving overall coordination.     Timed:         Manual Therapy:         mins  88368;     Therapeutic Exercise:     24    mins  69647;     Neuromuscular Sylvia:  15    mins  61356;    Therapeutic Activity:          mins  01628;     Gait Training:           mins  62379;     Ultrasound:          mins  27371;    Ionto                                   mins   23915  Self Care                            mins   99809  Canalith Repos         mins 96573      Un-Timed:  Electrical Stimulation:        mins  73002 ( );  Dry Needling          mins self-pay  Traction          mins 29362      Timed Treatment:   39   mins   Total Treatment:     39   mins    Heydi Maldonado, PT  [place] License:  [id number]

## 2024-10-21 ENCOUNTER — TREATMENT (OUTPATIENT)
Dept: PHYSICAL THERAPY | Facility: CLINIC | Age: 69
End: 2024-10-21
Payer: MEDICARE

## 2024-10-21 DIAGNOSIS — R53.1 GENERALIZED WEAKNESS: ICD-10-CM

## 2024-10-21 DIAGNOSIS — R26.89 BALANCE PROBLEM: ICD-10-CM

## 2024-10-21 DIAGNOSIS — R29.3 POSTURE IMBALANCE: Primary | ICD-10-CM

## 2024-10-21 PROCEDURE — 97110 THERAPEUTIC EXERCISES: CPT

## 2024-10-21 PROCEDURE — 97112 NEUROMUSCULAR REEDUCATION: CPT

## 2024-10-21 NOTE — PROGRESS NOTES
Physical Therapy Daily Treatment Note  [address] [place] [address]      Patient: Milagro Sanderson   : 1955  Referring practitioner: ELIAZAR Larios  Date of Initial Visit: Type: THERAPY  Noted: 2024  Today's Date: 10/26/2024  Patient seen for 9 sessions         Visit Diagnoses:    ICD-10-CM ICD-9-CM   1. Posture imbalance  R29.3 729.90   2. Balance problem  R26.89 781.99   3. Generalized weakness  R53.1 780.79       Subjective   Patient reports she feels worse since having her adjustment and that her tremor and speech are worse now. No LOB/falls this week but does feel like her legs are not working as well as before the adjustment.    Objective   See Exercise, Manual, and Modality Logs for complete treatment.   Pt's speech noted to be more challenging than in previous sessions for her      Assessment/Plan  Pt without report of increased pain and with minimal LOB episodes still mostly posterior due to her posterior positioning of her trunk. Demonstrated balance challenges with band walk outs. Continue with current POT progressing pt per tolerance with continued focus on UE/LE co-ordination especially with balance challenges.     Timed:         Manual Therapy:         mins  69760;     Therapeutic Exercise:     24    mins  74290;     Neuromuscular Sylvia:  14    mins  83856;    Therapeutic Activity:         mins  84742;     Gait Training:           mins  85210;     Ultrasound:          mins  21921;    Ionto                                   mins   84738  Self Care                            mins   90787  Canalith Repos         mins 74722      Un-Timed:  Electrical Stimulation:        mins  08928 ( );  Dry Needling          mins self-pay  Traction          mins 50404      Timed Treatment:   38   mins   Total Treatment:     55   mins    Heydi Maldonado PT  [place] License: [id number]

## 2024-10-25 ENCOUNTER — TREATMENT (OUTPATIENT)
Dept: PHYSICAL THERAPY | Facility: CLINIC | Age: 69
End: 2024-10-25
Payer: MEDICARE

## 2024-10-25 DIAGNOSIS — R26.89 BALANCE PROBLEM: ICD-10-CM

## 2024-10-25 DIAGNOSIS — R53.1 GENERALIZED WEAKNESS: ICD-10-CM

## 2024-10-25 DIAGNOSIS — R29.3 POSTURE IMBALANCE: Primary | ICD-10-CM

## 2024-10-25 PROCEDURE — 97112 NEUROMUSCULAR REEDUCATION: CPT

## 2024-10-25 PROCEDURE — 97110 THERAPEUTIC EXERCISES: CPT

## 2024-10-28 ENCOUNTER — TREATMENT (OUTPATIENT)
Dept: PHYSICAL THERAPY | Facility: CLINIC | Age: 69
End: 2024-10-28
Payer: MEDICARE

## 2024-10-28 DIAGNOSIS — R29.3 POSTURE IMBALANCE: Primary | ICD-10-CM

## 2024-10-28 DIAGNOSIS — R53.1 GENERALIZED WEAKNESS: ICD-10-CM

## 2024-10-28 DIAGNOSIS — R26.89 BALANCE PROBLEM: ICD-10-CM

## 2024-10-28 PROCEDURE — 97112 NEUROMUSCULAR REEDUCATION: CPT

## 2024-10-28 PROCEDURE — 97110 THERAPEUTIC EXERCISES: CPT

## 2024-10-28 NOTE — PROGRESS NOTES
Physical Therapy Daily Treatment Note  [address] [place] [address]      Patient: Milagro Sanderson   : 1955  Referring practitioner: ELIAZAR Larios  Date of Initial Visit: Type: THERAPY  Noted: 2024  Today's Date: 10/28/2024  Patient seen for 11 sessions         Visit Diagnoses:    ICD-10-CM ICD-9-CM   1. Posture imbalance  R29.3 729.90   2. Balance problem  R26.89 781.99   3. Generalized weakness  R53.1 780.79       Subjective   Patient reports no LOB or falls. Exercises are still going ok.    Objective   See Exercise, Manual, and Modality Logs for complete treatment.       Assessment/Plan  Pt without report of increased pain. During treatment. Pt with most LOB episode as pt became fatigued with band press outs. Pt did demonstrate improved trunk control with sit to stands. Still has some difficulty co-ordinating arm swings and leg motions. Continue with current POT progressing pt per tolerance.    Timed:         Manual Therapy:         mins  58525;     Therapeutic Exercise:     26    mins  20771;     Neuromuscular Sylvia:  13    mins  34663;    Therapeutic Activity:          mins  71489;     Gait Training:           mins  55853;     Ultrasound:          mins  52879;    Ionto                                   mins   41359  Self Care                            mins   43713  Canalith Repos         mins 33877      Un-Timed:  Electrical Stimulation:        mins  02158 ( );  Dry Needling          mins self-pay  Traction          mins 99355      Timed Treatment:   39   mins   Total Treatment:     39   mins    Heydi Maldonado, PT  [place] License: [id number]

## 2024-11-01 ENCOUNTER — TREATMENT (OUTPATIENT)
Dept: PHYSICAL THERAPY | Facility: CLINIC | Age: 69
End: 2024-11-01
Payer: MEDICARE

## 2024-11-01 DIAGNOSIS — R53.1 GENERALIZED WEAKNESS: ICD-10-CM

## 2024-11-01 DIAGNOSIS — R26.89 BALANCE PROBLEM: ICD-10-CM

## 2024-11-01 DIAGNOSIS — R29.3 POSTURE IMBALANCE: Primary | ICD-10-CM

## 2024-11-01 PROCEDURE — 97112 NEUROMUSCULAR REEDUCATION: CPT

## 2024-11-01 PROCEDURE — 97110 THERAPEUTIC EXERCISES: CPT

## 2024-11-03 NOTE — PROGRESS NOTES
Physical Therapy Daily Treatment Note  [address] [place] [address]      Patient: Milagro Sanderson   : 1955  Referring practitioner: ELIAZAR Larios  Date of Initial Visit: Type: THERAPY  Noted: 2024  Today's Date: 11/3/2024  Patient seen for 12 sessions         Visit Diagnoses:    ICD-10-CM ICD-9-CM   1. Posture imbalance  R29.3 729.90   2. Balance problem  R26.89 781.99   3. Generalized weakness  R53.1 780.79       Subjective   Patient reports that she was stepping onto her porch to talk with a friend when she lost her balance backward and fell into a wall scraping her elbow. Notes no big changes in her tremors or voice.    Objective   See Exercise, Manual, and Modality Logs for complete treatment.     Assessment/Plan  Pt demonstrated improved trunk control during sit to stands today but does tend to still let her rib cage move forward over her hips causing a posterior lean which causes her to fall backward when she is even slightly off balance. Pt does continue with random deviations in gait or movement which do not seem to have a cause other than her ataxia, unsure if these will improve with PT but improved recovery of balance may help pt not have a full fall. Continue with current POT progressing pt per tolerance with a focus on core strength and improved COG stability.    Timed:         Manual Therapy:         mins  88602;     Therapeutic Exercise:     23    mins  92787;     Neuromuscular Sylvia:  15    mins  94150;    Therapeutic Activity:          mins  94104;     Gait Training:           mins  56310;     Ultrasound:          mins  51536;    Ionto                                   mins   58994  Self Care                            mins   06490  Canalith Repos         mins 79469      Un-Timed:  Electrical Stimulation:        mins  68901 ( );  Dry Needling          mins self-pay  Traction          mins 29293      Timed Treatment:   38   mins   Total Treatment:     52    mins    Heydi Maldonado, PT  [place] License: [id number]

## 2024-11-04 ENCOUNTER — TREATMENT (OUTPATIENT)
Dept: PHYSICAL THERAPY | Facility: CLINIC | Age: 69
End: 2024-11-04
Payer: MEDICARE

## 2024-11-04 DIAGNOSIS — R26.89 BALANCE PROBLEM: ICD-10-CM

## 2024-11-04 DIAGNOSIS — R29.3 POSTURE IMBALANCE: Primary | ICD-10-CM

## 2024-11-04 DIAGNOSIS — R53.1 GENERALIZED WEAKNESS: ICD-10-CM

## 2024-11-04 PROCEDURE — 97112 NEUROMUSCULAR REEDUCATION: CPT

## 2024-11-04 PROCEDURE — 97110 THERAPEUTIC EXERCISES: CPT

## 2024-11-04 NOTE — PROGRESS NOTES
Physical Therapy Daily Treatment Note  [address] [place] [address]      Patient: Milagro Sanderson   : 1955  Referring practitioner: ELIAZAR Larios  Date of Initial Visit: Type: THERAPY  Noted: 2024  Today's Date: 2024  Patient seen for 13 sessions         Visit Diagnoses:    ICD-10-CM ICD-9-CM   1. Posture imbalance  R29.3 729.90   2. Balance problem  R26.89 781.99   3. Generalized weakness  R53.1 780.79       Subjective   Patient reports no significant falls but some LOB. Notes still mostly feels like she falls backward.    Objective   See Exercise, Manual, and Modality Logs for complete treatment.       Assessment/Plan  Pt with 1 significant LOB during hurdles when she took too big of a step. Pt also has some difficulty with band walkouts when her hands are away from her COG. Pt continues with difficulty with core control especially if she has to reach up over her head. Pt's LOB are inconsistent and difficult to predict the cause of. Pt does move too quickly which causes some of her LOB. Pt is becoming better at correcting her small LOB episodes. Continue with current POT progressing pt per tolerance.    Timed:         Manual Therapy:         mins  83830;     Therapeutic Exercise:   23      mins  55670;     Neuromuscular Sylvia:  15    mins  14756;    Therapeutic Activity:          mins  87075;     Gait Training:           mins  96550;     Ultrasound:          mins  92301;    Ionto                                   mins   89512  Self Care                            mins   55112  Canalith Repos         mins 98452      Un-Timed:  Electrical Stimulation:        mins  24702 ( );  Dry Needling          mins self-pay  Traction          mins 09796      Timed Treatment:   38   mins   Total Treatment:     54   mins    Heydi Maldonado PT  [place] License: [id number]

## 2024-11-08 ENCOUNTER — TREATMENT (OUTPATIENT)
Dept: PHYSICAL THERAPY | Facility: CLINIC | Age: 69
End: 2024-11-08
Payer: MEDICARE

## 2024-11-08 DIAGNOSIS — R53.1 GENERALIZED WEAKNESS: ICD-10-CM

## 2024-11-08 DIAGNOSIS — R29.3 POSTURE IMBALANCE: Primary | ICD-10-CM

## 2024-11-08 DIAGNOSIS — R26.89 BALANCE PROBLEM: ICD-10-CM

## 2024-11-08 PROCEDURE — 97112 NEUROMUSCULAR REEDUCATION: CPT

## 2024-11-08 PROCEDURE — 97110 THERAPEUTIC EXERCISES: CPT

## 2024-11-08 NOTE — PROGRESS NOTES
Physical Therapy Daily Treatment Note  [address] [place] [address]      Patient: Milagro Sanderson   : 1955  Referring practitioner: ELIAZAR Larios  Date of Initial Visit: Type: THERAPY  Noted: 2024  Today's Date: 2024  Patient seen for 14 sessions         Visit Diagnoses:    ICD-10-CM ICD-9-CM   1. Posture imbalance  R29.3 729.90   2. Balance problem  R26.89 781.99   3. Generalized weakness  R53.1 780.79       Subjective   Patient reports no falls. Notes she feels better after therapy and feels stronger.     Objective   See Exercise, Manual, and Modality Logs for complete treatment.   B LE MMT: Hip flexion B 4-/5, hip abd 4-/5, knee flexion 4/5  knee ext 4+, DF 5/5 PF 4+ sitting     Assessment/Plan  Pt has met STG#1,2,4 (drop adduction MMT goal). Remaining goals are still appropriate and pt is making progress toward them however due to her ataxia she still requires cuing for trunk positioning and has random LOB episodes but does demonstrate overall improvement in body co-ordination. Educated pt regarding safety with performance of heel/toe walking and SLS at home. Continue with current POT progressing pt per tolerance with increasingly challenging balance work.    Timed:         Manual Therapy:         mins  50273;     Therapeutic Exercise:     14    mins  26295;     Neuromuscular Sylvia:  24    mins  78765;    Therapeutic Activity:          mins  81785;     Gait Training:           mins  70893;     Ultrasound:          mins  51701;    Ionto                                   mins   89089  Self Care                            mins   05824  Canalith Repos         mins 97294      Un-Timed:  Electrical Stimulation:        mins  87895 ( );  Dry Needling          mins self-pay  Traction          mins 20917      Timed Treatment:   38   mins   Total Treatment:     50   mins    Heydi Maldonado, PT  [place] License: [id number]

## 2024-11-18 ENCOUNTER — TREATMENT (OUTPATIENT)
Dept: PHYSICAL THERAPY | Facility: CLINIC | Age: 69
End: 2024-11-18
Payer: MEDICARE

## 2024-11-18 DIAGNOSIS — R29.3 POSTURE IMBALANCE: Primary | ICD-10-CM

## 2024-11-18 DIAGNOSIS — R53.1 GENERALIZED WEAKNESS: ICD-10-CM

## 2024-11-18 DIAGNOSIS — R26.89 BALANCE PROBLEM: ICD-10-CM

## 2024-11-18 PROCEDURE — 97110 THERAPEUTIC EXERCISES: CPT

## 2024-11-18 PROCEDURE — 97112 NEUROMUSCULAR REEDUCATION: CPT

## 2024-11-18 NOTE — PROGRESS NOTES
Physical Therapy Daily Treatment Note  [address] [place] [address]      Patient: Milagro Sanderson   : 1955  Referring practitioner: ELIAZAR Larios  Date of Initial Visit: Type: THERAPY  Noted: 2024  Today's Date: 2024  Patient seen for 15 sessions         Visit Diagnoses:    ICD-10-CM ICD-9-CM   1. Posture imbalance  R29.3 729.90   2. Balance problem  R26.89 781.99   3. Generalized weakness  R53.1 780.79       Subjective   Patient reports that she has not had any falls. Performing the tandem walking and SLS vary from day to day and she is unsure why some days are good and some are not. Notes she is joining the [facility] and planning to do [facility]      Objective   See Exercise, Manual, and Modality Logs for complete treatment.     Assessment/Plan  Pt with only one minimal LOB when she did not leave enough space for her foot while performing hurdles. Pt was able to self correct even with addition of more challenging balance exercises.. Pt continues to demonstrate improving so-ordination and balance. Does still require cuing for trunk positioning over her hips to aid with posterior balance loss. Continue with current POT progressing pt per tolerance.    Timed:         Manual Therapy:         mins  62973;     Therapeutic Exercise:    10     mins  06157;     Neuromuscular Sylvia:  28    mins  37921;    Therapeutic Activity:          mins  76786;     Gait Training:          mins  92283;     Ultrasound:          mins  02037;    Ionto                                   mins   15675  Self Care                            mins   93829  Canalith Repos         mins 61714      Un-Timed:  Electrical Stimulation:        mins  70253 ( );  Dry Needling          mins self-pay  Traction          mins 94471      Timed Treatment:   38   mins   Total Treatment:     50   mins    Heydi Maldonado PT  [place] License: [id number]

## 2024-11-22 ENCOUNTER — TREATMENT (OUTPATIENT)
Dept: PHYSICAL THERAPY | Facility: CLINIC | Age: 69
End: 2024-11-22
Payer: MEDICARE

## 2024-11-22 DIAGNOSIS — R29.3 POSTURE IMBALANCE: Primary | ICD-10-CM

## 2024-11-22 DIAGNOSIS — R26.89 BALANCE PROBLEM: ICD-10-CM

## 2024-11-22 DIAGNOSIS — R53.1 GENERALIZED WEAKNESS: ICD-10-CM

## 2024-11-22 PROCEDURE — 97112 NEUROMUSCULAR REEDUCATION: CPT

## 2024-11-22 PROCEDURE — 97110 THERAPEUTIC EXERCISES: CPT

## 2024-11-24 NOTE — PROGRESS NOTES
"Physical Therapy Daily Treatment Note  [address] [place] [address]      Patient: Milagro Sanderson   : 1955  Referring practitioner: ELIAZAR Larios  Date of Initial Visit: Type: THERAPY  Noted: 2024  Today's Date: 2024  Patient seen for 16 sessions         Visit Diagnoses:    ICD-10-CM ICD-9-CM   1. Posture imbalance  R29.3 729.90   2. Balance problem  R26.89 781.99   3. Generalized weakness  R53.1 780.79       Subjective   Patient reports she has been good and has not had any falls. Went to [employer] and did most of it sitting down because the chairs were all rolling chairs so she could not hold onto it for balance.    Objective   See Exercise, Manual, and Modality Logs for complete treatment.     Assessment/Plan  Pt without significant LOB and is able to self correct small LOB including with addition of weight shifting into a SLS position as well as performance of fwd/bkwd tandem walking. Educated pt to continue to work on her strength and safe balance exercises for home. Discussed asking for a non- rolling chair at [employer] so she can better participate. Pt is making improvements in self correcting LOB however she does still tend to move her rib cage forward causing her COG to not be over her feet. Pt can self correct but also needs cuing for \"ribs over hips\" and activating her core muscle. Continue with current POT progressing pt per tolerance.    Timed:         Manual Therapy:         mins  11517;     Therapeutic Exercise:     10    mins  87968;     Neuromuscular Sylvia:  13    mins  52933;    Therapeutic Activity:          mins  42641;     Gait Training:           mins  86561;     Ultrasound:          mins  70726;    Ionto                                   mins   09706  Self Care                            mins   44153  Canalith Repos         mins 89221      Un-Timed:  Electrical Stimulation:        mins  41301 ( );  Dry Needling          mins self-pay  Traction    "       mins 95545      Timed Treatment:   23   mins   Total Treatment:     50   mins    Heydi Maldonado, PT  [place] License: [id number]

## 2024-11-25 ENCOUNTER — TREATMENT (OUTPATIENT)
Dept: PHYSICAL THERAPY | Facility: CLINIC | Age: 69
End: 2024-11-25
Payer: MEDICARE

## 2024-11-25 DIAGNOSIS — R26.89 BALANCE PROBLEM: ICD-10-CM

## 2024-11-25 DIAGNOSIS — R53.1 GENERALIZED WEAKNESS: ICD-10-CM

## 2024-11-25 DIAGNOSIS — R29.3 POSTURE IMBALANCE: Primary | ICD-10-CM

## 2024-11-25 PROCEDURE — 97110 THERAPEUTIC EXERCISES: CPT

## 2024-11-25 PROCEDURE — 97112 NEUROMUSCULAR REEDUCATION: CPT

## 2024-11-25 NOTE — PROGRESS NOTES
Physical Therapy Daily Treatment Note  [address] [place] [address]      Patient: Milagro Sanderson   : 1955  Referring practitioner: ELIAZAR Larios  Date of Initial Visit: Type: THERAPY  Noted: 2024  Today's Date: 2024  Patient seen for 17 sessions         Visit Diagnoses:    ICD-10-CM ICD-9-CM   1. Posture imbalance  R29.3 729.90   2. Balance problem  R26.89 781.99   3. Generalized weakness  R53.1 780.79       Subjective   Patient reports she has been able to do more than 1 chore in a day. Notes no falls or significant LOB episodes. Feels she is ready to use her HEP and the [facility] center to maintain her progress.    Objective   See Exercise, Manual, and Modality Logs for complete treatment.   TUG 8 sec  Sit to stands 5 in 9 sec  B LE MMT: hip flexion 5/5, knee flexion 4+/5, knee ext 5/5, DF 5/5    Assessment/Plan  Pt has met all STG and all LTG at this time with just occasional verbal or tactile cuing for body positioning required. Pt has made excellent progress. Educated pt regarding the importance of continuing with her HEP especially safe for home balance activities to maintain the gains she has made. Continue with current POT for 1 more session for HEP review.    Timed:         Manual Therapy:         mins  46631;     Therapeutic Exercise:     12    mins  07983;     Neuromuscular ySlvia:  26    mins  93240;    Therapeutic Activity:          mins  10105;     Gait Training:           mins  00874;     Ultrasound:          mins  40409;    Ionto                                   mins   64701  Self Care                            mins   00494  Canalith Repos         mins 66131      Un-Timed:  Electrical Stimulation:        mins  83577 ( );  Dry Needling          mins self-pay  Traction          mins 67209      Timed Treatment:   38   mins   Total Treatment:     53   mins    Heydi Maldonado, PT  [place] License: [id number]

## 2024-11-27 ENCOUNTER — TREATMENT (OUTPATIENT)
Dept: PHYSICAL THERAPY | Facility: CLINIC | Age: 69
End: 2024-11-27
Payer: MEDICARE

## 2024-11-27 DIAGNOSIS — R26.89 BALANCE PROBLEM: ICD-10-CM

## 2024-11-27 DIAGNOSIS — R29.3 POSTURE IMBALANCE: Primary | ICD-10-CM

## 2024-11-27 DIAGNOSIS — R53.1 GENERALIZED WEAKNESS: ICD-10-CM

## 2024-11-27 PROCEDURE — 97112 NEUROMUSCULAR REEDUCATION: CPT

## 2024-11-27 PROCEDURE — 97110 THERAPEUTIC EXERCISES: CPT

## 2024-11-27 NOTE — PROGRESS NOTES
Physical Therapy Daily Treatment Note  [address] [place] [address]      Patient: Milagro Sanderson   : 1955  Referring practitioner: ELIAZAR Larios  Date of Initial Visit: Type: THERAPY  Noted: 2024  Today's Date: 2024  Patient seen for 18 sessions         Visit Diagnoses:    ICD-10-CM ICD-9-CM   1. Posture imbalance  R29.3 729.90   2. Balance problem  R26.89 781.99   3. Generalized weakness  R53.1 780.79       Subjective   Patient reports she is doing well and feels ready to work with her HEP.     Objective   See Exercise, Manual, and Modality Logs for complete treatment.     Assessment/Plan  Pt without report of pain or LOB during treatment today. Updated pt's HEP to include all exercises performed in the clinic including balance exercises that are safe for pt. Educated pt to make sure she is safe and at a counter when working on balance activities.See last note for pt's met goals (pt has met all goals). DC pt from PT.    Timed:         Manual Therapy:         mins  69511;     Therapeutic Exercise:     20    mins  37965;     Neuromuscular Sylvia:  10    mins  15202;    Therapeutic Activity:          mins  00157;     Gait Training:           mins  92784;     Ultrasound:          mins  09502;    Ionto                                   mins   39931  Self Care                            mins   01124  Canalith Repos         mins 30640      Un-Timed:  Electrical Stimulation:        mins  43066 ( );  Dry Needling          mins self-pay  Traction          mins 19173      Timed Treatment:   30   mins   Total Treatment:     48   mins    Heydi Maldonado PT  [place] License: [id number]

## 2024-12-16 ENCOUNTER — HOSPITAL ENCOUNTER (OUTPATIENT)
Dept: MAMMOGRAPHY | Facility: HOSPITAL | Age: 69
Discharge: HOME | End: 2024-12-16
Admitting: FAMILY MEDICINE
Payer: MEDICARE

## 2024-12-16 DIAGNOSIS — Z12.31 ENCOUNTER FOR SCREENING MAMMOGRAM FOR BREAST CANCER: ICD-10-CM

## 2024-12-16 PROCEDURE — 77063 BREAST TOMOSYNTHESIS BI: CPT

## 2024-12-16 PROCEDURE — 77067 SCR MAMMO BI INCL CAD: CPT

## 2024-12-17 PROCEDURE — 77063 BREAST TOMOSYNTHESIS BI: CPT | Performed by: RADIOLOGY

## 2024-12-17 PROCEDURE — 77067 SCR MAMMO BI INCL CAD: CPT | Performed by: RADIOLOGY

## 2025-04-07 ENCOUNTER — TELEPHONE (OUTPATIENT)
Dept: INTERNAL MEDICINE | Facility: CLINIC | Age: 70
End: 2025-04-07
Payer: MEDICARE

## 2025-04-07 NOTE — TELEPHONE ENCOUNTER
Caller: Milagro Sanderson    Relationship: Self    Best call back number: [phone]     What orders are you requesting (i.e. lab or imaging): ROUTINE FASTING LAB ORDER    In what timeframe would the patient need to come in: AS SOON AS POSSIBLE     Where will you receive your lab/imaging services: IN OFFICE     Additional notes: PATIENT WOULD LIKE FOR LAB ORDER'S TO BE PLACED TO HAVE DRAWN BEFORE SCHEDULED APPOINTMENT 04/21/2025   Kenalog Preparation: Kenalog

## 2025-04-07 NOTE — TELEPHONE ENCOUNTER
Spoke with patient advised we wait until the appt to be sure everything is ordered that is needed, she voiced understanding.

## 2025-04-21 ENCOUNTER — OFFICE VISIT (OUTPATIENT)
Dept: INTERNAL MEDICINE | Facility: CLINIC | Age: 70
End: 2025-04-21
Payer: MEDICARE

## 2025-04-21 VITALS
HEART RATE: 75 BPM | RESPIRATION RATE: 20 BRPM | TEMPERATURE: 98 F | OXYGEN SATURATION: 98 % | DIASTOLIC BLOOD PRESSURE: 68 MMHG | SYSTOLIC BLOOD PRESSURE: 140 MMHG | HEIGHT: 67 IN | BODY MASS INDEX: 22.91 KG/M2 | WEIGHT: 146 LBS

## 2025-04-21 DIAGNOSIS — R26.2 DIFFICULTY WALKING: ICD-10-CM

## 2025-04-21 DIAGNOSIS — G25.0 ESSENTIAL TREMOR: ICD-10-CM

## 2025-04-21 DIAGNOSIS — E55.9 VITAMIN D DEFICIENCY: ICD-10-CM

## 2025-04-21 DIAGNOSIS — F33.40 RECURRENT MAJOR DEPRESSIVE DISORDER, IN REMISSION: Primary | ICD-10-CM

## 2025-04-21 DIAGNOSIS — Z91.81 AT MODERATE RISK FOR FALL: ICD-10-CM

## 2025-04-21 DIAGNOSIS — E53.8 VITAMIN B12 DEFICIENCY: ICD-10-CM

## 2025-04-21 PROCEDURE — 1160F RVW MEDS BY RX/DR IN RCRD: CPT | Performed by: FAMILY MEDICINE

## 2025-04-21 PROCEDURE — G2211 COMPLEX E/M VISIT ADD ON: HCPCS | Performed by: FAMILY MEDICINE

## 2025-04-21 PROCEDURE — 1159F MED LIST DOCD IN RCRD: CPT | Performed by: FAMILY MEDICINE

## 2025-04-21 PROCEDURE — 99214 OFFICE O/P EST MOD 30 MIN: CPT | Performed by: FAMILY MEDICINE

## 2025-04-21 PROCEDURE — 1126F AMNT PAIN NOTED NONE PRSNT: CPT | Performed by: FAMILY MEDICINE

## 2025-04-21 RX ORDER — CHLORAL HYDRATE 500 MG
1000 CAPSULE ORAL
COMMUNITY

## 2025-04-21 NOTE — PROGRESS NOTES
"Chief Complaint  Depression (Follow up visit)    Subjective        Milagro Sanderson presents to [facility]  History of Present Illness  Mood okay  Has been taking Zoloft 100 mg every other day  May try to come off  Doesn't feel need for gabapentin  Worries about fall risk, going to stop taking  Has been taking one every other day  Continues to aparicio tremor despite deep brain stimulator, has follow up with neurology scheduled.      Objective   Vital Signs:  /68   Pulse 75   Temp 98 °F (36.7 °C)   Resp 20   Ht 170.2 cm (67.01\")   Wt 66.2 kg (146 lb)   SpO2 98%   BMI 22.86 kg/m²   Estimated body mass index is 22.86 kg/m² as calculated from the following:    Height as of this encounter: 170.2 cm (67.01\").    Weight as of this encounter: 66.2 kg (146 lb).          Physical Exam  Vitals and nursing note reviewed.   Constitutional:       General: She is not in acute distress.     Appearance: Normal appearance. She is well-developed and well-groomed. She is not ill-appearing, toxic-appearing or diaphoretic.   HENT:      Head: Normocephalic and atraumatic.      Right Ear: Hearing normal.      Left Ear: Hearing normal.   Eyes:      General: Lids are normal. No scleral icterus.     Extraocular Movements: Extraocular movements intact.   Neck:      Trachea: Phonation normal.   Pulmonary:      Effort: Pulmonary effort is normal.   Musculoskeletal:      Cervical back: Neck supple.   Skin:     Coloration: Skin is not jaundiced or pale.   Neurological:      General: No focal deficit present.      Mental Status: She is alert and oriented to person, place, and time.      Motor: Motor function is intact.   Psychiatric:         Attention and Perception: Attention and perception normal.         Mood and Affect: Mood and affect normal.         Speech: Speech normal.         Behavior: Behavior normal. Behavior is cooperative.         Thought Content: Thought content normal.         Cognition and " Memory: Cognition and memory normal.         Judgment: Judgment normal.          Result Review :  The following data was reviewed by: Mae Valle MD on 04/16/2025:  Lab Results   Component Value Date    TSH 3.440 08/09/2024      3/20/25  neurology notes. ELIAZAR Loomis, Wagner Carlton MD           Assessment and Plan   Diagnoses and all orders for this visit:    1. Recurrent major depressive disorder, in remission (Primary)  Overview:  Past therapy: Lexapro, Prozac, Zoloft       Assessment & Plan:  Doing well. Can stop Zoloft if she wants to. If feels medicine needs back on can do 50 mg daily, every other day doing will not be as effective. She plans to continue Remeron as it helps her sleep.      2. Essential tremor  Overview:  Deep brain stimulator placed at , 2/28/24  Previous use of primidone, propranolol    Assessment & Plan:  Follow up with       3. Vitamin B12 deficiency  Assessment & Plan:  Recommend b complex or vitamin B12 daily, 500-1000 mcg daily.      4. Vitamin D deficiency  Assessment & Plan:  Discussed supplement over the counter, max 1,000-2,000 IU daily. Higher doses may correlate with increased fall risks.      5. Difficulty walking  Comments:  information via AVS regarding fall risk    6. At moderate risk for fall  Comments:  information via AVS regarding fall risk                  Follow Up   Return in about 18 weeks (around 8/25/2025) for Medicare Wellness, fasting labs 2-7 days prior to visit.  Patient was given instructions and counseling regarding her condition or for health maintenance advice. Please see specific information pulled into the AVS if appropriate.

## 2025-04-21 NOTE — ASSESSMENT & PLAN NOTE
Doing well. Can stop Zoloft if she wants to. If feels medicine needs back on can do 50 mg daily, every other day doing will not be as effective. She plans to continue Remeron as it helps her sleep.   Detail Level: Detailed Quality 110: Preventive Care And Screening: Influenza Immunization: Influenza Immunization previously received during influenza season Quality 130: Documentation Of Current Medications In The Medical Record: Current Medications Documented Quality 226: Preventive Care And Screening: Tobacco Use: Screening And Cessation Intervention: Patient screened for tobacco and never smoked

## 2025-04-21 NOTE — ASSESSMENT & PLAN NOTE
Discussed supplement over the counter, max 1,000-2,000 IU daily. Higher doses may correlate with increased fall risks.

## 2025-08-18 ENCOUNTER — TELEPHONE (OUTPATIENT)
Dept: INTERNAL MEDICINE | Facility: CLINIC | Age: 70
End: 2025-08-18
Payer: MEDICARE

## 2025-08-28 ENCOUNTER — OFFICE VISIT (OUTPATIENT)
Dept: INTERNAL MEDICINE | Facility: CLINIC | Age: 70
End: 2025-08-28
Payer: MEDICARE

## 2025-08-28 VITALS
HEIGHT: 67 IN | TEMPERATURE: 97.5 F | BODY MASS INDEX: 22.19 KG/M2 | WEIGHT: 141.4 LBS | OXYGEN SATURATION: 98 % | DIASTOLIC BLOOD PRESSURE: 74 MMHG | SYSTOLIC BLOOD PRESSURE: 118 MMHG | HEART RATE: 70 BPM

## 2025-08-28 DIAGNOSIS — Z53.20 COLON CANCER SCREENING DECLINED: ICD-10-CM

## 2025-08-28 DIAGNOSIS — E53.8 VITAMIN B12 DEFICIENCY: ICD-10-CM

## 2025-08-28 DIAGNOSIS — Z00.01 ENCOUNTER FOR MEDICARE ANNUAL EXAMINATION WITH ABNORMAL FINDINGS: ICD-10-CM

## 2025-08-28 DIAGNOSIS — R79.9 ABNORMAL BLOOD CHEMISTRY: ICD-10-CM

## 2025-08-28 DIAGNOSIS — E78.1 HYPERTRIGLYCERIDEMIA: ICD-10-CM

## 2025-08-28 DIAGNOSIS — G47.09 OTHER INSOMNIA: ICD-10-CM

## 2025-08-28 DIAGNOSIS — Z13.6 SCREENING FOR CARDIOVASCULAR CONDITION: ICD-10-CM

## 2025-08-28 DIAGNOSIS — F33.0 MILD EPISODE OF RECURRENT MAJOR DEPRESSIVE DISORDER: ICD-10-CM

## 2025-08-28 DIAGNOSIS — Z00.00 MEDICARE ANNUAL WELLNESS VISIT, SUBSEQUENT: Primary | ICD-10-CM

## 2025-08-28 DIAGNOSIS — R94.6 ABNORMAL THYROID FUNCTION TEST: ICD-10-CM

## 2025-08-28 DIAGNOSIS — F41.1 GENERALIZED ANXIETY DISORDER: ICD-10-CM

## 2025-08-28 DIAGNOSIS — E55.9 VITAMIN D DEFICIENCY: ICD-10-CM

## 2025-08-28 DIAGNOSIS — G25.0 ESSENTIAL TREMOR: ICD-10-CM

## 2025-08-28 DIAGNOSIS — Z91.81 AT MODERATE RISK FOR FALL: ICD-10-CM

## 2025-08-28 DIAGNOSIS — E53.1 VITAMIN B6 DEFICIENCY: ICD-10-CM

## 2025-08-28 DIAGNOSIS — Z51.81 MEDICATION MONITORING ENCOUNTER: ICD-10-CM

## 2025-08-28 RX ORDER — SERTRALINE HYDROCHLORIDE 100 MG/1
100 TABLET, FILM COATED ORAL DAILY
Qty: 90 TABLET | Refills: 3 | Status: SHIPPED | OUTPATIENT
Start: 2025-08-28

## 2025-08-28 RX ORDER — MIRTAZAPINE 7.5 MG/1
7.5 TABLET, FILM COATED ORAL NIGHTLY
Qty: 90 TABLET | Refills: 3 | Status: SHIPPED | OUTPATIENT
Start: 2025-08-28

## (undated) DEVICE — CONMED SCOPE SAVER BITE BLOCK, 20X27 MM: Brand: SCOPE SAVER

## (undated) DEVICE — 2000CC GUARDIAN II: Brand: GUARDIAN

## (undated) DEVICE — ENDOGATOR AUXILIARY WATER JET CONNECTOR: Brand: ENDOGATOR

## (undated) DEVICE — Device

## (undated) DEVICE — FRCP BIOP COLD ENDOJAW ALLGTR W/NDL 2.8X2300MM BLU

## (undated) DEVICE — KT ORCA VLV SXN AIR/H2O W/SEAL 1P/U STRL